# Patient Record
Sex: FEMALE | Race: WHITE | NOT HISPANIC OR LATINO | Employment: PART TIME | URBAN - METROPOLITAN AREA
[De-identification: names, ages, dates, MRNs, and addresses within clinical notes are randomized per-mention and may not be internally consistent; named-entity substitution may affect disease eponyms.]

---

## 2017-09-03 DIAGNOSIS — I10 ESSENTIAL (PRIMARY) HYPERTENSION: ICD-10-CM

## 2017-09-03 DIAGNOSIS — O24.419 GESTATIONAL DIABETES MELLITUS IN PREGNANCY: ICD-10-CM

## 2017-09-03 DIAGNOSIS — D25.9 LEIOMYOMA OF UTERUS: ICD-10-CM

## 2018-01-24 ENCOUNTER — APPOINTMENT (OUTPATIENT)
Dept: LAB | Facility: HOSPITAL | Age: 43
End: 2018-01-24
Payer: COMMERCIAL

## 2018-01-24 ENCOUNTER — OFFICE VISIT (OUTPATIENT)
Dept: OBGYN CLINIC | Facility: CLINIC | Age: 43
End: 2018-01-24
Payer: COMMERCIAL

## 2018-01-24 VITALS
DIASTOLIC BLOOD PRESSURE: 84 MMHG | SYSTOLIC BLOOD PRESSURE: 122 MMHG | BODY MASS INDEX: 27.21 KG/M2 | HEIGHT: 63 IN | WEIGHT: 153.6 LBS

## 2018-01-24 DIAGNOSIS — Z01.419 ENCOUNTER FOR ANNUAL ROUTINE GYNECOLOGICAL EXAMINATION: Primary | ICD-10-CM

## 2018-01-24 DIAGNOSIS — Z12.31 ENCOUNTER FOR SCREENING MAMMOGRAM FOR BREAST CANCER: ICD-10-CM

## 2018-01-24 PROCEDURE — 99396 PREV VISIT EST AGE 40-64: CPT | Performed by: OBSTETRICS & GYNECOLOGY

## 2018-01-24 PROCEDURE — G0145 SCR C/V CYTO,THINLAYER,RESCR: HCPCS | Performed by: OBSTETRICS & GYNECOLOGY

## 2018-01-24 PROCEDURE — 87624 HPV HI-RISK TYP POOLED RSLT: CPT | Performed by: OBSTETRICS & GYNECOLOGY

## 2018-01-24 RX ORDER — IRBESARTAN 300 MG/1
1 TABLET ORAL DAILY
COMMUNITY
Start: 2014-09-08 | End: 2018-03-27 | Stop reason: SDUPTHER

## 2018-01-24 RX ORDER — NIFEDIPINE 60 MG/1
2 TABLET, EXTENDED RELEASE ORAL DAILY
COMMUNITY
Start: 2011-03-29 | End: 2018-03-27 | Stop reason: SDUPTHER

## 2018-01-24 RX ORDER — SERTRALINE HYDROCHLORIDE 100 MG/1
1.5 TABLET, FILM COATED ORAL DAILY
COMMUNITY
Start: 2015-07-13 | End: 2018-03-27 | Stop reason: SDUPTHER

## 2018-01-24 NOTE — PATIENT INSTRUCTIONS
The patient was instructed to keep will exercises, she will make arrangements for mammogram now, she should continue to try to stop smoking she may see to help with her primary physician

## 2018-01-24 NOTE — PROGRESS NOTES
This is a 72-year-old white female, she is a  3 para 3 with 1 vaginal birth followed by 2  sections and a tubal ligation  Her current method of contraception includes tubal ligation  Her menstrual cycles are regular and predictable  She denies any problems with intimacy  Does admit to occasional problem with slight stress urine incontinence  She denies any major  GI complaint  The patient denies any new major family illnesses  She will need to get a mammogram soon  She also has a history of an abnormal Pap smear in the past   She has not been seen in this office for over 2 years  She will need to make arrangements for Pap smear today  The patient states she sees a dentist on a regular basis  She is happy with her weight and exercises on a regular basis  The patient still smoking up to a half pack a day  We had a discussion about stopping she may consider  She is being treated medically for depression and anxiety and for hypertension    Review of systems essentially negative  She does admit to slight stress urine incontinence, this is not interfering with her lifestyle  Family history negative  Social history positive for alcohol and tobacco     Surgical history is positive for  sections x2, history of tubal ligation    Physical exam shows a well-developed well-nourished white female acute distress her lungs clear abdomen soft there are no masses pelvic exam the external genitalia normal limits the vagina is clean, the uterus is retroverted slightly enlarge, there is a slight uterine prolapse  The vagina is otherwise unremarkable  The bladder is well supported  Pap smear was performed

## 2018-01-30 LAB — HPV RRNA GENITAL QL NAA+PROBE: NORMAL

## 2018-01-31 LAB
LAB AP GYN PRIMARY INTERPRETATION: NORMAL
Lab: NORMAL
PATH INTERP SPEC-IMP: NORMAL

## 2018-03-27 DIAGNOSIS — Z12.31 ENCOUNTER FOR SCREENING MAMMOGRAM FOR BREAST CANCER: ICD-10-CM

## 2018-03-27 DIAGNOSIS — Z01.419 ENCOUNTER FOR ANNUAL ROUTINE GYNECOLOGICAL EXAMINATION: ICD-10-CM

## 2018-03-28 RX ORDER — NIFEDIPINE 60 MG/1
TABLET, EXTENDED RELEASE ORAL
Qty: 60 TABLET | Refills: 0 | Status: SHIPPED | OUTPATIENT
Start: 2018-03-28 | End: 2018-08-21 | Stop reason: SDUPTHER

## 2018-03-28 RX ORDER — SERTRALINE HYDROCHLORIDE 100 MG/1
TABLET, FILM COATED ORAL
Qty: 45 TABLET | Refills: 0 | Status: SHIPPED | OUTPATIENT
Start: 2018-03-28 | End: 2018-08-21 | Stop reason: SDUPTHER

## 2018-03-28 RX ORDER — IRBESARTAN 300 MG/1
TABLET ORAL
Qty: 30 TABLET | Refills: 0 | Status: SHIPPED | OUTPATIENT
Start: 2018-03-28 | End: 2018-08-21 | Stop reason: SDUPTHER

## 2018-08-16 ENCOUNTER — APPOINTMENT (OUTPATIENT)
Dept: LAB | Facility: HOSPITAL | Age: 43
End: 2018-08-16
Payer: COMMERCIAL

## 2018-08-16 DIAGNOSIS — D25.9 LEIOMYOMA OF UTERUS: ICD-10-CM

## 2018-08-16 DIAGNOSIS — I10 ESSENTIAL (PRIMARY) HYPERTENSION: ICD-10-CM

## 2018-08-16 DIAGNOSIS — O24.419 GESTATIONAL DIABETES MELLITUS IN PREGNANCY: ICD-10-CM

## 2018-08-16 LAB
ALBUMIN SERPL BCP-MCNC: 3.7 G/DL (ref 3.5–5)
ALP SERPL-CCNC: 73 U/L (ref 46–116)
ALT SERPL W P-5'-P-CCNC: 13 U/L (ref 12–78)
ANION GAP SERPL CALCULATED.3IONS-SCNC: 6 MMOL/L (ref 4–13)
AST SERPL W P-5'-P-CCNC: 11 U/L (ref 5–45)
BILIRUB SERPL-MCNC: 0.4 MG/DL (ref 0.2–1)
BUN SERPL-MCNC: 10 MG/DL (ref 5–25)
CALCIUM SERPL-MCNC: 8.8 MG/DL (ref 8.3–10.1)
CHLORIDE SERPL-SCNC: 106 MMOL/L (ref 100–108)
CHOLEST SERPL-MCNC: 171 MG/DL (ref 50–200)
CO2 SERPL-SCNC: 25 MMOL/L (ref 21–32)
CREAT SERPL-MCNC: 0.51 MG/DL (ref 0.6–1.3)
ERYTHROCYTE [DISTWIDTH] IN BLOOD BY AUTOMATED COUNT: 16.2 % (ref 11.6–15.1)
EST. AVERAGE GLUCOSE BLD GHB EST-MCNC: 117 MG/DL
GFR SERPL CREATININE-BSD FRML MDRD: 119 ML/MIN/1.73SQ M
GLUCOSE P FAST SERPL-MCNC: 87 MG/DL (ref 65–99)
HBA1C MFR BLD: 5.7 % (ref 4.2–6.3)
HCT VFR BLD AUTO: 38.9 % (ref 34.8–46.1)
HDLC SERPL-MCNC: 43 MG/DL (ref 40–60)
HGB BLD-MCNC: 12.2 G/DL (ref 11.5–15.4)
LDLC SERPL CALC-MCNC: 111 MG/DL (ref 0–100)
MCH RBC QN AUTO: 27.5 PG (ref 26.8–34.3)
MCHC RBC AUTO-ENTMCNC: 31.4 G/DL (ref 31.4–37.4)
MCV RBC AUTO: 88 FL (ref 82–98)
PLATELET # BLD AUTO: 267 THOUSANDS/UL (ref 149–390)
PMV BLD AUTO: 10.6 FL (ref 8.9–12.7)
POTASSIUM SERPL-SCNC: 3.7 MMOL/L (ref 3.5–5.3)
PROT SERPL-MCNC: 7.6 G/DL (ref 6.4–8.2)
RBC # BLD AUTO: 4.43 MILLION/UL (ref 3.81–5.12)
SODIUM SERPL-SCNC: 137 MMOL/L (ref 136–145)
TRIGL SERPL-MCNC: 87 MG/DL
TSH SERPL DL<=0.05 MIU/L-ACNC: 1.24 UIU/ML (ref 0.36–3.74)
WBC # BLD AUTO: 6.8 THOUSAND/UL (ref 4.31–10.16)

## 2018-08-16 PROCEDURE — 36415 COLL VENOUS BLD VENIPUNCTURE: CPT

## 2018-08-16 PROCEDURE — 80061 LIPID PANEL: CPT

## 2018-08-16 PROCEDURE — 80053 COMPREHEN METABOLIC PANEL: CPT

## 2018-08-16 PROCEDURE — 84443 ASSAY THYROID STIM HORMONE: CPT

## 2018-08-16 PROCEDURE — 85027 COMPLETE CBC AUTOMATED: CPT

## 2018-08-16 PROCEDURE — 83036 HEMOGLOBIN GLYCOSYLATED A1C: CPT

## 2018-08-21 ENCOUNTER — OFFICE VISIT (OUTPATIENT)
Dept: FAMILY MEDICINE CLINIC | Facility: CLINIC | Age: 43
End: 2018-08-21
Payer: COMMERCIAL

## 2018-08-21 VITALS
BODY MASS INDEX: 28.28 KG/M2 | RESPIRATION RATE: 16 BRPM | TEMPERATURE: 98.5 F | DIASTOLIC BLOOD PRESSURE: 80 MMHG | HEIGHT: 63 IN | WEIGHT: 159.6 LBS | HEART RATE: 72 BPM | SYSTOLIC BLOOD PRESSURE: 120 MMHG

## 2018-08-21 DIAGNOSIS — Z12.31 ENCOUNTER FOR SCREENING MAMMOGRAM FOR BREAST CANCER: ICD-10-CM

## 2018-08-21 DIAGNOSIS — R25.1 TREMOR OF FACE AND HANDS: ICD-10-CM

## 2018-08-21 DIAGNOSIS — R20.0 NUMBNESS AND TINGLING OF LEFT LEG: ICD-10-CM

## 2018-08-21 DIAGNOSIS — Z00.00 WELL WOMAN EXAM (NO GYNECOLOGICAL EXAM): Primary | ICD-10-CM

## 2018-08-21 DIAGNOSIS — R20.2 NUMBNESS AND TINGLING OF LEFT LEG: ICD-10-CM

## 2018-08-21 DIAGNOSIS — J30.9 ALLERGIC RHINITIS, UNSPECIFIED SEASONALITY, UNSPECIFIED TRIGGER: ICD-10-CM

## 2018-08-21 DIAGNOSIS — Z01.419 ENCOUNTER FOR ANNUAL ROUTINE GYNECOLOGICAL EXAMINATION: ICD-10-CM

## 2018-08-21 PROCEDURE — 99396 PREV VISIT EST AGE 40-64: CPT | Performed by: FAMILY MEDICINE

## 2018-08-21 RX ORDER — IRBESARTAN 300 MG/1
300 TABLET ORAL DAILY
Qty: 90 TABLET | Refills: 3 | Status: SHIPPED | OUTPATIENT
Start: 2018-08-21 | End: 2019-11-04 | Stop reason: SDUPTHER

## 2018-08-21 RX ORDER — SERTRALINE HYDROCHLORIDE 100 MG/1
150 TABLET, FILM COATED ORAL DAILY
Qty: 135 TABLET | Refills: 3 | Status: SHIPPED | OUTPATIENT
Start: 2018-08-21 | End: 2019-11-04 | Stop reason: SDUPTHER

## 2018-08-21 RX ORDER — NIFEDIPINE 60 MG/1
120 TABLET, EXTENDED RELEASE ORAL DAILY
Qty: 180 TABLET | Refills: 1 | Status: SHIPPED | OUTPATIENT
Start: 2018-08-21 | End: 2019-07-12 | Stop reason: SDUPTHER

## 2018-08-21 NOTE — PROGRESS NOTES
FAMILY PRACTICE OFFICE VISIT       NAME: Bj Wright  AGE: 43 y o  SEX: female       : 1975        MRN: [id number]        Assessment and Plan     Problem List Items Addressed This Visit     Tremor of face and hands    Relevant Orders    Ambulatory referral to Neurology    Numbness and tingling of left leg    Relevant Orders    EMG 2 limb lower extremity      Other Visit Diagnoses     Well woman exam (no gynecological exam)    -  Primary    Allergic rhinitis, unspecified seasonality, unspecified trigger        Relevant Medications    Beclomethasone Dipropionate 80 MCG/ACT AERS    Encounter for annual routine gynecological examination        Relevant Medications    irbesartan (AVAPRO) 300 mg tablet    sertraline (ZOLOFT) 100 mg tablet    NIFEdipine (PROCARDIA XL) 60 mg 24 hr tablet    Encounter for screening mammogram for breast cancer        Relevant Medications    irbesartan (AVAPRO) 300 mg tablet    sertraline (ZOLOFT) 100 mg tablet    NIFEdipine (PROCARDIA XL) 60 mg 24 hr tablet       Patient presents for annual well exam   I advised her to contact her gyn to discuss results of Pap smear performed in January revealing ASCUS  Her HPV testing was negative  I reminded patient to proceed with mammography as ordered by gyn  Will order EMG/nerve conduction study of bilateral lower extremities to evaluate persistent numbness and paresthesias of left lower shin and foot  Patient should schedule non urgent consult with [facility]'s Neurology regarding chronic hand tremors and had shakiness  She denies any headaches or dizziness  Will try corticosteroid nasal spray for symptoms of chronic allergic rhinitis  Depression  Anxiety  Patient is doing well, continue Zoloft 150 mg daily  Hypertension:  Blood pressure is well controlled on regimen of Avapro and Procardia  Follow-up pending diagnostic results, updates, annually and as needed      Patient Instructions   Please  Call Dr Keith to review PAP results Your HPV screening was normal /negative   please schedule mammography   please proceed with EMG Re : left leg numbness    Neurology consult- non-urgent          Chief Complaint     Chief Complaint   Patient presents with    Follow-up     pt is here for f/u for meds, BP and blood work    Numbness     Left foot numbness       History of Present Illness     Annual well exam  Patient remains on medications for hypertension and depression/anxiety  She uses Avapro 300 mg once a day and Procardia 60 mg daily  She denies chest pain, palpitations, shortness of breath or dizziness  No abdominal discomfort or dysuria  Patient is up-to-date with gyn exam, January 2018  She is due for mammography  Occ  LBP-  No sciatica  Patient is complaining of rather persistent left  Foot and lower distal  Left  Shin paresthesias for the last few months  Patient states that her left dorsal foot feels numb, area affecting mid foot and 2nd, 3rd, 4th and 5th toes, she is also experiencing " jolts' of discomfort with touching lower anterior left shin   Patient also reports symptom of chronic sshakiness of head and B/L hands  Symptoms are present for many years, mild overall -  somewhat more noticeable lately   no headaches, no dizziness  Results of recent blood work discussed  All normal     Chronic nasal congestion, left ear feels clogged  No cold symptoms                 Review of Systems   Review of Systems   Constitutional: Negative  HENT: Positive for congestion and ear pain (Left ear feels blocked)  Eyes: Negative  Respiratory: Negative  Gastrointestinal: Negative  Endocrine: Negative  Genitourinary: Negative  Musculoskeletal: Negative  Allergic/Immunologic: Negative  Neurological: Positive for tremors and numbness  Hematological: Negative  Psychiatric/Behavioral: The patient is nervous/anxious          Active Problem List     Patient Active Problem List   Diagnosis    Benign essential hypertension    Depression with anxiety    Leiomyoma of uterus    Tremor of face and hands    Numbness and tingling of left leg       Past Medical History:  Past Medical History:   Diagnosis Date    Preeclampsia        Past Surgical History:  Past Surgical History:   Procedure Laterality Date     SECTION      EYE SURGERY      TUBAL LIGATION         Family History:  Family History   Problem Relation Age of Onset    Hypertension Father     Kidney disease Paternal Grandfather        Social History:  Social History     Social History    Marital status: Single     Spouse name: N/A    Number of children: N/A    Years of education: N/A     Occupational History    Not on file  Social History Main Topics    Smoking status: Current Every Day Smoker    Smokeless tobacco: Never Used    Alcohol use No    Drug use: No    Sexual activity: Not on file     Other Topics Concern    Not on file     Social History Narrative    No narrative on file         Objective     Vitals:    18 1132   BP: 120/80   Pulse:    Resp:    Temp:      Wt Readings from Last 3 Encounters:   18 72 4 kg (159 lb 9 6 oz)   18 69 7 kg (153 lb 9 6 oz)   16 71 3 kg (157 lb 4 oz)       Physical Exam   Constitutional: She is oriented to person, place, and time  She appears well-developed and well-nourished  HENT:   Head: Normocephalic and atraumatic  Right Ear: Tympanic membrane and external ear normal    Left Ear: Tympanic membrane and external ear normal    Nose: Mucosal edema (Pale congested nasal mucosa) present  Eyes: Conjunctivae are normal  Pupils are equal, round, and reactive to light  Neck: Normal range of motion  Neck supple  No thyromegaly present  Cardiovascular: Normal rate, regular rhythm and normal heart sounds  No murmur heard  Pulmonary/Chest: Effort normal and breath sounds normal  She has no wheezes  She has no rales  Abdominal: Soft   Bowel sounds are normal  She exhibits no abdominal bruit  There is no tenderness  Musculoskeletal: Normal range of motion  She exhibits no edema  No calf discoloration, swelling or edema  No pedal edema discoloration  Normal pedal pulses bilaterally   Neurological: She is alert and oriented to person, place, and time  No cranial nerve deficit  Skin: No rash noted  Psychiatric: She has a normal mood and affect  Judgment and thought content normal    Nursing note and vitals reviewed        Pertinent Laboratory/Diagnostic Studies:  Lab Results   Component Value Date    GLUCOSE 84 07/22/2016    BUN 10 08/16/2018    CREATININE 0 51 (L) 08/16/2018    CALCIUM 8 8 08/16/2018     08/16/2018    K 3 7 08/16/2018    CO2 25 08/16/2018     08/16/2018     Lab Results   Component Value Date    ALT 13 08/16/2018    AST 11 08/16/2018    ALKPHOS 73 08/16/2018    BILITOT 0 40 08/16/2018       Lab Results   Component Value Date    WBC 6 80 08/16/2018    HGB 12 2 08/16/2018    HCT 38 9 08/16/2018    MCV 88 08/16/2018     08/16/2018       No results found for: TSH    Lab Results   Component Value Date    CHOL 194 07/09/2015     Lab Results   Component Value Date    TRIG 87 08/16/2018     Lab Results   Component Value Date    HDL 43 08/16/2018     Lab Results   Component Value Date    LDLCALC 111 (H) 08/16/2018     Lab Results   Component Value Date    HGBA1C 5 7 08/16/2018       Results for orders placed or performed in visit on 08/16/18   CBC   Result Value Ref Range    WBC 6 80 4 31 - 10 16 Thousand/uL    RBC 4 43 3 81 - 5 12 Million/uL    Hemoglobin 12 2 11 5 - 15 4 g/dL    Hematocrit 38 9 34 8 - 46 1 %    MCV 88 82 - 98 fL    MCH 27 5 26 8 - 34 3 pg    MCHC 31 4 31 4 - 37 4 g/dL    RDW 16 2 (H) 11 6 - 15 1 %    Platelets 638 816 - 094 Thousands/uL    MPV 10 6 8 9 - 12 7 fL   Comprehensive metabolic panel   Result Value Ref Range    Sodium 137 136 - 145 mmol/L    Potassium 3 7 3 5 - 5 3 mmol/L    Chloride 106 100 - 108 mmol/L    CO2 25 21 - 32 mmol/L    Anion Gap 6 4 - 13 mmol/L    BUN 10 5 - 25 mg/dL    Creatinine 0 51 (L) 0 60 - 1 30 mg/dL    Glucose, Fasting 87 65 - 99 mg/dL    Calcium 8 8 8 3 - 10 1 mg/dL    AST 11 5 - 45 U/L    ALT 13 12 - 78 U/L    Alkaline Phosphatase 73 46 - 116 U/L    Total Protein 7 6 6 4 - 8 2 g/dL    Albumin 3 7 3 5 - 5 0 g/dL    Total Bilirubin 0 40 0 20 - 1 00 mg/dL    eGFR 119 ml/min/1 73sq m   TSH, 3rd generation   Result Value Ref Range    TSH 3RD GENERATON 1 240 0 358 - 3 740 uIU/mL   Lipid Panel with Direct LDL reflex   Result Value Ref Range    Cholesterol 171 50 - 200 mg/dL    Triglycerides 87 <=150 mg/dL    HDL, Direct 43 40 - 60 mg/dL    LDL Calculated 111 (H) 0 - 100 mg/dL   Hemoglobin A1c   Result Value Ref Range    Hemoglobin A1C 5 7 4 2 - 6 3 %     mg/dl       Orders Placed This Encounter   Procedures    Ambulatory referral to Neurology    EMG 2 limb lower extremity       ALLERGIES:  No Known Allergies    Current Medications     Current Outpatient Prescriptions   Medication Sig Dispense Refill    irbesartan (AVAPRO) 300 mg tablet Take 1 tablet (300 mg total) by mouth daily 90 tablet 3    NIFEdipine (PROCARDIA XL) 60 mg 24 hr tablet Take 2 tablets (120 mg total) by mouth daily 180 tablet 1    sertraline (ZOLOFT) 100 mg tablet Take 1 5 tablets (150 mg total) by mouth daily 135 tablet 3    Beclomethasone Dipropionate 80 MCG/ACT AERS 2 Act (160 mcg total) into each nostril daily 1 Inhaler 3     No current facility-administered medications for this visit            Health Maintenance     Health Maintenance   Topic Date Due    Pneumococcal PPSV23 Medium Risk Adult (1 of 1 - PPSV23) 09/05/1994    Depression Screening PHQ-9  09/17/2018 (Originally 1975)    MAMMOGRAM  09/17/2018 (Originally 1975)    INFLUENZA VACCINE  09/01/2018    PAP SMEAR  01/24/2021    DTaP,Tdap,and Td Vaccines (2 - Td) 07/12/2024     Immunization History   Administered Date(s) Administered    Influenza TIV (IM) 10/16/2015  Tdap 07/12/2014    Tuberculin Skin Test-PPD Intradermal 09/10/2012       Rajat Pérez MD

## 2018-08-21 NOTE — PATIENT INSTRUCTIONS
Please  Call Dr Keith to review PAP results   Your HPV screening was normal /negative   please schedule mammography   please proceed with EMG Re : left leg numbness    Neurology consult- non-urgent

## 2018-10-29 ENCOUNTER — TELEPHONE (OUTPATIENT)
Dept: OBGYN CLINIC | Facility: CLINIC | Age: 43
End: 2018-10-29

## 2018-10-29 NOTE — TELEPHONE ENCOUNTER
Pt was at her pcp and they told her she should fu with her GYN because her last pap looks questionable  Please call pt she is worried

## 2019-03-11 ENCOUNTER — OFFICE VISIT (OUTPATIENT)
Dept: FAMILY MEDICINE CLINIC | Facility: CLINIC | Age: 44
End: 2019-03-11
Payer: COMMERCIAL

## 2019-03-11 VITALS
TEMPERATURE: 98.8 F | SYSTOLIC BLOOD PRESSURE: 132 MMHG | WEIGHT: 172 LBS | HEIGHT: 63 IN | OXYGEN SATURATION: 96 % | RESPIRATION RATE: 16 BRPM | HEART RATE: 76 BPM | DIASTOLIC BLOOD PRESSURE: 90 MMHG | BODY MASS INDEX: 30.48 KG/M2

## 2019-03-11 DIAGNOSIS — J11.1 INFLUENZA: Primary | ICD-10-CM

## 2019-03-11 PROCEDURE — 99213 OFFICE O/P EST LOW 20 MIN: CPT | Performed by: NURSE PRACTITIONER

## 2019-03-11 PROCEDURE — 94640 AIRWAY INHALATION TREATMENT: CPT | Performed by: NURSE PRACTITIONER

## 2019-03-11 PROCEDURE — A7003 NEBULIZER ADMINISTRATION SET: HCPCS | Performed by: NURSE PRACTITIONER

## 2019-03-11 PROCEDURE — 3008F BODY MASS INDEX DOCD: CPT | Performed by: NURSE PRACTITIONER

## 2019-03-11 RX ORDER — ALBUTEROL SULFATE 90 UG/1
2 AEROSOL, METERED RESPIRATORY (INHALATION) EVERY 4 HOURS PRN
Qty: 18 G | Refills: 0 | Status: SHIPPED | OUTPATIENT
Start: 2019-03-11 | End: 2019-07-24

## 2019-03-11 RX ORDER — OSELTAMIVIR PHOSPHATE 75 MG/1
75 CAPSULE ORAL EVERY 12 HOURS SCHEDULED
Qty: 10 CAPSULE | Refills: 0 | Status: SHIPPED | OUTPATIENT
Start: 2019-03-11 | End: 2019-03-16

## 2019-03-11 RX ORDER — ALBUTEROL SULFATE 2.5 MG/3ML
2.5 SOLUTION RESPIRATORY (INHALATION) ONCE
Status: COMPLETED | OUTPATIENT
Start: 2019-03-11 | End: 2019-03-11

## 2019-03-11 RX ORDER — DEXTROMETHORPHAN HYDROBROMIDE AND PROMETHAZINE HYDROCHLORIDE 15; 6.25 MG/5ML; MG/5ML
5 SYRUP ORAL 4 TIMES DAILY PRN
Qty: 180 ML | Refills: 0 | Status: SHIPPED | OUTPATIENT
Start: 2019-03-11 | End: 2019-07-24

## 2019-03-11 RX ADMIN — ALBUTEROL SULFATE 2.5 MG: 2.5 SOLUTION RESPIRATORY (INHALATION) at 12:40

## 2019-03-11 NOTE — LETTER
March 11, 2019     Patient: Summer Carballo   YOB: 1975   Date of Visit: 3/11/2019       To Whom it May Concern:    Janett Flores is under my professional care  She was seen in my office on 3/11/2019  She may return to work on 03/16/2019  If you have any questions or concerns, please don't hesitate to call           Sincerely,          LUKE Olivo        CC: No Recipients

## 2019-03-11 NOTE — PROGRESS NOTES
FAMILY PRACTICE OFFICE VISIT       NAME: Nelli Palma  AGE: 37 y o  SEX: female       : 1975        MRN: [id number]    DATE: 3/11/2019    Assessment and Plan     Problem List Items Addressed This Visit     None      Visit Diagnoses     Influenza    -  Primary    Relevant Medications    promethazine-dextromethorphan (PHENERGAN-DM) 6 25-15 mg/5 mL oral syrup    oseltamivir (TAMIFLU) 75 mg capsule    albuterol inhalation solution 2 5 mg (Completed)    albuterol (VENTOLIN HFA) 90 mcg/act inhaler          1  Influenza  promethazine-dextromethorphan (PHENERGAN-DM) 6 25-15 mg/5 mL oral syrup    oseltamivir (TAMIFLU) 75 mg capsule    albuterol inhalation solution 2 5 mg    albuterol (VENTOLIN HFA) 90 mcg/act inhaler     This 77-year-old presents today with symptoms consistent with influenza  She was exposed to influenza at her work  She did receive influenza vaccination this season  She was feeling chest tightness and shortness of breath, which improved after albuterol nebulizer in office  Lung sounds initially clear, but decreased  Improved aeration after nebulizer  Will treat with Tamiflu 75 mg twice daily for 5 days  Albuterol inhaler prescribed 2 puffs every 4-6 hours as needed for shortness of breath, wheezing, or chest tightness  Promethazine DM cough syrup as needed  Continue supportive care:  Rest, increase fluids, warm tea, honey, humidification  If symptoms worsen, or if symptoms are not improving by the end of the week, instructed to call  We did review typical course of influenza and prevention of spread of illness  Chief Complaint     Chief Complaint   Patient presents with    Nasal Congestion    Generalized Body Aches    Fever    Cough    Wheezing       History of Present Illness     Nelli Palma is a 77-year-old female presenting today with fevers, chills, sweats, body aches for the past 2 days  Temperature max 102    Symptoms also include cough, rhinorrhea, nasal congestion and fatigue  She does feel out of breath and wheezy at times  She has been taking Mucinex, Tylenol, and ibuprofen  She did have of flu shot this season  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  Blood pressure is elevated today, she believes she forgot to take her medication today  Review of Systems   Review of Systems   Constitutional: Positive for chills, diaphoresis, fatigue and fever  HENT: Positive for congestion, postnasal drip, rhinorrhea and sore throat  Negative for ear pain  Respiratory: Positive for cough, chest tightness, shortness of breath and wheezing  Cardiovascular: Negative  Gastrointestinal: Negative  Musculoskeletal: Positive for myalgias  Neurological: Positive for headaches  Negative for dizziness         Active Problem List     Patient Active Problem List   Diagnosis    Benign essential hypertension    Depression with anxiety    Leiomyoma of uterus    Tremor of face and hands    Numbness and tingling of left leg       Past Medical History:  Past Medical History:   Diagnosis Date    Preeclampsia        Past Surgical History:  Past Surgical History:   Procedure Laterality Date     SECTION      EYE SURGERY      TUBAL LIGATION         Family History:  Family History   Problem Relation Age of Onset    Hypertension Father     Kidney disease Paternal Grandfather        Social History:  Social History     Socioeconomic History    Marital status: Single     Spouse name: Not on file    Number of children: Not on file    Years of education: Not on file    Highest education level: Not on file   Occupational History    Not on file   Social Needs    Financial resource strain: Not on file    Food insecurity:     Worry: Not on file     Inability: Not on file    Transportation needs:     Medical: Not on file     Non-medical: Not on file   Tobacco Use    Smoking status: Current Every Day Smoker    Smokeless tobacco: Never Used   Substance and Sexual Activity    Alcohol use: No    Drug use: No    Sexual activity: Not on file   Lifestyle    Physical activity:     Days per week: Not on file     Minutes per session: Not on file    Stress: Not on file   Relationships    Social connections:     Talks on phone: Not on file     Gets together: Not on file     Attends Bahai service: Not on file     Active member of club or organization: Not on file     Attends meetings of clubs or organizations: Not on file     Relationship status: Not on file    Intimate partner violence:     Fear of current or ex partner: Not on file     Emotionally abused: Not on file     Physically abused: Not on file     Forced sexual activity: Not on file   Other Topics Concern    Not on file   Social History Narrative    Not on file       I have reviewed the patient's medical history in detail; there are no changes to the history as noted in the electronic medical record  Objective     Vitals:    03/11/19 1201 03/11/19 1230 03/11/19 1231   BP: (!) 142/102 132/90    BP Location: Right arm     Patient Position: Sitting     Cuff Size: Adult     Pulse: 88  76   Resp: (!) 24  16   Temp: 98 8 °F (37 1 °C)     TempSrc: Tympanic     SpO2:   96%   Weight: 78 kg (172 lb)     Height: 5' 3" (1 6 m)       Wt Readings from Last 3 Encounters:   03/11/19 78 kg (172 lb)   08/21/18 72 4 kg (159 lb 9 6 oz)   01/24/18 69 7 kg (153 lb 9 6 oz)     Body mass index is 30 47 kg/m²  PHQ-9 Depression Screening    PHQ-9:    Frequency of the following problems over the past two weeks:            Physical Exam   Constitutional: She appears well-developed and well-nourished  She appears ill  No distress  HENT:   Head: Normocephalic and atraumatic  Right Ear: Tympanic membrane and ear canal normal    Left Ear: Tympanic membrane and ear canal normal    Nose: Mucosal edema present     Mouth/Throat: Posterior oropharyngeal erythema present  No oropharyngeal exudate or posterior oropharyngeal edema  Eyes: Conjunctivae are normal    Neck: Normal range of motion  Neck supple  Cardiovascular: Normal rate and normal heart sounds  Pulmonary/Chest: Effort normal and breath sounds normal    Musculoskeletal: She exhibits no edema  Lymphadenopathy:     She has cervical adenopathy (Bilateral submandibular adenopathy)  Psychiatric: She has a normal mood and affect  Nursing note and vitals reviewed  Mini neb  Performed by: LUKE Salmon  Authorized by: LUKE Salmon     Treatment 1:   Pre-Procedure     Symptoms:  Shortness of breath and cough    Lung Sounds:  Clear, decreased    HR:  88    RR:  24    SP02:  96    Medication Administered:  Albuterol 2 5 mg  Post-Procedure     Symptoms:  Cough    Lung sounds:  Clear, improved aeration    HR:  76    RR:  16        ALLERGIES:  No Known Allergies    Current Medications     Current Outpatient Medications   Medication Sig Dispense Refill    Beclomethasone Dipropionate 80 MCG/ACT AERS 2 Act (160 mcg total) into each nostril daily 1 Inhaler 3    irbesartan (AVAPRO) 300 mg tablet Take 1 tablet (300 mg total) by mouth daily 90 tablet 3    NIFEdipine (PROCARDIA XL) 60 mg 24 hr tablet Take 2 tablets (120 mg total) by mouth daily 180 tablet 1    sertraline (ZOLOFT) 100 mg tablet Take 1 5 tablets (150 mg total) by mouth daily 135 tablet 3    albuterol (VENTOLIN HFA) 90 mcg/act inhaler Inhale 2 puffs every 4 (four) hours as needed for wheezing 18 g 0    oseltamivir (TAMIFLU) 75 mg capsule Take 1 capsule (75 mg total) by mouth every 12 (twelve) hours for 5 days 10 capsule 0    promethazine-dextromethorphan (PHENERGAN-DM) 6 25-15 mg/5 mL oral syrup Take 5 mL by mouth 4 (four) times a day as needed for cough 180 mL 0     No current facility-administered medications for this visit            Health Maintenance     Health Maintenance   Topic Date Due    BMI: Followup Plan  09/05/1993    Pneumococcal PPSV23 Medium Risk Adult (1 of 1 - PPSV23) 09/05/1994    Depression Screening PHQ  04/11/2019 (Originally 1975)    MAMMOGRAM  04/11/2019 (Originally 1975)    BMI: Adult  03/11/2020    PAP SMEAR  01/24/2021    DTaP,Tdap,and Td Vaccines (2 - Td) 07/12/2024    INFLUENZA VACCINE  Completed    HEPATITIS B VACCINES  Aged Out     Immunization History   Administered Date(s) Administered     Influenza (IM) Preservative Free 10/29/2018    Influenza TIV (IM) 10/16/2015    Tdap 07/12/2014    Tuberculin Skin Test-PPD Intradermal 09/10/2012       LUKE Ball

## 2019-03-18 ENCOUNTER — TELEPHONE (OUTPATIENT)
Dept: FAMILY MEDICINE CLINIC | Facility: CLINIC | Age: 44
End: 2019-03-18

## 2019-03-18 NOTE — TELEPHONE ENCOUNTER
Patient called and stated that she had the flu last week and had taken all of her medication but now she has a rash on Both of her arms that are itchy  She isnt sure if its dry skin or what its from  No pain from the rash  I offered appointment for tomorrow but refused at this time  Please call to advise on what she should do?

## 2019-07-12 DIAGNOSIS — Z01.419 ENCOUNTER FOR ANNUAL ROUTINE GYNECOLOGICAL EXAMINATION: ICD-10-CM

## 2019-07-12 DIAGNOSIS — Z12.31 ENCOUNTER FOR SCREENING MAMMOGRAM FOR BREAST CANCER: ICD-10-CM

## 2019-07-12 RX ORDER — NIFEDIPINE 60 MG/1
TABLET, EXTENDED RELEASE ORAL
Qty: 180 TABLET | Refills: 0 | Status: SHIPPED | OUTPATIENT
Start: 2019-07-12 | End: 2019-11-04 | Stop reason: SDUPTHER

## 2019-07-21 ENCOUNTER — HOSPITAL ENCOUNTER (EMERGENCY)
Facility: HOSPITAL | Age: 44
Discharge: HOME | End: 2019-07-21
Attending: EMERGENCY MEDICINE | Admitting: EMERGENCY MEDICINE
Payer: COMMERCIAL

## 2019-07-21 VITALS
HEART RATE: 99 BPM | HEIGHT: 63 IN | RESPIRATION RATE: 20 BRPM | DIASTOLIC BLOOD PRESSURE: 63 MMHG | SYSTOLIC BLOOD PRESSURE: 141 MMHG | BODY MASS INDEX: 30.47 KG/M2 | OXYGEN SATURATION: 96 %

## 2019-07-21 DIAGNOSIS — R55 SYNCOPE: Primary | ICD-10-CM

## 2019-07-21 DIAGNOSIS — E87.6 HYPOKALEMIA: ICD-10-CM

## 2019-07-21 LAB
ALBUMIN SERPL BCP-MCNC: 3.5 G/DL (ref 3.5–5)
ALP SERPL-CCNC: 84 U/L (ref 46–116)
ALT SERPL W P-5'-P-CCNC: 14 U/L (ref 12–78)
ANION GAP SERPL CALCULATED.3IONS-SCNC: 9 MMOL/L (ref 4–13)
AST SERPL W P-5'-P-CCNC: 13 U/L (ref 5–45)
ATRIAL RATE: 82 BPM
BASOPHILS # BLD AUTO: 0.06 THOUSANDS/ΜL (ref 0–0.1)
BASOPHILS NFR BLD AUTO: 1 % (ref 0–1)
BILIRUB SERPL-MCNC: 0.22 MG/DL (ref 0.2–1)
BUN SERPL-MCNC: 15 MG/DL (ref 5–25)
CALCIUM SERPL-MCNC: 8.8 MG/DL (ref 8.3–10.1)
CHLORIDE SERPL-SCNC: 107 MMOL/L (ref 100–108)
CO2 SERPL-SCNC: 22 MMOL/L (ref 21–32)
CREAT SERPL-MCNC: 0.82 MG/DL (ref 0.6–1.3)
EOSINOPHIL # BLD AUTO: 0.02 THOUSAND/ΜL (ref 0–0.61)
EOSINOPHIL NFR BLD AUTO: 0 % (ref 0–6)
ERYTHROCYTE [DISTWIDTH] IN BLOOD BY AUTOMATED COUNT: 15.7 % (ref 11.6–15.1)
EXT PREG TEST URINE: NEGATIVE
EXT. CONTROL ED NAV: NORMAL
GFR SERPL CREATININE-BSD FRML MDRD: 88 ML/MIN/1.73SQ M
GLUCOSE SERPL-MCNC: 160 MG/DL (ref 65–140)
HCT VFR BLD AUTO: 37.6 % (ref 34.8–46.1)
HGB BLD-MCNC: 12.2 G/DL (ref 11.5–15.4)
IMM GRANULOCYTES # BLD AUTO: 0.02 THOUSAND/UL (ref 0–0.2)
IMM GRANULOCYTES NFR BLD AUTO: 0 % (ref 0–2)
LYMPHOCYTES # BLD AUTO: 2.33 THOUSANDS/ΜL (ref 0.6–4.47)
LYMPHOCYTES NFR BLD AUTO: 24 % (ref 14–44)
MCH RBC QN AUTO: 27.7 PG (ref 26.8–34.3)
MCHC RBC AUTO-ENTMCNC: 32.4 G/DL (ref 31.4–37.4)
MCV RBC AUTO: 85 FL (ref 82–98)
MONOCYTES # BLD AUTO: 0.78 THOUSAND/ΜL (ref 0.17–1.22)
MONOCYTES NFR BLD AUTO: 8 % (ref 4–12)
NEUTROPHILS # BLD AUTO: 6.33 THOUSANDS/ΜL (ref 1.85–7.62)
NEUTS SEG NFR BLD AUTO: 67 % (ref 43–75)
NRBC BLD AUTO-RTO: 0 /100 WBCS
P AXIS: 71 DEGREES
PLATELET # BLD AUTO: 235 THOUSANDS/UL (ref 149–390)
PMV BLD AUTO: 10.7 FL (ref 8.9–12.7)
POTASSIUM SERPL-SCNC: 3.3 MMOL/L (ref 3.5–5.3)
PR INTERVAL: 144 MS
PROT SERPL-MCNC: 7.3 G/DL (ref 6.4–8.2)
QRS AXIS: 11 DEGREES
QRSD INTERVAL: 80 MS
QT INTERVAL: 394 MS
QTC INTERVAL: 460 MS
RBC # BLD AUTO: 4.41 MILLION/UL (ref 3.81–5.12)
SODIUM SERPL-SCNC: 138 MMOL/L (ref 136–145)
T WAVE AXIS: 8 DEGREES
TROPONIN I SERPL-MCNC: <0.02 NG/ML
VENTRICULAR RATE: 82 BPM
WBC # BLD AUTO: 9.54 THOUSAND/UL (ref 4.31–10.16)

## 2019-07-21 PROCEDURE — 99284 EMERGENCY DEPT VISIT MOD MDM: CPT

## 2019-07-21 PROCEDURE — 93010 ELECTROCARDIOGRAM REPORT: CPT | Performed by: INTERNAL MEDICINE

## 2019-07-21 PROCEDURE — 84484 ASSAY OF TROPONIN QUANT: CPT | Performed by: EMERGENCY MEDICINE

## 2019-07-21 PROCEDURE — 81025 URINE PREGNANCY TEST: CPT | Performed by: EMERGENCY MEDICINE

## 2019-07-21 PROCEDURE — 93005 ELECTROCARDIOGRAM TRACING: CPT

## 2019-07-21 PROCEDURE — 99284 EMERGENCY DEPT VISIT MOD MDM: CPT | Performed by: EMERGENCY MEDICINE

## 2019-07-21 PROCEDURE — 36415 COLL VENOUS BLD VENIPUNCTURE: CPT | Performed by: EMERGENCY MEDICINE

## 2019-07-21 PROCEDURE — 80053 COMPREHEN METABOLIC PANEL: CPT | Performed by: EMERGENCY MEDICINE

## 2019-07-21 PROCEDURE — 85025 COMPLETE CBC W/AUTO DIFF WBC: CPT | Performed by: EMERGENCY MEDICINE

## 2019-07-21 RX ORDER — ATROPINE SULFATE 0.1 MG/ML
1 SYRINGE (ML) INJECTION ONCE
Status: DISCONTINUED | OUTPATIENT
Start: 2019-07-21 | End: 2019-07-21 | Stop reason: HOSPADM

## 2019-07-21 RX ORDER — POTASSIUM CHLORIDE 750 MG/1
10 TABLET, EXTENDED RELEASE ORAL DAILY
Qty: 5 TABLET | Refills: 0 | Status: SHIPPED | OUTPATIENT
Start: 2019-07-21 | End: 2019-11-29

## 2019-07-21 NOTE — ED ATTENDING ATTESTATION
Marizol Tomlinson MD, saw and evaluated the patient  I have discussed the patient with the resident/non-physician practitioner and agree with the resident's/non-physician practitioner's findings, Plan of Care, and MDM as documented in the resident's/non-physician practitioner's note, except where noted  All available labs and Radiology studies were reviewed  At this point I agree with the current assessment done in the Emergency Department  I have conducted an independent evaluation of this patient including a focused history of:    Emergency Department Note- Marilynn Díaz 37 y o  female MRN: [id number]    Unit/Bed#: ED 13 Encounter: [id number]    Marilynn Díaz is a 37 y o  female who presents with   Chief Complaint   Patient presents with    Syncope     Per EMS, patient had 2 episodes of syncope, one at [facility] where she works and one with EMS  Unknown headstrike, +LOC  Patient reports feeling faint prior with drop in BP (75/44 per EMS)  Pt reports headache currently  History of Present Illness   HPI:  Marilynn Díaz is a 37 y o  female who presents for evaluation of:  2 episodes of syncope while at work  Patient felt panicked then passed out  Patient notes that she had a headache  Patient denies palpitations and chest pain when she passed out  Patient denies h/o CV disease, seizure, neurovascular disease  Review of Systems   Constitutional: Negative for fatigue and fever  HENT: Negative for congestion and sinus pain  Cardiovascular: Negative for chest pain and palpitations  Neurological: Positive for syncope and headaches  All other systems reviewed and are negative        Historical Information   Past Medical History:   Diagnosis Date    Preeclampsia      Past Surgical History:   Procedure Laterality Date     SECTION      EYE SURGERY      TUBAL LIGATION       Social History   Social History     Substance and Sexual Activity   Alcohol Use No     Social History Substance and Sexual Activity   Drug Use No     Social History     Tobacco Use   Smoking Status Current Every Day Smoker   Smokeless Tobacco Never Used     Family History: non-contributory    Meds/Allergies   all medications and allergies reviewed  No Known Allergies    Objective   First Vitals:   Blood Pressure: 141/63 (19 0325)  Pulse: 99 (19 0325)  Respirations: 20 (19 032)  Height: 5' 3" (160 cm) (19 032)  SpO2: 96 % (19)    Current Vitals:   Blood Pressure: 141/63 (19 0325)  Pulse: 99 (19 0325)  Respirations: 20 (19)  Height: 5' 3" (160 cm) (19)  SpO2: 96 % (19)      Intake/Output Summary (Last 24 hours) at 2019 0338  Last data filed at 2019 7787  Gross per 24 hour   Intake 100 ml   Output    Net 100 ml       Invasive Devices     Peripheral Intravenous Line            Peripheral IV 19 Right Hand less than 1 day                Physical Exam   Constitutional: She is oriented to person, place, and time  She appears well-developed and well-nourished  HENT:   Head: Normocephalic and atraumatic  Cardiovascular: Normal rate and regular rhythm  Pulmonary/Chest: Effort normal and breath sounds normal    Abdominal: Soft  Bowel sounds are normal    Musculoskeletal: Normal range of motion  She exhibits no deformity  Neurological: She is alert and oriented to person, place, and time  Skin: Skin is warm and dry  Capillary refill takes less than 2 seconds  Psychiatric: She has a normal mood and affect  Her behavior is normal  Judgment and thought content normal    Nursing note and vitals reviewed   No LMP recorded  Medical Decision Makin  Syncope: ECG r/o arrhythmia; urine hcg r/o pregnancy; Tn r/o ACS; CBC r/o anemia    No results found for this or any previous visit (from the past 39 hour(s))    No orders to display         Portions of the record may have been created with voice recognition software  Occasional wrong word or "sound a like" substitutions may have occurred due to the inherent limitations of voice recognition software  Read the chart carefully and recognize, using context, where substitutions have occurred

## 2019-07-21 NOTE — ED PROVIDER NOTES
History  Chief Complaint   Patient presents with    Syncope     Per EMS, patient had 2 episodes of syncope, one at [facility] where she works and one with EMS  Unknown headstrike, +LOC  Patient reports feeling faint prior with drop in BP (75/44 per EMS)  Pt reports headache currently  Patient is a 51-year-old female with no significant past medical history who presents to the emergency department via EMS for syncope  She states that she was at work, returning from outside ago to an elevator, she states she felt very warm and flushed  States that she began feeling very lightheaded, and felt as though she was going to pass out so she tried to find a chair to sit down; states that her vision became dark and she subsequently "passed out " Unsure if she hit her head, fall was witnessed by a co-worker, who helped the patient into a seated position  She does have a prior history of syncope although she states has been many years since she has experienced this  She states that additionally while she was in the ambulance EN route to the emergency department, felt very lightheaded again, and presyncopal but she did not lose consciousness  Per EMS at that time, her heart rate and blood pressure decreased and then subsequently returned to normal   Given IV fluids by EMS  Patient states she currently feels generalized weakness, and fatigue  Denies chest pain, shortness of breath, cough, fever, chills, palpitations, headache, neck pain, neck stiffness, lightheadedness, dizziness, hearing changes, vision changes, difficulty swallowing, difficulty with speech, abdominal pain, nausea, vomiting, diarrhea, constipation, melena, hematochezia, dysuria urgency and frequency  She has no personal or family history of DVT or PE, no exogenous hormone therapy, no history of cancer, no recent surgeries or prolonged immobilizations, no lower extremity edema or calf tenderness  She has no cardiac history    LMP 3 days ago       History provided by:  Patient and EMS personnel   used: No    Syncope   Episode history:  Single  Most recent episode: Today  Progression:  Unchanged  Chronicity:  New  Witnessed: yes    Relieved by:  None tried  Worsened by:  Nothing  Ineffective treatments:  None tried  Associated symptoms: weakness    Associated symptoms: no chest pain, no diaphoresis, no difficulty breathing, no dizziness, no fever, no focal sensory loss, no focal weakness, no headaches, no nausea, no recent injury, no recent surgery, no seizures, no shortness of breath, no visual change and no vomiting    Risk factors: no congenital heart disease, no coronary artery disease, no seizures and no vascular disease        Prior to Admission Medications   Prescriptions Last Dose Informant Patient Reported? Taking? NIFEdipine (PROCARDIA XL) 60 mg 24 hr tablet   No No   Sig: TAKE 2 TABLETS DAILY   Patient taking differently: TAKE 1 TABLET DAILY   irbesartan (AVAPRO) 300 mg tablet   No No   Sig: Take 1 tablet (300 mg total) by mouth daily   sertraline (ZOLOFT) 100 mg tablet   No No   Sig: Take 1 5 tablets (150 mg total) by mouth daily      Facility-Administered Medications: None       Past Medical History:   Diagnosis Date    Preeclampsia        Past Surgical History:   Procedure Laterality Date     SECTION      EYE SURGERY      TUBAL LIGATION         Family History   Problem Relation Age of Onset    Hypertension Father     Kidney disease Paternal Grandfather      I have reviewed and agree with the history as documented  Social History     Tobacco Use    Smoking status: Current Every Day Smoker    Smokeless tobacco: Never Used   Substance Use Topics    Alcohol use: No    Drug use: No        Review of Systems   Constitutional: Positive for fatigue  Negative for appetite change, chills, diaphoresis and fever  HENT: Negative  Eyes: Negative  Negative for photophobia and visual disturbance  Respiratory: Negative  Negative for cough, chest tightness and shortness of breath  Cardiovascular: Positive for syncope  Negative for chest pain and leg swelling  Gastrointestinal: Negative  Negative for abdominal pain, blood in stool, constipation, diarrhea, nausea and vomiting  Endocrine: Negative  Genitourinary: Negative  Negative for difficulty urinating, dysuria, flank pain, frequency and urgency  Musculoskeletal: Negative  Negative for back pain, neck pain and neck stiffness  Skin: Negative  Allergic/Immunologic: Negative  Neurological: Positive for syncope and weakness  Negative for dizziness, focal weakness, seizures, light-headedness and headaches  Hematological: Negative  Psychiatric/Behavioral: Negative  Physical Exam  ED Triage Vitals [07/21/19 0325]   Temp Pulse Respirations Blood Pressure SpO2   -- 99 20 141/63 96 %      Temp src Heart Rate Source Patient Position - Orthostatic VS BP Location FiO2 (%)   -- Monitor Lying Right arm --      Pain Score       --             Orthostatic Vital Signs  Vitals:    07/21/19 0325   BP: 141/63   Pulse: 99   Patient Position - Orthostatic VS: Lying       Physical Exam   Constitutional: She is oriented to person, place, and time  She appears well-developed and well-nourished  HENT:   Head: Normocephalic and atraumatic  Right Ear: External ear normal    Left Ear: External ear normal    Nose: Nose normal    Mouth/Throat: Oropharynx is clear and moist    Eyes: Pupils are equal, round, and reactive to light  Conjunctivae and EOM are normal  No scleral icterus  Neck: Normal range of motion  No JVD present  No tracheal deviation present  Cardiovascular: Normal rate, regular rhythm, normal heart sounds and intact distal pulses  No murmur heard  Pulmonary/Chest: Effort normal and breath sounds normal  No respiratory distress  Abdominal: Soft  Bowel sounds are normal  She exhibits no distension  There is no tenderness   There is no guarding  Musculoskeletal: Normal range of motion  She exhibits no edema  Neurological: She is alert and oriented to person, place, and time  She has normal strength  She displays no tremor  No cranial nerve deficit or sensory deficit  She exhibits normal muscle tone  GCS eye subscore is 4  GCS verbal subscore is 5  GCS motor subscore is 6  Patient is alert and oriented to person, place, time, and situation  Speech is normal with no dysarthria, no aphasia  Cranial nerves II-XII intact  Sensation is intact bilaterally upper and lower extremities  Normal muscle tone, no clonus, no atrophy  5/5 muscle strength bilaterally upper extremities, 5/5 muscle strength bilaterally lower extremities  Finger-to-nose, heel-to-shin testing normal bilaterally  No pronator drift  Skin: Skin is warm and dry  Capillary refill takes less than 2 seconds  She is not diaphoretic  Psychiatric: She has a normal mood and affect  Her behavior is normal  Judgment and thought content normal    Vitals reviewed        ED Medications  Medications - No data to display    Diagnostic Studies  Results Reviewed     Procedure Component Value Units Date/Time    POCT pregnancy, urine [[id number]]  (Normal) Resulted:  07/21/19 0512    Lab Status:  Final result Updated:  07/21/19 0512     EXT PREG TEST UR (Ref: Negative) negative     Control valid    Comprehensive metabolic panel [[id number]]  (Abnormal) Collected:  07/21/19 0401    Lab Status:  Final result Specimen:  Blood from Arm, Right Updated:  07/21/19 0429     Sodium 138 mmol/L      Potassium 3 3 mmol/L      Chloride 107 mmol/L      CO2 22 mmol/L      ANION GAP 9 mmol/L      BUN 15 mg/dL      Creatinine 0 82 mg/dL      Glucose 160 mg/dL      Calcium 8 8 mg/dL      AST 13 U/L      ALT 14 U/L      Alkaline Phosphatase 84 U/L      Total Protein 7 3 g/dL      Albumin 3 5 g/dL      Total Bilirubin 0 22 mg/dL      eGFR 88 ml/min/1 73sq m     Narrative:       National Kidney Disease Foundation guidelines for Chronic Kidney Disease (CKD):     Stage 1 with normal or high GFR (GFR > 90 mL/min/1 73 square meters)    Stage 2 Mild CKD (GFR = 60-89 mL/min/1 73 square meters)    Stage 3A Moderate CKD (GFR = 45-59 mL/min/1 73 square meters)    Stage 3B Moderate CKD (GFR = 30-44 mL/min/1 73 square meters)    Stage 4 Severe CKD (GFR = 15-29 mL/min/1 73 square meters)    Stage 5 End Stage CKD (GFR <15 mL/min/1 73 square meters)  Note: GFR calculation is accurate only with a steady state creatinine    Troponin I [[id number]]  (Normal) Collected:  07/21/19 0401    Lab Status:  Final result Specimen:  Blood from Arm, Right Updated:  07/21/19 0429     Troponin I <0 02 ng/mL     CBC and differential [[id number]]  (Abnormal) Collected:  07/21/19 0401    Lab Status:  Final result Specimen:  Blood from Arm, Right Updated:  07/21/19 0410     WBC 9 54 Thousand/uL      RBC 4 41 Million/uL      Hemoglobin 12 2 g/dL      Hematocrit 37 6 %      MCV 85 fL      MCH 27 7 pg      MCHC 32 4 g/dL      RDW 15 7 %      MPV 10 7 fL      Platelets 398 Thousands/uL      nRBC 0 /100 WBCs      Neutrophils Relative 67 %      Immat GRANS % 0 %      Lymphocytes Relative 24 %      Monocytes Relative 8 %      Eosinophils Relative 0 %      Basophils Relative 1 %      Neutrophils Absolute 6 33 Thousands/µL      Immature Grans Absolute 0 02 Thousand/uL      Lymphocytes Absolute 2 33 Thousands/µL      Monocytes Absolute 0 78 Thousand/µL      Eosinophils Absolute 0 02 Thousand/µL      Basophils Absolute 0 06 Thousands/µL                  No orders to display         Procedures  ECG 12 Lead Documentation Only  Date/Time: 7/21/2019 3:48 AM  Performed by: Mk Marin DO  Authorized by: Mk Marni DO     ECG reviewed by me, the ED Provider: yes    Patient location:  ED  Previous ECG:     Previous ECG:  Unavailable  Interpretation:     Interpretation: normal    Rate:     ECG rate:  82    ECG rate assessment: normal    Rhythm: Rhythm: sinus rhythm    Ectopy:     Ectopy: none    QRS:     QRS axis:  Normal    QRS intervals:  Normal  Conduction:     Conduction: normal    ST segments:     ST segments:  Normal  T waves:     T waves: normal          ED Course         HEART Risk Score      Most Recent Value   History  0 Filed at: 07/21/2019 0344   ECG  0 Filed at: 07/21/2019 0344   Age  0 Filed at: 07/21/2019 0344   Risk Factors  1 Filed at: 07/21/2019 0344   Troponin  0 Filed at: 07/21/2019 0344   Heart Score Risk Calculator   History  0 Filed at: 07/21/2019 0344   ECG  0 Filed at: 07/21/2019 0344   Age  0 Filed at: 07/21/2019 0344   Risk Factors  1 Filed at: 07/21/2019 0344   Troponin  0 Filed at: 07/21/2019 0344   HEART Score  1 Filed at: 07/21/2019 0344   HEART Score  1 Filed at: 07/21/2019 0344          MDM  Number of Diagnoses or Management Options  Hypokalemia: new and does not require workup  Syncope: new and requires workup  Diagnosis management comments: 1  Syncope, will obtain EKG, evaluate for electrolyte abnormalities and a CBC to evaluate for anemia  Urine pregnancy negative, patient has abdominal pain  2  EKG sinus rhythm, normal intervals  Chemistry reveals mildly decreased potassium  Labs within normal limits  3  Patient has a normal neurological examination, her head is atraumatic, she has no focal neurological deficits, she is not on any anticoagulation  4  Discharge patient home, prescription for potassium replacement  Instructed to follow up with her PCP regarding her hypokalemia  Return precautions given for any further syncope, chest pain, shortness of breath, or other new symptoms         Amount and/or Complexity of Data Reviewed  Clinical lab tests: ordered and reviewed  Obtain history from someone other than the patient: yes  Review and summarize past medical records: yes  Independent visualization of images, tracings, or specimens: yes        Disposition  Final diagnoses:   Syncope   Hypokalemia     Time reflects when diagnosis was documented in both MDM as applicable and the Disposition within this note     Time User Action Codes Description Comment    7/21/2019  4:50 AM Christiano Felt Add [R55] Syncope     7/21/2019  4:50 AM Christiano Felt Add [E87 6] Hypokalemia       ED Disposition     ED Disposition Condition Date/Time Comment    Discharge Stable Sun Jul 21, 2019  4:50 AM Junior Pierce discharge to home/self care              Follow-up Information     Follow up With Specialties Details Why Contact Info Additional Information    Clotilde Simons MD Family Medicine Call in 3 days  [address]       [address] Emergency Department Emergency Medicine Go to  As needed, If symptoms worsen [address]  [facility] ED, [address], [place], [address] [facility], [address]          Discharge Medication List as of 7/21/2019  4:51 AM      START taking these medications    Details   potassium chloride (K-DUR,KLOR-CON) 10 mEq tablet Take 1 tablet (10 mEq total) by mouth daily for 5 days, Starting Sun 7/21/2019, Until Fri 7/26/2019, Print         CONTINUE these medications which have NOT CHANGED    Details   irbesartan (AVAPRO) 300 mg tablet Take 1 tablet (300 mg total) by mouth daily, Starting Tue 8/21/2018, Normal      NIFEdipine (PROCARDIA XL) 60 mg 24 hr tablet TAKE 2 TABLETS DAILY, Normal      sertraline (ZOLOFT) 100 mg tablet Take 1 5 tablets (150 mg total) by mouth daily, Starting Tue 8/21/2018, Normal      albuterol (VENTOLIN HFA) 90 mcg/act inhaler Inhale 2 puffs every 4 (four) hours as needed for wheezing, Starting Mon 3/11/2019, Normal      Beclomethasone Dipropionate 80 MCG/ACT AERS 2 Act (160 mcg total) into each nostril daily, Starting Tue 8/21/2018, Print      promethazine-dextromethorphan (PHENERGAN-DM) 6 25-15 mg/5 mL oral syrup Take 5 mL by mouth 4 (four) times a day as needed for cough, Starting Mon 3/11/2019, Normal           No discharge procedures on file  ED Provider  Attending physically available and evaluated Julián Jung I managed the patient along with the ED Attending      Electronically Signed by         Kiera King, DO  07/29/19 Lorraine Mulligan, DO  07/29/19 [id number]

## 2019-07-24 ENCOUNTER — OFFICE VISIT (OUTPATIENT)
Dept: FAMILY MEDICINE CLINIC | Facility: CLINIC | Age: 44
End: 2019-07-24
Payer: COMMERCIAL

## 2019-07-24 VITALS
RESPIRATION RATE: 16 BRPM | HEIGHT: 63 IN | WEIGHT: 170.2 LBS | DIASTOLIC BLOOD PRESSURE: 90 MMHG | HEART RATE: 68 BPM | BODY MASS INDEX: 30.16 KG/M2 | OXYGEN SATURATION: 97 % | TEMPERATURE: 98.6 F | SYSTOLIC BLOOD PRESSURE: 140 MMHG

## 2019-07-24 DIAGNOSIS — R73.01 IMPAIRED FASTING GLUCOSE: ICD-10-CM

## 2019-07-24 DIAGNOSIS — Z13.6 ENCOUNTER FOR LIPID SCREENING FOR CARDIOVASCULAR DISEASE: ICD-10-CM

## 2019-07-24 DIAGNOSIS — R55 SYNCOPE, UNSPECIFIED SYNCOPE TYPE: ICD-10-CM

## 2019-07-24 DIAGNOSIS — R14.0 ABDOMINAL DISTENSION, GASEOUS: ICD-10-CM

## 2019-07-24 DIAGNOSIS — Z13.220 ENCOUNTER FOR LIPID SCREENING FOR CARDIOVASCULAR DISEASE: ICD-10-CM

## 2019-07-24 DIAGNOSIS — Z91.81 STATUS POST FALL: ICD-10-CM

## 2019-07-24 DIAGNOSIS — Z09 ENCOUNTER FOR EXAMINATION FOLLOWING TREATMENT AT HOSPITAL: Primary | ICD-10-CM

## 2019-07-24 DIAGNOSIS — E87.6 LOW SERUM POTASSIUM: ICD-10-CM

## 2019-07-24 DIAGNOSIS — R53.83 FATIGUE, UNSPECIFIED TYPE: ICD-10-CM

## 2019-07-24 DIAGNOSIS — S09.90XA INJURY OF HEAD, INITIAL ENCOUNTER: ICD-10-CM

## 2019-07-24 PROCEDURE — 3008F BODY MASS INDEX DOCD: CPT | Performed by: FAMILY MEDICINE

## 2019-07-24 PROCEDURE — 99214 OFFICE O/P EST MOD 30 MIN: CPT | Performed by: FAMILY MEDICINE

## 2019-07-24 NOTE — PROGRESS NOTES
FAMILY PRACTICE OFFICE VISIT       NAME: Marilynn Díaz  AGE: 37 y o  SEX: female       : 1975        MRN: [id number]    DATE: 2019  TIME: 1:59 PM    Assessment and Plan     Problem List Items Addressed This Visit        Endocrine    Impaired fasting glucose    Relevant Orders    Hemoglobin A1C       Cardiovascular and Mediastinum    Syncope    Relevant Orders    CT head wo contrast    Echo complete with contrast if indicated    Holter monitor - 24 hour       Other    Encounter for examination following treatment at hospital - Primary    Status post fall    Relevant Orders    CT head wo contrast    Head injury    Relevant Orders    CT head wo contrast    Abdominal distension, gaseous    Relevant Orders    TSH, 3rd generation with Free T4 reflex    Celiac Disease Antibody Profile    Low serum potassium    Relevant Orders    Basic metabolic panel      Other Visit Diagnoses     Fatigue, unspecified type        Relevant Orders    TSH, 3rd generation with Free T4 reflex    Encounter for lipid screening for cardiovascular disease        Relevant Orders    Lipid Panel with Direct LDL reflex        29-year-old female with a history of hypertension, tobacco use here for follow-up of recent hospital/ER visit for syncope  Patient sustained head injury however denies any headache at present  Denies lightheadedness, dizziness, chest pain, shortness of breath, palpitations  I would like however to order CT head  I also would like to order Holter, echocardiogram   She is agreeable with this  On exam, she is neurologically intact  BP is mildly elevated today however patient did not take her medications today  I would like her to keep an eye on this and return in 2 weeks for BP check with Dr Adrien Macias  Regarding abdominal bloating? Patient does not wish to have any further workup at present  She will start a food diary to see what aggravates her symptoms    I will go ahead and check routine blood work including celiac panel and TSH  I have strongly urged, advised and counseled on the importance of tobacco cessation as well  Return parameters discussed  She will follow up with Dr Susan Mendoza in 2 weeks  There are no Patient Instructions on file for this visit  Chief Complaint     Chief Complaint   Patient presents with    Follow-up     ER f/u fainting        History of Present Illness     HPI   75-year-old female presents today for follow-up of recent ER visit after she had 2 episodes of fainting  Patient states she was at work, returning from outside leaving an elevator, states she felt very warm and flushed  She began to feel very lightheaded, felt as if she was going to pass out so she tried to find a chair to sit down, states her vision became dark and she subsequently passed out  Patient states she had the wall  Patient states she lost consciousness for few seconds  pt hit her head, loc was a few seconds   bp was taken, this was good   Called 911  Fainted again in the ambulance while they were trying to get an iv started  Did not lose consciousness the 2nd time  bp was noted to be low   Has fainted before  Last fainting episode was 1 yr ago  Has had a total of 5 epidodes of fainting in her adut life  Denies chest pain, shortness of breath, palpitations  Patient states, Lately she feels her abdominal fat is interfering with her breathing the past 2 months   Pt states she feels sometimes she forgets to breathe  Usually smokes 1/2-3/4 ppd X 20 yrs  Has quit for 6 yrs in between    Feels gas bubble over left side of abdomen, pressure occasionally  Passing gas helps  This feeling occurs once every 2 weeks, occurring for past yr  Occurs in different places of her abdomen  Patient states she feels better when she lays down    Consume 2-3 cups coffee daily  Rare alcohol use  Has gained weight in past 1 yr  Did not take bp meds today  Normally takes them in the evening    Will address abd issues   Review of Systems   Review of Systems   Constitutional: Negative for unexpected weight change  Eyes: Negative for visual disturbance  Respiratory: Negative for shortness of breath  Cardiovascular: Negative  Gastrointestinal: Positive for abdominal distention  Negative for blood in stool, constipation, diarrhea, nausea and vomiting  Genitourinary: Negative for dysuria  Neurological: Negative for headaches         Active Problem List     Patient Active Problem List   Diagnosis    Benign essential hypertension    Depression with anxiety    Leiomyoma of uterus    Tremor of face and hands    Numbness and tingling of left leg    Encounter for examination following treatment at hospital    Syncope    Status post fall    Head injury    Abdominal distension, gaseous    Impaired fasting glucose    Low serum potassium       Past Medical History:  Past Medical History:   Diagnosis Date    Preeclampsia        Past Surgical History:  Past Surgical History:   Procedure Laterality Date     SECTION      EYE SURGERY      TUBAL LIGATION         Family History:  Family History   Problem Relation Age of Onset    Hypertension Father     Kidney disease Paternal Grandfather        Social History:  Social History     Socioeconomic History    Marital status: Single     Spouse name: Not on file    Number of children: Not on file    Years of education: Not on file    Highest education level: Not on file   Occupational History    Not on file   Social Needs    Financial resource strain: Not on file    Food insecurity:     Worry: Not on file     Inability: Not on file    Transportation needs:     Medical: Not on file     Non-medical: Not on file   Tobacco Use    Smoking status: Current Every Day Smoker    Smokeless tobacco: Never Used   Substance and Sexual Activity    Alcohol use: No    Drug use: No    Sexual activity: Not on file   Lifestyle    Physical activity:     Days per week: Not on file Minutes per session: Not on file    Stress: Not on file   Relationships    Social connections:     Talks on phone: Not on file     Gets together: Not on file     Attends Islam service: Not on file     Active member of club or organization: Not on file     Attends meetings of clubs or organizations: Not on file     Relationship status: Not on file    Intimate partner violence:     Fear of current or ex partner: Not on file     Emotionally abused: Not on file     Physically abused: Not on file     Forced sexual activity: Not on file   Other Topics Concern    Not on file   Social History Narrative    Not on file     I have reviewed the patient's medical history in detail; there are no changes to the history as noted in the electronic medical record  Objective     Vitals:    07/24/19 1338   BP: 140/90   Pulse:    Resp:    Temp:    SpO2:      Wt Readings from Last 3 Encounters:   07/24/19 77 2 kg (170 lb 3 2 oz)   03/11/19 78 kg (172 lb)   08/21/18 72 4 kg (159 lb 9 6 oz)       Physical Exam   Constitutional: She is oriented to person, place, and time  She appears well-developed and well-nourished  HENT:   Head: Normocephalic and atraumatic  Mouth/Throat: Oropharynx is clear and moist    Eyes: Pupils are equal, round, and reactive to light  Conjunctivae and EOM are normal    Neck: Normal range of motion  Neck supple  No thyromegaly present  Cardiovascular: Normal rate, regular rhythm and normal heart sounds  Pulmonary/Chest: Effort normal and breath sounds normal    Abdominal: Soft  Bowel sounds are normal  She exhibits no distension  There is no tenderness  Musculoskeletal: Normal range of motion  She exhibits no edema  Lymphadenopathy:     She has no cervical adenopathy  Neurological: She is alert and oriented to person, place, and time  Psychiatric: She has a normal mood and affect  Nursing note and vitals reviewed        Pertinent Laboratory/Diagnostic Studies:  Lab Results   Component Value Date    GLUCOSE 84 10/27/2015    BUN 15 07/21/2019    CREATININE 0 82 07/21/2019    CALCIUM 8 8 07/21/2019     10/27/2015    K 3 3 (L) 07/21/2019    CO2 22 07/21/2019     07/21/2019     Lab Results   Component Value Date    ALT 14 07/21/2019    AST 13 07/21/2019    ALKPHOS 84 07/21/2019    BILITOT 0 24 07/09/2015       Lab Results   Component Value Date    WBC 9 54 07/21/2019    HGB 12 2 07/21/2019    HCT 37 6 07/21/2019    MCV 85 07/21/2019     07/21/2019       No results found for: TSH    Lab Results   Component Value Date    CHOL 194 07/09/2015     Lab Results   Component Value Date    TRIG 87 08/16/2018     Lab Results   Component Value Date    HDL 43 08/16/2018     Lab Results   Component Value Date    LDLCALC 111 (H) 08/16/2018     Lab Results   Component Value Date    HGBA1C 5 7 08/16/2018       Results for orders placed or performed during the hospital encounter of 07/21/19   Comprehensive metabolic panel   Result Value Ref Range    Sodium 138 136 - 145 mmol/L    Potassium 3 3 (L) 3 5 - 5 3 mmol/L    Chloride 107 100 - 108 mmol/L    CO2 22 21 - 32 mmol/L    ANION GAP 9 4 - 13 mmol/L    BUN 15 5 - 25 mg/dL    Creatinine 0 82 0 60 - 1 30 mg/dL    Glucose 160 (H) 65 - 140 mg/dL    Calcium 8 8 8 3 - 10 1 mg/dL    AST 13 5 - 45 U/L    ALT 14 12 - 78 U/L    Alkaline Phosphatase 84 46 - 116 U/L    Total Protein 7 3 6 4 - 8 2 g/dL    Albumin 3 5 3 5 - 5 0 g/dL    Total Bilirubin 0 22 0 20 - 1 00 mg/dL    eGFR 88 ml/min/1 73sq m   CBC and differential   Result Value Ref Range    WBC 9 54 4 31 - 10 16 Thousand/uL    RBC 4 41 3 81 - 5 12 Million/uL    Hemoglobin 12 2 11 5 - 15 4 g/dL    Hematocrit 37 6 34 8 - 46 1 %    MCV 85 82 - 98 fL    MCH 27 7 26 8 - 34 3 pg    MCHC 32 4 31 4 - 37 4 g/dL    RDW 15 7 (H) 11 6 - 15 1 %    MPV 10 7 8 9 - 12 7 fL    Platelets 760 368 - 338 Thousands/uL    nRBC 0 /100 WBCs    Neutrophils Relative 67 43 - 75 %    Immat GRANS % 0 0 - 2 %    Lymphocytes Relative 24 14 - 44 %    Monocytes Relative 8 4 - 12 %    Eosinophils Relative 0 0 - 6 %    Basophils Relative 1 0 - 1 %    Neutrophils Absolute 6 33 1 85 - 7 62 Thousands/µL    Immature Grans Absolute 0 02 0 00 - 0 20 Thousand/uL    Lymphocytes Absolute 2 33 0 60 - 4 47 Thousands/µL    Monocytes Absolute 0 78 0 17 - 1 22 Thousand/µL    Eosinophils Absolute 0 02 0 00 - 0 61 Thousand/µL    Basophils Absolute 0 06 0 00 - 0 10 Thousands/µL   Troponin I   Result Value Ref Range    Troponin I <0 02 <=0 04 ng/mL   POCT pregnancy, urine   Result Value Ref Range    EXT PREG TEST UR (Ref: Negative) negative     Control valid    ECG 12 lead   Result Value Ref Range    Ventricular Rate 82 BPM    Atrial Rate 82 BPM    PA Interval 144 ms    QRSD Interval 80 ms    QT Interval 394 ms    QTC Interval 460 ms    P Axis 71 degrees    QRS Axis 11 degrees    T Wave Axis 8 degrees       Orders Placed This Encounter   Procedures    CT head wo contrast    Hemoglobin A1C    TSH, 3rd generation with Free T4 reflex    Lipid Panel with Direct LDL reflex    Basic metabolic panel    Celiac Disease Antibody Profile    Holter monitor - 24 hour    Echo complete with contrast if indicated       ALLERGIES:  No Known Allergies    Current Medications     Current Outpatient Medications   Medication Sig Dispense Refill    irbesartan (AVAPRO) 300 mg tablet Take 1 tablet (300 mg total) by mouth daily 90 tablet 3    NIFEdipine (PROCARDIA XL) 60 mg 24 hr tablet TAKE 2 TABLETS DAILY (Patient taking differently: TAKE 1 TABLET DAILY) 180 tablet 0    sertraline (ZOLOFT) 100 mg tablet Take 1 5 tablets (150 mg total) by mouth daily 135 tablet 3    potassium chloride (K-DUR,KLOR-CON) 10 mEq tablet Take 1 tablet (10 mEq total) by mouth daily for 5 days (Patient not taking: Reported on 7/24/2019) 5 tablet 0     No current facility-administered medications for this visit            Health Maintenance     Health Maintenance   Topic Date Due    MAMMOGRAM  1975  Pneumococcal Vaccine: Pediatrics (0 to 5 Years) and At-Risk Patients (6 to 59 Years) (1 of 1 - PPSV23) 09/05/1981    BMI: Followup Plan  09/05/1993    INFLUENZA VACCINE  07/01/2019    Depression Screening PHQ  07/24/2020    BMI: Adult  07/24/2020    PAP SMEAR  01/24/2021    DTaP,Tdap,and Td Vaccines (2 - Td) 07/12/2024    Pneumococcal Vaccine: 65+ Years (1 of 2 - PCV13) 09/05/2040    HEPATITIS B VACCINES  Aged Out     Immunization History   Administered Date(s) Administered     Influenza (IM) Preservative Free 10/29/2018    Influenza TIV (IM) 10/16/2015    Tdap 07/12/2014    Tuberculin Skin Test-PPD Intradermal 09/10/2012       Gail Aldana MD

## 2019-07-28 PROBLEM — S09.90XA HEAD INJURY: Status: ACTIVE | Noted: 2019-07-28

## 2019-07-28 PROBLEM — E87.6 LOW SERUM POTASSIUM: Status: ACTIVE | Noted: 2019-07-28

## 2019-07-28 PROBLEM — Z91.81 STATUS POST FALL: Status: ACTIVE | Noted: 2019-07-28

## 2019-07-28 PROBLEM — R73.01 IMPAIRED FASTING GLUCOSE: Status: ACTIVE | Noted: 2019-07-28

## 2019-07-28 PROBLEM — Z09 ENCOUNTER FOR EXAMINATION FOLLOWING TREATMENT AT HOSPITAL: Status: ACTIVE | Noted: 2019-07-28

## 2019-07-28 PROBLEM — R14.0 ABDOMINAL DISTENSION, GASEOUS: Status: ACTIVE | Noted: 2019-07-28

## 2019-07-28 PROBLEM — R55 SYNCOPE: Status: ACTIVE | Noted: 2019-07-28

## 2019-08-06 ENCOUNTER — HOSPITAL ENCOUNTER (OUTPATIENT)
Dept: RADIOLOGY | Facility: HOSPITAL | Age: 44
Discharge: HOME | End: 2019-08-06
Payer: COMMERCIAL

## 2019-08-06 DIAGNOSIS — R55 SYNCOPE, UNSPECIFIED SYNCOPE TYPE: ICD-10-CM

## 2019-08-06 DIAGNOSIS — S09.90XA INJURY OF HEAD, INITIAL ENCOUNTER: ICD-10-CM

## 2019-08-06 DIAGNOSIS — Z91.81 STATUS POST FALL: ICD-10-CM

## 2019-08-06 PROCEDURE — 70450 CT HEAD/BRAIN W/O DYE: CPT

## 2019-08-08 ENCOUNTER — TELEPHONE (OUTPATIENT)
Dept: FAMILY MEDICINE CLINIC | Facility: CLINIC | Age: 44
End: 2019-08-08

## 2019-08-08 NOTE — RESULT ENCOUNTER NOTE
Can you please let patient know that her CT head did not show any acute abnormalities  She does have possible cyst or polyp in her left maxillary sinus region  If she has any symptoms, I would like her to be further evaluated by ENT    Thank you

## 2019-08-08 NOTE — TELEPHONE ENCOUNTER
----- Message from Aditi Del Rosario MD sent at 8/8/2019  1:37 PM EDT -----  Can you please let patient know that her CT head did not show any acute abnormalities  She does have possible cyst or polyp in her left maxillary sinus region  If she has any symptoms, I would like her to be further evaluated by ENT    Thank you

## 2019-08-12 ENCOUNTER — TELEPHONE (OUTPATIENT)
Dept: FAMILY MEDICINE CLINIC | Facility: CLINIC | Age: 44
End: 2019-08-12

## 2019-08-12 NOTE — TELEPHONE ENCOUNTER
----- Message from Subhash Qiu MD sent at 8/8/2019  1:37 PM EDT -----  Can you please let patient know that her CT head did not show any acute abnormalities  She does have possible cyst or polyp in her left maxillary sinus region  If she has any symptoms, I would like her to be further evaluated by ENT    Thank you

## 2019-08-30 ENCOUNTER — TELEPHONE (OUTPATIENT)
Dept: FAMILY MEDICINE CLINIC | Facility: CLINIC | Age: 44
End: 2019-08-30

## 2019-08-30 ENCOUNTER — HOSPITAL ENCOUNTER (OUTPATIENT)
Dept: NON INVASIVE DIAGNOSTICS | Facility: HOSPITAL | Age: 44
Discharge: HOME | End: 2019-08-30
Payer: COMMERCIAL

## 2019-08-30 DIAGNOSIS — R55 SYNCOPE, UNSPECIFIED SYNCOPE TYPE: ICD-10-CM

## 2019-08-30 PROCEDURE — 93225 XTRNL ECG REC<48 HRS REC: CPT

## 2019-08-30 PROCEDURE — 93306 TTE W/DOPPLER COMPLETE: CPT | Performed by: INTERNAL MEDICINE

## 2019-08-30 PROCEDURE — 93306 TTE W/DOPPLER COMPLETE: CPT

## 2019-08-30 PROCEDURE — 93226 XTRNL ECG REC<48 HR SCAN A/R: CPT

## 2019-08-30 NOTE — TELEPHONE ENCOUNTER
----- Message from Chevy Boyer MD sent at 8/30/2019  2:41 PM EDT -----  Can you please let patient know that her echocardiogram was overall normal   Thank you

## 2019-09-05 PROCEDURE — 93227 XTRNL ECG REC<48 HR R&I: CPT | Performed by: INTERNAL MEDICINE

## 2019-10-14 ENCOUNTER — OFFICE VISIT (OUTPATIENT)
Dept: FAMILY MEDICINE CLINIC | Facility: CLINIC | Age: 44
End: 2019-10-14
Payer: COMMERCIAL

## 2019-10-14 VITALS
WEIGHT: 165.6 LBS | SYSTOLIC BLOOD PRESSURE: 142 MMHG | TEMPERATURE: 98 F | BODY MASS INDEX: 29.33 KG/M2 | HEART RATE: 74 BPM | DIASTOLIC BLOOD PRESSURE: 80 MMHG | OXYGEN SATURATION: 98 %

## 2019-10-14 DIAGNOSIS — J01.90 ACUTE NON-RECURRENT SINUSITIS, UNSPECIFIED LOCATION: Primary | ICD-10-CM

## 2019-10-14 DIAGNOSIS — R05.9 COUGH: ICD-10-CM

## 2019-10-14 PROCEDURE — 99213 OFFICE O/P EST LOW 20 MIN: CPT | Performed by: NURSE PRACTITIONER

## 2019-10-14 RX ORDER — AMOXICILLIN AND CLAVULANATE POTASSIUM 875; 125 MG/1; MG/1
1 TABLET, FILM COATED ORAL EVERY 12 HOURS SCHEDULED
Qty: 20 TABLET | Refills: 0 | Status: SHIPPED | OUTPATIENT
Start: 2019-10-14 | End: 2019-10-24

## 2019-10-14 NOTE — PROGRESS NOTES
FAMILY PRACTICE OFFICE VISIT       NAME: Pushpa Mohr  AGE: 40 y o  SEX: female       : 1975        MRN: [id number]    DATE: 10/14/2019    Assessment and Plan     Problem List Items Addressed This Visit     None      Visit Diagnoses     Acute non-recurrent sinusitis, unspecified location    -  Primary    Relevant Medications    amoxicillin-clavulanate (AUGMENTIN) 875-125 mg per tablet    Other Relevant Orders    XR chest pa & lateral    Cough        Relevant Orders    XR chest pa & lateral          1  Acute non-recurrent sinusitis, unspecified location  amoxicillin-clavulanate (AUGMENTIN) 875-125 mg per tablet    XR chest pa & lateral   2  Cough  XR chest pa & lateral     This 51-year-old female presents today with symptoms concerning for sinusitis  Recommend treatment with Augmentin 875-125 mg twice daily for 10 days  Take this medication with food  Recommend taking an over-the-counter probiotic or eating yogurt daily while taking antibiotics  Continue supportive care:  Rest, fluids, warm fluids, honey, humidification  Nasal saline rinses  Stop using Afrin  Follow up with ENT  May continue to use Tylenol or ibuprofen as needed  Sudafed as needed  Will check chest x-ray due to chest burning, cough, assess for pneumonia  If symptoms should worsen, or if symptoms are not improving over the next few days instructed to call  Chief Complaint     Chief Complaint   Patient presents with    Cough     congestion, body aches, chills       History of Present Illness     Pushpa Mohr is a 40 old female presenting today for nasal congestion, body aches, night sweats, fatigue, nausea, cough, postnasal drip  Symptoms began 1 week ago  Denies fevers  No sore throat  Left ear feels clogged  She has been taking Sudafed with no relief  Motrin helps with achiness  She has also been trying allergy medications including Benadryl which are not effective    She is not able to tolerate Flonase due to the smell  Chest is burning at times  She is using a nasal saline spray and Afrin on a regular basis  Admits she should not be using Afrin on a regular basis  Has chronic sinus issues, with a cyst noted in left maxillary sinus  She is planning to follow up with ENT  Current smoker  Currently working as a CNA on night shift, 1 of her patients has similar symptoms  Has 3 children ages 25, 6, 11, which keep her busy and she has limited time to rest         Review of Systems   Review of Systems   Constitutional: Positive for chills, diaphoresis and fatigue  Negative for fever  HENT: Positive for congestion, postnasal drip, rhinorrhea and sinus pressure  Negative for ear pain, sore throat and voice change  Respiratory: Positive for cough  Negative for chest tightness, shortness of breath and wheezing  Cardiovascular: Positive for chest pain (chest burning at times)  Negative for palpitations and leg swelling  Gastrointestinal: Positive for nausea  Negative for abdominal pain, diarrhea and vomiting  Musculoskeletal: Positive for myalgias  Neurological: Positive for headaches  Negative for dizziness         Active Problem List     Patient Active Problem List   Diagnosis    Benign essential hypertension    Depression with anxiety    Leiomyoma of uterus    Tremor of face and hands    Numbness and tingling of left leg    Encounter for examination following treatment at hospital    Syncope    Status post fall    Head injury    Abdominal distension, gaseous    Impaired fasting glucose    Low serum potassium       Past Medical History:  Past Medical History:   Diagnosis Date    Preeclampsia        Past Surgical History:  Past Surgical History:   Procedure Laterality Date     SECTION      EYE SURGERY      TUBAL LIGATION         Family History:  Family History   Problem Relation Age of Onset    Hypertension Father     Kidney disease Paternal Grandfather        Social History:  Social History     Socioeconomic History    Marital status: Single     Spouse name: Not on file    Number of children: Not on file    Years of education: Not on file    Highest education level: Not on file   Occupational History    Not on file   Social Needs    Financial resource strain: Not on file    Food insecurity:     Worry: Not on file     Inability: Not on file    Transportation needs:     Medical: Not on file     Non-medical: Not on file   Tobacco Use    Smoking status: Current Every Day Smoker    Smokeless tobacco: Never Used   Substance and Sexual Activity    Alcohol use: No    Drug use: No    Sexual activity: Not on file   Lifestyle    Physical activity:     Days per week: Not on file     Minutes per session: Not on file    Stress: Not on file   Relationships    Social connections:     Talks on phone: Not on file     Gets together: Not on file     Attends [employer] service: Not on file     Active member of club or organization: Not on file     Attends meetings of clubs or organizations: Not on file     Relationship status: Not on file    Intimate partner violence:     Fear of current or ex partner: Not on file     Emotionally abused: Not on file     Physically abused: Not on file     Forced sexual activity: Not on file   Other Topics Concern    Not on file   Social History Narrative    Not on file       I have reviewed the patient's medical history in detail; there are no changes to the history as noted in the electronic medical record  Objective     Vitals:    10/14/19 1535   BP: 142/80   BP Location: Right arm   Patient Position: Sitting   Cuff Size: Standard   Pulse: 74   Temp: 98 °F (36 7 °C)   SpO2: 98%   Weight: 75 1 kg (165 lb 9 6 oz)     Wt Readings from Last 3 Encounters:   10/14/19 75 1 kg (165 lb 9 6 oz)   07/24/19 77 2 kg (170 lb 3 2 oz)   03/11/19 78 kg (172 lb)     Body mass index is 29 33 kg/m²    PHQ-9 Depression Screening    PHQ-9:    Frequency of the following problems over the past two weeks:            Physical Exam   Constitutional: She appears well-developed and well-nourished  No distress  HENT:   Head: Normocephalic and atraumatic  Right Ear: Tympanic membrane, external ear and ear canal normal    Left Ear: Tympanic membrane, external ear and ear canal normal    Nose: Mucosal edema and rhinorrhea present  Mouth/Throat: Posterior oropharyngeal erythema present  No oropharyngeal exudate or posterior oropharyngeal edema  Eyes: Conjunctivae are normal    Neck: Normal range of motion  Neck supple  Cardiovascular: Normal rate, regular rhythm and normal heart sounds  Pulmonary/Chest: Effort normal and breath sounds normal    Musculoskeletal: She exhibits no edema  Lymphadenopathy:     She has cervical adenopathy (Bilateral submandibular adenopathy)  Skin: No rash noted  Psychiatric: She has a normal mood and affect  Nursing note and vitals reviewed  ALLERGIES:  No Known Allergies    Current Medications     Current Outpatient Medications   Medication Sig Dispense Refill    irbesartan (AVAPRO) 300 mg tablet Take 1 tablet (300 mg total) by mouth daily 90 tablet 3    NIFEdipine (PROCARDIA XL) 60 mg 24 hr tablet TAKE 2 TABLETS DAILY (Patient taking differently: TAKE 1 TABLET DAILY) 180 tablet 0    sertraline (ZOLOFT) 100 mg tablet Take 1 5 tablets (150 mg total) by mouth daily 135 tablet 3    amoxicillin-clavulanate (AUGMENTIN) 875-125 mg per tablet Take 1 tablet by mouth every 12 (twelve) hours for 10 days 20 tablet 0    potassium chloride (K-DUR,KLOR-CON) 10 mEq tablet Take 1 tablet (10 mEq total) by mouth daily for 5 days (Patient not taking: Reported on 7/24/2019) 5 tablet 0     No current facility-administered medications for this visit            Health Maintenance     Health Maintenance   Topic Date Due    MAMMOGRAM  1975    Pneumococcal Vaccine: Pediatrics (0 to 5 Years) and At-Risk Patients (6 to 59 Years) (1 of 1 - PPSV23) 09/05/1981    BMI: Followup Plan  09/05/1993    INFLUENZA VACCINE  07/01/2019    BMI: Adult  10/14/2020    Cervical Cancer Screening  01/24/2021    DTaP,Tdap,and Td Vaccines (2 - Td) 07/12/2024    Pneumococcal Vaccine: 65+ Years (1 of 2 - PCV13) 09/05/2040    HEPATITIS B VACCINES  Aged Out     Immunization History   Administered Date(s) Administered     Influenza (IM) Preservative Free 10/29/2018    Influenza TIV (IM) 10/16/2015    Tdap 07/12/2014    Tuberculin Skin Test-PPD Intradermal 09/10/2012       LUKE Fernandez

## 2019-10-14 NOTE — LETTER
October 14, 2019     Patient: Tala Martinez   YOB: 1975   Date of Visit: 10/14/2019       To Whom it May Concern:    Sara Irizarry is under my professional care  She was seen in my office on 10/14/2019  She may return to work on 10/16/2019  If you have any questions or concerns, please don't hesitate to call           Sincerely,          LUKE Serrano        CC: No Recipients

## 2019-10-16 ENCOUNTER — TRANSCRIBE ORDERS (OUTPATIENT)
Dept: RADIOLOGY | Facility: HOSPITAL | Age: 44
End: 2019-10-16

## 2019-10-16 ENCOUNTER — HOSPITAL ENCOUNTER (OUTPATIENT)
Dept: RADIOLOGY | Facility: HOSPITAL | Age: 44
Discharge: HOME | End: 2019-10-16
Payer: COMMERCIAL

## 2019-10-16 ENCOUNTER — LAB (OUTPATIENT)
Dept: LAB | Facility: HOSPITAL | Age: 44
End: 2019-10-16
Payer: COMMERCIAL

## 2019-10-16 DIAGNOSIS — R73.01 IMPAIRED FASTING GLUCOSE: ICD-10-CM

## 2019-10-16 DIAGNOSIS — Z13.6 ENCOUNTER FOR LIPID SCREENING FOR CARDIOVASCULAR DISEASE: ICD-10-CM

## 2019-10-16 DIAGNOSIS — R05.9 COUGH: ICD-10-CM

## 2019-10-16 DIAGNOSIS — Z13.220 ENCOUNTER FOR LIPID SCREENING FOR CARDIOVASCULAR DISEASE: ICD-10-CM

## 2019-10-16 DIAGNOSIS — R53.83 FATIGUE, UNSPECIFIED TYPE: ICD-10-CM

## 2019-10-16 DIAGNOSIS — E87.6 LOW SERUM POTASSIUM: ICD-10-CM

## 2019-10-16 DIAGNOSIS — J01.90 ACUTE NON-RECURRENT SINUSITIS, UNSPECIFIED LOCATION: ICD-10-CM

## 2019-10-16 DIAGNOSIS — R14.0 ABDOMINAL DISTENSION, GASEOUS: ICD-10-CM

## 2019-10-16 LAB
ANION GAP SERPL CALCULATED.3IONS-SCNC: 5 MMOL/L (ref 4–13)
BUN SERPL-MCNC: 17 MG/DL (ref 5–25)
CALCIUM SERPL-MCNC: 8.9 MG/DL (ref 8.3–10.1)
CHLORIDE SERPL-SCNC: 106 MMOL/L (ref 100–108)
CHOLEST SERPL-MCNC: 200 MG/DL (ref 50–200)
CO2 SERPL-SCNC: 26 MMOL/L (ref 21–32)
CREAT SERPL-MCNC: 0.57 MG/DL (ref 0.6–1.3)
EST. AVERAGE GLUCOSE BLD GHB EST-MCNC: 108 MG/DL
GFR SERPL CREATININE-BSD FRML MDRD: 113 ML/MIN/1.73SQ M
GLUCOSE P FAST SERPL-MCNC: 86 MG/DL (ref 65–99)
HBA1C MFR BLD: 5.4 % (ref 4.2–6.3)
HDLC SERPL-MCNC: 43 MG/DL (ref 40–60)
LDLC SERPL CALC-MCNC: 134 MG/DL (ref 0–100)
POTASSIUM SERPL-SCNC: 3.5 MMOL/L (ref 3.5–5.3)
SODIUM SERPL-SCNC: 137 MMOL/L (ref 136–145)
TRIGL SERPL-MCNC: 115 MG/DL
TSH SERPL DL<=0.05 MIU/L-ACNC: 1.41 UIU/ML (ref 0.36–3.74)

## 2019-10-16 PROCEDURE — 83516 IMMUNOASSAY NONANTIBODY: CPT

## 2019-10-16 PROCEDURE — 80048 BASIC METABOLIC PNL TOTAL CA: CPT

## 2019-10-16 PROCEDURE — 84443 ASSAY THYROID STIM HORMONE: CPT

## 2019-10-16 PROCEDURE — 82784 ASSAY IGA/IGD/IGG/IGM EACH: CPT

## 2019-10-16 PROCEDURE — 80061 LIPID PANEL: CPT

## 2019-10-16 PROCEDURE — 83036 HEMOGLOBIN GLYCOSYLATED A1C: CPT

## 2019-10-16 PROCEDURE — 86255 FLUORESCENT ANTIBODY SCREEN: CPT

## 2019-10-16 PROCEDURE — 36415 COLL VENOUS BLD VENIPUNCTURE: CPT

## 2019-10-16 PROCEDURE — 71046 X-RAY EXAM CHEST 2 VIEWS: CPT

## 2019-10-16 NOTE — RESULT ENCOUNTER NOTE
Please let patient know her chest x-ray is normal   Please have her call if she is not feeling better

## 2019-10-17 LAB
ENDOMYSIUM IGA SER QL: NEGATIVE
GLIADIN PEPTIDE IGA SER-ACNC: 4 UNITS (ref 0–19)
GLIADIN PEPTIDE IGG SER-ACNC: 10 UNITS (ref 0–19)
IGA SERPL-MCNC: 274 MG/DL (ref 87–352)
TTG IGA SER-ACNC: <2 U/ML (ref 0–3)
TTG IGG SER-ACNC: 8 U/ML (ref 0–5)

## 2019-10-24 DIAGNOSIS — E78.00 ELEVATED CHOLESTEROL: Primary | ICD-10-CM

## 2019-10-24 NOTE — RESULT ENCOUNTER NOTE
Please let patient know that her blood work including thyroid, blood sugar, kidneys was within normal range  Her total cholesterol is 200, increased from 171 one  year ago and her bad cholesterol is now 134, increased from 111  I would like her to work on increasing heart healthy fats in the diet by incorporating Mediterranean diet, starting routine exercise regimen as well as working on quitting smoking  I also would like her to avoid saturated fats  I will go ahead and printed out a prescription for her to recheck her cholesterol again in 3 months to make sure it is improving  I also would like her to follow up with Dr Roya Chaparro  In addition, her celiac panel showed 1 blood test that might be elevated in celiac disease however it is not specific  I would like her to avoid gluten see if her abdominal bloating does improve  I also would like her to see a gastroenterologist in regards to this blood test as well as her bloating  Can you please provide patient with number for [facility]    Thank you

## 2019-10-28 ENCOUNTER — TELEPHONE (OUTPATIENT)
Dept: FAMILY MEDICINE CLINIC | Facility: CLINIC | Age: 44
End: 2019-10-28

## 2019-10-28 NOTE — TELEPHONE ENCOUNTER
----- Message from July Graves MD sent at 10/24/2019  6:27 PM EDT -----  Please let patient know that her blood work including thyroid, blood sugar, kidneys was within normal range  Her total cholesterol is 200, increased from 171 one  year ago and her bad cholesterol is now 134, increased from 111  I would like her to work on increasing heart healthy fats in the diet by incorporating Mediterranean diet, starting routine exercise regimen as well as working on quitting smoking  I also would like her to avoid saturated fats  I will go ahead and printed out a prescription for her to recheck her cholesterol again in 3 months to make sure it is improving  I also would like her to follow up with Dr Roya Chaparro  In addition, her celiac panel showed 1 blood test that might be elevated in celiac disease however it is not specific  I would like her to avoid gluten see if her abdominal bloating does improve  I also would like her to see a gastroenterologist in regards to this blood test as well as her bloating  Can you please provide patient with number for [facility]    Thank you

## 2019-11-04 DIAGNOSIS — Z01.419 ENCOUNTER FOR ANNUAL ROUTINE GYNECOLOGICAL EXAMINATION: ICD-10-CM

## 2019-11-04 DIAGNOSIS — Z12.31 ENCOUNTER FOR SCREENING MAMMOGRAM FOR BREAST CANCER: ICD-10-CM

## 2019-11-06 RX ORDER — NIFEDIPINE 60 MG/1
TABLET, EXTENDED RELEASE ORAL
Qty: 180 TABLET | Refills: 0 | Status: SHIPPED | OUTPATIENT
Start: 2019-11-06 | End: 2019-12-30 | Stop reason: HOSPADM

## 2019-11-06 RX ORDER — SERTRALINE HYDROCHLORIDE 100 MG/1
TABLET, FILM COATED ORAL
Qty: 135 TABLET | Refills: 0 | Status: SHIPPED | OUTPATIENT
Start: 2019-11-06 | End: 2020-01-07 | Stop reason: SDUPTHER

## 2019-11-06 RX ORDER — IRBESARTAN 300 MG/1
TABLET ORAL
Qty: 90 TABLET | Refills: 0 | Status: SHIPPED | OUTPATIENT
Start: 2019-11-06 | End: 2020-01-07 | Stop reason: SDUPTHER

## 2019-11-06 NOTE — TELEPHONE ENCOUNTER
Please contact patient  I just refilled her medications  I have not seen her in the office since August of last year    Please advise her to schedule annual physical   Thank you

## 2019-11-12 ENCOUNTER — TELEPHONE (OUTPATIENT)
Dept: FAMILY MEDICINE CLINIC | Facility: CLINIC | Age: 44
End: 2019-11-12

## 2019-11-13 NOTE — TELEPHONE ENCOUNTER
Spoke with patient and gave her results and directions    Patient made an appt for 12/12/2019 anno other questions at this time

## 2019-11-24 ENCOUNTER — HOSPITAL ENCOUNTER (EMERGENCY)
Facility: HOSPITAL | Age: 44
Discharge: HOME | End: 2019-11-24
Attending: EMERGENCY MEDICINE | Admitting: EMERGENCY MEDICINE
Payer: COMMERCIAL

## 2019-11-24 ENCOUNTER — APPOINTMENT (EMERGENCY)
Dept: RADIOLOGY | Facility: HOSPITAL | Age: 44
End: 2019-11-24
Payer: COMMERCIAL

## 2019-11-24 VITALS
RESPIRATION RATE: 20 BRPM | BODY MASS INDEX: 29.06 KG/M2 | SYSTOLIC BLOOD PRESSURE: 129 MMHG | HEIGHT: 63 IN | WEIGHT: 164 LBS | DIASTOLIC BLOOD PRESSURE: 78 MMHG | OXYGEN SATURATION: 96 % | HEART RATE: 71 BPM

## 2019-11-24 DIAGNOSIS — R42 EPISODIC LIGHTHEADEDNESS: ICD-10-CM

## 2019-11-24 DIAGNOSIS — R94.31 NONSPECIFIC ABNORMAL ELECTROCARDIOGRAM (ECG) (EKG): ICD-10-CM

## 2019-11-24 DIAGNOSIS — R55 PRE-SYNCOPE: Primary | ICD-10-CM

## 2019-11-24 LAB
ALBUMIN SERPL BCP-MCNC: 3.6 G/DL (ref 3.5–5)
ALP SERPL-CCNC: 84 U/L (ref 46–116)
ALT SERPL W P-5'-P-CCNC: 21 U/L (ref 12–78)
ANION GAP SERPL CALCULATED.3IONS-SCNC: 7 MMOL/L (ref 4–13)
AST SERPL W P-5'-P-CCNC: 23 U/L (ref 5–45)
ATRIAL RATE: 67 BPM
BASOPHILS # BLD AUTO: 0.05 THOUSANDS/ΜL (ref 0–0.1)
BASOPHILS NFR BLD AUTO: 1 % (ref 0–1)
BILIRUB SERPL-MCNC: 0.25 MG/DL (ref 0.2–1)
BUN SERPL-MCNC: 16 MG/DL (ref 5–25)
CALCIUM SERPL-MCNC: 9.1 MG/DL (ref 8.3–10.1)
CHLORIDE SERPL-SCNC: 109 MMOL/L (ref 100–108)
CO2 SERPL-SCNC: 24 MMOL/L (ref 21–32)
CREAT SERPL-MCNC: 0.85 MG/DL (ref 0.6–1.3)
EOSINOPHIL # BLD AUTO: 0.1 THOUSAND/ΜL (ref 0–0.61)
EOSINOPHIL NFR BLD AUTO: 1 % (ref 0–6)
ERYTHROCYTE [DISTWIDTH] IN BLOOD BY AUTOMATED COUNT: 14.9 % (ref 11.6–15.1)
GFR SERPL CREATININE-BSD FRML MDRD: 84 ML/MIN/1.73SQ M
GLUCOSE SERPL-MCNC: 129 MG/DL (ref 65–140)
GLUCOSE SERPL-MCNC: 132 MG/DL (ref 65–140)
HCT VFR BLD AUTO: 38 % (ref 34.8–46.1)
HGB BLD-MCNC: 12.1 G/DL (ref 11.5–15.4)
IMM GRANULOCYTES # BLD AUTO: 0.04 THOUSAND/UL (ref 0–0.2)
IMM GRANULOCYTES NFR BLD AUTO: 1 % (ref 0–2)
LYMPHOCYTES # BLD AUTO: 1.34 THOUSANDS/ΜL (ref 0.6–4.47)
LYMPHOCYTES NFR BLD AUTO: 16 % (ref 14–44)
MAGNESIUM SERPL-MCNC: 1.9 MG/DL (ref 1.6–2.6)
MCH RBC QN AUTO: 27.8 PG (ref 26.8–34.3)
MCHC RBC AUTO-ENTMCNC: 31.8 G/DL (ref 31.4–37.4)
MCV RBC AUTO: 87 FL (ref 82–98)
MONOCYTES # BLD AUTO: 0.79 THOUSAND/ΜL (ref 0.17–1.22)
MONOCYTES NFR BLD AUTO: 9 % (ref 4–12)
NEUTROPHILS # BLD AUTO: 6.31 THOUSANDS/ΜL (ref 1.85–7.62)
NEUTS SEG NFR BLD AUTO: 72 % (ref 43–75)
NRBC BLD AUTO-RTO: 0 /100 WBCS
P AXIS: 72 DEGREES
PLATELET # BLD AUTO: 212 THOUSANDS/UL (ref 149–390)
PMV BLD AUTO: 9.9 FL (ref 8.9–12.7)
POTASSIUM SERPL-SCNC: 3.7 MMOL/L (ref 3.5–5.3)
PR INTERVAL: 144 MS
PROT SERPL-MCNC: 7.4 G/DL (ref 6.4–8.2)
QRS AXIS: 5 DEGREES
QRSD INTERVAL: 80 MS
QT INTERVAL: 476 MS
QTC INTERVAL: 502 MS
RBC # BLD AUTO: 4.35 MILLION/UL (ref 3.81–5.12)
SODIUM SERPL-SCNC: 140 MMOL/L (ref 136–145)
T WAVE AXIS: -53 DEGREES
TROPONIN I SERPL-MCNC: <0.02 NG/ML
TROPONIN I SERPL-MCNC: <0.02 NG/ML
VENTRICULAR RATE: 67 BPM
WBC # BLD AUTO: 8.63 THOUSAND/UL (ref 4.31–10.16)

## 2019-11-24 PROCEDURE — 93005 ELECTROCARDIOGRAM TRACING: CPT

## 2019-11-24 PROCEDURE — 82948 REAGENT STRIP/BLOOD GLUCOSE: CPT

## 2019-11-24 PROCEDURE — 85025 COMPLETE CBC W/AUTO DIFF WBC: CPT | Performed by: EMERGENCY MEDICINE

## 2019-11-24 PROCEDURE — 84484 ASSAY OF TROPONIN QUANT: CPT | Performed by: EMERGENCY MEDICINE

## 2019-11-24 PROCEDURE — 96361 HYDRATE IV INFUSION ADD-ON: CPT

## 2019-11-24 PROCEDURE — 83735 ASSAY OF MAGNESIUM: CPT | Performed by: EMERGENCY MEDICINE

## 2019-11-24 PROCEDURE — 93010 ELECTROCARDIOGRAM REPORT: CPT | Performed by: INTERNAL MEDICINE

## 2019-11-24 PROCEDURE — 99284 EMERGENCY DEPT VISIT MOD MDM: CPT

## 2019-11-24 PROCEDURE — 71046 X-RAY EXAM CHEST 2 VIEWS: CPT

## 2019-11-24 PROCEDURE — 99285 EMERGENCY DEPT VISIT HI MDM: CPT | Performed by: EMERGENCY MEDICINE

## 2019-11-24 PROCEDURE — 96360 HYDRATION IV INFUSION INIT: CPT

## 2019-11-24 PROCEDURE — 36415 COLL VENOUS BLD VENIPUNCTURE: CPT

## 2019-11-24 PROCEDURE — 80053 COMPREHEN METABOLIC PANEL: CPT | Performed by: EMERGENCY MEDICINE

## 2019-11-24 RX ADMIN — SODIUM CHLORIDE 1000 ML: 0.9 INJECTION, SOLUTION INTRAVENOUS at 07:10

## 2019-11-24 NOTE — ED ATTENDING ATTESTATION
11/24/2019  ITaras MD, saw and evaluated the patient  I have discussed the patient with the resident/non-physician practitioner and agree with the resident's/non-physician practitioner's findings, Plan of Care, and MDM as documented in the resident's/non-physician practitioner's note, except where noted  All available labs and Radiology studies were reviewed  I was present for key portions of any procedure(s) performed by the resident/non-physician practitioner and I was immediately available to provide assistance  At this point I agree with the current assessment done in the Emergency Department  I have conducted an independent evaluation of this patient a history and physical is as follows: This is a 40 y o  old female who presents to the ED for evaluation of shaking/near syncope  Works as CNA, began to feel pale, nausea, lightheaded  "Felt like needed to poop" BP checked at that time was 70/40  Reports recent viral URI symptoms  Np CP or dyspnea  No complaints at this time  VS and nursing notes reviewed  General: Appears in NAD, awake, alert, speaking normally in full sentences  Well-nourished, well-developed  Appears stated age  Head: Normocephalic, atraumatic  Eyes: EOMI  Vision grossly normal  No subconjunctival hemorrhages or occular discharge noted  Symmetrical lids  ENT: Atraumatic external nose and ears  No stridor  Normal phonation  No drooling  Normal swallowing  Neck: No JVD  FROM  No goiter  CV: No pallor  Normal rate  Lungs: No tachypnea  No respiratory distress  MSK: Moving all extremities equally, no peripheral edema  Skin: Dry, intact  No cyanosis  Neuro: Awake, alert, GCS15  CN II-XII grossly intact  Grossly normal gait  Psychiatric/Behavioral: Appropriate mood and affect  A/P: This is a 40 y o  female who presents to the ED for evaluation of near syncope  Labs, EKG, IV fluids    Previous echo and Holter monitor review, unremarkable, likely vagal, over re-evaluate disposition accordingly        ED Course         Critical Care Time  Procedures

## 2019-11-24 NOTE — ED PROVIDER NOTES
History  Chief Complaint   Patient presents with    Shaking     per ems - patient reports near syncope, c/o feeling "fuzzy", patient also c/o shakiness       Dizziness   Quality:  Lightheadedness  Severity:  Moderate  Onset quality:  Sudden  Duration:  1 hour  Progression:  Resolved  Chronicity:  New  Associated symptoms: no blood in stool, no chest pain, no diarrhea, no palpitations, no shortness of breath, no vomiting and no weakness        Prior to Admission Medications   Prescriptions Last Dose Informant Patient Reported? Taking? NIFEdipine (PROCARDIA XL) 60 mg 24 hr tablet   No Yes   Sig: TAKE 2 TABLETS DAILY   irbesartan (AVAPRO) 300 mg tablet   No Yes   Sig: TAKE 1 TABLET DAILY   potassium chloride (K-DUR,KLOR-CON) 10 mEq tablet   No No   Sig: Take 1 tablet (10 mEq total) by mouth daily for 5 days   Patient not taking: Reported on 2019   sertraline (ZOLOFT) 100 mg tablet   No Yes   Sig: TAKE ONE AND ONE-HALF TABLETS DAILY      Facility-Administered Medications: None       Past Medical History:   Diagnosis Date    Preeclampsia        Past Surgical History:   Procedure Laterality Date     SECTION      EYE SURGERY      TUBAL LIGATION         Family History   Problem Relation Age of Onset    Hypertension Father     Kidney disease Paternal Grandfather      I have reviewed and agree with the history as documented  Social History     Tobacco Use    Smoking status: Current Every Day Smoker    Smokeless tobacco: Never Used   Substance Use Topics    Alcohol use: Yes    Drug use: No        Review of Systems   Constitutional: Negative for chills and fever  HENT: Positive for congestion and rhinorrhea  Eyes: Negative for photophobia and visual disturbance  Respiratory: Positive for cough  Negative for shortness of breath  Cardiovascular: Negative for chest pain, palpitations and leg swelling  Gastrointestinal: Negative for abdominal pain, blood in stool, diarrhea and vomiting  Genitourinary: Negative for dysuria and hematuria  Musculoskeletal: Negative for neck pain and neck stiffness  Skin: Negative for rash and wound  Neurological: Positive for light-headedness  Negative for seizures, facial asymmetry, speech difficulty, weakness and numbness  Psychiatric/Behavioral: Negative for agitation and confusion  All other systems reviewed and are negative  Physical Exam  ED Triage Vitals [11/24/19 0610]   Temp Pulse Respirations Blood Pressure SpO2   -- 68 20 117/69 97 %      Temp src Heart Rate Source Patient Position - Orthostatic VS BP Location FiO2 (%)   -- Monitor -- -- --      Pain Score       No Pain             Orthostatic Vital Signs  Vitals:    11/24/19 0714 11/24/19 0800 11/24/19 0903 11/24/19 0930   BP: 122/74 116/73 130/82 129/78   Pulse: 71 68 80 71       Physical Exam   Constitutional: She is oriented to person, place, and time  She appears well-developed  No distress  HENT:   Head: Normocephalic  Right Ear: External ear normal    Left Ear: External ear normal    Nose: Nose normal    Mouth/Throat: Oropharynx is clear and moist    Eyes: Conjunctivae and EOM are normal    Neck: No tracheal deviation present  Cardiovascular: Normal rate, normal heart sounds and intact distal pulses  Pulmonary/Chest: Effort normal and breath sounds normal  No stridor  No respiratory distress  She has no wheezes  She has no rales  She exhibits no tenderness  Abdominal: Soft  She exhibits no distension  There is no tenderness  There is no rebound and no guarding  Musculoskeletal: She exhibits no edema, tenderness or deformity  Neurological: She is alert and oriented to person, place, and time  No cranial nerve deficit or sensory deficit  She exhibits normal muscle tone  CN II-XII intact, 5/5 motor and intact sensation in upper and lower extremities bilaterally, no pronator drift     Skin: Skin is warm and dry  Capillary refill takes less than 2 seconds   She is not diaphoretic  Psychiatric: She has a normal mood and affect  Her behavior is normal    Nursing note and vitals reviewed        ED Medications  Medications   sodium chloride 0 9 % bolus 1,000 mL (0 mL Intravenous Stopped 11/24/19 0859)       Diagnostic Studies  Results Reviewed     Procedure Component Value Units Date/Time    Troponin I [[id number]]  (Normal) Collected:  11/24/19 0859    Lab Status:  Final result Specimen:  Blood from Arm, Left Updated:  11/24/19 0927     Troponin I <0 02 ng/mL     Magnesium [[id number]]  (Normal) Collected:  11/24/19 0616    Lab Status:  Final result Specimen:  Blood from Arm, Left Updated:  11/24/19 0813     Magnesium 1 9 mg/dL     Comprehensive metabolic panel [[id number]]  (Abnormal) Collected:  11/24/19 0616    Lab Status:  Final result Specimen:  Blood from Arm, Left Updated:  11/24/19 0647     Sodium 140 mmol/L      Potassium 3 7 mmol/L      Chloride 109 mmol/L      CO2 24 mmol/L      ANION GAP 7 mmol/L      BUN 16 mg/dL      Creatinine 0 85 mg/dL      Glucose 132 mg/dL      Calcium 9 1 mg/dL      AST 23 U/L      ALT 21 U/L      Alkaline Phosphatase 84 U/L      Total Protein 7 4 g/dL      Albumin 3 6 g/dL      Total Bilirubin 0 25 mg/dL      eGFR 84 ml/min/1 73sq m     Narrative:       [place] guidelines for Chronic Kidney Disease (CKD):     Stage 1 with normal or high GFR (GFR > 90 mL/min/1 73 square meters)    Stage 2 Mild CKD (GFR = 60-89 mL/min/1 73 square meters)    Stage 3A Moderate CKD (GFR = 45-59 mL/min/1 73 square meters)    Stage 3B Moderate CKD (GFR = 30-44 mL/min/1 73 square meters)    Stage 4 Severe CKD (GFR = 15-29 mL/min/1 73 square meters)    Stage 5 End Stage CKD (GFR <15 mL/min/1 73 square meters)  Note: GFR calculation is accurate only with a steady state creatinine    Troponin I [[id number]]  (Normal) Collected:  11/24/19 0616    Lab Status:  Final result Specimen:  Blood from Arm, Left Updated:  11/24/19 0647     Troponin I <0 02 ng/mL     CBC and differential [[id number]] Collected:  11/24/19 0616    Lab Status:  Final result Specimen:  Blood from Arm, Left Updated:  11/24/19 0625     WBC 8 63 Thousand/uL      RBC 4 35 Million/uL      Hemoglobin 12 1 g/dL      Hematocrit 38 0 %      MCV 87 fL      MCH 27 8 pg      MCHC 31 8 g/dL      RDW 14 9 %      MPV 9 9 fL      Platelets 618 Thousands/uL      nRBC 0 /100 WBCs      Neutrophils Relative 72 %      Immat GRANS % 1 %      Lymphocytes Relative 16 %      Monocytes Relative 9 %      Eosinophils Relative 1 %      Basophils Relative 1 %      Neutrophils Absolute 6 31 Thousands/µL      Immature Grans Absolute 0 04 Thousand/uL      Lymphocytes Absolute 1 34 Thousands/µL      Monocytes Absolute 0 79 Thousand/µL      Eosinophils Absolute 0 10 Thousand/µL      Basophils Absolute 0 05 Thousands/µL     Fingerstick Glucose (POCT) [[id number]]  (Normal) Collected:  11/24/19 0614    Lab Status:  Final result Updated:  11/24/19 0616     POC Glucose 129 mg/dl                  XR chest 2 views   ED Interpretation by Walter Grubbs MD (11/24 7057)   No pneumonia, pneumothorax, narrow mediastinum      Final Result by Jen Berman MD (11/24 4064)      Interval enlargement of the cardiac silhouette, consider new cardiac or pericardial disease  Workstation performed: [id number]               Procedures  Procedures        ED Course  ED Course as of Nov 24 0950   Sun Nov 24, 2019   0649 WBC: 8 63   0649 Red Blood Cell Count: 4 35   0649 Troponin I: <0 02   4422 POC Glucose: 129   0649 Creatinine: 0 85   0649 Potassium: 3 7   0752 Procedure Note: EKG  Date/Time: 11/24/19 7:52 AM   Interpreted by: Clotilde Guthrie   Indications / Diagnosis: Lightheaded  ECG reviewed by me, the ED Provider: yes   The EKG demonstrates:  Rhythm: normal sinus  Intervals:  Qt 502, otherwise normal intervals  Axis: normal axis  QRS/Blocks: normal QRS  ST Changes: Inferior and lateral T wave changes since EKG from from 7-, no STD/SWETHA           7528 Patient is noted to have some new T-wave inversions in the inferolateral leads on EKG today, unclear if these are acute as patient's last EKG was from several months ago  Given the new changes will obtain a delta troponin an EKG  Also has mildly prolonged Qt, will check Mg level      0846 Magnesium: 1 9   0949 Delta trop negative with no changes on repeat EKG  Vitals stable since patient arrived  Will discharge to home with cardiology and pcp f/u  MDM  Number of Diagnoses or Management Options  Diagnosis management comments: This is a 17-year-old female who presents following a presyncopal episode  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, she was at work this morning when she began to feel very lightheaded with some mild associated nausea, sore has also stated that she looked pale  She sat down and they took her blood pressure which was 70/40 when they checked it, this was the same on repeat  On arrival in the ED patient's BP was stable with >987 systolic  She denies losing consciousness  She does report URI symptoms of congestion, mild cough for 1 week  Denies any chest pain, back pain, abdominal pain  Overall this is most consistent with a vagal episode  He will in symptoms of cough and congestion, will obtain a chest x-ray to rule out pneumonia, will obtain a CBC to evaluate white count for affection, hemoglobin for anemia, and BMP for electrolytes kidney function  Obtain EKG and troponin for evidence of cardiac etiologies  Will also give fluids due to report of low blood pressure, possible component dehydration as patient reports she has been sick for the past week  No abdominal tenderness to suggest intra-abdominal pathology  Patient otherwise well-appearing with clear lungs and normal vitals and afebrile time of exam which is all reassuring        Disposition  Final diagnoses:   Pre-syncope   Episodic lightheadedness   Nonspecific abnormal electrocardiogram (ECG) (EKG)     Time reflects when diagnosis was documented in both MDM as applicable and the Disposition within this note     Time User Action Codes Description Comment    11/24/2019  9:35 AM Oneil Vega Add [R55] Pre-syncope     11/24/2019  9:35 AM Oneil Vega Add [R42] Episodic lightheadedness     11/24/2019  9:36 AM Marquita Vega Add [R94 31] Nonspecific abnormal electrocardiogram (ECG) (EKG)       ED Disposition     ED Disposition Condition Date/Time Comment    Discharge Stable Sun Nov 24, 2019  9:35 AM Pushpa Mohr discharge to home/self care  Follow-up Information     Follow up With Specialties Details Why Contact Info Additional Information    [address] [facility] Schedule an appointment as soon as possible for a visit  Recurrent episodes of syncope/presyncope, episode of hypotension, enlarged cardiac silhouette [address] [facility] [id number]  [place] [address], [address] [place] [address]    Dannie Sloan MD Family Medicine Schedule an appointment as soon as possible for a visit  Recurrent episodes of syncope/presyncope, episode of hypotension, enlarged cardiac silhouette [address]  [phone]             Patient's Medications   Discharge Prescriptions    No medications on file     No discharge procedures on file  ED Provider  Attending physically available and evaluated Pushpa Mohr I managed the patient along with the ED Attending      Electronically Signed by         Ryan Reno MD  11/24/19 0768

## 2019-11-25 LAB
ATRIAL RATE: 66 BPM
ATRIAL RATE: 73 BPM
P AXIS: 62 DEGREES
P AXIS: 71 DEGREES
PR INTERVAL: 146 MS
PR INTERVAL: 146 MS
QRS AXIS: 2 DEGREES
QRS AXIS: 8 DEGREES
QRSD INTERVAL: 76 MS
QRSD INTERVAL: 78 MS
QT INTERVAL: 450 MS
QT INTERVAL: 456 MS
QTC INTERVAL: 471 MS
QTC INTERVAL: 502 MS
T WAVE AXIS: -49 DEGREES
T WAVE AXIS: -61 DEGREES
VENTRICULAR RATE: 66 BPM
VENTRICULAR RATE: 73 BPM

## 2019-11-25 PROCEDURE — 93010 ELECTROCARDIOGRAM REPORT: CPT | Performed by: INTERNAL MEDICINE

## 2019-11-26 ENCOUNTER — OFFICE VISIT (OUTPATIENT)
Dept: FAMILY MEDICINE CLINIC | Facility: CLINIC | Age: 44
End: 2019-11-26
Payer: COMMERCIAL

## 2019-11-26 VITALS
BODY MASS INDEX: 28.98 KG/M2 | SYSTOLIC BLOOD PRESSURE: 126 MMHG | HEART RATE: 88 BPM | WEIGHT: 163.6 LBS | TEMPERATURE: 98.5 F | OXYGEN SATURATION: 97 % | DIASTOLIC BLOOD PRESSURE: 86 MMHG

## 2019-11-26 DIAGNOSIS — I51.7 CARDIOMEGALY: ICD-10-CM

## 2019-11-26 DIAGNOSIS — R94.31 ECG ABNORMAL: ICD-10-CM

## 2019-11-26 DIAGNOSIS — R55 SYNCOPE, UNSPECIFIED SYNCOPE TYPE: Primary | ICD-10-CM

## 2019-11-26 DIAGNOSIS — F41.8 DEPRESSION WITH ANXIETY: ICD-10-CM

## 2019-11-26 DIAGNOSIS — I10 BENIGN ESSENTIAL HYPERTENSION: ICD-10-CM

## 2019-11-26 DIAGNOSIS — J20.9 ACUTE BRONCHITIS WITH BRONCHOSPASM: ICD-10-CM

## 2019-11-26 PROCEDURE — 99215 OFFICE O/P EST HI 40 MIN: CPT | Performed by: FAMILY MEDICINE

## 2019-11-26 RX ORDER — MOMETASONE FUROATE 50 UG/1
2 SPRAY, METERED NASAL DAILY
Qty: 17 G | Refills: 3 | Status: SHIPPED | OUTPATIENT
Start: 2019-11-26 | End: 2020-01-07 | Stop reason: ALTCHOICE

## 2019-11-26 RX ORDER — METHYLPREDNISOLONE 4 MG/1
TABLET ORAL
Qty: 21 EACH | Refills: 0 | Status: SHIPPED | OUTPATIENT
Start: 2019-11-26 | End: 2019-12-12

## 2019-11-26 RX ORDER — AZITHROMYCIN 250 MG/1
TABLET, FILM COATED ORAL
Qty: 6 TABLET | Refills: 0 | Status: SHIPPED | OUTPATIENT
Start: 2019-11-26 | End: 2019-11-30

## 2019-11-26 NOTE — PROGRESS NOTES
FAMILY PRACTICE OFFICE VISIT       NAME: Troy Young  AGE: 40 y o  SEX: female       : 1975        MRN: [id number]        Assessment and Plan     Problem List Items Addressed This Visit        Cardiovascular and Mediastinum    Benign essential hypertension     · Continue Avapro and Procardia, blood pressure is well controlled         Relevant Orders    Echo complete with contrast if indicated    Syncope - Primary     · Syncope x2 summer 2019, near-syncope 2019  · Unremarkable echo, Holter and CT brain is summer 2019, chest x-ray performed in 2019 speculates possible cardiomegaly  · EKG in 2019 reveals new T-wave inversions in inferior lateral leads  · Will proceed with nuclear stress test and echo ASAP         Relevant Orders    NM myocardial perfusion spect (rx stress and/or rest)    Echo complete with contrast if indicated       Other    Depression with anxiety     · Continue Zoloft 150 mg daily           Other Visit Diagnoses     ECG abnormal        Relevant Orders    NM myocardial perfusion spect (rx stress and/or rest)    Echo complete with contrast if indicated    Cardiomegaly        Relevant Orders    NM myocardial perfusion spect (rx stress and/or rest)    Echo complete with contrast if indicated    Acute bronchitis with bronchospasm        Relevant Medications    azithromycin (ZITHROMAX) 250 mg tablet    methylPREDNISolone 4 MG tablet therapy pack    Albuterol Sulfate (PROAIR RESPICLICK) 899 (90 Base) MCG/ACT AEPB    mometasone (NASONEX) 50 mcg/act nasal spray      Patient presents for evaluation of recurrent episodes of syncope/near syncope and worsening of nasal congestion, cough, bronchospasm symptoms  She was seen in emergency room of [facility]  due to near syncopal episode  New changes on EKG manifesting as T-wave inversions in inferior lateral leads as well as possible cardiomegaly described on chest x-ray    Patient presents with symptoms of wheezing, cough/acute bronchitis with bronchospasm today  Previous workup of 2 syncopal episodes this summer included echo, Holter and CT brain  Recent blood work including cardiac enzymes x2 was unremarkable  Plan:  · Will start patient on regimen of Medrol Dosepak, Z-Oliver, albuterol inhaler and Nasonex  · Schedule recheck in the office in 1 week  · Patient will proceed with ASAP echocardiogram and nuclear stress test  · She may require further evaluation with Cardiology pending diagnostic workup  · I advised rest, fluids, proper hydration    There are no Patient Instructions on file for this visit  Discussed with the patient and all questioned fully answered  She will call me if any problems arise  M*Jumpido software was used to dictate this note  It may contain errors with dictating incorrect words/spelling  Please contact provider directly with any questions  Chief Complaint     Chief Complaint   Patient presents with    Follow-up     ER follow up/ Syncope     URI     couhg, congestion        History of Present Illness     Patient presents for evaluation of near-syncope and ongoing cold and cough symptoms  She was seen in our office in mid October and was treated for URI with Augmentin for 10 days  Patient did develop significant side effects on this antibiotic manifesting as GI upset and vomiting, nevertheless she managed to completed, sinus symptoms have improved but has not resolved completely  Patient started with new URI symptoms a week ago and now is complaining of severe nasal congestion, coughing attacks and bilateral ear pressure  Patient is afebrile  Patient reports 2 episodes of syncope that took place in June of 2019  She was evaluated emergency room and followed up with our office  Extensive workup included echo, Holter and CT brain along with blood work, all performed in August of 2019 and all unremarkable  Etiology of her syncopal episodes was undetermined    Patient reports new episode of nearly passing out on Saturday, November 23rd  Around 530 in the morning patient developed symptoms of nausea and sensation of nearly passing out  She was able to sit down quickly  EMT was called  Blood pressure on the scene was 70/40  Patient was taking to emergency room of [facility] via ambulance  No syncope this time  Emergency room workup reviewed today with patient  Chest x-ray performed in emergency room revealed  Interval enlargement of the cardiac silhouette, consider new cardiac or pericardial disease   [facility] work performed in emergency room included normal CBC, CMP, troponin I was negative x2, normal magnesium  EKG revealed T-wave inversions in leads 2, 3, AVF and V5 V6, I compare this tracing to prior study in July of 2019 with a nonspecific ST flattening was noticed in inferior leads, T-wave inversions in V5 and V6 are new compared to prior study in 2019       URI    Associated symptoms include congestion and coughing  Pertinent negatives include no chest pain  Review of Systems   Review of Systems   Constitutional: Positive for fatigue  Negative for fever  HENT: Positive for congestion, postnasal drip and sinus pressure  Eyes: Negative  Respiratory: Positive for cough and chest tightness  Cardiovascular: Negative  Negative for chest pain and palpitations  Gastrointestinal: Negative  Endocrine: Negative  Genitourinary: Negative  Musculoskeletal: Negative  Skin: Negative  Allergic/Immunologic: Negative  Neurological: Positive for dizziness and syncope  Hematological: Negative  Psychiatric/Behavioral: Negative          Active Problem List     Patient Active Problem List   Diagnosis    Benign essential hypertension    Depression with anxiety    Leiomyoma of uterus    Tremor of face and hands    Numbness and tingling of left leg    Syncope    Impaired fasting glucose       Past Medical History:  Past Medical History: Diagnosis Date    Preeclampsia        Past Surgical History:  Past Surgical History:   Procedure Laterality Date     SECTION      EYE SURGERY      TUBAL LIGATION         Family History:  Family History   Problem Relation Age of Onset    Hypertension Father     Kidney disease Paternal Grandfather        Social History:  Social History     Socioeconomic History    Marital status: /Civil Union     Spouse name: Not on file    Number of children: Not on file    Years of education: Not on file    Highest education level: Not on file   Occupational History    Not on file   Social Needs    Financial resource strain: Not on file    Food insecurity:     Worry: Not on file     Inability: Not on file    Transportation needs:     Medical: Not on file     Non-medical: Not on file   Tobacco Use    Smoking status: Current Every Day Smoker    Smokeless tobacco: Never Used   Substance and Sexual Activity    Alcohol use:  Yes    Drug use: No    Sexual activity: Not on file   Lifestyle    Physical activity:     Days per week: Not on file     Minutes per session: Not on file    Stress: Not on file   Relationships    Social connections:     Talks on phone: Not on file     Gets together: Not on file     Attends Congregation service: Not on file     Active member of club or organization: Not on file     Attends meetings of clubs or organizations: Not on file     Relationship status: Not on file    Intimate partner violence:     Fear of current or ex partner: Not on file     Emotionally abused: Not on file     Physically abused: Not on file     Forced sexual activity: Not on file   Other Topics Concern    Not on file   Social History Narrative    Not on file       Objective     Vitals:    19 0908   BP: 126/86   BP Location: Left arm   Patient Position: Sitting   Cuff Size: Standard   Pulse: 88   Temp: 98 5 °F (36 9 °C)   TempSrc: Tympanic   SpO2: 97%   Weight: 74 2 kg (163 lb 9 6 oz)     Wt Readings from Last 3 Encounters:   11/26/19 74 2 kg (163 lb 9 6 oz)   11/24/19 74 4 kg (164 lb)   10/14/19 75 1 kg (165 lb 9 6 oz)       Physical Exam   Constitutional: She is oriented to person, place, and time  She appears well-developed and well-nourished  Fatigued   HENT:   Head: Normocephalic and atraumatic  Right Ear: Tympanic membrane normal    Left Ear: Tympanic membrane normal    Nose: Mucosal edema present  Mouth/Throat: Posterior oropharyngeal erythema present  No oropharyngeal exudate  Eyes: Conjunctivae are normal    Neck: Neck supple  No JVD present  Cardiovascular: Normal rate, regular rhythm and normal heart sounds  No murmur heard  Pulmonary/Chest: Effort normal  No respiratory distress  She has wheezes  She has no rales  Coughing attacks during exam  Increased expiratory phase, generalized wheezing and bilateral rhonchi at bases  Nebulized albuterol administered in the office today   Abdominal: Bowel sounds are normal    Musculoskeletal: Normal range of motion  Lymphadenopathy:     She has no cervical adenopathy  Neurological: She is alert and oriented to person, place, and time  She has normal reflexes  No cranial nerve deficit  Skin: Skin is warm  No rash noted  Psychiatric: She has a normal mood and affect  Her behavior is normal  Judgment and thought content normal    Nursing note and vitals reviewed        Pertinent Laboratory/Diagnostic Studies:  Lab Results   Component Value Date    GLUCOSE 84 10/27/2015    BUN 16 11/24/2019    CREATININE 0 85 11/24/2019    CALCIUM 9 1 11/24/2019     10/27/2015    K 3 7 11/24/2019    CO2 24 11/24/2019     (H) 11/24/2019     Lab Results   Component Value Date    ALT 21 11/24/2019    AST 23 11/24/2019    ALKPHOS 84 11/24/2019    BILITOT 0 24 07/09/2015       Lab Results   Component Value Date    WBC 8 63 11/24/2019    HGB 12 1 11/24/2019    HCT 38 0 11/24/2019    MCV 87 11/24/2019     11/24/2019       No results found for: TSH    Lab Results   Component Value Date    CHOL 194 07/09/2015     Lab Results   Component Value Date    TRIG 115 10/16/2019     Lab Results   Component Value Date    HDL 43 10/16/2019     Lab Results   Component Value Date    LDLCALC 134 (H) 10/16/2019     Lab Results   Component Value Date    HGBA1C 5 4 10/16/2019       Results for orders placed or performed during the hospital encounter of 11/24/19   CBC and differential   Result Value Ref Range    WBC 8 63 4 31 - 10 16 Thousand/uL    RBC 4 35 3 81 - 5 12 Million/uL    Hemoglobin 12 1 11 5 - 15 4 g/dL    Hematocrit 38 0 34 8 - 46 1 %    MCV 87 82 - 98 fL    MCH 27 8 26 8 - 34 3 pg    MCHC 31 8 31 4 - 37 4 g/dL    RDW 14 9 11 6 - 15 1 %    MPV 9 9 8 9 - 12 7 fL    Platelets 817 872 - 491 Thousands/uL    nRBC 0 /100 WBCs    Neutrophils Relative 72 43 - 75 %    Immat GRANS % 1 0 - 2 %    Lymphocytes Relative 16 14 - 44 %    Monocytes Relative 9 4 - 12 %    Eosinophils Relative 1 0 - 6 %    Basophils Relative 1 0 - 1 %    Neutrophils Absolute 6 31 1 85 - 7 62 Thousands/µL    Immature Grans Absolute 0 04 0 00 - 0 20 Thousand/uL    Lymphocytes Absolute 1 34 0 60 - 4 47 Thousands/µL    Monocytes Absolute 0 79 0 17 - 1 22 Thousand/µL    Eosinophils Absolute 0 10 0 00 - 0 61 Thousand/µL    Basophils Absolute 0 05 0 00 - 0 10 Thousands/µL   Comprehensive metabolic panel   Result Value Ref Range    Sodium 140 136 - 145 mmol/L    Potassium 3 7 3 5 - 5 3 mmol/L    Chloride 109 (H) 100 - 108 mmol/L    CO2 24 21 - 32 mmol/L    ANION GAP 7 4 - 13 mmol/L    BUN 16 5 - 25 mg/dL    Creatinine 0 85 0 60 - 1 30 mg/dL    Glucose 132 65 - 140 mg/dL    Calcium 9 1 8 3 - 10 1 mg/dL    AST 23 5 - 45 U/L    ALT 21 12 - 78 U/L    Alkaline Phosphatase 84 46 - 116 U/L    Total Protein 7 4 6 4 - 8 2 g/dL    Albumin 3 6 3 5 - 5 0 g/dL    Total Bilirubin 0 25 0 20 - 1 00 mg/dL    eGFR 84 ml/min/1 73sq m   Troponin I   Result Value Ref Range    Troponin I <0 02 <=0 04 ng/mL   Magnesium Result Value Ref Range    Magnesium 1 9 1 6 - 2 6 mg/dL   Troponin I   Result Value Ref Range    Troponin I <0 02 <=0 04 ng/mL   ECG 12 lead   Result Value Ref Range    Ventricular Rate 67 BPM    Atrial Rate 67 BPM    NE Interval 144 ms    QRSD Interval 80 ms    QT Interval 476 ms    QTC Interval 502 ms    P [place] 72 degrees    QRS Axis 5 degrees    T Wave Axis -53 degrees   ECG 12 lead   Result Value Ref Range    Ventricular Rate 66 BPM    Atrial Rate 66 BPM    NE Interval 146 ms    QRSD Interval 78 ms    QT Interval 450 ms    QTC Interval 471 ms    P Axis 71 degrees    QRS Axis 8 degrees    T Wave Axis -61 degrees   ECG 12 lead   Result Value Ref Range    Ventricular Rate 73 BPM    Atrial Rate 73 BPM    NE Interval 146 ms    QRSD Interval 76 ms    QT Interval 456 ms    QTC Interval 502 ms    P Axis 62 degrees    QRS Axis 2 degrees    T Wave Axis -49 degrees   Fingerstick Glucose (POCT)   Result Value Ref Range    POC Glucose 129 65 - 140 mg/dl       Orders Placed This Encounter   Procedures    NM myocardial perfusion spect (rx stress and/or rest)    Echo complete with contrast if indicated       ALLERGIES:  Allergies   Allergen Reactions    Augmentin [Amoxicillin-Pot Clavulanate] Vomiting     Vomiting and  myalgias       Current Medications     Current Outpatient Medications   Medication Sig Dispense Refill    irbesartan (AVAPRO) 300 mg tablet TAKE 1 TABLET DAILY 90 tablet 0    NIFEdipine (PROCARDIA XL) 60 mg 24 hr tablet TAKE 2 TABLETS DAILY 180 tablet 0    sertraline (ZOLOFT) 100 mg tablet TAKE ONE AND ONE-HALF TABLETS DAILY 135 tablet 0    Albuterol Sulfate (PROAIR RESPICLICK) 902 (90 Base) MCG/ACT AEPB Two puffs every 4-6 hours as needed for cough and wheeze 1 each 1    azithromycin (ZITHROMAX) 250 mg tablet Take 2 tablets today then 1 tablet daily x 4 days 6 tablet 0    methylPREDNISolone 4 MG tablet therapy pack Use as directed on package 21 each 0    mometasone (NASONEX) 50 mcg/act nasal spray 2 sprays into each nostril daily 17 g 3     No current facility-administered medications for this visit          Medications Discontinued During This Encounter   Medication Reason    potassium chloride (K-DUR,KLOR-CON) 10 mEq tablet        Health Maintenance     Health Maintenance   Topic Date Due    MAMMOGRAM  1975    Pneumococcal Vaccine: Pediatrics (0 to 5 Years) and At-Risk Patients (6 to 59 Years) (1 of 1 - PPSV23) 09/05/1981    BMI: Followup Plan  09/05/1993    Influenza Vaccine  07/01/2019    BMI: Adult  11/26/2020    Cervical Cancer Screening  01/24/2021    DTaP,Tdap,and Td Vaccines (2 - Td) 07/12/2024    Pneumococcal Vaccine: 65+ Years (1 of 2 - PCV13) 09/05/2040    HIV Screening  Completed    HIB Vaccine  Aged Out    Hepatitis B Vaccine  Aged Out    IPV Vaccine  Aged Out    Hepatitis A Vaccine  Aged Out    Meningococcal ACWY Vaccine  Aged Out    HPV Vaccine  Aged Out       Immunization History   Administered Date(s) Administered     Influenza (IM) Preservative Free 10/29/2018    Influenza TIV (IM) 10/16/2015    Tdap 07/12/2014    Tuberculin Skin Test-PPD Intradermal 09/10/2012       Avril Amor MD

## 2019-11-29 PROBLEM — Z09 ENCOUNTER FOR EXAMINATION FOLLOWING TREATMENT AT HOSPITAL: Status: RESOLVED | Noted: 2019-07-28 | Resolved: 2019-11-29

## 2019-11-29 PROBLEM — E87.6 LOW SERUM POTASSIUM: Status: RESOLVED | Noted: 2019-07-28 | Resolved: 2019-11-29

## 2019-11-29 PROBLEM — R14.0 ABDOMINAL DISTENSION, GASEOUS: Status: RESOLVED | Noted: 2019-07-28 | Resolved: 2019-11-29

## 2019-11-29 PROBLEM — S09.90XA HEAD INJURY: Status: RESOLVED | Noted: 2019-07-28 | Resolved: 2019-11-29

## 2019-11-29 PROBLEM — Z91.81 STATUS POST FALL: Status: RESOLVED | Noted: 2019-07-28 | Resolved: 2019-11-29

## 2019-11-29 NOTE — ASSESSMENT & PLAN NOTE
· Syncope x2 summer 2019, near-syncope November 2019  · Unremarkable echo, Holter and CT brain is summer 2019, chest x-ray performed in November 2019 speculates possible cardiomegaly  · EKG in November 2019 reveals new T-wave inversions in inferior lateral leads  · Will proceed with nuclear stress test and echo ASAP

## 2019-12-06 ENCOUNTER — HOSPITAL ENCOUNTER (OUTPATIENT)
Dept: NON INVASIVE DIAGNOSTICS | Facility: HOSPITAL | Age: 44
Discharge: HOME | End: 2019-12-06
Payer: COMMERCIAL

## 2019-12-06 DIAGNOSIS — I10 BENIGN ESSENTIAL HYPERTENSION: ICD-10-CM

## 2019-12-06 DIAGNOSIS — R94.31 ECG ABNORMAL: ICD-10-CM

## 2019-12-06 DIAGNOSIS — I51.7 CARDIOMEGALY: ICD-10-CM

## 2019-12-06 DIAGNOSIS — R55 SYNCOPE, UNSPECIFIED SYNCOPE TYPE: ICD-10-CM

## 2019-12-06 PROCEDURE — 93306 TTE W/DOPPLER COMPLETE: CPT

## 2019-12-06 PROCEDURE — 93306 TTE W/DOPPLER COMPLETE: CPT | Performed by: INTERNAL MEDICINE

## 2019-12-09 ENCOUNTER — HOSPITAL ENCOUNTER (OUTPATIENT)
Dept: NON INVASIVE DIAGNOSTICS | Facility: CLINIC | Age: 44
Discharge: HOME | End: 2019-12-09
Payer: COMMERCIAL

## 2019-12-09 DIAGNOSIS — I51.7 CARDIOMEGALY: ICD-10-CM

## 2019-12-09 DIAGNOSIS — R94.31 ECG ABNORMAL: ICD-10-CM

## 2019-12-09 DIAGNOSIS — R55 SYNCOPE, UNSPECIFIED SYNCOPE TYPE: ICD-10-CM

## 2019-12-09 LAB
CHEST PAIN STATEMENT: NORMAL
MAX DIASTOLIC BP: 82 MMHG
MAX HEART RATE: 142 BPM
MAX PREDICTED HEART RATE: 176 BPM
MAX. SYSTOLIC BP: 180 MMHG
PROTOCOL NAME: NORMAL
REASON FOR TERMINATION: NORMAL
TARGET HR FORMULA: NORMAL
TIME IN EXERCISE PHASE: NORMAL

## 2019-12-09 PROCEDURE — A9502 TC99M TETROFOSMIN: HCPCS

## 2019-12-09 PROCEDURE — 93018 CV STRESS TEST I&R ONLY: CPT | Performed by: INTERNAL MEDICINE

## 2019-12-09 PROCEDURE — 78452 HT MUSCLE IMAGE SPECT MULT: CPT | Performed by: INTERNAL MEDICINE

## 2019-12-09 PROCEDURE — 78452 HT MUSCLE IMAGE SPECT MULT: CPT

## 2019-12-09 PROCEDURE — 93016 CV STRESS TEST SUPVJ ONLY: CPT | Performed by: INTERNAL MEDICINE

## 2019-12-09 PROCEDURE — 93017 CV STRESS TEST TRACING ONLY: CPT

## 2019-12-10 ENCOUNTER — TELEPHONE (OUTPATIENT)
Dept: FAMILY MEDICINE CLINIC | Facility: CLINIC | Age: 44
End: 2019-12-10

## 2019-12-10 DIAGNOSIS — R94.39 ABNORMAL NUCLEAR STRESS TEST: Primary | ICD-10-CM

## 2019-12-10 DIAGNOSIS — I34.0 MITRAL VALVE INSUFFICIENCY, UNSPECIFIED ETIOLOGY: ICD-10-CM

## 2019-12-10 NOTE — TELEPHONE ENCOUNTER
Spoke with PT and she is aware of the appointment and is off tomorrow so she will be going   Pt knows to call us back if she needs anything futher

## 2019-12-10 NOTE — TELEPHONE ENCOUNTER
Call placed to Cardio and got Pt appointment for 12/11 @ [address] in [place]   Called Pt and left her a detailed message about her appointment but asked her to call me back to richa she got the message and can make the appointment

## 2019-12-10 NOTE — TELEPHONE ENCOUNTER
Please contact patient  I received results of echocardiogram and nuclear stress test   Nuclear stress test raises concern about possible blockage  Echocardiogram reveals normal pumping ability of the heart but shows mild leakage in mitral valve  We need to arrange patient's consultation with Cardiology ASAP  Please contact [facility]'s Cardiology in person to set up an appointment for patient  I will place stat orders      Thank you

## 2019-12-11 ENCOUNTER — OFFICE VISIT (OUTPATIENT)
Dept: CARDIOLOGY CLINIC | Facility: CLINIC | Age: 44
End: 2019-12-11
Payer: COMMERCIAL

## 2019-12-11 VITALS
WEIGHT: 169 LBS | HEART RATE: 72 BPM | BODY MASS INDEX: 29.95 KG/M2 | DIASTOLIC BLOOD PRESSURE: 70 MMHG | HEIGHT: 63 IN | OXYGEN SATURATION: 98 % | SYSTOLIC BLOOD PRESSURE: 124 MMHG

## 2019-12-11 DIAGNOSIS — I10 BENIGN ESSENTIAL HYPERTENSION: ICD-10-CM

## 2019-12-11 DIAGNOSIS — R94.39 ABNORMAL STRESS TEST: Primary | ICD-10-CM

## 2019-12-11 DIAGNOSIS — I34.0 NONRHEUMATIC MITRAL VALVE REGURGITATION: ICD-10-CM

## 2019-12-11 DIAGNOSIS — R94.39 ABNORMAL NUCLEAR STRESS TEST: ICD-10-CM

## 2019-12-11 PROCEDURE — 3074F SYST BP LT 130 MM HG: CPT | Performed by: INTERNAL MEDICINE

## 2019-12-11 PROCEDURE — 99204 OFFICE O/P NEW MOD 45 MIN: CPT | Performed by: INTERNAL MEDICINE

## 2019-12-11 PROCEDURE — 93000 ELECTROCARDIOGRAM COMPLETE: CPT | Performed by: INTERNAL MEDICINE

## 2019-12-11 PROCEDURE — 3078F DIAST BP <80 MM HG: CPT | Performed by: INTERNAL MEDICINE

## 2019-12-11 NOTE — PROGRESS NOTES
[facility]  [id number] [facility], [place] [address]  Phone#  [phone]  Fax#  [phone]  *-*-*-*-*-*-*-*-*-*-*-*-*-*-*-*-*-*-*-*-*-*-*-*-*-*-*-*-*-*-*-*-*-*-*-*-*-*-*-*-*-*-*-*-*-*-*-*-*-*-*-*-*-*  Mee Childress DATE: 12/11/19 4:16 PM  PATIENT NAME: Benny Doyle   1975    [id number]  Age: 40 y o  Sex: female  AUTHOR: Ross Herzog MD  PRIMARYCARE PHYSICIAN: Celena Whiteside MD  REFERRING PHYSICIAN: Celena Whiteside MD  [address] [place] [address] Marlon Griffith MD   *-*-*-*-*-*-*-*-*-*-*-*-*-*-*-*-*-*-*-*-*-*-*-*-*-*-*-*-*-*-*-*-*-*-*-*-*-*-*-*-*-*-*-*-*-*-*-*-*-*-*-*-*-*-  REASON FOR REFERRAL:  Evaluation of abnormal stress test, syncope or near-syncope    *-*-*-*-*-*-*-*-*-*-*-*-*-*-*-*-*-*-*-*-*-*-*-*-*-*-*-*-*-*-*-*-*-*-*-*-*-*-*-*-*-*-*-*-*-*-*-*-*-*-*-*-*-*-  CARDIOLOGY ASSESSMENT & PLAN:  Abnormal nuclear stress test  40 year young female with risk factors of longstanding history of hypertension and history of smoking with symptoms of decline in exercise tolerance, recurring syncope and near-syncope and baseline ECG abnormality recently underwent exercise nuclear stress test   Though she had no diagnostic changes of ischemia there were changes consistent with exercise induced inferolateral ischemia  Echocardiogram significant for normal left ventricular systolic function with mild-to-moderate mitral valve regurgitation  Today on physical examination I am not able to murmur  She denies typical angina-like chest pain  - today we discussed the options for further evaluation including doing of coronary CT angiogram versus cardiac catheterization  Given that she has had multiple symptoms and abnormalities on various test in our opinion cardiac catheterization with coronary angiogram would be recommended for more definitive evaluation of her symptoms and abnormalities  I have explained this procedure to her briefly including its benefits and risks    She is concerned and little anxious about the test but is agreeable to undergo it  She says that she would need adequate sedation as she is very anxious  - cardiac catheterization at [facility]  - today we reinforced with her the importance of quitting smoking specially given her risk factors  At this time she is not interested in smoking cessation counseling     - regarding cardiac catheterization she clarifies that she had her tubal ligation and cannot be pregnant  A script for pregnancy test is being given to her and may be performed upon the request of cardiologist performing the cardiac catheterization  She recently had routine metabolic panel which indicates normal renal function  Mitral valve regurgitation  Mild to moderate mitral valve regurgitation noted on recent transthoracic echocardiogram   On auscultation I am not able to appreciate significant murmur today  She has had no signs and symptoms of heart failure  At this time no further workup is recommended regarding this condition  She will continue her antihypertensive therapy  A limited transthoracic echocardiogram may be performed next year to reassess this condition  Benign essential hypertension  Blood pressure is well controlled on current therapy with a irbesartan and nifedipine XL     - Patient is advised to continue current medical therapy  - Dietary and medical compliance are reinforced  - Advised  to report any concerning symptoms such as chest pain, shortness of breath, decline in exercise tolerance or presyncope/syncope  *-*-*-*-*-*-*-*-*-*-*-*-*-*-*-*-*-*-*-*-*-*-*-*-*-*-*-*-*-*-*-*-*-*-*-*-*-*-*-*-*-*-*-*-*-*-*-*-*-*-*-*-*-*-  CURRENT ECG:  Results for orders placed or performed in visit on 12/11/19   POCT ECG    Narrative    Sinus rhythm, HR 72 bpm, normal axis and intervals, evidence of inferior and lateral downsloping ST changes and T-wave inversions size    Slightly delayed R-wave transition  *-*-*-*-*-*-*-*-*-*-*-*-*-*-*-*-*-*-*-*-*-*-*-*-*-*-*-*-*-*-*-*-*-*-*-*-*-*-*-*-*-*-*-*-*-*-*-*-*-*-*-*-*-*-  HISTORY OF PRESENT ILLNESS:  Patient is a pleasant 40 year female with past medical history significant for benign essential hypertension since she was around 21 age, history of anxiety and depression, GERD and impaired fasting glucose  She was her usual state of health until past June when she had a syncopal event while at work  Apparently it was a heart day and she was working the night shift  As she was walking out of the elevator she felt a profound weakness and blurriness of vision  As she walked to the dining room near by she lost consciousness and fell onto the floor  She was taken to [facility] where she was diagnosed with vasovagal syncope  Subsequently she had an echocardiogram and a Holter test performed which were overall normal   More recently a couple of days before Thanksgiving she had a similar episode while she was at work  This time she was doing normal activities and not stressed  While she was doing normal activities she again felt the profound weakness and vision change and she was cold and clammy and diaphoretic  Her Colace check her blood pressure and apparently her systolic blood pressure was 70 mmHg  She was taken to emergency room  As per documentation her blood pressure were there was normal   Again she was diagnosed with presyncope  Subsequently she saw her primary care physician who sent her for another echocardiogram and a nuclear stress test   The echocardiogram was significant for presence of mild to moderate mitral valve regurgitation  Her nuclear stress test was significant for small to moderate size inferolateral perfusion abnormality consistent with ischemia  She is now referred for further evaluation      She has no prior history of coronary artery disease, congestive heart failure or arrhythmias in the past   She denies typical angina-like chest pain but she does notice that her exercise tolerance has decreased in the recent months and she is not able to be as active as before  She mentions specifically that during gardening she has to take several breaks  *-*-*-*-*-*-*-*-*-*-*-*-*-*-*-*-*-*-*-*-*-*-*-*-*-*-*-*-*-*-*-*-*-*-*-*-*-*-*-*-*-*-*-*-*-*-*-*-*-*-*-*-*-*  PAST MEDICAL HISTORY:   Past Medical History:   Diagnosis Date    Preeclampsia    Benign essential Hypertension  GERD  Anxiety and depression  Impaired fasting glucose      PAST SURGICAL HISTORY:   Past Surgical History:   Procedure Laterality Date     SECTION      EYE SURGERY      TUBAL LIGATION         FAMILY HISTORY:  Family History   Problem Relation Age of Onset    Hypertension Father     Kidney disease Paternal Grandfather      Her dad passed away at the age of around 61 years of on clear etiology  SOCIAL HISTORY:  [unfilled]  Social History     Tobacco Use   Smoking Status Current Every Day Smoker   Smokeless Tobacco Never Used   Smokes half a pack of cigarettes a day  Social History     Substance and Sexual Activity   Alcohol Use Yes     Social History     Substance and Sexual Activity   Drug Use No     She is a certified nursing assistant at [employer]  Occasional alcohol use      *-*-*-*-*-*-*-*-*-*-*-*-*-*-*-*-*-*-*-*-*-*-*-*-*-*-*-*-*-*-*-*-*-*-*-*-*-*-*-*-*-*-*-*-*-*-*-*-*-*-*-*-*-*  ALLERGIES:  Allergies   Allergen Reactions    Augmentin [Amoxicillin-Pot Clavulanate] Vomiting     Vomiting and  myalgias     *-*-*-*-*-*-*-*-*-*-*-*-*-*-*-*-*-*-*-*-*-*-*-*-*-*-*-*-*-*-*-*-*-*-*-*-*-*-*-*-*-*-*-*-*-*-*-*-*-*-*-*-*-*  CURRENT OUTPATIENT MEDICATIONS:     Current Outpatient Medications:     Albuterol Sulfate (PROAIR RESPICLICK) 487 (90 Base) MCG/ACT AEPB, Two puffs every 4-6 hours as needed for cough and wheeze, Disp: 1 each, Rfl: 1    irbesartan (AVAPRO) 300 mg tablet, TAKE 1 TABLET DAILY, Disp: 90 tablet, Rfl: 0   methylPREDNISolone 4 MG tablet therapy pack, Use as directed on package, Disp: 21 each, Rfl: 0    mometasone (NASONEX) 50 mcg/act nasal spray, 2 sprays into each nostril daily, Disp: 17 g, Rfl: 3    NIFEdipine (PROCARDIA XL) 60 mg 24 hr tablet, TAKE 2 TABLETS DAILY, Disp: 180 tablet, Rfl: 0    sertraline (ZOLOFT) 100 mg tablet, TAKE ONE AND ONE-HALF TABLETS DAILY, Disp: 135 tablet, Rfl: 0    *-*-*-*-*-*-*-*-*-*-*-*-*-*-*-*-*-*-*-*-*-*-*-*-*-*-*-*-*-*-*-*-*-*-*-*-*-*-*-*-*-*-*-*-*-*-*-*-*-*-*-*-*-*  REVIEW OF SYMPTOMS:    Positive for:  Recurring near-syncope and syncope and his decrease in exercise tolerance  Feeling of anxiety  Negative for: All remaining as reviewed below and in HPI  SYSTEM SYMPTOMS REVIEWED:  General--weight change, fever, night sweats  Respirato  Cardiovascular--chest pain, syncope, dyspnea on exertion, edema, decline in exercise tolerance, claudication   Gastrointestinal--persistent vomiting, diarrhea, abdominal distention, blood in stool   Muscular or skeletal--joint pain or swelling   Neurologic--headaches, syncope, abnormal movement  Hematologic--history of easy bruising and bleeding   Endocrine--thyroid enlargement, heat or cold intolerance, polyuria   Psychiatric--anxiety, depression     *-*-*-*-*-*-*-*-*-*-*-*-*-*-*-*-*-*-*-*-*-*-*-*-*-*-*-*-*-*-*-*-*-*-*-*-*-*-*-*-*-*-*-*-*-*-*-*-*-*-*-*-*-*-  VITAL SIGNS:  Vitals:    12/11/19 1522   BP: 124/70   Pulse: 72   SpO2: 98%   Weight: 76 7 kg (169 lb)   Height: 5' 3" (1 6 m)     Weight (last 2 days)     Date/Time   Weight    12/11/19 1522   76 7 (169)           ,   Wt Readings from Last 3 Encounters:   12/11/19 76 7 kg (169 lb)   11/26/19 74 2 kg (163 lb 9 6 oz)   11/24/19 74 4 kg (164 lb)    , Body mass index is 29 94 kg/m²    *-*-*-*-*-*-*-*-*-*-*-*-*-*-*-*-*-*-*-*-*-*-*-*-*-*-*-*-*-*-*-*-*-*-*-*-*-*-*-*-*-*-*-*-*-*-*-*-*-*-*-*-*-*-  PHYSICAL EXAM:  General Appearance:    Alert, cooperative, no distress, appears stated age   Head, Eyes, ENT:    No obvious abnormality, moist mucous mebranes  Neck:   Supple, no carotid bruit or JVD   Back:     Symmetric, no curvature  Lungs:     Respirations unlabored  Clear to auscultation bilaterally,    Chest wall:    No tenderness or deformity   Heart:    Regular rate and rhythm, S1 and S2 normal, no murmur, rub  or gallop  Abdomen:     Soft, non-tender, No obvious masses, or organomegaly   Extremities:   Extremities normal, no cyanosis or edema    Skin:   Skin color, texture, turgor normal, no rashes or lesions     *-*-*-*-*-*-*-*-*-*-*-*-*-*-*-*-*-*-*-*-*-*-*-*-*-*-*-*-*-*-*-*-*-*-*-*-*-*-*-*-*-*-*-*-*-*-*-*-*-*-*-*-*-*-  LABORATORY DATA:  I have personally reviewed pertinent labs      Sodium   Date Value Ref Range Status   10/27/2015 140 136 - 145 mmol/L Final   07/09/2015 135 (L) 136 - 145 mmol/L Final   07/10/2014 138 136 - 145 mmol/L Final     Potassium   Date Value Ref Range Status   11/24/2019 3 7 3 5 - 5 3 mmol/L Final   10/16/2019 3 5 3 5 - 5 3 mmol/L Final   07/21/2019 3 3 (L) 3 5 - 5 3 mmol/L Final   10/27/2015 3 6 3 5 - 5 3 mmol/L Final   07/09/2015 3 5 3 5 - 5 3 mmol/L Final   07/10/2014 3 7 3 5 - 5 3 mmol/L Final     Chloride   Date Value Ref Range Status   11/24/2019 109 (H) 100 - 108 mmol/L Final   10/16/2019 106 100 - 108 mmol/L Final   07/21/2019 107 100 - 108 mmol/L Final   10/27/2015 107 100 - 108 mmol/L Final   07/09/2015 102 100 - 108 mmol/L Final   07/10/2014 101 100 - 108 mmol/L Final     CO2   Date Value Ref Range Status   11/24/2019 24 21 - 32 mmol/L Final   10/16/2019 26 21 - 32 mmol/L Final   07/21/2019 22 21 - 32 mmol/L Final   10/27/2015 25 21 0 - 32 0 mmol/L Final   07/09/2015 25 23 - 33 mmol/L Final   07/10/2014 22 (L) 23 - 33 mmol/L Final     Anion Gap   Date Value Ref Range Status   10/27/2015 8 4 - 13 mmol/L Final   07/09/2015 8 4 - 13 mmol/L Final   07/10/2014 15 (H) 4 - 13 mmol/L Final     BUN   Date Value Ref Range Status   11/24/2019 16 5 - 25 mg/dL Final 10/16/2019 17 5 - 25 mg/dL Final   07/21/2019 15 5 - 25 mg/dL Final   10/27/2015 9 5 - 25 mg/dL Final   07/09/2015 16 5 - 25 mg/dL Final   07/10/2014 11 5 - 25 mg/dL Final     Creatinine   Date Value Ref Range Status   11/24/2019 0 85 0 60 - 1 30 mg/dL Final     Comment:     Standardized to IDMS reference method   10/16/2019 0 57 (L) 0 60 - 1 30 mg/dL Final     Comment:     Standardized to IDMS reference method   07/21/2019 0 82 0 60 - 1 30 mg/dL Final     Comment:     Standardized to IDMS reference method   10/27/2015 0 50 (L) 0 60 - 1 30 mg/dL Final     Comment:     Standardized to IDMS reference method   07/09/2015 0 52 (L) 0 60 - 1 30 mg/dL Final     Comment:     Standardized to IDMS reference method   07/10/2014 0 60 0 60 - 1 30 mg/dL Final     Comment:     Standardized to IDMS reference method     eGFR   Date Value Ref Range Status   11/24/2019 84 ml/min/1 73sq m Final   10/16/2019 113 ml/min/1 73sq m Final   07/21/2019 88 ml/min/1 73sq m Final     Glucose   Date Value Ref Range Status   10/27/2015 84 65 - 140 mg/dL Final     Comment:     If patient is fasting, the ADA then defines impaired fasting glucose as  >100 mg/dl and diabetes as  >or equal to 126 mg/dl  07/09/2015 102 65 - 140 mg/dL Final     Comment:     If patient is fasting, the ADA then defines impaired fasting glucose as  >100 mg/dl and diabetes as  >or equal to 126 mg/dl  07/10/2014 139 65 - 140 mg/dL Final     Comment:     If patient is fasting, the ADA then defines impaired fasting glucose as  >100 mg/dl and diabetes as  >or equal to 126 mg/dl         Calcium   Date Value Ref Range Status   11/24/2019 9 1 8 3 - 10 1 mg/dL Final   10/16/2019 8 9 8 3 - 10 1 mg/dL Final   07/21/2019 8 8 8 3 - 10 1 mg/dL Final   10/27/2015 8 8 8 3 - 10 1 mg/dL Final   07/09/2015 8 2 (L) 8 3 - 10 1 mg/dL Final   07/10/2014 9 0 8 3 - 10 1 mg/dL Final     AST   Date Value Ref Range Status   11/24/2019 23 5 - 45 U/L Final     Comment:       Specimen collection should occur prior to Sulfasalazine administration due to the potential for falsely depressed results  07/21/2019 13 5 - 45 U/L Final     Comment:       Specimen collection should occur prior to Sulfasalazine administration due to the potential for falsely depressed results  08/16/2018 11 5 - 45 U/L Final     Comment:       Specimen collection should occur prior to Sulfasalazine administration due to the potential for falsely depressed results  07/09/2015 16 0 - 45 U/L Final   07/10/2014 21 0 - 45 U/L Final   07/03/2014 23 0 - 45 U/L Final     ALT   Date Value Ref Range Status   11/24/2019 21 12 - 78 U/L Final     Comment:       Specimen collection should occur prior to Sulfasalazine and/or Sulfapyridine administration due to the potential for falsely depressed results  07/21/2019 14 12 - 78 U/L Final     Comment:       Specimen collection should occur prior to Sulfasalazine and/or Sulfapyridine administration due to the potential for falsely depressed results  08/16/2018 13 12 - 78 U/L Final     Comment:       Specimen collection should occur prior to Sulfasalazine and/or Sulfapyridine administration due to the potential for falsely depressed results      07/09/2015 20 14 - 59 U/L Final   07/10/2014 17 6 - 78 U/L Final   07/03/2014 16 6 - 78 U/L Final     Alkaline Phosphatase   Date Value Ref Range Status   11/24/2019 84 46 - 116 U/L Final   07/21/2019 84 46 - 116 U/L Final   08/16/2018 73 46 - 116 U/L Final   07/09/2015 94 46 - 116 U/L Final   07/10/2014 199 (H) 50 - 136 U/L Final   07/03/2014 173 (H) 50 - 136 U/L Final     Total Protein   Date Value Ref Range Status   07/09/2015 7 7 6 4 - 8 2 g/dL Final   07/10/2014 7 1 6 4 - 8 2 g/dL Final   07/03/2014 6 9 6 4 - 8 2 g/dL Final     Total Bilirubin   Date Value Ref Range Status   07/09/2015 0 24 0 20 - 1 00 mg/dL Final   07/10/2014 0 16 (L) 0 20 - 1 00 mg/dL Final   07/03/2014 0 11 (L) 0 20 - 1 00 mg/dL Final     Magnesium   Date Value Ref Range Status   11/24/2019 1 9 1 6 - 2 6 mg/dL Final   06/11/2014 4 5 (H) 1 6 - 2 6 mg/dL Final     Comment:     The above 1 analytes were performed by [facility]  [address] [place], [place] [address] [address]       WBC   Date Value Ref Range Status   11/24/2019 8 63 4 31 - 10 16 Thousand/uL Final   07/21/2019 9 54 4 31 - 10 16 Thousand/uL Final   08/16/2018 6 80 4 31 - 10 16 Thousand/uL Final   07/09/2015 7 01 4 31 - 10 16 Thousand/uL Final   07/11/2014 12 89 (H) 4 31 - 10 16 Thousand/uL Final   07/10/2014 8 91 4 31 - 10 16 Thousand/uL Final     Hemoglobin   Date Value Ref Range Status   11/24/2019 12 1 11 5 - 15 4 g/dL Final   07/21/2019 12 2 11 5 - 15 4 g/dL Final   08/16/2018 12 2 11 5 - 15 4 g/dL Final   07/09/2015 13 4 11 5 - 15 4 g/dL Final   07/11/2014 11 4 (L) 11 5 - 15 4 g/dL Final   07/10/2014 11 7 11 5 - 15 4 g/dL Final     Platelets   Date Value Ref Range Status   11/24/2019 212 149 - 390 Thousands/uL Final   07/21/2019 235 149 - 390 Thousands/uL Final   08/16/2018 267 149 - 390 Thousands/uL Final   07/09/2015 245 149 - 390 Thousand/uL Final   07/11/2014 193 149 - 390 Thousand/uL Final   07/10/2014 207 149 - 390 Thousand/uL Final     No results found for: PT, PTT, INR  No results found for: CKMB, BNP, DIGOXIN  No results found for: TSH  Cholesterol   Date Value Ref Range Status   07/09/2015 194 mg/dL Final     Comment:     CHOLESTEROL:       Desirable           <200 mg/dl       Borderline High     200-239 mg/dl       High                   >239 mg/dl  ____________________________________       HDL   Date Value Ref Range Status   07/09/2015 38 mg/dL Final     Comment:     HDL:       High       >59 mg/dl       Low        <41 mg/dl  ______________________________       HDL, Direct   Date Value Ref Range Status   10/16/2019 43 40 - 60 mg/dL Final     Comment:       HDL Cholesterol:       High    >60 mg/dL       Low     <41 mg/dL  Specimen collection should occur prior to Metamizole administration due to the potential for rasheed depressed results  Triglycerides   Date Value Ref Range Status   10/16/2019 115 <=150 mg/dL Final     Comment:       Triglyceride:     Normal          <150 mg/dl     Borderline High 150-199 mg/dl     High            200-499 mg/dl        Very High       >499 mg/dl    Specimen collection should occur prior to N-Acetylcysteine or Metamizole administration due to the potential for falsely depressed results     07/09/2015 150 mg/dL Final     Comment:     TRIGLYCERIDE:       Normal                 <150 mg/dl       Borderline High       150-199 mg/dl       High                  200-499 mg/dl       Very High             >499 mg/dl  _______________________________________        Hemoglobin A1C   Date Value Ref Range Status   10/16/2019 5 4 4 2 - 6 3 % Final   06/02/2014 5 4 4 0 - 5 6 % Final     Comment:     5 7-6 4% impaired fasting glucose  >=6 5% diagnosis of diabetes  Falsely low levels are seen in conditions linked to short RBC life span  ï¿½ hemolytic anemia, and splenomegaly  Falsely elevated levels are seen in situations where there is an  increased production of RBC ï¿½ receipt of erythropoietin or blood  transfusions  Adopted from ADA-Clinical Practice Recommendations       INFLU A PCR   Date Value Ref Range Status   01/04/2015 Detected (A) Not Detected Final     Comment:     Test performed at [address] [place] [address] B PCR   Date Value Ref Range Status   01/04/2015 Not Detected Not Detected Final     Comment:     Test performed at [facility] 2     RSV PCR   Date Value Ref Range Status   01/04/2015 Not Detected Not Detected Final     Comment:     Test performed at [facility] 2  The above 3 analytes were performed by [facility] [address]         *-*-*-*-*-*-*-*-*-*-*-*-*-*-*-*-*-*-*-*-*-*-*-*-*-*-*-*-*-*-*-*-*-*-*-*-*-*-*-*-*-*-*-*-*-*-*-*-*-*-*-*-*-*-  RADIOLOGY RESULTS:  No results found     *-*-*-*-*-*-*-*-*-*-*-*-*-*-*-*-*-*-*-*-*-*-*-*-*-*-*-*-*-*-*-*-*-*-*-*-*-*-*-*-*-*-*-*-*-*-*-*-*-*-*-*-*-*-  ECHOCARDIOGRAM AND OTHER CARDIOLOGY RESULTS:  Results for orders placed during the hospital encounter of 19   Echo complete with contrast if indicated    Narrative [address]  [facility], [address] [facility]  [phone]    Transthoracic Echocardiogram  2D, M-mode, Doppler, and Color Doppler    Study date:  06-Dec-2019    Patient: Dean Katz  MR number: [id number]  Account number: [de-identified]  : 1975  Age: 40 years  Gender: Female  Status: Outpatient  Location: Echo lab  Height: 63 in  Weight: 162 6 lb  BP: 140/ 90 mmHg    Indications: Cardiomegaly;Myocarditis  Diagnoses: I51 4 - Myocarditis, unspecified, I51 7 - Cardiomegaly    Sonographer:  Adina Angel  Referring Physician:  Isaias Ch MD  Group:  [id number]  Interpreting Physician:  Marci Aldridge MD    SUMMARY    LEFT VENTRICLE:  Systolic function was normal  Ejection fraction was estimated to be 60 %  There were no regional wall motion abnormalities  RIGHT VENTRICLE:  The size was normal   Systolic function was normal     MITRAL VALVE:  There was mild to moderate regurgitation  HISTORY: PRIOR HISTORY: HTN;Syncope  PROCEDURE: The procedure was performed in the echo lab  This was a stat study  The transthoracic approach was used  The study included complete 2D imaging, M-mode, complete spectral Doppler, and color Doppler  The heart rate was 63 bpm, at  the start of the study  Images were obtained from the parasternal, apical, subcostal, and suprasternal notch acoustic windows  Image quality was adequate  LEFT VENTRICLE: Size was normal  Systolic function was normal  Ejection fraction was estimated to be 60 %  There were no regional wall motion abnormalities  Wall thickness was normal  No evidence of apical thrombus   DOPPLER: Left  ventricular diastolic function parameters were normal     RIGHT VENTRICLE: The size was normal  Systolic function was normal  Wall thickness was normal     LEFT ATRIUM: Size was normal     RIGHT ATRIUM: Size was normal     MITRAL VALVE: Valve structure was normal  There was normal leaflet separation  DOPPLER: The transmitral velocity was within the normal range  There was no evidence for stenosis  There was mild to moderate regurgitation  AORTIC VALVE: The valve was trileaflet  Leaflets exhibited normal thickness and normal cuspal separation  DOPPLER: Transaortic velocity was within the normal range  There was no evidence for stenosis  There was no significant  regurgitation  TRICUSPID VALVE: The valve structure was normal  There was normal leaflet separation  DOPPLER: The transtricuspid velocity was within the normal range  There was no evidence for stenosis  There was no significant regurgitation  PULMONIC VALVE: Leaflets exhibited normal thickness, no calcification, and normal cuspal separation  DOPPLER: The transpulmonic velocity was within the normal range  There was no significant regurgitation  PERICARDIUM: There was no pericardial effusion  AORTA: The root exhibited normal size  SYSTEMIC VEINS: IVC: The inferior vena cava was normal in size  Respirophasic changes were normal     SYSTEM MEASUREMENT TABLES    2D  %FS: 37 07 %  Ao Diam: 2 9 cm  EDV(Teich): 97 37 ml  EF(Cube): 75 07 %  EF(Teich): 67 05 %  ESV(Cube): 24 27 ml  ESV(Teich): 32 08 ml  IVSd: 0 88 cm  LA Area: 13 25 cm2  LA Diam: 2 44 cm  LVEDV MOD A4C: 75 94 ml  LVEF MOD A4C: 59 91 %  LVESV MOD A4C: 30 45 ml  LVIDd: 4 6 cm  LVIDs: 2 9 cm  LVLd A4C: 7 81 cm  LVLs A4C: 6 3 cm  LVOT Diam: 1 91 cm  LVPWd: 0 93 cm  RA Area: 12 44 cm2  SI(Cube): 41 3 ml/m2  SI(Teich): 36 88 ml/m2  SV MOD A4C: 45 49 ml  SV(Cube): 73 1 ml  SV(Teich): 65 28 ml  rv diam: 3 27 cm    CW  AV Env  Ti: 288 35 ms  AV VTI: 29 12 cm  AV Vmax: 1 51 m/s  AV Vmean: 1 01 m/s  AV maxP 08 mmHg  AV meanP 72 mmHg  MR VTI: 226 55 cm  MR Vmax: 6 05 m/s  MR Vmean: 5 11 m/s  MR maxP 2 mmHg  MR meanP 35 mmHg  PRend P 94 mmHg  PRend Vmax: 1 22 m/s    MM  TAPSE: 1 87 cm    PW  JAD (VTI): 2 97 cm2  JAD Vmax: 2 74 cm2  E': 0 05 m/s  E/E': 13 43  LVOT Env  Ti: 354 9 ms  LVOT VTI: 30 29 cm  LVOT Vmax: 1 45 m/s  LVOT Vmean: 0 85 m/s  LVOT maxP 36 mmHg  LVOT meanPG: 3 64 mmHg  LVSI Dopp: 48 93 ml/m2  LVSV Dopp: 86 61 ml  MV A Nikolay: 0 74 m/s  MV Dec [place]: 3 17 m/s2  MV DecT: 231 91 ms  MV E Nikolay: 0 73 m/s  MV E/A Ratio: 0 99    [facility] Accredited [facility]ocardi[facility] Laboratory    Prepared and electronically signed by    Bharat Heard MD  Signed 06-Dec-2019 16:37:02       Exercise nuclear stress test performed on 2019 was significant for good exercise tolerance, she exercised for 10 minutes and achieved only 81% of her age predicted maximal heart rate  She had baseline ECG abnormalities which persisted with exercise  There were no new changes of ischemia  Her SPECT imaging was significant for small to moderate size, moderately severe partially reversible perfusion abnormality involving the inferolateral wall  Ejection fraction was normal at 58%  There was wall motion abnormality in the inferior region       *-*-*-*-*-*-*-*-*-*-*-*-*-*-*-*-*-*-*-*-*-*-*-*-*-*-*-*-*-*-*-*-*-*-*-*-*-*-*-*-*-*-*-*-*-*-*-*-*-*-*-*-*-*-  SIGNATURES:   @V@   Jcarlos Arciniega MD     CC:   MD Lissa Kelly MD

## 2019-12-11 NOTE — ASSESSMENT & PLAN NOTE
40 year young female with risk factors of longstanding history of hypertension and history of smoking with symptoms of decline in exercise tolerance, recurring syncope and near-syncope and baseline ECG abnormality recently underwent exercise nuclear stress test   Though she had no diagnostic changes of ischemia there were changes consistent with exercise induced inferolateral ischemia  Echocardiogram significant for normal left ventricular systolic function with mild-to-moderate mitral valve regurgitation  Today on physical examination I am not able to murmur  She denies typical angina-like chest pain  - today we discussed the options for further evaluation including doing of coronary CT angiogram versus cardiac catheterization  Given that she has had multiple symptoms and abnormalities on various test in our opinion cardiac catheterization with coronary angiogram would be recommended for more definitive evaluation of her symptoms and abnormalities  I have explained this procedure to her briefly including its benefits and risks  She is concerned and little anxious about the test but is agreeable to undergo it  She says that she would need adequate sedation as she is very anxious  - cardiac catheterization at [facility] catheterization lab  - today we reinforced with her the importance of quitting smoking specially given her risk factors  At this time she is not interested in smoking cessation counseling     - regarding cardiac catheterization she clarifies that she had her tubal ligation and cannot be pregnant  A script for pregnancy test is being given to her and may be performed upon the request of cardiologist performing the cardiac catheterization  She recently had routine metabolic panel which indicates normal renal function

## 2019-12-11 NOTE — ASSESSMENT & PLAN NOTE
Mild to moderate mitral valve regurgitation noted on recent transthoracic echocardiogram   On auscultation I am not able to appreciate significant murmur today  She has had no signs and symptoms of heart failure  At this time no further workup is recommended regarding this condition  She will continue her antihypertensive therapy  A limited transthoracic echocardiogram may be performed next year to reassess this condition

## 2019-12-11 NOTE — ASSESSMENT & PLAN NOTE
Blood pressure is well controlled on current therapy with a irbesartan and nifedipine XL     - Patient is advised to continue current medical therapy  - Dietary and medical compliance are reinforced  - Advised  to report any concerning symptoms such as chest pain, shortness of breath, decline in exercise tolerance or presyncope/syncope

## 2019-12-11 NOTE — PATIENT INSTRUCTIONS
CARDIOLOGY ASSESSMENT & PLAN:  Abnormal nuclear stress test  40 year young female with risk factors of longstanding history of hypertension and history of smoking with symptoms of decline in exercise tolerance, recurring syncope and near-syncope and baseline ECG abnormality recently underwent exercise nuclear stress test   Though she had no diagnostic changes of ischemia there were changes consistent with exercise induced inferolateral ischemia  Echocardiogram significant for normal left ventricular systolic function with mild-to-moderate mitral valve regurgitation  Today on physical examination I am not able to murmur  She denies typical angina-like chest pain  - today we discussed the options for further evaluation including doing of coronary CT angiogram versus cardiac catheterization  Given that she has had multiple symptoms and abnormalities on various test in our opinion cardiac catheterization with coronary angiogram would be recommended for more definitive evaluation of her symptoms and abnormalities  I have explained this procedure to her briefly including its benefits and risks  She is concerned and little anxious about the test but is agreeable to undergo it  She says that she would need adequate sedation as she is very anxious  - cardiac catheterization at [facility] catheterization lab  - today we reinforced with her the importance of quitting smoking specially given her risk factors  At this time she is not interested in smoking cessation counseling     - regarding cardiac catheterization she clarifies that she had her tubal ligation and cannot be pregnant  A script for pregnancy test is being given to her and may be performed upon the request of cardiologist performing the cardiac catheterization  She recently had routine metabolic panel which indicates normal renal function        Mitral valve regurgitation  Mild to moderate mitral valve regurgitation noted on recent transthoracic echocardiogram   On auscultation I am not able to appreciate significant murmur today  She has had no signs and symptoms of heart failure  At this time no further workup is recommended regarding this condition  She will continue her antihypertensive therapy  A limited transthoracic echocardiogram may be performed next year to reassess this condition  Benign essential hypertension  Blood pressure is well controlled on current therapy with a irbesartan and nifedipine XL     - Patient is advised to continue current medical therapy  - Dietary and medical compliance are reinforced  - Advised  to report any concerning symptoms such as chest pain, shortness of breath, decline in exercise tolerance or presyncope/syncope

## 2019-12-12 ENCOUNTER — APPOINTMENT (OUTPATIENT)
Dept: LAB | Facility: CLINIC | Age: 44
End: 2019-12-12
Payer: COMMERCIAL

## 2019-12-12 ENCOUNTER — OFFICE VISIT (OUTPATIENT)
Dept: FAMILY MEDICINE CLINIC | Facility: CLINIC | Age: 44
End: 2019-12-12
Payer: COMMERCIAL

## 2019-12-12 ENCOUNTER — TELEPHONE (OUTPATIENT)
Dept: CARDIOLOGY CLINIC | Facility: CLINIC | Age: 44
End: 2019-12-12

## 2019-12-12 VITALS
RESPIRATION RATE: 18 BRPM | BODY MASS INDEX: 29.48 KG/M2 | HEART RATE: 67 BPM | SYSTOLIC BLOOD PRESSURE: 122 MMHG | TEMPERATURE: 97.8 F | DIASTOLIC BLOOD PRESSURE: 80 MMHG | HEIGHT: 63 IN | WEIGHT: 166.4 LBS | OXYGEN SATURATION: 98 %

## 2019-12-12 DIAGNOSIS — I34.0 NONRHEUMATIC MITRAL VALVE REGURGITATION: ICD-10-CM

## 2019-12-12 DIAGNOSIS — Z12.39 SCREENING FOR BREAST CANCER: ICD-10-CM

## 2019-12-12 DIAGNOSIS — Z00.00 WELL ADULT EXAM: Primary | ICD-10-CM

## 2019-12-12 DIAGNOSIS — R94.39 ABNORMAL NUCLEAR STRESS TEST: ICD-10-CM

## 2019-12-12 DIAGNOSIS — Z01.818 PRE-OPERATIVE CLEARANCE: Primary | ICD-10-CM

## 2019-12-12 DIAGNOSIS — I10 BENIGN ESSENTIAL HYPERTENSION: ICD-10-CM

## 2019-12-12 DIAGNOSIS — F41.8 DEPRESSION WITH ANXIETY: ICD-10-CM

## 2019-12-12 LAB
ANION GAP SERPL CALCULATED.3IONS-SCNC: 7 MMOL/L (ref 4–13)
BASOPHILS # BLD AUTO: 0.06 THOUSANDS/ΜL (ref 0–0.1)
BASOPHILS NFR BLD AUTO: 1 % (ref 0–1)
BUN SERPL-MCNC: 12 MG/DL (ref 5–25)
CALCIUM SERPL-MCNC: 9.4 MG/DL (ref 8.3–10.1)
CHLORIDE SERPL-SCNC: 109 MMOL/L (ref 100–108)
CO2 SERPL-SCNC: 24 MMOL/L (ref 21–32)
CREAT SERPL-MCNC: 0.6 MG/DL (ref 0.6–1.3)
EOSINOPHIL # BLD AUTO: 0.04 THOUSAND/ΜL (ref 0–0.61)
EOSINOPHIL NFR BLD AUTO: 1 % (ref 0–6)
ERYTHROCYTE [DISTWIDTH] IN BLOOD BY AUTOMATED COUNT: 15.4 % (ref 11.6–15.1)
GFR SERPL CREATININE-BSD FRML MDRD: 111 ML/MIN/1.73SQ M
GLUCOSE SERPL-MCNC: 89 MG/DL (ref 65–140)
HCT VFR BLD AUTO: 41.2 % (ref 34.8–46.1)
HGB BLD-MCNC: 12.9 G/DL (ref 11.5–15.4)
IMM GRANULOCYTES # BLD AUTO: 0.03 THOUSAND/UL (ref 0–0.2)
IMM GRANULOCYTES NFR BLD AUTO: 1 % (ref 0–2)
INR PPP: 0.99 (ref 0.84–1.19)
LYMPHOCYTES # BLD AUTO: 1.94 THOUSANDS/ΜL (ref 0.6–4.47)
LYMPHOCYTES NFR BLD AUTO: 31 % (ref 14–44)
MCH RBC QN AUTO: 27.2 PG (ref 26.8–34.3)
MCHC RBC AUTO-ENTMCNC: 31.3 G/DL (ref 31.4–37.4)
MCV RBC AUTO: 87 FL (ref 82–98)
MONOCYTES # BLD AUTO: 0.53 THOUSAND/ΜL (ref 0.17–1.22)
MONOCYTES NFR BLD AUTO: 9 % (ref 4–12)
NEUTROPHILS # BLD AUTO: 3.6 THOUSANDS/ΜL (ref 1.85–7.62)
NEUTS SEG NFR BLD AUTO: 57 % (ref 43–75)
NRBC BLD AUTO-RTO: 0 /100 WBCS
PLATELET # BLD AUTO: 253 THOUSANDS/UL (ref 149–390)
PMV BLD AUTO: 10.1 FL (ref 8.9–12.7)
POTASSIUM SERPL-SCNC: 3.6 MMOL/L (ref 3.5–5.3)
PROTHROMBIN TIME: 12.7 SECONDS (ref 11.6–14.5)
RBC # BLD AUTO: 4.74 MILLION/UL (ref 3.81–5.12)
SODIUM SERPL-SCNC: 140 MMOL/L (ref 136–145)
WBC # BLD AUTO: 6.2 THOUSAND/UL (ref 4.31–10.16)

## 2019-12-12 PROCEDURE — 85025 COMPLETE CBC W/AUTO DIFF WBC: CPT

## 2019-12-12 PROCEDURE — 85610 PROTHROMBIN TIME: CPT

## 2019-12-12 PROCEDURE — 99396 PREV VISIT EST AGE 40-64: CPT | Performed by: FAMILY MEDICINE

## 2019-12-12 PROCEDURE — 80048 BASIC METABOLIC PNL TOTAL CA: CPT

## 2019-12-12 PROCEDURE — 36415 COLL VENOUS BLD VENIPUNCTURE: CPT

## 2019-12-12 RX ORDER — LORAZEPAM 0.5 MG/1
0.5 TABLET ORAL 2 TIMES DAILY PRN
Qty: 30 TABLET | Refills: 0 | Status: SHIPPED | OUTPATIENT
Start: 2019-12-12 | End: 2020-01-07 | Stop reason: SDUPTHER

## 2019-12-12 NOTE — PROGRESS NOTES
FAMILY PRACTICE OFFICE VISIT       NAME: Pushpa Mohr  AGE: 40 y o  SEX: female       : 1975        MRN: [id number]        Assessment and Plan     Problem List Items Addressed This Visit        Cardiovascular and Mediastinum    Benign essential hypertension     · Blood pressure is well controlled on Avapro and Procardia XL         Mitral valve regurgitation       Other    Depression with anxiety     · Continue Zoloft 150 mg once a day  · Will prescribe lorazepam 0 5 mg on a as needed basis for symptoms of anxiety  · Patient is well aware of sedating side effects of medication, advised her not to drive while using it         Relevant Medications    LORazepam (ATIVAN) 0 5 mg tablet    Abnormal nuclear stress test     · Nuclear stress 10 reveals small to moderate, moderately severe, partially reversible myocardial perfusion defect of the inferolateral wall  · Patient presented with few episodes of near-syncope and abnormal EKG with T-wave inversions in inferior lateral leads  · Patient is being scheduled for cardiac catheterization later this month  ·            Other Visit Diagnoses     Well adult exam    -  Primary    Screening for breast cancer        Relevant Orders    Mammo screening bilateral w 3d & cad       Patient presents for annual well exam   She is past due gyn exam and mammogram   Forthcoming cardiac catheterization due to abnormal nuclear stress test and few episodes of near-syncope as well as abnormal appearance of EKG  Echocardiogram reveals mild to moderate mitral regurgitation with normal ejection fraction  Patient will continue same medication regimen for hypertension  She will remain on Zoloft 150 mg daily for treatment of longstanding depression anxiety and will use lorazepam p r n     I strongly advised her to quit tobacco   Follow-up pending results of cardiac catheterization and routine checkup in 3 months  BMI Counseling: Body mass index is 29 48 kg/m²   The BMI is above normal  Nutrition recommendations include encouraging healthy choices of fruits and vegetables, consuming healthier snacks, moderation in carbohydrate intake and reducing intake of cholesterol  There are no Patient Instructions on file for this visit  Discussed with the patient and all questioned fully answered  She will call me if any problems arise  M*Modal software was used to dictate this note  It may contain errors with dictating incorrect words/spelling  Please contact provider directly with any questions  Chief Complaint     Chief Complaint   Patient presents with    Follow-up       History of Present Illness     Patient presents for follow-up/annual physical   She is under care of gyn, Dr Celina Villanueva as, last Pap smear January 2018 consistent with ASCUS, HPV testing was negative  Patient is past due follow-up and mammogram I advised her to set up  Menses are regular  Status post tubal ligation  Results of recent nuclear stress test, echocardiogram and Cardiology consult reviewed with patient and rest   Nuclear stress test is concerning for small to moderate, moderately severe, partially reversible myocardial perfusion defect of the inferolateral wall  Echocardiogram revealed normal ejection fraction of 60% and mild-to-moderate mitral regurgitation  Patient is still smoking, less than half a pack daily, she is well aware of risks and is planning to quit  She was seen by [facility] in consultation and is currently scheduled for cardiac catheterization later this month  No recurrences of syncope  No chest pain or palpitations  Patient remains on medications for hypertension as directed  She uses Zoloft 150 mg daily for longstanding symptoms of depression anxiety that overall well controlled  Patient admits to increased anxiety within past few days due to results of cardiac testing and forthcoming cardiac catheterization      Cold and cough symptoms have resolved  Review of Systems   Review of Systems   Constitutional: Negative  HENT: Negative  Eyes: Negative  Respiratory: Negative  Cardiovascular: Negative  Gastrointestinal: Negative  Endocrine: Negative  Genitourinary: Negative  Musculoskeletal: Negative  Skin: Negative  Allergic/Immunologic: Negative  Neurological: Negative  Hematological: Negative  Psychiatric/Behavioral: The patient is nervous/anxious  Active Problem List     Patient Active Problem List   Diagnosis    Benign essential hypertension    Depression with anxiety    Leiomyoma of uterus    Tremor of face and hands    Numbness and tingling of left leg    Syncope    Impaired fasting glucose    Abnormal nuclear stress test    Mitral valve regurgitation       Past Medical History:  Past Medical History:   Diagnosis Date    Preeclampsia        Past Surgical History:  Past Surgical History:   Procedure Laterality Date     SECTION      EYE SURGERY      TUBAL LIGATION         Family History:  Family History   Problem Relation Age of Onset    Hypertension Father     Kidney disease Paternal Grandfather        Social History:  Social History     Socioeconomic History    Marital status: /Civil Union     Spouse name: Not on file    Number of children: Not on file    Years of education: Not on file    Highest education level: Not on file   Occupational History    Not on file   Social Needs    Financial resource strain: Not on file    Food insecurity:     Worry: Not on file     Inability: Not on file    Transportation needs:     Medical: Not on file     Non-medical: Not on file   Tobacco Use    Smoking status: Current Every Day Smoker    Smokeless tobacco: Never Used   Substance and Sexual Activity    Alcohol use:  Yes    Drug use: No    Sexual activity: Not on file   Lifestyle    Physical activity:     Days per week: Not on file     Minutes per session: Not on file    Stress: Not on file   Relationships    Social connections:     Talks on phone: Not on file     Gets together: Not on file     Attends [employer] service: Not on file     Active member of club or organization: Not on file     Attends meetings of clubs or organizations: Not on file     Relationship status: Not on file    Intimate partner violence:     Fear of current or ex partner: Not on file     Emotionally abused: Not on file     Physically abused: Not on file     Forced sexual activity: Not on file   Other Topics Concern    Not on file   Social History Narrative    Not on file           Objective     Vitals:    12/12/19 1009   BP: 122/80   BP Location: Left arm   Patient Position: Sitting   Cuff Size: Adult   Pulse: 67   Resp: 18   Temp: 97 8 °F (36 6 °C)   TempSrc: Tympanic   SpO2: 98%   Weight: 75 5 kg (166 lb 6 4 oz)   Height: 5' 3" (1 6 m)     Wt Readings from Last 3 Encounters:   12/12/19 75 5 kg (166 lb 6 4 oz)   12/11/19 76 7 kg (169 lb)   11/26/19 74 2 kg (163 lb 9 6 oz)       Physical Exam   Constitutional: She is oriented to person, place, and time  She appears well-developed and well-nourished  HENT:   Head: Normocephalic and atraumatic  Eyes: Conjunctivae are normal    Neck: Neck supple  No JVD present  Carotid bruit is not present  No thyromegaly present  Cardiovascular: Normal rate, regular rhythm and normal heart sounds  No murmur heard  Pulmonary/Chest: Effort normal and breath sounds normal  No respiratory distress  She has no wheezes  Abdominal: She exhibits no abdominal bruit  Musculoskeletal: Normal range of motion  She exhibits no edema  Neurological: She is alert and oriented to person, place, and time  No cranial nerve deficit  Coordination normal    Psychiatric: Her behavior is normal  Her mood appears anxious  Nursing note and vitals reviewed        Pertinent Laboratory/Diagnostic Studies:  Lab Results   Component Value Date    GLUCOSE 84 10/27/2015    BUN 12 12/12/2019 CREATININE 0 60 12/12/2019    CALCIUM 9 4 12/12/2019     10/27/2015    K 3 6 12/12/2019    CO2 24 12/12/2019     (H) 12/12/2019     Lab Results   Component Value Date    ALT 21 11/24/2019    AST 23 11/24/2019    ALKPHOS 84 11/24/2019    BILITOT 0 24 07/09/2015       Lab Results   Component Value Date    WBC 6 20 12/12/2019    HGB 12 9 12/12/2019    HCT 41 2 12/12/2019    MCV 87 12/12/2019     12/12/2019       No results found for: TSH    Lab Results   Component Value Date    CHOL 194 07/09/2015     Lab Results   Component Value Date    TRIG 115 10/16/2019     Lab Results   Component Value Date    HDL 43 10/16/2019     Lab Results   Component Value Date    LDLCALC 134 (H) 10/16/2019     Lab Results   Component Value Date    HGBA1C 5 4 10/16/2019       Results for orders placed or performed in visit on 02/80/99   Basic metabolic panel   Result Value Ref Range    Sodium 140 136 - 145 mmol/L    Potassium 3 6 3 5 - 5 3 mmol/L    Chloride 109 (H) 100 - 108 mmol/L    CO2 24 21 - 32 mmol/L    ANION GAP 7 4 - 13 mmol/L    BUN 12 5 - 25 mg/dL    Creatinine 0 60 0 60 - 1 30 mg/dL    Glucose 89 65 - 140 mg/dL    Calcium 9 4 8 3 - 10 1 mg/dL    eGFR 111 ml/min/1 73sq m   CBC and differential   Result Value Ref Range    WBC 6 20 4  31 - 10 16 Thousand/uL    RBC 4 74 3 81 - 5 12 Million/uL    Hemoglobin 12 9 11 5 - 15 4 g/dL    Hematocrit 41 2 34 8 - 46 1 %    MCV 87 82 - 98 fL    MCH 27 2 26 8 - 34 3 pg    MCHC 31 3 (L) 31 4 - 37 4 g/dL    RDW 15 4 (H) 11 6 - 15 1 %    MPV 10 1 8 9 - 12 7 fL    Platelets 973 090 - 816 Thousands/uL    nRBC 0 /100 WBCs    Neutrophils Relative 57 43 - 75 %    Immat GRANS % 1 0 - 2 %    Lymphocytes Relative 31 14 - 44 %    Monocytes Relative 9 4 - 12 %    Eosinophils Relative 1 0 - 6 %    Basophils Relative 1 0 - 1 %    Neutrophils Absolute 3 60 1 85 - 7 62 Thousands/µL    Immature Grans Absolute 0 03 0 00 - 0 20 Thousand/uL    Lymphocytes Absolute 1 94 0 60 - 4 47 Thousands/µL Monocytes Absolute 0 53 0 17 - 1 22 Thousand/µL    Eosinophils Absolute 0 04 0 00 - 0 61 Thousand/µL    Basophils Absolute 0 06 0 00 - 0 10 Thousands/µL   Protime-INR   Result Value Ref Range    Protime 12 7 11 6 - 14 5 seconds    INR 0 99 0 84 - 1 19       Orders Placed This Encounter   Procedures    Mammo screening bilateral w 3d & cad       ALLERGIES:  Allergies   Allergen Reactions    Augmentin [Amoxicillin-Pot Clavulanate] Vomiting     Vomiting and  myalgias       Current Medications     Current Outpatient Medications   Medication Sig Dispense Refill    Albuterol Sulfate (PROAIR RESPICLICK) 749 (90 Base) MCG/ACT AEPB Two puffs every 4-6 hours as needed for cough and wheeze 1 each 1    irbesartan (AVAPRO) 300 mg tablet TAKE 1 TABLET DAILY 90 tablet 0    mometasone (NASONEX) 50 mcg/act nasal spray 2 sprays into each nostril daily 17 g 3    NIFEdipine (PROCARDIA XL) 60 mg 24 hr tablet TAKE 2 TABLETS DAILY 180 tablet 0    sertraline (ZOLOFT) 100 mg tablet TAKE ONE AND ONE-HALF TABLETS DAILY 135 tablet 0    LORazepam (ATIVAN) 0 5 mg tablet Take 1 tablet (0 5 mg total) by mouth 2 (two) times a day as needed for anxiety 30 tablet 0     No current facility-administered medications for this visit          Medications Discontinued During This Encounter   Medication Reason    methylPREDNISolone 4 MG tablet therapy pack        Health Maintenance     Health Maintenance   Topic Date Due    MAMMOGRAM  1975    Pneumococcal Vaccine: Pediatrics (0 to 5 Years) and At-Risk Patients (6 to 59 Years) (1 of 1 - PPSV23) 09/05/1981    BMI: Followup Plan  09/05/1993    Influenza Vaccine  07/01/2019    BMI: Adult  12/12/2020    Cervical Cancer Screening  01/24/2021    DTaP,Tdap,and Td Vaccines (2 - Td) 07/12/2024    Pneumococcal Vaccine: 65+ Years (1 of 2 - PCV13) 09/05/2040    HIV Screening  Completed    HIB Vaccine  Aged Out    Hepatitis B Vaccine  Aged Out    IPV Vaccine  Aged Out    Hepatitis A Vaccine Aged Out    Meningococcal ACWY Vaccine  Aged Out    HPV Vaccine  Aged Out       Immunization History   Administered Date(s) Administered     Influenza (IM) Preservative Free 10/29/2018    Influenza TIV (IM) 10/16/2015    Tdap 07/12/2014    Tuberculin Skin Test-PPD Intradermal 09/10/2012       Dannie Sloan MD

## 2019-12-16 NOTE — ASSESSMENT & PLAN NOTE
· Continue Zoloft 150 mg once a day  · Will prescribe lorazepam 0 5 mg on a as needed basis for symptoms of anxiety    · Patient is well aware of sedating side effects of medication, advised her not to drive while using it

## 2019-12-16 NOTE — ASSESSMENT & PLAN NOTE
· Nuclear stress 10 reveals small to moderate, moderately severe, partially reversible myocardial perfusion defect of the inferolateral wall  · Patient presented with few episodes of near-syncope and abnormal EKG with T-wave inversions in inferior lateral leads  · Patient is being scheduled for cardiac catheterization later this month    ·

## 2019-12-27 ENCOUNTER — TELEPHONE (OUTPATIENT)
Dept: FAMILY MEDICINE CLINIC | Facility: CLINIC | Age: 44
End: 2019-12-27

## 2019-12-27 DIAGNOSIS — R20.2 PARESTHESIA OF ARM: ICD-10-CM

## 2019-12-27 DIAGNOSIS — R25.1 TREMOR OF FACE AND HANDS: Primary | ICD-10-CM

## 2019-12-27 DIAGNOSIS — R68.84 JAW PAIN: ICD-10-CM

## 2019-12-27 DIAGNOSIS — R94.39 ABNORMAL NUCLEAR STRESS TEST: Primary | ICD-10-CM

## 2019-12-27 RX ORDER — ASPIRIN 81 MG/1
324 TABLET, CHEWABLE ORAL ONCE
Status: CANCELLED | OUTPATIENT
Start: 2019-12-27 | End: 2019-12-27

## 2019-12-27 RX ORDER — SODIUM CHLORIDE 9 MG/ML
125 INJECTION, SOLUTION INTRAVENOUS CONTINUOUS
Status: CANCELLED | OUTPATIENT
Start: 2019-12-27

## 2019-12-27 RX ORDER — CLOPIDOGREL BISULFATE 75 MG/1
600 TABLET ORAL ONCE
Status: CANCELLED | OUTPATIENT
Start: 2019-12-27 | End: 2019-12-27

## 2019-12-28 ENCOUNTER — APPOINTMENT (EMERGENCY)
Dept: RADIOLOGY | Facility: HOSPITAL | Age: 44
DRG: 247 | End: 2019-12-28
Payer: COMMERCIAL

## 2019-12-28 ENCOUNTER — APPOINTMENT (INPATIENT)
Dept: RADIOLOGY | Facility: HOSPITAL | Age: 44
DRG: 247 | End: 2019-12-28
Payer: COMMERCIAL

## 2019-12-28 ENCOUNTER — APPOINTMENT (OUTPATIENT)
Dept: NON INVASIVE DIAGNOSTICS | Facility: HOSPITAL | Age: 44
DRG: 247 | End: 2019-12-28
Payer: COMMERCIAL

## 2019-12-28 ENCOUNTER — HOSPITAL ENCOUNTER (INPATIENT)
Facility: HOSPITAL | Age: 44
LOS: 2 days | Discharge: HOME | DRG: 247 | End: 2019-12-30
Attending: EMERGENCY MEDICINE | Admitting: INTERNAL MEDICINE
Payer: COMMERCIAL

## 2019-12-28 DIAGNOSIS — I21.21 STEMI INVOLVING LEFT CIRCUMFLEX CORONARY ARTERY (HCC): Primary | ICD-10-CM

## 2019-12-28 DIAGNOSIS — R07.9 CHEST PAIN: ICD-10-CM

## 2019-12-28 DIAGNOSIS — I21.3 ST ELEVATION MI (STEMI) (HCC): ICD-10-CM

## 2019-12-28 DIAGNOSIS — R77.8 ELEVATED TROPONIN: ICD-10-CM

## 2019-12-28 DIAGNOSIS — F17.200 SMOKER: ICD-10-CM

## 2019-12-28 LAB
ALBUMIN SERPL BCP-MCNC: 3.4 G/DL (ref 3.5–5)
ALP SERPL-CCNC: 80 U/L (ref 46–116)
ALT SERPL W P-5'-P-CCNC: 20 U/L (ref 12–78)
ANION GAP SERPL CALCULATED.3IONS-SCNC: 7 MMOL/L (ref 4–13)
APTT PPP: 27 SECONDS (ref 23–37)
AST SERPL W P-5'-P-CCNC: 17 U/L (ref 5–45)
BASOPHILS # BLD AUTO: 0.03 THOUSANDS/ΜL (ref 0–0.1)
BASOPHILS NFR BLD AUTO: 1 % (ref 0–1)
BILIRUB SERPL-MCNC: 0.2 MG/DL (ref 0.2–1)
BUN SERPL-MCNC: 13 MG/DL (ref 5–25)
CALCIUM SERPL-MCNC: 8.6 MG/DL (ref 8.3–10.1)
CHLORIDE SERPL-SCNC: 110 MMOL/L (ref 100–108)
CHOLEST SERPL-MCNC: 224 MG/DL (ref 50–200)
CO2 SERPL-SCNC: 24 MMOL/L (ref 21–32)
CREAT SERPL-MCNC: 0.62 MG/DL (ref 0.6–1.3)
EOSINOPHIL # BLD AUTO: 0.03 THOUSAND/ΜL (ref 0–0.61)
EOSINOPHIL NFR BLD AUTO: 1 % (ref 0–6)
ERYTHROCYTE [DISTWIDTH] IN BLOOD BY AUTOMATED COUNT: 15.5 % (ref 11.6–15.1)
GFR SERPL CREATININE-BSD FRML MDRD: 110 ML/MIN/1.73SQ M
GLUCOSE SERPL-MCNC: 90 MG/DL (ref 65–140)
HCG SERPL QL: NEGATIVE
HCT VFR BLD AUTO: 39 % (ref 34.8–46.1)
HDLC SERPL-MCNC: 48 MG/DL
HGB BLD-MCNC: 12.7 G/DL (ref 11.5–15.4)
IMM GRANULOCYTES # BLD AUTO: 0.02 THOUSAND/UL (ref 0–0.2)
IMM GRANULOCYTES NFR BLD AUTO: 0 % (ref 0–2)
INR PPP: 0.94 (ref 0.84–1.19)
KCT BLD-ACNC: 248 SEC (ref 89–137)
LDLC SERPL CALC-MCNC: 150 MG/DL (ref 0–100)
LYMPHOCYTES # BLD AUTO: 2.07 THOUSANDS/ΜL (ref 0.6–4.47)
LYMPHOCYTES NFR BLD AUTO: 35 % (ref 14–44)
MAGNESIUM SERPL-MCNC: 2 MG/DL (ref 1.6–2.6)
MCH RBC QN AUTO: 28 PG (ref 26.8–34.3)
MCHC RBC AUTO-ENTMCNC: 32.6 G/DL (ref 31.4–37.4)
MCV RBC AUTO: 86 FL (ref 82–98)
MONOCYTES # BLD AUTO: 0.58 THOUSAND/ΜL (ref 0.17–1.22)
MONOCYTES NFR BLD AUTO: 10 % (ref 4–12)
NEUTROPHILS # BLD AUTO: 3.27 THOUSANDS/ΜL (ref 1.85–7.62)
NEUTS SEG NFR BLD AUTO: 53 % (ref 43–75)
NONHDLC SERPL-MCNC: 176 MG/DL
NRBC BLD AUTO-RTO: 0 /100 WBCS
PLATELET # BLD AUTO: 273 THOUSANDS/UL (ref 149–390)
PMV BLD AUTO: 10.2 FL (ref 8.9–12.7)
POTASSIUM SERPL-SCNC: 3.4 MMOL/L (ref 3.5–5.3)
PROT SERPL-MCNC: 7.5 G/DL (ref 6.4–8.2)
PROTHROMBIN TIME: 12.2 SECONDS (ref 11.6–14.5)
RBC # BLD AUTO: 4.54 MILLION/UL (ref 3.81–5.12)
SODIUM SERPL-SCNC: 141 MMOL/L (ref 136–145)
SPECIMEN SOURCE: ABNORMAL
TRIGL SERPL-MCNC: 130 MG/DL
TROPONIN I SERPL-MCNC: 0.6 NG/ML
TROPONIN I SERPL-MCNC: 3.49 NG/ML
TROPONIN I SERPL-MCNC: 4.69 NG/ML
TROPONIN I SERPL-MCNC: 4.91 NG/ML
WBC # BLD AUTO: 6 THOUSAND/UL (ref 4.31–10.16)

## 2019-12-28 PROCEDURE — 96375 TX/PRO/DX INJ NEW DRUG ADDON: CPT

## 2019-12-28 PROCEDURE — 027136Z DILATION OF CORONARY ARTERY, TWO ARTERIES WITH THREE DRUG-ELUTING INTRALUMINAL DEVICES, PERCUTANEOUS APPROACH: ICD-10-PCS | Performed by: INTERNAL MEDICINE

## 2019-12-28 PROCEDURE — 71045 X-RAY EXAM CHEST 1 VIEW: CPT

## 2019-12-28 PROCEDURE — 93458 L HRT ARTERY/VENTRICLE ANGIO: CPT | Performed by: INTERNAL MEDICINE

## 2019-12-28 PROCEDURE — B2111ZZ FLUOROSCOPY OF MULTIPLE CORONARY ARTERIES USING LOW OSMOLAR CONTRAST: ICD-10-PCS | Performed by: INTERNAL MEDICINE

## 2019-12-28 PROCEDURE — 96374 THER/PROPH/DIAG INJ IV PUSH: CPT

## 2019-12-28 PROCEDURE — 4A023N7 MEASUREMENT OF CARDIAC SAMPLING AND PRESSURE, LEFT HEART, PERCUTANEOUS APPROACH: ICD-10-PCS | Performed by: INTERNAL MEDICINE

## 2019-12-28 PROCEDURE — C1874 STENT, COATED/COV W/DEL SYS: HCPCS

## 2019-12-28 PROCEDURE — 99152 MOD SED SAME PHYS/QHP 5/>YRS: CPT | Performed by: EMERGENCY MEDICINE

## 2019-12-28 PROCEDURE — 80053 COMPREHEN METABOLIC PANEL: CPT | Performed by: EMERGENCY MEDICINE

## 2019-12-28 PROCEDURE — C1725 CATH, TRANSLUMIN NON-LASER: HCPCS | Performed by: EMERGENCY MEDICINE

## 2019-12-28 PROCEDURE — B3101ZZ FLUOROSCOPY OF THORACIC AORTA USING LOW OSMOLAR CONTRAST: ICD-10-PCS | Performed by: INTERNAL MEDICINE

## 2019-12-28 PROCEDURE — 93458 L HRT ARTERY/VENTRICLE ANGIO: CPT | Performed by: EMERGENCY MEDICINE

## 2019-12-28 PROCEDURE — C9600 PERC DRUG-EL COR STENT SING: HCPCS | Performed by: EMERGENCY MEDICINE

## 2019-12-28 PROCEDURE — 85730 THROMBOPLASTIN TIME PARTIAL: CPT | Performed by: EMERGENCY MEDICINE

## 2019-12-28 PROCEDURE — 92921 HB PRQ CARDIAC ANGIO ADDL ART: CPT | Performed by: EMERGENCY MEDICINE

## 2019-12-28 PROCEDURE — C1769 GUIDE WIRE: HCPCS | Performed by: EMERGENCY MEDICINE

## 2019-12-28 PROCEDURE — 99152 MOD SED SAME PHYS/QHP 5/>YRS: CPT | Performed by: INTERNAL MEDICINE

## 2019-12-28 PROCEDURE — 99285 EMERGENCY DEPT VISIT HI MDM: CPT

## 2019-12-28 PROCEDURE — 84484 ASSAY OF TROPONIN QUANT: CPT | Performed by: EMERGENCY MEDICINE

## 2019-12-28 PROCEDURE — 93005 ELECTROCARDIOGRAM TRACING: CPT

## 2019-12-28 PROCEDURE — 99285 EMERGENCY DEPT VISIT HI MDM: CPT | Performed by: EMERGENCY MEDICINE

## 2019-12-28 PROCEDURE — C1887 CATHETER, GUIDING: HCPCS | Performed by: EMERGENCY MEDICINE

## 2019-12-28 PROCEDURE — 83735 ASSAY OF MAGNESIUM: CPT | Performed by: EMERGENCY MEDICINE

## 2019-12-28 PROCEDURE — 92928 PRQ TCAT PLMT NTRAC ST 1 LES: CPT | Performed by: INTERNAL MEDICINE

## 2019-12-28 PROCEDURE — 80061 LIPID PANEL: CPT | Performed by: EMERGENCY MEDICINE

## 2019-12-28 PROCEDURE — 36415 COLL VENOUS BLD VENIPUNCTURE: CPT

## 2019-12-28 PROCEDURE — 84484 ASSAY OF TROPONIN QUANT: CPT | Performed by: STUDENT IN AN ORGANIZED HEALTH CARE EDUCATION/TRAINING PROGRAM

## 2019-12-28 PROCEDURE — 84703 CHORIONIC GONADOTROPIN ASSAY: CPT | Performed by: STUDENT IN AN ORGANIZED HEALTH CARE EDUCATION/TRAINING PROGRAM

## 2019-12-28 PROCEDURE — 85025 COMPLETE CBC W/AUTO DIFF WBC: CPT | Performed by: EMERGENCY MEDICINE

## 2019-12-28 PROCEDURE — NC001 PR NO CHARGE: Performed by: STUDENT IN AN ORGANIZED HEALTH CARE EDUCATION/TRAINING PROGRAM

## 2019-12-28 PROCEDURE — C1894 INTRO/SHEATH, NON-LASER: HCPCS | Performed by: EMERGENCY MEDICINE

## 2019-12-28 PROCEDURE — 85610 PROTHROMBIN TIME: CPT | Performed by: EMERGENCY MEDICINE

## 2019-12-28 PROCEDURE — 02703DZ DILATION OF CORONARY ARTERY, ONE ARTERY WITH INTRALUMINAL DEVICE, PERCUTANEOUS APPROACH: ICD-10-PCS | Performed by: INTERNAL MEDICINE

## 2019-12-28 PROCEDURE — 85347 COAGULATION TIME ACTIVATED: CPT

## 2019-12-28 RX ORDER — ACETAMINOPHEN 325 MG/1
650 TABLET ORAL EVERY 4 HOURS PRN
Status: DISCONTINUED | OUTPATIENT
Start: 2019-12-28 | End: 2019-12-30 | Stop reason: HOSPADM

## 2019-12-28 RX ORDER — SODIUM CHLORIDE 9 MG/ML
100 INJECTION, SOLUTION INTRAVENOUS CONTINUOUS
Status: DISCONTINUED | OUTPATIENT
Start: 2019-12-28 | End: 2019-12-28

## 2019-12-28 RX ORDER — HEPARIN SODIUM 1000 [USP'U]/ML
4000 INJECTION, SOLUTION INTRAVENOUS; SUBCUTANEOUS ONCE
Status: COMPLETED | OUTPATIENT
Start: 2019-12-28 | End: 2019-12-28

## 2019-12-28 RX ORDER — SODIUM CHLORIDE 9 MG/ML
INJECTION, SOLUTION INTRAVENOUS
Status: COMPLETED | OUTPATIENT
Start: 2019-12-28 | End: 2019-12-28

## 2019-12-28 RX ORDER — LIDOCAINE HYDROCHLORIDE 10 MG/ML
INJECTION, SOLUTION EPIDURAL; INFILTRATION; INTRACAUDAL; PERINEURAL CODE/TRAUMA/SEDATION MEDICATION
Status: COMPLETED | OUTPATIENT
Start: 2019-12-28 | End: 2019-12-28

## 2019-12-28 RX ORDER — HEPARIN SODIUM 10000 [USP'U]/100ML
3-20 INJECTION, SOLUTION INTRAVENOUS
Status: DISCONTINUED | OUTPATIENT
Start: 2019-12-28 | End: 2019-12-28

## 2019-12-28 RX ORDER — NITROGLYCERIN 20 MG/100ML
INJECTION INTRAVENOUS CODE/TRAUMA/SEDATION MEDICATION
Status: COMPLETED | OUTPATIENT
Start: 2019-12-28 | End: 2019-12-28

## 2019-12-28 RX ORDER — FLUTICASONE PROPIONATE 50 MCG
1 SPRAY, SUSPENSION (ML) NASAL 2 TIMES DAILY
Status: DISCONTINUED | OUTPATIENT
Start: 2019-12-28 | End: 2019-12-30 | Stop reason: HOSPADM

## 2019-12-28 RX ORDER — FENTANYL CITRATE 50 UG/ML
INJECTION, SOLUTION INTRAMUSCULAR; INTRAVENOUS CODE/TRAUMA/SEDATION MEDICATION
Status: COMPLETED | OUTPATIENT
Start: 2019-12-28 | End: 2019-12-28

## 2019-12-28 RX ORDER — ACETAMINOPHEN 325 MG/1
650 TABLET ORAL EVERY 4 HOURS PRN
Status: DISCONTINUED | OUTPATIENT
Start: 2019-12-28 | End: 2019-12-28

## 2019-12-28 RX ORDER — ATORVASTATIN CALCIUM 80 MG/1
80 TABLET, FILM COATED ORAL
Status: DISCONTINUED | OUTPATIENT
Start: 2019-12-28 | End: 2019-12-30 | Stop reason: HOSPADM

## 2019-12-28 RX ORDER — SODIUM CHLORIDE 9 MG/ML
100 INJECTION, SOLUTION INTRAVENOUS CONTINUOUS
Status: DISCONTINUED | OUTPATIENT
Start: 2019-12-28 | End: 2019-12-29

## 2019-12-28 RX ORDER — HEPARIN SODIUM 1000 [USP'U]/ML
INJECTION, SOLUTION INTRAVENOUS; SUBCUTANEOUS CODE/TRAUMA/SEDATION MEDICATION
Status: COMPLETED | OUTPATIENT
Start: 2019-12-28 | End: 2019-12-28

## 2019-12-28 RX ORDER — ONDANSETRON 2 MG/ML
4 INJECTION INTRAMUSCULAR; INTRAVENOUS EVERY 6 HOURS PRN
Status: DISCONTINUED | OUTPATIENT
Start: 2019-12-28 | End: 2019-12-30 | Stop reason: HOSPADM

## 2019-12-28 RX ORDER — NITROGLYCERIN 0.4 MG/1
0.4 TABLET SUBLINGUAL ONCE
Status: COMPLETED | OUTPATIENT
Start: 2019-12-28 | End: 2019-12-28

## 2019-12-28 RX ORDER — SODIUM CHLORIDE 9 MG/ML
3 INJECTION INTRAVENOUS AS NEEDED
Status: DISCONTINUED | OUTPATIENT
Start: 2019-12-28 | End: 2019-12-28

## 2019-12-28 RX ORDER — ASPIRIN 81 MG/1
81 TABLET, CHEWABLE ORAL DAILY
Status: DISCONTINUED | OUTPATIENT
Start: 2019-12-29 | End: 2019-12-30 | Stop reason: HOSPADM

## 2019-12-28 RX ORDER — LOSARTAN POTASSIUM 50 MG/1
100 TABLET ORAL DAILY
Status: DISCONTINUED | OUTPATIENT
Start: 2019-12-29 | End: 2019-12-30 | Stop reason: HOSPADM

## 2019-12-28 RX ORDER — VERAPAMIL HYDROCHLORIDE 2.5 MG/ML
INJECTION, SOLUTION INTRAVENOUS CODE/TRAUMA/SEDATION MEDICATION
Status: COMPLETED | OUTPATIENT
Start: 2019-12-28 | End: 2019-12-28

## 2019-12-28 RX ORDER — LORAZEPAM 0.5 MG/1
0.5 TABLET ORAL 2 TIMES DAILY PRN
Status: DISCONTINUED | OUTPATIENT
Start: 2019-12-28 | End: 2019-12-30 | Stop reason: HOSPADM

## 2019-12-28 RX ORDER — MIDAZOLAM HYDROCHLORIDE 2 MG/2ML
INJECTION, SOLUTION INTRAMUSCULAR; INTRAVENOUS CODE/TRAUMA/SEDATION MEDICATION
Status: COMPLETED | OUTPATIENT
Start: 2019-12-28 | End: 2019-12-28

## 2019-12-28 RX ORDER — ASPIRIN 325 MG
325 TABLET ORAL ONCE
Status: COMPLETED | OUTPATIENT
Start: 2019-12-28 | End: 2019-12-28

## 2019-12-28 RX ORDER — HEPARIN SODIUM 1000 [USP'U]/ML
4000 INJECTION, SOLUTION INTRAVENOUS; SUBCUTANEOUS AS NEEDED
Status: DISCONTINUED | OUTPATIENT
Start: 2019-12-28 | End: 2019-12-28

## 2019-12-28 RX ORDER — NICOTINE 21 MG/24HR
1 PATCH, TRANSDERMAL 24 HOURS TRANSDERMAL DAILY
Status: DISCONTINUED | OUTPATIENT
Start: 2019-12-29 | End: 2019-12-30 | Stop reason: HOSPADM

## 2019-12-28 RX ORDER — LOSARTAN POTASSIUM 50 MG/1
100 TABLET ORAL DAILY
Status: DISCONTINUED | OUTPATIENT
Start: 2019-12-29 | End: 2019-12-28

## 2019-12-28 RX ORDER — HEPARIN SODIUM 1000 [USP'U]/ML
2000 INJECTION, SOLUTION INTRAVENOUS; SUBCUTANEOUS AS NEEDED
Status: DISCONTINUED | OUTPATIENT
Start: 2019-12-28 | End: 2019-12-28

## 2019-12-28 RX ADMIN — HEPARIN SODIUM 4000 UNITS: 1000 INJECTION, SOLUTION INTRAVENOUS; SUBCUTANEOUS at 16:18

## 2019-12-28 RX ADMIN — IOHEXOL 150 ML: 350 INJECTION, SOLUTION INTRAVENOUS at 17:25

## 2019-12-28 RX ADMIN — MIDAZOLAM 1 MG: 1 INJECTION INTRAMUSCULAR; INTRAVENOUS at 16:44

## 2019-12-28 RX ADMIN — METOPROLOL TARTRATE 25 MG: 25 TABLET ORAL at 21:15

## 2019-12-28 RX ADMIN — VERAPAMIL HYDROCHLORIDE 2.5 MG: 2.5 INJECTION INTRAVENOUS at 16:33

## 2019-12-28 RX ADMIN — NITROGLYCERIN 200 MCG: 20 INJECTION INTRAVENOUS at 16:49

## 2019-12-28 RX ADMIN — HEPARIN SODIUM 1000 UNITS: 1000 INJECTION INTRAVENOUS; SUBCUTANEOUS at 16:41

## 2019-12-28 RX ADMIN — SODIUM CHLORIDE 100 ML/HR: 0.9 INJECTION, SOLUTION INTRAVENOUS at 18:44

## 2019-12-28 RX ADMIN — ACETAMINOPHEN 650 MG: 325 TABLET ORAL at 20:33

## 2019-12-28 RX ADMIN — SERTRALINE HYDROCHLORIDE 150 MG: 100 TABLET ORAL at 20:34

## 2019-12-28 RX ADMIN — NITROGLYCERIN 200 MCG: 20 INJECTION INTRAVENOUS at 17:27

## 2019-12-28 RX ADMIN — MIDAZOLAM 1 MG: 1 INJECTION INTRAMUSCULAR; INTRAVENOUS at 17:09

## 2019-12-28 RX ADMIN — NITROGLYCERIN 200 MCG: 20 INJECTION INTRAVENOUS at 16:33

## 2019-12-28 RX ADMIN — ASPIRIN 325 MG ORAL TABLET 325 MG: 325 PILL ORAL at 16:03

## 2019-12-28 RX ADMIN — ATORVASTATIN CALCIUM 80 MG: 80 TABLET, FILM COATED ORAL at 20:34

## 2019-12-28 RX ADMIN — SODIUM CHLORIDE 100 ML/HR: 0.9 INJECTION, SOLUTION INTRAVENOUS at 16:28

## 2019-12-28 RX ADMIN — FENTANYL CITRATE 50 MCG: 50 INJECTION, SOLUTION INTRAMUSCULAR; INTRAVENOUS at 17:09

## 2019-12-28 RX ADMIN — LIDOCAINE HYDROCHLORIDE 1 ML: 10 INJECTION, SOLUTION EPIDURAL; INFILTRATION; INTRACAUDAL; PERINEURAL at 16:33

## 2019-12-28 RX ADMIN — NITROGLYCERIN 1 INCH: 20 OINTMENT TOPICAL at 16:44

## 2019-12-28 RX ADMIN — TICAGRELOR 180 MG: 90 TABLET ORAL at 16:41

## 2019-12-28 RX ADMIN — IOHEXOL 70 ML: 350 INJECTION, SOLUTION INTRAVENOUS at 17:47

## 2019-12-28 RX ADMIN — MIDAZOLAM 1 MG: 1 INJECTION INTRAMUSCULAR; INTRAVENOUS at 17:38

## 2019-12-28 RX ADMIN — FENTANYL CITRATE 50 MCG: 50 INJECTION, SOLUTION INTRAMUSCULAR; INTRAVENOUS at 17:38

## 2019-12-28 RX ADMIN — FENTANYL CITRATE 50 MCG: 50 INJECTION, SOLUTION INTRAMUSCULAR; INTRAVENOUS at 16:30

## 2019-12-28 RX ADMIN — FENTANYL CITRATE 50 MCG: 50 INJECTION, SOLUTION INTRAMUSCULAR; INTRAVENOUS at 16:44

## 2019-12-28 RX ADMIN — FLUTICASONE PROPIONATE 1 SPRAY: 50 SPRAY, METERED NASAL at 21:17

## 2019-12-28 RX ADMIN — HEPARIN SODIUM 4000 UNITS: 1000 INJECTION INTRAVENOUS; SUBCUTANEOUS at 17:27

## 2019-12-28 RX ADMIN — MIDAZOLAM 2 MG: 1 INJECTION INTRAMUSCULAR; INTRAVENOUS at 16:30

## 2019-12-28 RX ADMIN — NITROGLYCERIN 200 MCG: 20 INJECTION INTRAVENOUS at 17:09

## 2019-12-28 RX ADMIN — HEPARIN SODIUM 4000 UNITS: 1000 INJECTION INTRAVENOUS; SUBCUTANEOUS at 16:33

## 2019-12-28 RX ADMIN — TICAGRELOR 90 MG: 90 TABLET ORAL at 21:14

## 2019-12-28 RX ADMIN — NITROGLYCERIN 200 MCG: 20 INJECTION INTRAVENOUS at 17:12

## 2019-12-28 RX ADMIN — NITROGLYCERIN 0.4 MG: 0.4 TABLET SUBLINGUAL at 16:11

## 2019-12-28 NOTE — ED PROVIDER NOTES
History  Chief Complaint   Patient presents with    Chest Pain     pt is due to have a blockage removed on monday, she has pain from her jaw down to her shoulder into her chest with n/v  Patient is a 54-year-old female with no significant past history presents to the emergency department for evaluation of chest pain  The patient states that since October she has been experiencing intermittent chest pain at rest  She has been seeing her primary care physician for this, and a stress test was scheduled given she had new EKG changes of T-wave inversion in the inferior and lateral leads  Stress test the beginning of this month revealed partially reversible myocardial perfusion defect of the inferolateral wall, patient was then scheduled for cardiac catheterization on 12/30  The patient states that yesterday her pain became more severe, and she vomited once yesterday and twice today  Her pain at worsened at 11:00 a m  This morning and has been constant since it started  She said prior to this had been intermittent and would resolve after several minutes to an hour  She is also endorsing shortness of breath that has been ongoing as well  Upon evaluation of her EKG today she has elevation in lead AVR, ST depression in 2, 3 and AVF as well as ST depression in V4 through V6  A STEMI alert was called, Cardiology evaluated the patient she was taken to the cath lab for cardiac catheterization        History provided by:  Patient  History limited by:  Acuity of condition   used: No    Chest Pain   Pain location:  Substernal area  Pain quality: crushing    Pain radiates to:  R jaw, epigastrium and precordial region  Onset quality:  Sudden  Date/time of last known well:  12/28/2019 11:00 AM  Timing:  Constant  Progression:  Worsening  Chronicity:  Recurrent  Context: at rest    Relieved by:  None tried  Worsened by:  Nothing tried  Ineffective treatments:  None tried  Associated symptoms: shortness of breath and vomiting    Associated symptoms: no abdominal pain, no cough, no fever and no nausea        Prior to Admission Medications   Prescriptions Last Dose Informant Patient Reported? Taking? Albuterol Sulfate (PROAIR RESPICLICK) 149 (90 Base) MCG/ACT AEPB   No No   Sig: Two puffs every 4-6 hours as needed for cough and wheeze   LORazepam (ATIVAN) 0 5 mg tablet   No No   Sig: Take 1 tablet (0 5 mg total) by mouth 2 (two) times a day as needed for anxiety   NIFEdipine (PROCARDIA XL) 60 mg 24 hr tablet   No No   Sig: TAKE 2 TABLETS DAILY   irbesartan (AVAPRO) 300 mg tablet   No No   Sig: TAKE 1 TABLET DAILY   mometasone (NASONEX) 50 mcg/act nasal spray   No No   Si sprays into each nostril daily   sertraline (ZOLOFT) 100 mg tablet   No No   Sig: TAKE ONE AND ONE-HALF TABLETS DAILY      Facility-Administered Medications: None       Past Medical History:   Diagnosis Date    Preeclampsia        Past Surgical History:   Procedure Laterality Date     SECTION      EYE SURGERY      TUBAL LIGATION         Family History   Problem Relation Age of Onset    Hypertension Father     Kidney disease Paternal Grandfather      I have reviewed and agree with the history as documented  Social History     Tobacco Use    Smoking status: Current Every Day Smoker    Smokeless tobacco: Never Used   Substance Use Topics    Alcohol use: Yes    Drug use: No        Review of Systems   Unable to perform ROS: Acuity of condition   Constitutional: Negative  Negative for fever  HENT: Negative  Respiratory: Positive for shortness of breath  Negative for cough and wheezing  Cardiovascular: Positive for chest pain  Negative for leg swelling  Gastrointestinal: Positive for vomiting  Negative for abdominal pain, blood in stool and nausea         Physical Exam  ED Triage Vitals   Temperature Pulse Respirations Blood Pressure SpO2   19 1531 19 1531 19 1531 19 1531 19 1531   98 9 °F (37 2 °C) 91 22 (!) 167/125 100 %      Temp Source Heart Rate Source Patient Position - Orthostatic VS BP Location FiO2 (%)   12/28/19 1531 12/28/19 1554 12/28/19 1610 12/28/19 1610 --   Oral Monitor Sitting Left arm       Pain Score       12/28/19 1531       6             Orthostatic Vital Signs  Vitals:    12/28/19 1531 12/28/19 1554 12/28/19 1610   BP: (!) 167/125 168/100 (!) 176/96   Pulse: 91 80 67   Patient Position - Orthostatic VS:   Sitting       Physical Exam   Constitutional: She is oriented to person, place, and time  She appears well-developed and well-nourished  She appears distressed  HENT:   Head: Normocephalic and atraumatic  Mouth/Throat: Oropharynx is clear and moist    Eyes: Conjunctivae and EOM are normal  No scleral icterus  Neck: Normal range of motion  Neck supple  No JVD present  Cardiovascular: Normal rate, regular rhythm, normal heart sounds and intact distal pulses  No murmur heard  Pulmonary/Chest: Breath sounds normal  No stridor  No respiratory distress  She has no rales  Conversational dyspnea  Tachypnea    Abdominal: Soft  She exhibits no distension  There is no tenderness  There is no guarding  Musculoskeletal: Normal range of motion  She exhibits no edema  Neurological: She is alert and oriented to person, place, and time  Skin: Skin is warm and dry  Capillary refill takes less than 2 seconds  She is not diaphoretic  Psychiatric: She has a normal mood and affect  Her behavior is normal    Vitals reviewed        ED Medications  Medications   sodium chloride (PF) 0 9 % injection 3 mL (has no administration in time range)   heparin (porcine) 25,000 units in 250 mL infusion (premix) (0 Units/kg/hr × 75 kg (Order-Specific) Intravenous Hold 12/28/19 1621)   heparin (porcine) injection 4,000 Units (has no administration in time range)   heparin (porcine) injection 2,000 Units (has no administration in time range)   fentanyl citrate (PF) 100 MCG/2ML (50 mcg Intravenous Given 12/28/19 1644)   midazolam (VERSED) injection (1 mg Intravenous Given 12/28/19 1644)   sodium chloride 0 9 % infusion (100 mL/hr Intravenous New Bag 12/28/19 1628)   lidocaine (PF) (XYLOCAINE-MPF) 1 % injection (1 mL Infiltration Given 12/28/19 1633)   heparin (porcine) injection (1,000 Units Intravenous Given 12/28/19 1641)   verapamil (ISOPTIN) injection (2 5 mg Intravenous Given 12/28/19 1633)   nitroGLYcerin (TRIDIL) 50 mg in 250 mL infusion (premix) (200 mcg Intra-arterial Given 12/28/19 1633)   ticagrelor (BRILINTA) tablet (180 mg Oral Given 12/28/19 1641)   nitroglycerin (NITRO-BID) 2 % TD ointment (1 inch Topical Given 12/28/19 1644)   aspirin tablet 325 mg (325 mg Oral Given 12/28/19 1603)   nitroglycerin (NITROSTAT) SL tablet 0 4 mg (0 4 mg Sublingual Given 12/28/19 1611)   heparin (porcine) injection 4,000 Units (4,000 Units Intravenous Given 12/28/19 1618)       Diagnostic Studies  Results Reviewed     Procedure Component Value Units Date/Time    Protime-INR [[id number]]  (Normal) Collected:  12/28/19 1600    Lab Status:  Final result Specimen:  Blood from Arm, Right Updated:  12/28/19 1634     Protime 12 2 seconds      INR 0 94    APTT [[id number]]  (Normal) Collected:  12/28/19 1600    Lab Status:  Final result Specimen:  Blood from Arm, Right Updated:  12/28/19 1634     PTT 27 seconds     Lipid panel [[id number]]  (Abnormal) Collected:  12/28/19 1550    Lab Status:  Final result Specimen:  Blood from Arm, Right Updated:  12/28/19 1627     Cholesterol 224 mg/dL      Triglycerides 130 mg/dL      HDL, Direct 48 mg/dL      LDL Calculated 150 mg/dL      Non-HDL-Chol (CHOL-HDL) 176 mg/dl     Magnesium [[id number]]  (Normal) Collected:  12/28/19 1550    Lab Status:  Final result Specimen:  Blood from Arm, Right Updated:  12/28/19 1627     Magnesium 2 0 mg/dL     Troponin I [[id number]]  (Abnormal) Collected:  12/28/19 0250    Lab Status:  Final result Specimen:  Blood from Arm, Right Updated:  12/28/19 0040 Troponin I 0 60 ng/mL     Comprehensive metabolic panel [[id number]]  (Abnormal) Collected:  12/28/19 1550    Lab Status:  Final result Specimen:  Blood from Arm, Right Updated:  12/28/19 1620     Sodium 141 mmol/L      Potassium 3 4 mmol/L      Chloride 110 mmol/L      CO2 24 mmol/L      ANION GAP 7 mmol/L      BUN 13 mg/dL      Creatinine 0 62 mg/dL      Glucose 90 mg/dL      Calcium 8 6 mg/dL      AST 17 U/L      ALT 20 U/L      Alkaline Phosphatase 80 U/L      Total Protein 7 5 g/dL      Albumin 3 4 g/dL      Total Bilirubin 0 20 mg/dL      eGFR 110 ml/min/1 73sq m     Narrative:       [facility] guidelines for Chronic Kidney Disease (CKD):     Stage 1 with normal or high GFR (GFR > 90 mL/min/1 73 square meters)    Stage 2 Mild CKD (GFR = 60-89 mL/min/1 73 square meters)    Stage 3A Moderate CKD (GFR = 45-59 mL/min/1 73 square meters)    Stage 3B Moderate CKD (GFR = 30-44 mL/min/1 73 square meters)    Stage 4 Severe CKD (GFR = 15-29 mL/min/1 73 square meters)    Stage 5 End Stage CKD (GFR <15 mL/min/1 73 square meters)  Note: GFR calculation is accurate only with a steady state creatinine    CBC and differential [[id number]]  (Abnormal) Collected:  12/28/19 1550    Lab Status:  Final result Specimen:  Blood from Arm, Right Updated:  12/28/19 1617     WBC 6 00 Thousand/uL      RBC 4 54 Million/uL      Hemoglobin 12 7 g/dL      Hematocrit 39 0 %      MCV 86 fL      MCH 28 0 pg      MCHC 32 6 g/dL      RDW 15 5 %      MPV 10 2 fL      Platelets 441 Thousands/uL      nRBC 0 /100 WBCs      Neutrophils Relative 53 %      Immat GRANS % 0 %      Lymphocytes Relative 35 %      Monocytes Relative 10 %      Eosinophils Relative 1 %      Basophils Relative 1 %      Neutrophils Absolute 3 27 Thousands/µL      Immature Grans Absolute 0 02 Thousand/uL      Lymphocytes Absolute 2 07 Thousands/µL      Monocytes Absolute 0 58 Thousand/µL      Eosinophils Absolute 0 03 Thousand/µL      Basophils Absolute 0 03 Thousands/µL     APTT six (6) hours after Heparin bolus/drip initiation or dosing change [[id number]]     Lab Status:  No result Specimen:  Blood                  XR chest portable    (Results Pending)         Procedures  Procedures      ED Course  ED Course as of Dec 28 1648   Sat Dec 28, 2019   1605 MI alert called  Spoke with Dr Molina Phoenix, will come evaluate patient in ED  ASA and heparin ordered  MDM  Number of Diagnoses or Management Options  Chest pain: new and requires workup  Elevated troponin: new and requires workup  ST elevation MI (STEMI) [facility]): new and requires workup  Diagnosis management comments: Patient with a history of chest pain presenting now with persistent chest pain and vomiting  EKG reveals ST elevation and ST depressions  A STEMI alert was, cardiology evaluated the patient and patient was taken to the cath lab for acute ST-elevation MI  Given aspirin, SL nitro and heparin in the emergency department           Amount and/or Complexity of Data Reviewed  Clinical lab tests: reviewed  Tests in the radiology section of CPT®: ordered and reviewed  Review and summarize past medical records: yes  Discuss the patient with other providers: yes  Independent visualization of images, tracings, or specimens: yes          Disposition  Final diagnoses:   Chest pain   Elevated troponin   ST elevation MI (STEMI) ([facility] 75 )     Time reflects when diagnosis was documented in both MDM as applicable and the Disposition within this note     Time User Action Codes Description Comment    12/28/2019  4:25 PM Lg Cider Add [R07 9] Chest pain     12/28/2019  4:25 PM Venice Tsai [R79 89] Elevated troponin     12/28/2019  4:25 PM Lg Cider Add [I21 3] ST [facility] )     12/28/2019  4:37 PM Donovanbenito Silvaer Modify [R07 9] Chest pain     12/28/2019  4:37 PM Donovan Jointer Modify [I21 3] ST elevation MI (STEMI) [facility])       ED Disposition ED Disposition Condition Date/Time Comment    Send to Cath Lab  Sat Dec 28, 2019  4:25 PM       Follow-up Information    None         Patient's Medications   Discharge Prescriptions    No medications on file     No discharge procedures on file  ED Provider  Attending physically available and evaluated Jose Nesbitt  I managed the patient along with the ED Attending      Electronically Signed by         Viji Milton DO  12/28/19 8756

## 2019-12-28 NOTE — LETTER
[address] [facility] [address]  [address] [facility] [address]  Dept: [phone]    December 30, 2019     Patient: Avel Alicia   YOB: 1975   Date of Visit: 12/28/2019       To Whom it May Concern:    Ian Bautista is under my professional care  She was seen in the hospital from 12/28/2019   to 12/30/19  She may return to work on 1/13/2019 with the following limitations:  No heavy lifting greater than 10 lb or strenuous activity until clearance by outpatient Cardiology  If you have any questions or concerns, please don't hesitate to call           Sincerely,          Indira Daniels MD

## 2019-12-28 NOTE — PROGRESS NOTES
12/28/19 1600   Plan of Care   Comments Attended code  Family present     Assessment Completed by: Unit visit

## 2019-12-28 NOTE — ED ATTENDING ATTESTATION
Charlie Recinos MD, saw and evaluated the patient  All available labs and X-rays were ordered by me or the resident and have been reviewed by myself  I discussed the patient with the resident / non-physician and agree with the resident's / non-physician practitioner's findings and plan as documented in the resident's / non-physician practicitioner's note, except where noted  At this point, I agree with the current assessment done in the ED  I was present during key portions of all procedures performed unless otherwise stated  Chief Complaint   Patient presents with    Chest Pain     pt is due to have a blockage removed on monday, she has pain from her jaw down to her shoulder into her chest with n/v  This is a 41 y/o F presenting for evaluation of CP  The patient states that she has been having chest pain every now and then, but today it has become significantly more severe  She says it is going to her right jaw, right shoulder with associated nausea without diaphoresis  She has had a recent stress test that is abnormal (per patient)  She also states she is supposed to have a catherization done soon to evaluate her  No f/ch  No falls trauma  I saw her EKG    Immediately evaluated the patient due to it  A: intact  B: bilateral sound present  C: 2+ pulses, radials bialterally equal  2+ PTs  No pulsatile mass in abdomen  D: awake, alert, tearful  E: afebrile    She states that she is having active chest pain (3/10 down from 8/10 earlier)  She doesn't feel well with the pain going from chest to the jaw + shoulder on the right     Given EKG and story, I have high suspicion for ACS/STEMI  Called STEMI alert given story  Ordered heparin bolus/drip  Ordered posterior eKG (that doesn't look like a posterior MI)     Ordered sublingual ntiroglycerin for active pain  Ordered [id number]  Awaiting consult for Brlinta 180 vs Plavix 600    Vitals:    12/28/19 1531 12/28/19 1554 12/28/19 1610 12/28/19 1613   BP: (!) 167/125 168/100 (!) 176/96    BP Location:   Left arm    Pulse: 91 80 67    Resp: 22 18 20    Temp: 98 9 °F (37 2 °C)      TempSrc: Oral      SpO2: 100% 98% 98%    Weight:    74 4 kg (164 lb)   - 13 point ROS was performed and all are normal unless stated in the history above  - Nursing note reviewed  Vitals reviewed  - Orders placed by myself and/or advanced practitioner / resident     - Previous chart was reviewed  - No language barrier    - History obtained from patient  - There are no limitations to the history obtained  - Critical care time: Not applicable for this patient  Addendum:  - EKG done at 3:48pm, signed immediately  - Evaluated patient immediately  - Called STEMI alert at 3:56 PM; Interventional cardiology paged; EKG Roselle Texted to Dr Kami Pal at 4:00 PM      Code Status: No Order  Advance Directive and Living Will:      Power of :    POLST:      Final Diagnosis:  1  Chest pain    2  Elevated troponin    3   ST elevation MI (STEMI) [facility])        ED Course as of Dec 28 1625   Sat Dec 28, 2019   1622 Troponin I(!): 0 60     Medications   sodium chloride (PF) 0 9 % injection 3 mL (has no administration in time range)   heparin (porcine) 25,000 units in 250 mL infusion (premix) (0 Units/kg/hr × 75 kg (Order-Specific) Intravenous Hold 12/28/19 1621)   heparin (porcine) injection 4,000 Units (has no administration in time range)   heparin (porcine) injection 2,000 Units (has no administration in time range)   heparin (ACS LOW) (has no administration in time range)   aspirin tablet 325 mg (325 mg Oral Given 12/28/19 1603)   nitroglycerin (NITROSTAT) SL tablet 0 4 mg (0 4 mg Sublingual Given 12/28/19 1611)   heparin (porcine) injection 4,000 Units (4,000 Units Intravenous Given 12/28/19 1618)     XR chest portable    (Results Pending)     Orders Placed This Encounter   Procedures    XR chest portable    CBC and differential    Comprehensive metabolic panel    Troponin I    Lipid panel    Magnesium    Protime-INR    APTT    APTT six (6) hours after Heparin bolus/drip initiation or dosing change    Insert peripheral IV    Continuous cardiac monitoring    Continuous pulse oximetry    Nasal cannula oxygen    Heparin: ACS (low) or Straight Infusion Protocol Administration Instructions    Heparin: ASC (Low) or Straight  Infusion Protocol Notify Physician If:    Heparin Infusion and Platelet Monitoring Per Protocol    Consult to Cardiology    ECG 12 lead     Labs Reviewed   CBC AND DIFFERENTIAL - Abnormal       Result Value Ref Range Status    WBC 6 00  4 31 - 10 16 Thousand/uL Final    RBC 4 54  3 81 - 5 12 Million/uL Final    Hemoglobin 12 7  11 5 - 15 4 g/dL Final    Hematocrit 39 0  34 8 - 46 1 % Final    MCV 86  82 - 98 fL Final    MCH 28 0  26 8 - 34 3 pg Final    MCHC 32 6  31 4 - 37 4 g/dL Final    RDW 15 5 (*) 11 6 - 15 1 % Final    MPV 10 2  8 9 - 12 7 fL Final    Platelets 452  860 - 390 Thousands/uL Final    nRBC 0  /100 WBCs Final    Neutrophils Relative 53  43 - 75 % Final    Immat GRANS % 0  0 - 2 % Final    Lymphocytes Relative 35  14 - 44 % Final    Monocytes Relative 10  4 - 12 % Final    Eosinophils Relative 1  0 - 6 % Final    Basophils Relative 1  0 - 1 % Final    Neutrophils Absolute 3 27  1 85 - 7 62 Thousands/µL Final    Immature Grans Absolute 0 02  0 00 - 0 20 Thousand/uL Final    Lymphocytes Absolute 2 07  0 60 - 4 47 Thousands/µL Final    Monocytes Absolute 0 58  0 17 - 1 22 Thousand/µL Final    Eosinophils Absolute 0 03  0 00 - 0 61 Thousand/µL Final    Basophils Absolute 0 03  0 00 - 0 10 Thousands/µL Final   COMPREHENSIVE METABOLIC PANEL - Abnormal    Sodium 141  136 - 145 mmol/L Final    Potassium 3 4 (*) 3 5 - 5 3 mmol/L Final    Chloride 110 (*) 100 - 108 mmol/L Final    CO2 24  21 - 32 mmol/L Final    ANION GAP 7  4 - 13 mmol/L Final    BUN 13  5 - 25 mg/dL Final    Creatinine 0 62  0 60 - 1 30 mg/dL Final    Comment: Standardized to IDMS reference method    Glucose 90  65 - 140 mg/dL Final    Comment:   If the patient is fasting, the ADA then defines impaired fasting glucose as > 100 mg/dL and diabetes as > or equal to 123 mg/dL  Specimen collection should occur prior to Sulfasalazine administration due to the potential for falsely depressed results  Specimen collection should occur prior to Sulfapyridine administration due to the potential for falsely elevated results  Calcium 8 6  8 3 - 10 1 mg/dL Final    AST 17  5 - 45 U/L Final    Comment:   Specimen collection should occur prior to Sulfasalazine administration due to the potential for falsely depressed results  ALT 20  12 - 78 U/L Final    Comment:   Specimen collection should occur prior to Sulfasalazine and/or Sulfapyridine administration due to the potential for falsely depressed results       Alkaline Phosphatase 80  46 - 116 U/L Final    Total Protein 7 5  6 4 - 8 2 g/dL Final    Albumin 3 4 (*) 3 5 - 5 0 g/dL Final    Total Bilirubin 0 20  0 20 - 1 00 mg/dL Final    eGFR 110  ml/min/1 73sq m Final    Narrative:     [facility] guidelines for Chronic Kidney Disease (CKD):     Stage 1 with normal or high GFR (GFR > 90 mL/min/1 73 square meters)    Stage 2 Mild CKD (GFR = 60-89 mL/min/1 73 square meters)    Stage 3A Moderate CKD (GFR = 45-59 mL/min/1 73 square meters)    Stage 3B Moderate CKD (GFR = 30-44 mL/min/1 73 square meters)    Stage 4 Severe CKD (GFR = 15-29 mL/min/1 73 square meters)    Stage 5 End Stage CKD (GFR <15 mL/min/1 73 square meters)  Note: GFR calculation is accurate only with a steady state creatinine   TROPONIN I - Abnormal    Troponin I 0 60 (*) <=0 04 ng/mL Final    Comment:   [employer] Chemistry analyzer 99% cutoff is > 0 04 ng/mL in network labs     o cTnI 99% cutoff is useful only when applied to patients in the clinical setting of myocardial ischemia   o cTnI 99% cutoff should be interpreted in the context of clinical history, ECG findings and possibly cardiac imaging to establish correct diagnosis  o cTnI 99% cutoff may be suggestive but clearly not indicative of a coronary event without the clinical setting of myocardial ischemia  LIPID PANEL   MAGNESIUM   PROTIME-INR   APTT   APTT     Time reflects when diagnosis was documented in both MDM as applicable and the Disposition within this note     Time User Action Codes Description Comment    2019  4:25 PM My Deshpande [R07 9] Chest pain     2019  4:25 PM Mireya Carmine [R79 89] Elevated troponin     2019  4:25 PM My Deshpande [I21 3] ST elevation MI (STEMI) [facility])       ED Disposition     ED Disposition Condition Date/Time Comment    Send to Cath Lab  Sat Dec 28, 2019  4:25 PM       Follow-up Information    None       Patient's Medications   Discharge Prescriptions    No medications on file     No discharge procedures on file  Prior to Admission Medications   Prescriptions Last Dose Informant Patient Reported? Taking? Albuterol Sulfate (PROAIR RESPICLICK) 251 (90 Base) MCG/ACT AEPB   No No   Sig: Two puffs every 4-6 hours as needed for cough and wheeze   LORazepam (ATIVAN) 0 5 mg tablet   No No   Sig: Take 1 tablet (0 5 mg total) by mouth 2 (two) times a day as needed for anxiety   NIFEdipine (PROCARDIA XL) 60 mg 24 hr tablet   No No   Sig: TAKE 2 TABLETS DAILY   irbesartan (AVAPRO) 300 mg tablet   No No   Sig: TAKE 1 TABLET DAILY   mometasone (NASONEX) 50 mcg/act nasal spray   No No   Si sprays into each nostril daily   sertraline (ZOLOFT) 100 mg tablet   No No   Sig: TAKE ONE AND ONE-HALF TABLETS DAILY      Facility-Administered Medications: None       Portions of the record may have been created with voice recognition software  Occasional wrong word or "sound a like" substitutions may have occurred due to the inherent limitations of voice recognition software   Read the chart carefully and recognize, using context, where substitutions have occurred      Electronically signed by:  Carlos Bill

## 2019-12-28 NOTE — H&P
H&P Exam - Cardiology   Seun Ch 40 y o  female MRN: [id number]  Unit/Bed#: DEE Encounter: [id number]    Assessment/Plan   STEMI  -had abnormal stress test as an outpatient and was planned for elective PCI now presenting with persistent chest pain with acute EKG changes  -procedure left heart catheterization  -start aspirin, Brilinta, Lipitor 80  -check TTE  -case management consult Brilinta pricing  -continue with ARB and start lopressor 25mg BID   Hypertension  -currently irbesartan 300 mg daily and nifedipine 60 mg b i d   - will hold nifedipine and start Lopressor   Mild to moderate MR  -seen on TTE on 12/06  -EF was preserved at this time  Active smoker  -encourage smoking cessation    History of Present Illness   HPI:  Seun Ch is a 40 y o  female who presents with chest pain  She has past medical history hypertension, active smoker and mild-to-moderate MR  She has been having intermittent chest pain since October and had a positive stress test as an outpatient  She has been plans for heart catheterization by Dr Gonzalez Grief  She reports that the pain became so severe that she can take anymore and came to the hospital for evaluation  She reports more frequent and persistent episodes of this chest pain that have open are from sleep  She describes chest pain as radiating from her jaw to epigastric region and down her left arm  Previously lasted for 30 seconds to a minute, but now lasted to 15 minutes at a time  EKG now shows a new AVR elevation with diffuse ST segment depressions  She has also been having episodes of intermittent nausea vomiting and shortness of breath with chest pain  Review of Systems   Constitutional: Negative for fatigue and fever  HENT: Negative for congestion  Eyes: Negative for photophobia and visual disturbance  Respiratory: Positive for chest tightness and shortness of breath  Negative for cough  Cardiovascular: Positive for chest pain   Negative for palpitations and leg swelling  Gastrointestinal: Negative for abdominal distention, abdominal pain, constipation, diarrhea, nausea and vomiting  Genitourinary: Negative for difficulty urinating and dysuria  Musculoskeletal: Negative for arthralgias and back pain  Neurological: Negative for dizziness, syncope and headaches  Hematological: Negative for adenopathy  Psychiatric/Behavioral: Negative for agitation  Historical Information   Past Medical History:   Diagnosis Date    Preeclampsia      Past Surgical History:   Procedure Laterality Date     SECTION      EYE SURGERY      TUBAL LIGATION       Social History   Social History     Substance and Sexual Activity   Alcohol Use Yes     Social History     Substance and Sexual Activity   Drug Use No     Social History     Tobacco Use   Smoking Status Current Every Day Smoker   Smokeless Tobacco Never Used     Family History:   Family History   Problem Relation Age of Onset    Hypertension Father     Kidney disease Paternal Grandfather        Meds/Allergies   all medications and allergies reviewed  Allergies   Allergen Reactions    Augmentin [Amoxicillin-Pot Clavulanate] Vomiting     Vomiting and  myalgias       Objective   Vitals: Blood pressure (!) 176/96, pulse 67, temperature 98 9 °F (37 2 °C), temperature source Oral, resp  rate 20, weight 74 4 kg (164 lb), SpO2 98 %  Orthostatic Blood Pressures      Most Recent Value   Blood Pressure  (!) 176/96 filed at 2019 1610   Patient Position - Orthostatic VS  Sitting filed at 2019 1610          No intake or output data in the 24 hours ending 19 1632    Invasive Devices     Peripheral Intravenous Line            Peripheral IV 19 Left Arm less than 1 day    Peripheral IV 19 Right Antecubital less than 1 day                Physical Exam   Constitutional: She is oriented to person, place, and time  She appears well-developed  No distress     Eyes: Pupils are equal, round, and reactive to light  Conjunctivae and EOM are normal    Neck: Normal range of motion  Neck supple  No JVD present  Cardiovascular: Normal rate, regular rhythm and intact distal pulses  Exam reveals no gallop and no friction rub  Murmur heard  Pulmonary/Chest: No stridor  No respiratory distress  She has no wheezes  Abdominal: Soft  Bowel sounds are normal  She exhibits no distension  There is no tenderness  Musculoskeletal: Normal range of motion  She exhibits no edema  Neurological: She is alert and oriented to person, place, and time  Skin: Skin is warm and dry  She is not diaphoretic  Psychiatric: She has a normal mood and affect  Her behavior is normal           Lab Results:   I have personally reviewed pertinent lab results  CBC with diff:   Results from last 7 days   Lab Units 12/28/19  1550   WBC Thousand/uL 6 00   RBC Million/uL 4 54   HEMOGLOBIN g/dL 12 7   HEMATOCRIT % 39 0   MCV fL 86   MCH pg 28 0   MCHC g/dL 32 6   RDW % 15 5*   MPV fL 10 2   PLATELETS Thousands/uL 273     CMP:   Results from last 7 days   Lab Units 12/28/19  1550   SODIUM mmol/L 141   POTASSIUM mmol/L 3 4*   CHLORIDE mmol/L 110*   CO2 mmol/L 24   BUN mg/dL 13   CREATININE mg/dL 0 62   CALCIUM mg/dL 8 6   AST U/L 17   ALT U/L 20   ALK PHOS U/L 80   EGFR ml/min/1 73sq m 110     Troponin:   0   Lab Value Date/Time    TROPONINI 0 60 (H) 12/28/2019 1550    TROPONINI <0 02 11/24/2019 0859    TROPONINI <0 02 11/24/2019 0616    TROPONINI <0 02 07/21/2019 0401     Coags:   Results from last 7 days   Lab Units 12/28/19  1600   PTT seconds 27   INR  0 94     Magnesium:   Results from last 7 days   Lab Units 12/28/19  1550   MAGNESIUM mg/dL 2 0     Lipid Profile:   Results from last 7 days   Lab Units 12/28/19  1550   HDL mg/dL 48   LDL CALC mg/dL 150*   TRIGLYCERIDES mg/dL 130     Imaging: I have personally reviewed pertinent reports      EKG:  Isolated AVR elevation with diffuse ST depressions  Pathology and Other Studies: I have personally reviewed pertinent reports      VTE Prophylaxis: Enoxaparin (Lovenox)    Code Status: No Order  Advance Directive and Living Will:      Power of :    POLST:

## 2019-12-29 ENCOUNTER — APPOINTMENT (INPATIENT)
Dept: NON INVASIVE DIAGNOSTICS | Facility: HOSPITAL | Age: 44
DRG: 247 | End: 2019-12-29
Payer: COMMERCIAL

## 2019-12-29 ENCOUNTER — TELEPHONE (OUTPATIENT)
Dept: INPATIENT UNIT | Facility: HOSPITAL | Age: 44
End: 2019-12-29

## 2019-12-29 LAB
ALBUMIN SERPL BCP-MCNC: 2.9 G/DL (ref 3.5–5)
ALP SERPL-CCNC: 65 U/L (ref 46–116)
ALT SERPL W P-5'-P-CCNC: 16 U/L (ref 12–78)
ANION GAP SERPL CALCULATED.3IONS-SCNC: 7 MMOL/L (ref 4–13)
AST SERPL W P-5'-P-CCNC: 25 U/L (ref 5–45)
ATRIAL RATE: 79 BPM
ATRIAL RATE: 80 BPM
BILIRUB SERPL-MCNC: 0.26 MG/DL (ref 0.2–1)
BUN SERPL-MCNC: 10 MG/DL (ref 5–25)
CALCIUM SERPL-MCNC: 8.2 MG/DL (ref 8.3–10.1)
CHLORIDE SERPL-SCNC: 111 MMOL/L (ref 100–108)
CHOLEST SERPL-MCNC: 177 MG/DL (ref 50–200)
CO2 SERPL-SCNC: 24 MMOL/L (ref 21–32)
CREAT SERPL-MCNC: 0.56 MG/DL (ref 0.6–1.3)
ERYTHROCYTE [DISTWIDTH] IN BLOOD BY AUTOMATED COUNT: 15.6 % (ref 11.6–15.1)
GFR SERPL CREATININE-BSD FRML MDRD: 114 ML/MIN/1.73SQ M
GLUCOSE SERPL-MCNC: 106 MG/DL (ref 65–140)
HCT VFR BLD AUTO: 33.2 % (ref 34.8–46.1)
HDLC SERPL-MCNC: 40 MG/DL
HGB BLD-MCNC: 10.6 G/DL (ref 11.5–15.4)
LDLC SERPL CALC-MCNC: 117 MG/DL (ref 0–100)
MAGNESIUM SERPL-MCNC: 1.9 MG/DL (ref 1.6–2.6)
MCH RBC QN AUTO: 28 PG (ref 26.8–34.3)
MCHC RBC AUTO-ENTMCNC: 31.9 G/DL (ref 31.4–37.4)
MCV RBC AUTO: 88 FL (ref 82–98)
NONHDLC SERPL-MCNC: 137 MG/DL
P AXIS: 69 DEGREES
P AXIS: 73 DEGREES
PHOSPHATE SERPL-MCNC: 3 MG/DL (ref 2.7–4.5)
PLATELET # BLD AUTO: 203 THOUSANDS/UL (ref 149–390)
PMV BLD AUTO: 9.9 FL (ref 8.9–12.7)
POTASSIUM SERPL-SCNC: 3.4 MMOL/L (ref 3.5–5.3)
PR INTERVAL: 140 MS
PR INTERVAL: 144 MS
PROT SERPL-MCNC: 6.1 G/DL (ref 6.4–8.2)
QRS AXIS: -14 DEGREES
QRS AXIS: -9 DEGREES
QRSD INTERVAL: 78 MS
QRSD INTERVAL: 82 MS
QT INTERVAL: 352 MS
QT INTERVAL: 390 MS
QTC INTERVAL: 403 MS
QTC INTERVAL: 449 MS
RBC # BLD AUTO: 3.78 MILLION/UL (ref 3.81–5.12)
SODIUM SERPL-SCNC: 142 MMOL/L (ref 136–145)
T WAVE AXIS: 158 DEGREES
T WAVE AXIS: 195 DEGREES
TRIGL SERPL-MCNC: 101 MG/DL
TSH SERPL DL<=0.05 MIU/L-ACNC: 1.96 UIU/ML (ref 0.36–3.74)
VENTRICULAR RATE: 79 BPM
VENTRICULAR RATE: 80 BPM
WBC # BLD AUTO: 7.31 THOUSAND/UL (ref 4.31–10.16)

## 2019-12-29 PROCEDURE — 93325 DOPPLER ECHO COLOR FLOW MAPG: CPT | Performed by: INTERNAL MEDICINE

## 2019-12-29 PROCEDURE — 83735 ASSAY OF MAGNESIUM: CPT | Performed by: STUDENT IN AN ORGANIZED HEALTH CARE EDUCATION/TRAINING PROGRAM

## 2019-12-29 PROCEDURE — 80053 COMPREHEN METABOLIC PANEL: CPT | Performed by: STUDENT IN AN ORGANIZED HEALTH CARE EDUCATION/TRAINING PROGRAM

## 2019-12-29 PROCEDURE — 84100 ASSAY OF PHOSPHORUS: CPT | Performed by: STUDENT IN AN ORGANIZED HEALTH CARE EDUCATION/TRAINING PROGRAM

## 2019-12-29 PROCEDURE — 93321 DOPPLER ECHO F-UP/LMTD STD: CPT | Performed by: INTERNAL MEDICINE

## 2019-12-29 PROCEDURE — 85027 COMPLETE CBC AUTOMATED: CPT | Performed by: STUDENT IN AN ORGANIZED HEALTH CARE EDUCATION/TRAINING PROGRAM

## 2019-12-29 PROCEDURE — NC001 PR NO CHARGE: Performed by: INTERNAL MEDICINE

## 2019-12-29 PROCEDURE — 93010 ELECTROCARDIOGRAM REPORT: CPT | Performed by: INTERNAL MEDICINE

## 2019-12-29 PROCEDURE — 80061 LIPID PANEL: CPT | Performed by: STUDENT IN AN ORGANIZED HEALTH CARE EDUCATION/TRAINING PROGRAM

## 2019-12-29 PROCEDURE — 93308 TTE F-UP OR LMTD: CPT

## 2019-12-29 PROCEDURE — 84443 ASSAY THYROID STIM HORMONE: CPT | Performed by: STUDENT IN AN ORGANIZED HEALTH CARE EDUCATION/TRAINING PROGRAM

## 2019-12-29 PROCEDURE — 93308 TTE F-UP OR LMTD: CPT | Performed by: INTERNAL MEDICINE

## 2019-12-29 PROCEDURE — 99232 SBSQ HOSP IP/OBS MODERATE 35: CPT | Performed by: INTERNAL MEDICINE

## 2019-12-29 RX ORDER — POTASSIUM CHLORIDE 20 MEQ/1
40 TABLET, EXTENDED RELEASE ORAL EVERY 4 HOURS
Status: COMPLETED | OUTPATIENT
Start: 2019-12-29 | End: 2019-12-29

## 2019-12-29 RX ORDER — METOPROLOL SUCCINATE 50 MG/1
50 TABLET, EXTENDED RELEASE ORAL DAILY
Status: DISCONTINUED | OUTPATIENT
Start: 2019-12-29 | End: 2019-12-30 | Stop reason: HOSPADM

## 2019-12-29 RX ADMIN — POTASSIUM CHLORIDE 40 MEQ: 1500 TABLET, EXTENDED RELEASE ORAL at 12:08

## 2019-12-29 RX ADMIN — ATORVASTATIN CALCIUM 80 MG: 80 TABLET, FILM COATED ORAL at 16:26

## 2019-12-29 RX ADMIN — POTASSIUM CHLORIDE 40 MEQ: 1500 TABLET, EXTENDED RELEASE ORAL at 14:17

## 2019-12-29 RX ADMIN — SERTRALINE HYDROCHLORIDE 150 MG: 100 TABLET ORAL at 08:45

## 2019-12-29 RX ADMIN — METOPROLOL SUCCINATE 50 MG: 50 TABLET, EXTENDED RELEASE ORAL at 20:43

## 2019-12-29 RX ADMIN — NICOTINE 1 PATCH: 21 PATCH, EXTENDED RELEASE TRANSDERMAL at 08:45

## 2019-12-29 RX ADMIN — ENOXAPARIN SODIUM 40 MG: 40 INJECTION SUBCUTANEOUS at 08:45

## 2019-12-29 RX ADMIN — TICAGRELOR 90 MG: 90 TABLET ORAL at 20:43

## 2019-12-29 RX ADMIN — METOPROLOL TARTRATE 25 MG: 25 TABLET ORAL at 08:45

## 2019-12-29 RX ADMIN — ACETAMINOPHEN 650 MG: 325 TABLET ORAL at 08:45

## 2019-12-29 RX ADMIN — ACETAMINOPHEN 650 MG: 325 TABLET ORAL at 04:49

## 2019-12-29 RX ADMIN — LOSARTAN POTASSIUM 100 MG: 50 TABLET, FILM COATED ORAL at 08:45

## 2019-12-29 RX ADMIN — LORAZEPAM 0.5 MG: 0.5 TABLET ORAL at 08:45

## 2019-12-29 RX ADMIN — ACETAMINOPHEN 650 MG: 325 TABLET ORAL at 00:21

## 2019-12-29 RX ADMIN — ASPIRIN 81 MG 81 MG: 81 TABLET ORAL at 08:46

## 2019-12-29 RX ADMIN — TICAGRELOR 90 MG: 90 TABLET ORAL at 08:45

## 2019-12-29 NOTE — SOCIAL WORK
CM met with patient and explained CM role  Patient lives w/ her  Laly Noe [phone], in a 2 fl 2 sang home  No DME, drives, and independent w/ ADLs  Denies HHC, STR, MH and drugs/etoh  PCP is Dr Latonya Oakley  No POA  Patient uses [facility] in [place]  CM reviewed d/c planning process including the following: identifying help at home, patient preference for d/c planning needs, Discharge Lounge, [facility] Meds to Bed program, availability of treatment team to discuss questions or concerns patient and/or family may have regarding understanding medications and recognizing signs and symptoms once discharged  CM also encouraged patient to follow up with all recommended appointments after discharge  Patient advised of importance for patient and family to participate in managing patients medical well being

## 2019-12-29 NOTE — CONSULTS
Cardiology Progress Note   Danilo Galvan 40 y o  female MRN: [id number]  Unit/Bed#: [facility] 522-01 Encounter: [id number]    Hospital Course/Assessment  39yo woman with history of HTN, active smoker, who presented with chest pain s/p MYA x 3 to mLCX, OM1, mLAD  Today feels well and denies CP    Plan  1  STEMI s/p MYA x 3  - aspirin, brilinta, atorvastatin 80mg  - f/u TTE  - transitioning metop tartrate 25mg q12H to metop XL 50mg at next dose at 9pm  - complete smoking cessation  - HTN control as below  - trops trended down  Stop trending    2  HTN  - home meds irbesartan 300mg and nifedipine 60mg BID held on admission  - losartan 100mg started    3  Hypokalemia: K 3 4  Giving KCL 40mEq x 2 doses  4  Active smoker: as above    Nick Singh MD  - PGY-4 Cardiology Fellow  - Tiger text enabled    ---  Subjective: Today in good spirits  Getting TTE  Denies CP    Objective  GEN: NAD, alert and oriented x 3, pleasant and cooperative   Cardiac: S1, S2, appreciated  No S3 or S4  Regular rhythm  No JVD  +2 radial pulses, +2 DP, PT  murmurs  Pulm: CTABL  Abd: +Bs, NT, ND    ======================================================  TREADMILL STRESS  No results found for this or any previous visit    ----------------------------------------------------------------------------------------------  NUCLEAR STRESS TEST: No results found for this or any previous visit    Results for orders placed during the hospital encounter of 19   NM myocardial perfusion spect (rx stress and/or rest)    Narrative Anel 175  [facility], [address] [place] [address]  [phone]    Stress Gated SPECT Myocardial Perfusion Imaging after Exercise    Patient: Jemma Briceño  MR number: [id number]  Account number: [de-identified]  : 1975  Age: 40 years  Gender: Female  Status: Outpatient  Location: [address] [place] [address] [facility]  Height: 63 in  Weight: 164 lb  BP: 132/ 82 mmHg    Allergies: AMOXICILLIN-POT CLAVULANATE    Referring Physician:  Jeri Foreman MD  Primary Physician:  Jeri Foreman MD  RN:  Enmanuel Purvis RN  Group:  [facility]  Report Prepared by[de-identified]  Enmanuel Purvis RN  Interpreting Physician:  Felix Leroy MD  Technician:  BRUNILDA Palacios    INDICATIONS: Detection of coronary artery disease  HISTORY: The patient is a 40year old  female  Chest pain status: no chest pain  Other symptoms: syncope with collapse  Coronary artery disease risk factors: hypertension and smoking  Cardiovascular history: none significant  Medications: a calcium channel blocker  Previous test results: abnormal ECG  PHYSICAL EXAM: Baseline physical exam screening: normal     REST ECG: Inferolateral T-wave changes Normal sinus rhythm  PROCEDURE: The study was performed in the [address]  Treadmill exercise testing was performed, using the Walter protocol  Gated SPECT myocardial perfusion imaging was performed during stress  Systolic blood  pressure was 132 mmHg, at the start of the study  Diastolic blood pressure was 82 mmHg, at the start of the study  The heart rate was 74 bpm, at the start of the study  IV double checked  WALTER PROTOCOL:  HR bpm SBP mmHg DBP mmHg Symptoms  Baseline 74 132 82 none  Stage 1 110 142 76 --  Stage 2 115 168 80 --  Stage 3 127 180 82 fatigue  Stage 4 142 142 80 fatigue  Recovery 1 75 124 76 --  Recovery 2 80 132 78 --  No medications or fluids given  STRESS SUMMARY: Duration of exercise was 10 min  The patient exercised to protocol stage 4  Maximal work rate was 13 4 METs  Maximal heart rate during stress was 142 bpm ( 81 % of maximal predicted heart rate)  The rate-pressure product  for the peak heart rate and blood pressure was 24112  There was no chest pain during stress  The stress test was terminated due to fatigue  Pre oxygen saturation: 98 %  Peak oxygen saturation: 98 %   The stress ECG was negative for ischemia  and normal  There were no stress arrhythmias or conduction abnormalities  The stress ECG was non-diagnostic due to resting ST/T wave abnormality  ISOTOPE ADMINISTRATION:  The radiopharmaceutical was injected one minute before the end of exercise  PERFUSION DEFECTS:  -  There was a small to moderate, moderately severe, partially reversible myocardial perfusion defect of the inferolateral wall  GATED SPECT:  The calculated left ventricular ejection fraction was 58 %  There was mildly reduced myocardial thickening and motion of the inferior wall of the left ventricle  SUMMARY:  -  Stress results: Duration of exercise was 10 min  Target heart rate was not achieved  There was no chest pain during stress  -  ECG conclusions: The stress ECG was negative for ischemia and normal  The stress ECG was non-diagnostic due to resting ST/T wave abnormality   -  Perfusion imaging: There was a small to moderate, moderately severe, partially reversible myocardial perfusion defect of the inferolateral wall  -  Gated SPECT: The calculated left ventricular ejection fraction was 58 %  There was mildly reduced myocardial thickening and motion of the inferior wall of the left ventricle  IMPRESSIONS: Abnormal study after submaximal exercise without reproduction of symptoms  A partially reversible inferolateral defect is noted likely consistent with coronary ischemia  Clinical correlation is advised  Diagnostic sensitivity  was limited by submaximal stress      Prepared and signed by    Felix Leroy MD  Signed 12/09/2019 15:36:20         --------------------------------------------------------------------------------  CATH:    Results for orders placed during the hospital encounter of 12/28/19   Cardiac catheterization    [facility]  [address]  [phone]    Amna Roberson    Invasive Cardiovascular Lab Complete Report    Patient: Jackeline Lai NAHUM  MR number: [id number]  Account number: [de-identified]  Study date: 2019  Gender: Female  : 1975  Height: 63 in  Weight: 163 7 lb  BSA: 1 78 mï¾²    Allergies: AMOXICILLIN-POT CLAVULANATE    Diagnostic Cardiologist:  Patricia Viera MD  Interventional Cardiologist:  Patricia Viera MD  Primary Physician:  Nilay Aguilar MD    SUMMARY    CORONARY CIRCULATION:  Mid LAD: There was a tubular 75 % stenosis  This is a likely culprit for the patient's clinical presentation  An intervention was performed  Mid circumflex: There was a 100 % stenosis at the origin of OM1  There was LOUISE grade 0 flow through the vessel (no flow)  This is a likely culprit for the patient's clinical presentation  An intervention was performed  1ST LESION INTERVENTIONS:  A successful balloon angioplasty with stent procedure was performed on the 99 % lesion in the mid circumflex  Following intervention there was an excellent angiographic appearance with a 0 % residual stenosis  2ND LESION INTERVENTIONS:  A successful drug-eluting stent with balloon angioplasty procedure was performed on the 99 % lesion in the 1st obtuse marginal  Following intervention there was an excellent angiographic appearance with a 0 % residual stenosis  A [address] Rx 2 5 x 15mm drug-eluting stent was placed across the lesion and deployed at a maximum inflation pressure of 10 yolanda  A [address] Rx 2 5 x 08mm drug-eluting stent was placed across the lesion and deployed at a maximum inflation pressure of 9 yolanda  3RD LESION INTERVENTIONS:  A successful drug-eluting stent with balloon angioplasty procedure was performed on the 75 % lesion in the mid LAD  Following intervention there was an excellent angiographic appearance with a 0 % residual stenosis  A Xience Lory Rx 3 0 x 28mm drug-eluting stent was placed across the lesion and deployed at a maximum inflation pressure of 16 yolanda      INDICATIONS:  --  Possible CAD: myocardial infarction without ST elevation (NSTEMI)  PROCEDURES PERFORMED    --  Left heart catheterization without ventriculogram   --  Left coronary angiography  --  Right coronary angiography  --  Outpatient  --  Coronary Catheterization (w/ LHC)  --  Mod Sedation Same Physician Initial 15min  --  Coronary Drug Eluting Stent w/PTCA  --  PTCA, Additional Branch of Coronary Artery  --  Coronary Drug Eluting Stent w/PTCA  --  Intervention on mid circumflex: balloon angioplasty, stent  --  Intervention on OM1: drug-eluting stent, balloon angioplasty  --  Intervention on mid LAD: drug-eluting stent, balloon angioplasty  PROCEDURE: The risks and alternatives of the procedures and conscious sedation were explained to the patient and informed consent was obtained  The patient was brought to the cath lab and placed on the table  The planned puncture sites  were prepped and draped in the usual sterile fashion  --  Right radial artery access  After performing an Hemant's test to verify adequate ulnar artery supply to the hand, the radial site was prepped  The puncture site was infiltrated with local anesthetic  The vessel was accessed using the  modified Seldinger technique, a wire was advanced into the vessel, and a sheath was advanced over the wire into the vessel  --  Left heart catheterization without ventriculogram  A catheter was advanced over a guidewire into the ascending aorta  After recording ascending aortic pressure, the catheter was advanced across the aortic valve and left ventricular  pressure was recorded  The catheter was pulled back across the aortic valve and into the ascending aorta and pullback pressures were obtained  --  Left coronary artery angiography  A catheter was advanced over a guidewire into the aorta and positioned in the left coronary artery ostium under fluoroscopic guidance  Angiography was performed  --  Right coronary artery angiography   A catheter was advanced over a guidewire into the aorta and positioned in the right coronary artery ostium under fluoroscopic guidance  Angiography was performed  --  Outpatient  --  Coronary Catheterization (w/ LHC)  --  Mod Sedation Same Physician Initial 15min  LESION #1 INTERVENTION: A successful balloon angioplasty with stent procedure was performed on the 99 % lesion in the mid circumflex  Following intervention there was an excellent angiographic appearance with a 0 % residual stenosis  There  was LOUISE 3 flow before the procedure and LOUISE 3 flow after the procedure  There was no dissection  --  Vessel setup was performed  A 6Fr  Launcher Ebu 3 5 guiding catheter was used to cannulate the vessel  --  Vessel setup was performed  A Ht  50 St 190cm wire was used to cross the lesion  --  Balloon angioplasty was performed, using a Mini Trek Rx 2 0 x 15mm balloon, with 4 inflations and a maximum inflation pressure of 13 yolanda  LESION #2 INTERVENTION: A successful drug-eluting stent with balloon angioplasty procedure was performed on the 99 % lesion in the 1st obtuse marginal  Following intervention there was an excellent angiographic appearance with a 0 %  residual stenosis  There was LOUISE 3 flow before the procedure and LOUISE 3 flow after the procedure  There was no dissection  --  Vessel setup was performed  A 6Fr  Launcher Ebu 3 5 guiding catheter was used to cannulate the vessel  --  Vessel setup was performed  A Runthrough NS 180cm wire was used to cross the lesion  --  A Xience Lory Rx 2 5 x 15mm drug-eluting stent was placed across the lesion and deployed at a maximum inflation pressure of 10 yolanda  --  A Xience Lory Rx 2 5 x 08mm drug-eluting stent was placed across the lesion and deployed at a maximum inflation pressure of 9 yolanda  --  Balloon angioplasty was performed, with 2 inflations and a maximum inflation pressure of 14 yolanda      --  Balloon angioplasty was performed, using a NC Emerge 3 0 x 8mm balloon, with 2 inflations and a maximum inflation pressure of 20 yolanda  --  Balloon angioplasty was performed, using a NC Emerge 2 5 x 12mm balloon, with 1 inflations and a maximum inflation pressure of 12 yolanda  LESION #3 INTERVENTION: A successful drug-eluting stent with balloon angioplasty procedure was performed on the 75 % lesion in the mid LAD  Following intervention there was an excellent angiographic appearance with a 0 % residual stenosis  There was LOUISE 3 flow before the procedure and LOUISE 3 flow after the procedure  There was no dissection  --  Vessel setup was performed  A 6Fr  Launcher Ebu 3 5 guiding catheter was used to cannulate the vessel  --  Vessel setup was performed  A Ht  50 St 190cm wire was used to cross the lesion  --  Balloon angioplasty was performed, using a NC Emerge 2 5 x 12mm balloon, with 3 inflations and a maximum inflation pressure of 10 yolanda  --  A Xience Lory Rx 3 0 x 28mm drug-eluting stent was placed across the lesion and deployed at a maximum inflation pressure of 16 yolanda  --  Balloon angioplasty was performed, using a NC Emerge 3 5 x 15mm balloon, with 2 inflations and a maximum inflation pressure of 20 yolanda  --  Balloon angioplasty was performed, using a NC Trek Rx 3 75 x 15mm balloon, with 2 inflations and a maximum inflation pressure of 20 yolanda  INTERVENTIONS:  --  Coronary Drug Eluting Stent w/PTCA  --  PTCA, Additional Branch of Coronary Artery  --  Coronary Drug Eluting Stent w/PTCA  PROCEDURE COMPLETION: The patient tolerated the procedure well and was discharged from the cath lab  TIMING: Test started at 16:32  Test concluded at 17:46  HEMOSTASIS: The sheath was removed  The site was compressed with a Hemoband  device  Hemostasis was obtained  MEDICATIONS GIVEN: Fentanyl (1OOmcg/2 ml), 50 mcg, IV, at 16:30  Versed (2mg/2ml), 2 mg, IV, at 16:30  1% Lidocaine, 1 ml, subcutaneously, at 16:32  Nitroglycerin (200mcg/ml), 200 mcg, at 16:34  Heparin  1000 units/ml, 4,000 units, IV, at 16:34  Verapamil (5mg/2ml), 2 5 mg, IV, at 16:34  Ticagrelor, 180 mg, PO, at 16:41  Heparin 1000 units/ml, 1,000 units, IV, at 16:41  Nitro Paste, 1, topically, at 16:43, in  Versed (2mg/2ml), 1 mg, IV,  at 16:44  Fentanyl (1OOmcg/2 ml), 50 mcg, IV, at 16:44  Nitroglycerine (200mcg/ml), 200 mcg, at 16:49  Nitroglycerine (200mcg/ml), 200 mcg, at 17:08  Versed (2mg/2ml), 1 mg, IV, at 17:08  Fentanyl (1OOmcg/2 ml), 50 mcg, IV, at 17:08  Nitroglycerine (200mcg/ml), 200 mcg, at 17:12  Nitroglycerine (200mcg/ml), 200 mcg, at 17:27  Heparin 1000 units/ml, 4,000 units, IV, at 17:27  Versed (2mg/2ml), 1 mg, IV, at 17:38  Fentanyl (1OOmcg/2 ml), 50 mcg, IV, at 17:38  CONTRAST  GIVEN: 150 ml Omnipaque (350 mg I /ml)  70 ml Omnipaque (350 mg I /ml)  RADIATION EXPOSURE: Fluoroscopy time: 19 5 min  HEMODYNAMICS: Hemodynamic assessment demonstrated normal LVEDP  CORONARY VESSELS:   --  The coronary circulation is right dominant  --  Left main: The vessel was medium to large sized  Angiography showed minor luminal irregularities  --  LAD: The vessel was medium to large sized  Angiography showed the vessel to wrap around the cardiac apex  There was one major diagonal branch  --  Proximal LAD: There was a tubular 40 % stenosis  --  Mid LAD: There was a tubular 75 % stenosis  This is a likely culprit for the patient's clinical presentation  An intervention was performed  --  Mid LAD:  --  Circumflex: The vessel was medium to large sized  There were two major obtuse marginals  --  Mid circumflex: There was a 100 % stenosis at the origin of OM1  There was LOUISE grade 0 flow through the vessel (no flow)  This is a likely culprit for the patient's clinical presentation  An intervention was performed  --  1st obtuse marginal:  --  RCA: The vessel was small to medium sized  Angiography showed moderate atherosclerosis      RECOMMENDATIONS  Patient management should include adding lipid lowering therapy  Patient management should include smoking cessation and aggressive risk factor modification  DISPOSITION:  The patient left the catheterization laboratory in stable condition  Prepared and signed by    Steven Ching MD  Signed 2019 17:54:47    Study diagram    Angiographic findings  Native coronary lesions:  ï¾·Proximal LAD: Lesion 1: tubular, 40 % stenosis  ï¾·Mid LAD: Lesion 1: tubular, 75 % stenosis  ï¾·Mid circumflex: Lesion 1: 100 % stenosis  Intervention results  Native coronary lesions:  ï¾·Successful balloon angioplasty and stent of the 99 % stenosis in mid circumflex  Appearance excellent with 0 % residual stenosis  ï¾·Successful drug-eluting stent and balloon angioplasty of the 99 % stenosis in OM1  Appearance excellent with 0 % residual stenosis  Stent: Mabelene Gia Rx 2 5 x 15mm drug-eluting  Stent: Mabelene Gia Rx 2 5 x 08mm drug-eluting  ï¾·Successful drug-eluting stent and balloon angioplasty of the 75 % stenosis in mid LAD  Appearance excellent with 0 % residual stenosis  Stent: Mabelene Gia Rx 3 0 x 28mm drug-eluting      Hemodynamic tables    Pressures:  Baseline  Pressures:  - HR: 71  Pressures:  - Rhythm:  Pressures:  -- Aortic Pressure (S/D/M): 136/93/112  Pressures:  -- Left Ventricle (s/edp): 120/15/--    Outputs:  Baseline  Outputs:  -- CALCULATIONS: [age]  Outputs:  -- CALCULATIONS: Body Surface Area: 1 78  Outputs:  -- CALCULATIONS: Height in cm: 160 00  Outputs:  -- CALCULATIONS: Sex: Female  Outputs:  -- CALCULATIONS: Weight in k 40       --------------------------------------------------------------------------------  ECHO:   Results for orders placed during the hospital encounter of 19   Echo complete with contrast if indicated    Maury Sauceda  [address] [facility], [address] [facility]  [phone]    Transthoracic Echocardiogram  2D, M-mode, Doppler, and Color Doppler    Study date: 06-Dec-2019    Patient: Gina Castro  MR number: [id number]  Account number: [de-identified]  : 1975  Age: 40 years  Gender: Female  Status: Outpatient  Location: Echo lab  Height: 63 in  Weight: 162 6 lb  BP: 140/ 90 mmHg    Indications: Cardiomegaly;Myocarditis  Diagnoses: I51 4 - Myocarditis, unspecified, I51 7 - Cardiomegaly    Sonographer:  Adina Angel  Referring Physician:  Fuentes Sanchez MD  Group:  [facility]  Interpreting Physician:  Doe Delgado MD    SUMMARY    LEFT VENTRICLE:  Systolic function was normal  Ejection fraction was estimated to be 60 %  There were no regional wall motion abnormalities  RIGHT VENTRICLE:  The size was normal   Systolic function was normal     MITRAL VALVE:  There was mild to moderate regurgitation  HISTORY: PRIOR HISTORY: HTN;Syncope  PROCEDURE: The procedure was performed in the echo lab  This was a stat study  The transthoracic approach was used  The study included complete 2D imaging, M-mode, complete spectral Doppler, and color Doppler  The heart rate was 63 bpm, at  the start of the study  Images were obtained from the parasternal, apical, subcostal, and suprasternal notch acoustic windows  Image quality was adequate  LEFT VENTRICLE: Size was normal  Systolic function was normal  Ejection fraction was estimated to be 60 %  There were no regional wall motion abnormalities  Wall thickness was normal  No evidence of apical thrombus  DOPPLER: Left  ventricular diastolic function parameters were normal     RIGHT VENTRICLE: The size was normal  Systolic function was normal  Wall thickness was normal     LEFT ATRIUM: Size was normal     RIGHT ATRIUM: Size was normal     MITRAL VALVE: Valve structure was normal  There was normal leaflet separation  DOPPLER: The transmitral velocity was within the normal range  There was no evidence for stenosis  There was mild to moderate regurgitation  AORTIC VALVE: The valve was trileaflet  Leaflets exhibited normal thickness and normal cuspal separation  DOPPLER: Transaortic velocity was within the normal range  There was no evidence for stenosis  There was no significant  regurgitation  TRICUSPID VALVE: The valve structure was normal  There was normal leaflet separation  DOPPLER: The transtricuspid velocity was within the normal range  There was no evidence for stenosis  There was no significant regurgitation  PULMONIC VALVE: Leaflets exhibited normal thickness, no calcification, and normal cuspal separation  DOPPLER: The transpulmonic velocity was within the normal range  There was no significant regurgitation  PERICARDIUM: There was no pericardial effusion  AORTA: The root exhibited normal size  SYSTEMIC VEINS: IVC: The inferior vena cava was normal in size  Respirophasic changes were normal     SYSTEM MEASUREMENT TABLES    2D  %FS: 37 07 %  Ao Diam: 2 9 cm  EDV(Teich): 97 37 ml  EF(Cube): 75 07 %  EF(Teich): 67 05 %  ESV(Cube): 24 27 ml  ESV(Teich): 32 08 ml  IVSd: 0 88 cm  LA Area: 13 25 cm2  LA Diam: 2 44 cm  LVEDV MOD A4C: 75 94 ml  LVEF MOD A4C: 59 91 %  LVESV MOD A4C: 30 45 ml  LVIDd: 4 6 cm  LVIDs: 2 9 cm  LVLd A4C: 7 81 cm  LVLs A4C: 6 3 cm  LVOT Diam: 1 91 cm  LVPWd: 0 93 cm  RA Area: 12 44 cm2  SI(Cube): 41 3 ml/m2  SI(Teich): 36 88 ml/m2  SV MOD A4C: 45 49 ml  SV(Cube): 73 1 ml  SV(Teich): 65 28 ml  rv diam: 3 27 cm    CW  AV Env  Ti: 288 35 ms  AV VTI: 29 12 cm  AV Vmax: 1 51 m/s  AV Vmean: 1 01 m/s  AV maxP 08 mmHg  AV meanP 72 mmHg  MR VTI: 226 55 cm  MR Vmax: 6 05 m/s  MR Vmean: 5 11 m/s  MR maxP 2 mmHg  MR meanP 35 mmHg  PRend P 94 mmHg  PRend Vmax: 1 22 m/s    MM  TAPSE: 1 87 cm    PW  JAD (VTI): 2 97 cm2  JAD Vmax: 2 74 cm2  E': 0 05 m/s  E/E': 13 43  LVOT Env  Ti: 354 9 ms  LVOT VTI: 30 29 cm  LVOT Vmax: 1 45 m/s  LVOT Vmean: 0 85 m/s  LVOT maxP 36 mmHg  LVOT meanPG: 3 64 mmHg  LVSI Dopp: 48 93 ml/m2  LVSV Dopp: 86 61 ml  MV A Nikolay: 0 74 m/s  MV Dec Billings: 3 17 m/s2  MV DecT: 231 91 ms  MV E Nikolay: 0 73 m/s  MV E/A Ratio: 0 99    [employer] Accredited Echocardiography Laboratory    Prepared and electronically signed by    Radhames Artis MD  Signed 06-Dec-2019 16:37:02       No results found for this or any previous visit   --------------------------------------------------------------------------------  HOLTER  Results for orders placed during the hospital encounter of 19   Holter monitor - 24 hour    Narrative PT NAME: Elton Taylor  : 1975  AGE: 40 y o  GENDER: female  MRN: [id number]   PROCEDURE: Holter monitor - 24 hour        INDICATIONS: Syncope      FINDINGS:  1  A 24 hour holter monitor demonstrated normal sinus rhythm with an   average rate of 78 BPM; a minimum rate of 58 BPM; and a maximum rate of   124 BPM   2  There was one premature ventricular contraction  3  There were 8 supraventricular ectopic beats, all of which were   premature atrial contractions  There were no runs of supraventricular   tachycardia  4  The longest R-R interval was 1 2 seconds  5  The patient kept a diary during holter monitor  It demonstrated no   symptoms  Impression 1  Normal 24 hour holter monitor  2  Patient in normal sinus rhythm throughout holter monitoring  3  Rare premature ventricular contractions with no non-sustained   ventricular tachycardia  4  Rare premature atrial contractions with no supraventricular   tachycardia  5  No significant pauses  6  No symptoms noted in diary          --------------------------------------------------------------------------------  CAROTIDS  No results found for this or any previous visit  [unfilled]   ======================================================    VS: Blood pressure 135/81, pulse 58, temperature 98 8 °F (37 1 °C), temperature source Oral, resp  rate 18, height 5' 3" (1 6 m), weight 76 8 kg (169 lb 5 oz), SpO2 98 %  , Body mass index is 29 99 kg/m²  , Orthostatic Blood Pressures      Most Recent Value   Blood Pressure  135/81 filed at 12/29/2019 0500   Patient Position - Orthostatic VS  Lying filed at 12/29/2019 0500          Active Meds    Current Facility-Administered Medications:     acetaminophen (TYLENOL) tablet 650 mg, 650 mg, Oral, Q4H PRN, Francia Yu MD, 650 mg at 12/29/19 0845    aspirin chewable tablet 81 mg, 81 mg, Oral, Daily, Francia Yu MD, 81 mg at 12/29/19 0846    atorvastatin (LIPITOR) tablet 80 mg, 80 mg, Oral, Daily With Chetan Fontanez MD, 80 mg at 12/28/19 2034    enoxaparin (LOVENOX) subcutaneous injection 40 mg, 40 mg, Subcutaneous, Daily, Francia Yu MD, 40 mg at 12/29/19 0845    fluticasone (FLONASE) 50 mcg/act nasal spray 1 spray, 1 spray, Each Nare, BID, Francia Yu MD, 1 spray at 12/28/19 2117    LORazepam (ATIVAN) tablet 0 5 mg, 0 5 mg, Oral, BID PRN, Francia Yu MD, 0 5 mg at 12/29/19 0845    losartan (COZAAR) tablet 100 mg, 100 mg, Oral, Daily, Francia Yu MD, 100 mg at 12/29/19 0845    metoprolol tartrate (LOPRESSOR) tablet 25 mg, 25 mg, Oral, Q12H [address], Francia Yu MD, 25 mg at 12/29/19 0845    nicotine (NICODERM CQ) 21 mg/24 hr TD 24 hr patch 1 patch, 1 patch, Transdermal, Daily, Francia Yu MD, 1 patch at 12/29/19 0845    ondansetron (ZOFRAN) injection 4 mg, 4 mg, Intravenous, Q6H PRN, Francia Yu MD    sertraline (ZOLOFT) tablet 150 mg, 150 mg, Oral, Daily, Francia Yu MD, 150 mg at 12/29/19 0845    ticagrelor (BRILINTA) tablet 90 mg, 90 mg, Oral, Q12H [address], Peggy Lee DO, 90 mg at 12/29/19 0845    Labs & Results  Lab Results   Component Value Date    TROPONINI 4 91 (H) 12/28/2019    TROPONINI 4 69 (H) 12/28/2019    TROPONINI 3 49 (H) 12/28/2019     Lab Results   Component Value Date    GLUCOSE 84 10/27/2015    CALCIUM 8 2 (L) 12/29/2019     10/27/2015    K 3 4 (L) 12/29/2019    CO2 24 12/29/2019     (H) 12/29/2019    BUN 10 12/29/2019    CREATININE 0 56 (L) 12/29/2019     Lab Results   Component Value Date    WBC 7 31 12/29/2019    HGB 10 6 (L) 12/29/2019    HCT 33 2 (L) 12/29/2019    MCV 88 12/29/2019     12/29/2019     Results from last 7 days   Lab Units 12/28/19  1600   INR  0 94     Lab Results   Component Value Date    CHOL 194 07/09/2015     Lab Results   Component Value Date    HDL 40 12/29/2019    HDL 48 12/28/2019     Lab Results   Component Value Date    LDLCALC 117 (H) 12/29/2019    LDLCALC 150 (H) 12/28/2019     Lab Results   Component Value Date    TRIG 101 12/29/2019    TRIG 130 12/28/2019     Lab Results   Component Value Date    ALT 16 12/29/2019    AST 25 12/29/2019

## 2019-12-29 NOTE — CONSULTS
Please see note from earlier today      Trevor Cazares MD  - PGY-4 Cardiology Fellow  - Tiger text enabled

## 2019-12-30 VITALS
RESPIRATION RATE: 12 BRPM | HEART RATE: 63 BPM | HEIGHT: 63 IN | TEMPERATURE: 98.2 F | OXYGEN SATURATION: 97 % | BODY MASS INDEX: 29.69 KG/M2 | SYSTOLIC BLOOD PRESSURE: 129 MMHG | DIASTOLIC BLOOD PRESSURE: 74 MMHG | WEIGHT: 167.55 LBS

## 2019-12-30 PROBLEM — F17.200 SMOKER: Status: ACTIVE | Noted: 2019-12-30

## 2019-12-30 PROCEDURE — NC001 PR NO CHARGE: Performed by: INTERNAL MEDICINE

## 2019-12-30 PROCEDURE — 99238 HOSP IP/OBS DSCHRG MGMT 30/<: CPT | Performed by: INTERNAL MEDICINE

## 2019-12-30 RX ORDER — METOPROLOL SUCCINATE 50 MG/1
50 TABLET, EXTENDED RELEASE ORAL DAILY
Qty: 30 TABLET | Refills: 4 | Status: SHIPPED | OUTPATIENT
Start: 2019-12-31 | End: 2020-01-07 | Stop reason: SDUPTHER

## 2019-12-30 RX ORDER — ASPIRIN 81 MG/1
81 TABLET, CHEWABLE ORAL DAILY
Qty: 30 TABLET | Refills: 11 | Status: SHIPPED | OUTPATIENT
Start: 2019-12-31 | End: 2020-11-19

## 2019-12-30 RX ORDER — ATORVASTATIN CALCIUM 80 MG/1
80 TABLET, FILM COATED ORAL
Qty: 30 TABLET | Refills: 4 | Status: SHIPPED | OUTPATIENT
Start: 2019-12-30 | End: 2020-01-07 | Stop reason: SDUPTHER

## 2019-12-30 RX ORDER — NICOTINE 21 MG/24HR
1 PATCH, TRANSDERMAL 24 HOURS TRANSDERMAL DAILY
Qty: 28 PATCH | Refills: 0 | Status: SHIPPED | OUTPATIENT
Start: 2019-12-31 | End: 2020-01-07 | Stop reason: ALTCHOICE

## 2019-12-30 RX ADMIN — TICAGRELOR 90 MG: 90 TABLET ORAL at 09:30

## 2019-12-30 RX ADMIN — SERTRALINE HYDROCHLORIDE 150 MG: 100 TABLET ORAL at 09:30

## 2019-12-30 RX ADMIN — METOPROLOL SUCCINATE 50 MG: 50 TABLET, EXTENDED RELEASE ORAL at 09:30

## 2019-12-30 RX ADMIN — ENOXAPARIN SODIUM 40 MG: 40 INJECTION SUBCUTANEOUS at 09:30

## 2019-12-30 RX ADMIN — ACETAMINOPHEN 650 MG: 325 TABLET ORAL at 09:31

## 2019-12-30 RX ADMIN — ASPIRIN 81 MG 81 MG: 81 TABLET ORAL at 09:30

## 2019-12-30 RX ADMIN — LORAZEPAM 0.5 MG: 0.5 TABLET ORAL at 03:11

## 2019-12-30 RX ADMIN — NICOTINE 1 PATCH: 21 PATCH, EXTENDED RELEASE TRANSDERMAL at 09:29

## 2019-12-30 RX ADMIN — LOSARTAN POTASSIUM 100 MG: 50 TABLET, FILM COATED ORAL at 09:30

## 2019-12-30 NOTE — PLAN OF CARE
Problem: CARDIOVASCULAR - ADULT  Goal: Maintains optimal cardiac output and hemodynamic stability  Description  INTERVENTIONS:  - Monitor I/O, vital signs and rhythm  - Monitor for S/S and trends of decreased cardiac output  - Administer and titrate ordered vasoactive medications to optimize hemodynamic stability  - Assess quality of pulses, skin color and temperature  - Assess for signs of decreased coronary artery perfusion  - Instruct patient to report change in severity of symptoms  Outcome: Progressing  Goal: Absence of cardiac dysrhythmias or at baseline rhythm  Description  INTERVENTIONS:  - Continuous cardiac monitoring, vital signs, obtain 12 lead EKG if ordered  - Administer antiarrhythmic and heart rate control medications as ordered  - Monitor electrolytes and administer replacement therapy as ordered  Outcome: Progressing     Problem: PAIN - ADULT  Goal: Verbalizes/displays adequate comfort level or baseline comfort level  Description  Interventions:  - Encourage patient to monitor pain and request assistance  - Assess pain using appropriate pain scale  - Administer analgesics based on type and severity of pain and evaluate response  - Implement non-pharmacological measures as appropriate and evaluate response  - Consider cultural and social influences on pain and pain management  - Notify physician/advanced practitioner if interventions unsuccessful or patient reports new pain  Outcome: Progressing     Problem: INFECTION - ADULT  Goal: Absence or prevention of progression during hospitalization  Description  INTERVENTIONS:  - Assess and monitor for signs and symptoms of infection  - Monitor lab/diagnostic results  - Monitor all insertion sites, i e  indwelling lines, tubes, and drains  - Monitor endotracheal if appropriate and nasal secretions for changes in amount and color  - [place] appropriate cooling/warming therapies per order  - Administer medications as ordered  - Instruct and encourage patient and family to use good hand hygiene technique  - Identify and instruct in appropriate isolation precautions for identified infection/condition  Outcome: Progressing  Goal: Absence of fever/infection during neutropenic period  Description  INTERVENTIONS:  - Monitor WBC    Outcome: Progressing     Problem: SAFETY ADULT  Goal: Patient will remain free of falls  Description  INTERVENTIONS:  - Assess patient frequently for physical needs  -  Identify cognitive and physical deficits and behaviors that affect risk of falls    -  [place] fall precautions as indicated by assessment   - Educate patient/family on patient safety including physical limitations  - Instruct patient to call for assistance with activity based on assessment  - Modify environment to reduce risk of injury  - Consider OT/PT consult to assist with strengthening/mobility  Outcome: Progressing  Goal: Maintain or return to baseline ADL function  Description  INTERVENTIONS:  -  Assess patient's ability to carry out ADLs; assess patient's baseline for ADL function and identify physical deficits which impact ability to perform ADLs (bathing, care of mouth/teeth, toileting, grooming, dressing, etc )  - Assess/evaluate cause of self-care deficits   - Assess range of motion  - Assess patient's mobility; develop plan if impaired  - Assess patient's need for assistive devices and provide as appropriate  - Encourage maximum independence but intervene and supervise when necessary  - Involve family in performance of ADLs  - Assess for home care needs following discharge   - Consider OT consult to assist with ADL evaluation and planning for discharge  - Provide patient education as appropriate  Outcome: Progressing  Goal: Maintain or return mobility status to optimal level  Description  INTERVENTIONS:  - Assess patient's baseline mobility status (ambulation, transfers, stairs, etc )    - Identify cognitive and physical deficits and behaviors that affect mobility  - Identify mobility aids required to assist with transfers and/or ambulation (gait belt, sit-to-stand, lift, walker, cane, etc )  - [place] fall precautions as indicated by assessment  - Record patient progress and toleration of activity level on Mobility SBAR; progress patient to next Phase/Stage  - Instruct patient to call for assistance with activity based on assessment  - Consider rehabilitation consult to assist with strengthening/weightbearing, etc   Outcome: Progressing     Problem: DISCHARGE PLANNING  Goal: Discharge to home or other facility with appropriate resources  Description  INTERVENTIONS:  - Identify barriers to discharge w/patient and caregiver  - Arrange for needed discharge resources and transportation as appropriate  - Identify discharge learning needs (meds, wound care, etc )  - Arrange for interpretive services to assist at discharge as needed  - Refer to Case Management Department for coordinating discharge planning if the patient needs post-hospital services based on physician/advanced practitioner order or complex needs related to functional status, cognitive ability, or social support system  Outcome: Progressing     Problem: Knowledge Deficit  Goal: Patient/family/caregiver demonstrates understanding of disease process, treatment plan, medications, and discharge instructions  Description  Complete learning assessment and assess knowledge base    Interventions:  - Provide teaching at level of understanding  - Provide teaching via preferred learning methods  Outcome: Progressing

## 2019-12-30 NOTE — DISCHARGE SUMMARY
Discharge Summary - Deepali Martin 40 y o  female MRN: [id number]    Unit/Bed#: [facility] 522-01 Encounter: [id number]    Admission Date:   Admission Orders (From admission, onward)     Ordered        12/28/19 1648  Inpatient Admission  Once                     Admitting Diagnosis: Chest pain [R07 9]  Elevated troponin [R79 89]  ST elevation MI (STEMI) ([address] ) [I21 3]    HPI:  The patient is a 44-year-old female with past medical history of hypertension, mild-to-moderate MR, anxiety/depression and is also an active smoker  She had been having intermittent chest pain since October and had a positive stress test as an outpatient  She was plan for a heart catheterization on 12/30, but over the weekend, the chest pain became significant more intense which prompted to come to the hospital       Procedures Performed:   Orders Placed This Encounter   Procedures    Cardiac catheterization       Summary of Hospital Course:   She had emergent heart catheterization for ischemic EKG changes with he rise in troponins concerning for ACS  She had 3 stents placed in total, DESx2 to OM1 and DESx1 to the mid LAD  Her ARB was continued during hospitalization, and she was started on metoprolol  TTE showed an EF of 50% with some lateral wall abnormalities consistent where her ischemia was on heart catheterization  She was also started on aspirin and Brilinta as well as Lipitor 80 mg daily  She feels asymptomatic today and denies any recurrent chest pain  She is hemodynamically stable and ready for discharge today      Significant Findings, Care, Treatment and Services Provided:   Left heart catheterization now status post 2x MYA to OM1 and 1x MYA to midLAD    Complications:  None    Discharge Diagnosis:  STEMI    Resolved Problems  Date Reviewed: 12/16/2019    None          Condition at Discharge: good         Discharge instructions/Information to patient and family:   See after visit summary for information provided to patient and family  Provisions for Follow-Up Care:  See after visit summary for information related to follow-up care and any pertinent home health orders  PCP: Cynthia Martinez MD    Disposition: Home    Planned Readmission: No      Discharge Statement   I spent 25 minutes discharging the patient  This time was spent on the day of discharge  I had direct contact with the patient on the day of discharge  Additional documentation is required if more than 30 minutes were spent on discharge  Discharge Medications:  See after visit summary for reconciled discharge medications provided to patient and family

## 2019-12-30 NOTE — DISCHARGE INSTRUCTIONS
1  Please see the post cardiac catheterization dishcarge instructions  No heavy lifting, greater than 10 lbs  or strenuous  activity for 48 hrs  2 Remove band aid tomorrow  Shower and wash area- wrist gently with soap and water- beginning tomorrow  Rinse and pat dry  Apply new water seal band aid  Repeat this process for 5 days  No powders, creams lotions or antibiotic ointments  for 5 days  No tub baths, hot tubs or swimming for 5 days  3  Please call our office ([phone]) if you have any fever, redness, swelling, discharge from your wrist access site  4 No driving for 2 days     If you developed a sunburn like rash on your back or chest please call the cardiology office  Coronary Intravascular Stent Placement, Ambulatory Care   GENERAL INFORMATION:   What do I need to know about coronary intravascular stent placement? Coronary intravascular stent placement is a procedure to place a stent in an artery of your heart that has plaque buildup  Plaque is a mixture of fat and cholesterol  A stent is a small mesh tube made of metal that helps keep your artery open  Your healthcare provider may place a bare metal stent or a drug-eluting stent (MYA) in your artery  A MYA is coated with medicine that is slowly released and helps prevent more plaque buildup in the area where the stent is placed  The stent remains in your artery for life  You may need more than one stent  How do I prepare for coronary intravascular stent placement? · Your healthcare provider will talk to you about how to prepare for surgery  He may tell you not to eat or drink anything after midnight on the day of your surgery  He will tell you what medicines to take or not take on the day of your surgery  · You may need blood tests and a stress test before your procedure  Talk to your healthcare provider about these or other tests you may need   Contrast dye will be used during your procedure to help healthcare providers see your heart better  Tell the healthcare provider if you have ever had an allergic reaction to contrast dye  What will happen during coronary intravascular stent placement? · You will be given medicine in your IV to help you relax or make you drowsy  You will also get local anesthesia that will numb the area where the catheter will be placed  You will be awake during the procedure so that your healthcare providers can give you instructions  You may be asked to cough, hold your breath, or to tell them how you feel during the procedure  · A catheter (long, thin tube) will be put into an artery, in your wrist or groin  The catheter will be guided through this artery to your heart and into the narrowed or blocked artery  Healthcare providers will use x-rays and dye to find the area where the stent needs to be placed  You may feel warm as the dye is put into the catheter  A guidewire is then placed into the catheter  The balloon catheter is guided into the narrowed or blocked artery using the guidewire  Healthcare providers inflate the balloon several times for short periods  The inflated balloon pushes the plaque against the artery walls  This opens them and allows more blood flow to your heart  Another balloon catheter with a stent is then inserted into the artery  The balloon is inflated  This expands the stent and pushes it into place against the artery wall  The stent will be left in your artery to help keep it open  What are the risks of coronary intravascular stent placement? · You may develop a hematoma (swelling caused by collection of blood) or bleed more than expected from your catheter site  The dye used during this procedure may cause an allergic reaction or kidney problems  You may develop an infection  · Your artery may be injured when the catheter is inserted  During or after surgery, blood clots may form, or plaque may break off   The blood clot or plaque may block your artery and cause a heart attack or stroke  The stent could collapse, or a clot could form on the stent  This could cause the artery to become blocked again  You may need another procedure to open your artery  CARE AGREEMENT:   You have the right to help plan your care  Learn about your health condition and how it may be treated  Discuss treatment options with your caregivers to decide what care you want to receive  You always have the right to refuse treatment  The above information is an  only  It is not intended as medical advice for individual conditions or treatments  Talk to your doctor, nurse or pharmacist before following any medical regimen to see if it is safe and effective for you  © 2014 [address] is for End User's use only and may not be sold, redistributed or otherwise used for commercial purposes  All illustrations and images included in CareNotes® are the copyrighted property of A D A M , Inc  or Dejon Muñoz

## 2019-12-30 NOTE — PROGRESS NOTES
Cardiology Progress Note - Elton Taylor 40 y o  female MRN: [id number]    Unit/Bed#: [facility] 522-01 Encounter: [id number]      Assessment & Plan:  STEMI  -had abnormal stress test as an outpatient and was planned for elective PCI, but presented with persistent chest pain with acute EKG changes concerning for an acute STEMI  - s/p MYA x3 (2x to OM1 and 1x to mid LAD)  -continue with aspirin, Brilinta 90 mg b i d , Lipitor 80  -continue with ARB and Toprol XL 50 mg daily  -pending TTE read  Hypertension  -currently on  irbesartan 300 mg daily and nifedipine 60 mg b i d  At home  - will hold nifedipine  -changed irbesartan to losartan for formulary reasons, continue back on DC  -continue with Toprol XL 50 mg daily  Mild to moderate MR  -seen on TTE on 12/06  -EF was preserved at this time, pending repeat echo  Active smoker  -encourage smoking cessation    Subjective:   Patient seen and examined  No significant events overnight  Denies chest pain, pnd, orthopnea, abdominal pain, nausea, vomiting, fever, chills, shortness of breath, headache or dizziness  Objective:     Vitals: Blood pressure 129/74, pulse 63, temperature 98 2 °F (36 8 °C), temperature source Oral, resp  rate 12, height 5' 3" (1 6 m), weight 76 kg (167 lb 8 8 oz), SpO2 97 %  , Body mass index is 29 68 kg/m² ,   Orthostatic Blood Pressures      Most Recent Value   Blood Pressure  129/74 filed at 12/30/2019 0700   Patient Position - Orthostatic VS  Lying filed at 12/30/2019 0700            Intake/Output Summary (Last 24 hours) at 12/30/2019 1008  Last data filed at 12/30/2019 7621  Gross per 24 hour   Intake 480 ml   Output 400 ml   Net 80 ml           Physical Exam:    GEN: Elton Taylor appears well, alert and oriented x 3, pleasant and cooperative   HEENT: anicteric, mucous membranes moist  NECK: no jvd, carotid bruits   HEART: regular rhythm, normal S1 and S2, no murmurs, clicks, gallops or rubs   LUNGS: clear to auscultation bilaterally; no wheezes, rales, or rhonchi   ABDOMEN: normal bowel sounds, soft, no tenderness, no distention  EXTREMITIES: peripheral pulses normal; no clubbing, cyanosis, or edema  NEURO: no focal findings   SKIN: normal without suspicious lesions on exposed skin      Current Facility-Administered Medications:     acetaminophen (TYLENOL) tablet 650 mg, 650 mg, Oral, Q4H PRN, Blanche Mims MD, 650 mg at 12/30/19 8761    aspirin chewable tablet 81 mg, 81 mg, Oral, Daily, Blanche Mims MD, 81 mg at 12/30/19 0930    atorvastatin (LIPITOR) tablet 80 mg, 80 mg, Oral, Daily With Samson Andrea MD, 80 mg at 12/29/19 1626    enoxaparin (LOVENOX) subcutaneous injection 40 mg, 40 mg, Subcutaneous, Daily, Blanche Mims MD, 40 mg at 12/30/19 0930    fluticasone (FLONASE) 50 mcg/act nasal spray 1 spray, 1 spray, Each Nare, BID, Blanche Mims MD, Stopped at 12/29/19 1724    LORazepam (ATIVAN) tablet 0 5 mg, 0 5 mg, Oral, BID PRN, Blanche Mims MD, 0 5 mg at 12/30/19 7078    losartan (COZAAR) tablet 100 mg, 100 mg, Oral, Daily, Blanche Mims MD, 100 mg at 12/30/19 0930    metoprolol succinate (TOPROL-XL) 24 hr tablet 50 mg, 50 mg, Oral, Daily, Jose Carver MD, 50 mg at 12/30/19 0930    nicotine (NICODERM CQ) 21 mg/24 hr TD 24 hr patch 1 patch, 1 patch, Transdermal, Daily, Blanche Mims MD, 1 patch at 12/30/19 0929    ondansetron (ZOFRAN) injection 4 mg, 4 mg, Intravenous, Q6H PRN, Blanche Mims MD    sertraline (ZOLOFT) tablet 150 mg, 150 mg, Oral, Daily, Blanche Mims MD, 150 mg at 12/30/19 0930    ticagrelor (BRILINTA) tablet 90 mg, 90 mg, Oral, Q12H [address], Roz Lee DO, 90 mg at 12/30/19 0930    Labs & Results:    Lab Results   Component Value Date    TROPONINI 4 91 (H) 12/28/2019    TROPONINI 4 69 (H) 12/28/2019    TROPONINI 3 49 (H) 12/28/2019       Lab Results   Component Value Date    GLUCOSE 84 10/27/2015    CALCIUM 8 2 (L) 12/29/2019     10/27/2015    K 3 4 (L) 12/29/2019    CO2 24 12/29/2019     (H) 12/29/2019    BUN 10 12/29/2019    CREATININE 0 56 (L) 12/29/2019       Lab Results   Component Value Date    WBC 7 31 12/29/2019    HGB 10 6 (L) 12/29/2019    HCT 33 2 (L) 12/29/2019    MCV 88 12/29/2019     12/29/2019     Results from last 7 days   Lab Units 12/28/19  1600   INR  0 94       Lab Results   Component Value Date    CHOL 194 07/09/2015     Lab Results   Component Value Date    HDL 40 12/29/2019    HDL 48 12/28/2019     Lab Results   Component Value Date    LDLCALC 117 (H) 12/29/2019    LDLCALC 150 (H) 12/28/2019     Lab Results   Component Value Date    TRIG 101 12/29/2019    TRIG 130 12/28/2019       Lab Results   Component Value Date    ALT 16 12/29/2019    AST 25 12/29/2019         EKG personally reviewed by )Indira Daniels MD  No acute changes   TELE: no events overnight

## 2019-12-30 NOTE — UTILIZATION REVIEW
Initial Clinical Review    Admission: Date/Time/Statement: Inpatient Admission Orders (From admission, onward)     Ordered        12/28/19 1648  Inpatient Admission  Once             Orders Placed This Encounter   Procedures    Inpatient Admission     Standing Status:   Standing     Number of Occurrences:   1     Order Specific Question:   Admitting Physician     Answer:   Tami Mcpherson [[id number]]     Order Specific Question:   Level of Care     Answer:   Level 1 Stepdown [13]     Order Specific Question:   Estimated length of stay     Answer:   More than 2 Midnights     Order Specific Question:   Certification     Answer:   I certify that inpatient services are medically necessary for this patient for a duration of greater than two midnights  See H&P and MD Progress Notes for additional information about the patient's course of treatment  ED Arrival Information     Expected Arrival Acuity Means of Arrival Escorted By Service Admission Type    - 12/28/2019 15:12 Immediate Walk-In Self Cardiology Emergency    Arrival Complaint    pain     Chief Complaint   Patient presents with    Chest Pain     pt is due to have a blockage removed on monday, she has pain from her jaw down to her shoulder into her chest with n/v  Assessment/Plan: 40year old female with PMHx HTN, mild to moderate MR + active smoker  *Has been having intermittent chest pain since October, positive Outpatient Stress Test earlier this month showed partially reversible myocardial perfusion defect of the inferolateral wall with Cardiology plan for Cardiac Cath  on 12/30/19 Monday  Emergent presentation to [facility] ED 12/28/19 2nd1 day history of worsening chest pain with radiation to jaw, down left arm and epigastric area  Also episodes of chest pain now more constant lasting 15 minutes , severe and awoke her from sleep  Also episodes of intermittent nausea vomiting and SOB with chest pain    IN ED:  +Distress    /125    EKG  - Marked ST abnormality, possible inferolateral subendocardial injury  ED TX:  O2, ASA, Brilinta, NTG SL + IV, IV Heparin    EMERGENT CARD CATH/ PCI + MYA X3:  CORONARY CIRCULATION:  Mid LAD: There was a tubular 75 % stenosis  This is a likely culprit for the patient's clinical presentation  An intervention was performed  Mid circumflex: There was a 100 % stenosis at the origin of OM1  There was LOUISE grade 0 flow through the vessel (no flow)  This is likely culprit for the patient's clinical presentation  An intervention was performed      1ST LESION INTERVENTIONS:  A successful balloon angioplasty with stent procedure was performed on the 99 % lesion in the mid circumflex  Following intervention there was an excellent angiographic appearance with a 0 % residual stenosis    2ND LESION INTERVENTIONS:  A successful drug-eluting stent with balloon angioplasty procedure was performed on the 99 % lesion in the 1st obtuse marginal  Following intervention there was an excellent angiographic appearance with a 0 % residual stenosis  A [address] Rx 2 5 x 15mm drug-eluting stent was placed across the lesion and deployed at a maximum inflation pressure of 10 yolanda  A [address] Rx 2 5 x 08mm drug-eluting stent was placed across the lesion and deployed at a maximum inflation pressure of 9 yolanda    3RD LESION INTERVENTIONS:  A successful drug-eluting stent with balloon angioplasty procedure was performed on the 75 % lesion in the mid LAD  Following intervention there was an excellent angiographic appearance with a 0 % residual stenosis  A Xience Lory Rx 3 0 x 28mm drug-eluting stent was placed across the lesion and deployed at a maximum inflation pressure of 16 yolanda      POST PCI - TO LEVEL 1 STEPDOWN    ED Triage Vitals   Temperature Pulse Respirations Blood Pressure SpO2   12/28/19 1531 12/28/19 1531 12/28/19 1531 12/28/19 1531 12/28/19 1531   98 9 °F (37 2 °C) 91 22 (!) 167/125 100 %      Temp Source Heart Rate Source Patient Position - Orthostatic VS BP Location FiO2 (%)   12/28/19 1531 12/28/19 1554 12/28/19 1610 12/28/19 1610 --   Oral Monitor Sitting Left arm       Pain Score       12/28/19 1531       6        Wt Readings from Last 1 Encounters:   12/30/19 76 kg (167 lb 8 8 oz)     Additional Vital Signs:   12/30/19 0700  98 2 °F (36 8 °C)  63  12  129/74  97 %    12/30/19 0233  98 1 °F (36 7 °C)  56  18  126/71  96 %    12/29/19 2300  98 1 °F (36 7 °C)  62  16  137/94  95 %    12/29/19 2043  --  79  --  122/67  --    12/29/19 1900  98 7 °F (37 1 °C)  81  16  124/72  96 %    12/29/19 1500  98 2 °F (36 8 °C)  59  16  145/77  96 %    12/29/19 1018  98 4 °F (36 9 °C)  63  18  126/78  99 %    12/29/19 0500  98 8 °F (37 1 °C)  58  18  135/81  98 %    12/29/19 0330  98 7 °F (37 1 °C)  62  18  140/88  98 %    12/29/19 0100  --  --  --  --  --      Pertinent Labs/Diagnostic Test Results:   Results from last 7 days   Lab Units 12/29/19  0446 12/28/19  1550   WBC Thousand/uL 7 31 6 00   HEMOGLOBIN g/dL 10 6* 12 7   HEMATOCRIT % 33 2* 39 0   PLATELETS Thousands/uL 203 273   NEUTROS ABS Thousands/µL  --  3 27     Results from last 7 days   Lab Units 12/29/19  0446 12/28/19  1550   SODIUM mmol/L 142 141   POTASSIUM mmol/L 3 4* 3 4*   CHLORIDE mmol/L 111* 110*   CO2 mmol/L 24 24   ANION GAP mmol/L 7 7   BUN mg/dL 10 13   CREATININE mg/dL 0 56* 0 62   EGFR ml/min/1 73sq m 114 110   CALCIUM mg/dL 8 2* 8 6   MAGNESIUM mg/dL 1 9 2 0   PHOSPHORUS mg/dL 3 0  --      Results from last 7 days   Lab Units 12/29/19  0446 12/28/19  1550   AST U/L 25 17   ALT U/L 16 20   ALK PHOS U/L 65 80   TOTAL PROTEIN g/dL 6 1* 7 5   ALBUMIN g/dL 2 9* 3 4*   TOTAL BILIRUBIN mg/dL 0 26 0 20     Results from last 7 days   Lab Units 12/29/19  0446 12/28/19  1550   GLUCOSE RANDOM mg/dL 106 90     Results from last 7 days   Lab Units 12/28/19  2304 12/28/19  2101 12/28/19  1837 12/28/19  1550   TROPONIN I ng/mL 4 91* 4 69* 3 49* 0 60*       Results from last 7 days Lab Units 12/28/19  1600   PROTIME seconds 12 2   INR  0 94   PTT seconds 27     Results from last 7 days   Lab Units 12/29/19  0446   TSH 3RD GENERATON uIU/mL 1 960        EKG -  Normal sinus rhythm  Marked ST abnormality, possible inferolateral subendocardial injury    ED Treatment:   Medication Administration from 12/28/2019 1512 to 12/28/2019 1808       Date/Time Order Dose Route Action     12/28/2019 1603 aspirin tablet 325 mg 325 mg Oral Given     12/28/2019 1611 nitroglycerin (NITROSTAT) SL tablet 0 4 mg 0 4 mg Sublingual Given     12/28/2019 1618 heparin (porcine) injection 4,000 Units 4,000 Units Intravenous Given     12/28/2019 1621 heparin (porcine) 25,000 units in 250 mL infusion (premix) 0 Units/kg/hr Intravenous Hold     12/28/2019 1738 fentanyl citrate (PF) 100 MCG/2ML 50 mcg Intravenous Given     12/28/2019 1709 fentanyl citrate (PF) 100 MCG/2ML 50 mcg Intravenous Given     12/28/2019 1644 fentanyl citrate (PF) 100 MCG/2ML 50 mcg Intravenous Given     12/28/2019 1630 fentanyl citrate (PF) 100 MCG/2ML 50 mcg Intravenous Given     12/28/2019 1738 midazolam (VERSED) injection 1 mg Intravenous Given     12/28/2019 1709 midazolam (VERSED) injection 1 mg Intravenous Given     12/28/2019 1644 midazolam (VERSED) injection 1 mg Intravenous Given     12/28/2019 1630 midazolam (VERSED) injection 2 mg Intravenous Given     12/28/2019 1628 sodium chloride 0 9 % infusion 100 mL/hr Intravenous New Bag     12/28/2019 1633 lidocaine (PF) (XYLOCAINE-MPF) 1 % injection 1 mL Infiltration Given     12/28/2019 1727 heparin (porcine) injection 4,000 Units Intravenous Given     12/28/2019 1641 heparin (porcine) injection 1,000 Units Intravenous Given     12/28/2019 1633 heparin (porcine) injection 4,000 Units Intravenous Given     12/28/2019 1633 verapamil (ISOPTIN) injection 2 5 mg Intravenous Given     12/28/2019 1633 nitroGLYcerin (TRIDIL) 50 mg in 250 mL infusion (premix) 200 mcg Intra-arterial Given     12/28/2019 1641 ticagrelor (BRILINTA) tablet 180 mg Oral Given     12/28/2019 1644 nitroglycerin (NITRO-BID) 2 % TD ointment 1 inch Topical Given     12/28/2019 1727 nitroGLYcerin (TRIDIL) 50 mg in 250 mL infusion (premix) 200 mcg Other Given     12/28/2019 1712 nitroGLYcerin (TRIDIL) 50 mg in 250 mL infusion (premix) 200 mcg Other Given     12/28/2019 1709 nitroGLYcerin (TRIDIL) 50 mg in 250 mL infusion (premix) 200 mcg Other Given     12/28/2019 1649 nitroGLYcerin (TRIDIL) 50 mg in 250 mL infusion (premix) 200 mcg Other Given     12/28/2019 1725 iohexol (OMNIPAQUE) 350 MG/ML injection (SINGLE-DOSE) 150 mL Intravenous Given     Past Medical History:   Diagnosis Date    Preeclampsia      Present on Admission:  **None**    Admitting Diagnosis: Chest pain [R07 9]  Elevated troponin [R79 89]  [facility] [I21 3]    Age/Sex: 40 y o  female    Admission Orders:  TELEMETRY  ST SEGMENT MONITORING  VS Q2HRS  Nasal O2 at 2L/min - TITRATE SpO2 > 92 %  Continuous Pulse Oximetry  POST CARD CATH ACTIVITY RESTRICTIONS    Scheduled Medications:  aspirin 81 mg Oral Daily   atorvastatin 80 mg Oral Daily With Dinner   enoxaparin 40 mg Subcutaneous Daily   fluticasone 1 spray Each Nare BID   losartan 100 mg Oral Daily   metoprolol succinate 50 mg Oral Daily   nicotine 1 patch Transdermal Daily   sertraline 150 mg Oral Daily   ticagrelor 90 mg Oral Q12H [address]     Continuous IV Infusions:  IVF  sodium chloride 0 9 % infusion    Ordered Dose: 100 mL/hr Route: Intravenous Frequency: Continuous @ 100 mL/hr   Scheduled Start Date/Time: 12/28/19 1800 End Date/Time: 12/29/19 0442       PRN Meds:  acetaminophen 650 mg Oral Q4H PRN  GIVEN X 3/ 24 HRS   LORazepam 0 5 mg Oral BID PRN  GIVEN X 1/ 24 HRS   ondansetron 4 mg Intravenous Q6H PRN     IP CONSULT TO CARDIOLOGY  IP CONSULT TO CASE MANAGEMENT    Network Utilization Review Department  [email or web address]  ATTENTION: Please call with any questions or concerns to [phone] and carefully listen to the prompts so that you are directed to the right person  All voicemails are confidential   Corry Alvarado all requests for admission clinical reviews, approved or denied determinations and any other requests to dedicated fax number below belonging to the campus where the patient is receiving treatment   List of dedicated fax numbers for the Facilities:  [address] DENIALS (Administrative/Medical Necessity) [phone]   [address] (Maternity/NICU/Pediatrics) [phone]   Nette Pineda [phone]   Maryjo Sosa [phone]   Sarah Pretty [phone]   Kahlil Hutchins [phone]   [address] [place] [address] [phone]   [facility]  [phone]   2205 [facility] [address]  2401 [facility] [address] [facility] [phone]

## 2019-12-31 ENCOUNTER — TRANSITIONAL CARE MANAGEMENT (OUTPATIENT)
Dept: FAMILY MEDICINE CLINIC | Facility: CLINIC | Age: 44
End: 2019-12-31

## 2020-01-02 NOTE — UTILIZATION REVIEW
Initial Clinical Review    Admission: Date/Time/Statement: Inpatient Admission Orders (From admission, onward)     Ordered        12/28/19 1648  Inpatient Admission  Once             Orders Placed This Encounter   Procedures    Inpatient Admission     Standing Status:   Standing     Number of Occurrences:   1     Order Specific Question:   Admitting Physician     Answer:   Chantel Coffey [[id number]]     Order Specific Question:   Level of Care     Answer:   Level 1 Stepdown [13]     Order Specific Question:   Estimated length of stay     Answer:   More than 2 Midnights     Order Specific Question:   Certification     Answer:   I certify that inpatient services are medically necessary for this patient for a duration of greater than two midnights  See H&P and MD Progress Notes for additional information about the patient's course of treatment  ED Arrival Information     Expected Arrival Acuity Means of Arrival Escorted By Service Admission Type    - 12/28/2019 15:12 Immediate Walk-In Self Cardiology Emergency    Arrival Complaint    pain     Chief Complaint   Patient presents with    Chest Pain     pt is due to have a blockage removed on monday, she has pain from her jaw down to her shoulder into her chest with n/v  Assessment/Plan: 40year old female with PMHx HTN, mild to moderate MR + active smoker  *Has been having intermittent chest pain since October, positive Outpatient Stress Test earlier this month showed partially reversible myocardial perfusion defect of the inferolateral wall with Cardiology plan for Cardiac Cath  on 12/30/19 Monday  Emergent presentation to [facility] ED 12/28/19 2nd1 day history of worsening chest pain with radiation to jaw, down left arm and epigastric area  Also episodes of chest pain now more constant lasting 15 minutes , severe and awoke her from sleep  Also episodes of intermittent nausea vomiting and SOB with chest pain    IN ED:  +Distress    /125    EKG  - Marked ST abnormality, possible inferolateral subendocardial injury  ED TX:  O2, ASA, Brilinta, NTG SL + IV, IV Heparin    EMERGENT CARD CATH/ PCI + MYA X3:  CORONARY CIRCULATION:  Mid LAD: There was a tubular 75 % stenosis  This is a likely culprit for the patient's clinical presentation  An intervention was performed  Mid circumflex: There was a 100 % stenosis at the origin of OM1  There was LOUISE grade 0 flow through the vessel (no flow)  This is likely culprit for the patient's clinical presentation  An intervention was performed      1ST LESION INTERVENTIONS:  A successful balloon angioplasty with stent procedure was performed on the 99 % lesion in the mid circumflex  Following intervention there was an excellent angiographic appearance with a 0 % residual stenosis    2ND LESION INTERVENTIONS:  A successful drug-eluting stent with balloon angioplasty procedure was performed on the 99 % lesion in the 1st obtuse marginal  Following intervention there was an excellent angiographic appearance with a 0 % residual stenosis  A [address] Rx 2 5 x 15mm drug-eluting stent was placed across the lesion and deployed at a maximum inflation pressure of 10 yolanda  A [address] Rx 2 5 x 08mm drug-eluting stent was placed across the lesion and deployed at a maximum inflation pressure of 9 yolanda    3RD LESION INTERVENTIONS:  A successful drug-eluting stent with balloon angioplasty procedure was performed on the 75 % lesion in the mid LAD  Following intervention there was an excellent angiographic appearance with a 0 % residual stenosis  A Xience Lory Rx 3 0 x 28mm drug-eluting stent was placed across the lesion and deployed at a maximum inflation pressure of 16 yolanda      POST PCI - TO LEVEL 1 STEPDOWN    ED Triage Vitals   Temperature Pulse Respirations Blood Pressure SpO2   12/28/19 1531 12/28/19 1531 12/28/19 1531 12/28/19 1531 12/28/19 1531   98 9 °F (37 2 °C) 91 22 (!) 167/125 100 %      Temp Source Heart Rate Source Patient Position - Orthostatic VS BP Location FiO2 (%)   12/28/19 1531 12/28/19 1554 12/28/19 1610 12/28/19 1610 --   Oral Monitor Sitting Left arm       Pain Score       12/28/19 1531       6        Wt Readings from Last 1 Encounters:   12/30/19 76 kg (167 lb 8 8 oz)     Additional Vital Signs:   12/30/19 0700  98 2 °F (36 8 °C)  63  12  129/74  97 %    12/30/19 0233  98 1 °F (36 7 °C)  56  18  126/71  96 %    12/29/19 2300  98 1 °F (36 7 °C)  62  16  137/94  95 %    12/29/19 2043  --  79  --  122/67  --    12/29/19 1900  98 7 °F (37 1 °C)  81  16  124/72  96 %    12/29/19 1500  98 2 °F (36 8 °C)  59  16  145/77  96 %    12/29/19 1018  98 4 °F (36 9 °C)  63  18  126/78  99 %    12/29/19 0500  98 8 °F (37 1 °C)  58  18  135/81  98 %    12/29/19 0330  98 7 °F (37 1 °C)  62  18  140/88  98 %    12/29/19 0100  --  --  --  --  --      Pertinent Labs/Diagnostic Test Results:   Results from last 7 days   Lab Units 12/29/19  0446 12/28/19  1550   WBC Thousand/uL 7 31 6 00   HEMOGLOBIN g/dL 10 6* 12 7   HEMATOCRIT % 33 2* 39 0   PLATELETS Thousands/uL 203 273   NEUTROS ABS Thousands/µL  --  3 27     Results from last 7 days   Lab Units 12/29/19  0446 12/28/19  1550   SODIUM mmol/L 142 141   POTASSIUM mmol/L 3 4* 3 4*   CHLORIDE mmol/L 111* 110*   CO2 mmol/L 24 24   ANION GAP mmol/L 7 7   BUN mg/dL 10 13   CREATININE mg/dL 0 56* 0 62   EGFR ml/min/1 73sq m 114 110   CALCIUM mg/dL 8 2* 8 6   MAGNESIUM mg/dL 1 9 2 0   PHOSPHORUS mg/dL 3 0  --      Results from last 7 days   Lab Units 12/29/19  0446 12/28/19  1550   AST U/L 25 17   ALT U/L 16 20   ALK PHOS U/L 65 80   TOTAL PROTEIN g/dL 6 1* 7 5   ALBUMIN g/dL 2 9* 3 4*   TOTAL BILIRUBIN mg/dL 0 26 0 20     Results from last 7 days   Lab Units 12/29/19  0446 12/28/19  1550   GLUCOSE RANDOM mg/dL 106 90     Results from last 7 days   Lab Units 12/28/19  2304 12/28/19  2101 12/28/19  1837 12/28/19  1550   TROPONIN I ng/mL 4 91* 4 69* 3 49* 0 60*       Results from last 7 days Lab Units 12/28/19  1600   PROTIME seconds 12 2   INR  0 94   PTT seconds 27     Results from last 7 days   Lab Units 12/29/19  0446   TSH 3RD GENERATON uIU/mL 1 960        EKG -  Normal sinus rhythm  Marked ST abnormality, possible inferolateral subendocardial injury    ED Treatment:   Medication Administration from 12/28/2019 1512 to 12/28/2019 1808       Date/Time Order Dose Route Action     12/28/2019 1603 aspirin tablet 325 mg 325 mg Oral Given     12/28/2019 1611 nitroglycerin (NITROSTAT) SL tablet 0 4 mg 0 4 mg Sublingual Given     12/28/2019 1618 heparin (porcine) injection 4,000 Units 4,000 Units Intravenous Given     12/28/2019 1621 heparin (porcine) 25,000 units in 250 mL infusion (premix) 0 Units/kg/hr Intravenous Hold     12/28/2019 1738 fentanyl citrate (PF) 100 MCG/2ML 50 mcg Intravenous Given     12/28/2019 1709 fentanyl citrate (PF) 100 MCG/2ML 50 mcg Intravenous Given     12/28/2019 1644 fentanyl citrate (PF) 100 MCG/2ML 50 mcg Intravenous Given     12/28/2019 1630 fentanyl citrate (PF) 100 MCG/2ML 50 mcg Intravenous Given     12/28/2019 1738 midazolam (VERSED) injection 1 mg Intravenous Given     12/28/2019 1709 midazolam (VERSED) injection 1 mg Intravenous Given     12/28/2019 1644 midazolam (VERSED) injection 1 mg Intravenous Given     12/28/2019 1630 midazolam (VERSED) injection 2 mg Intravenous Given     12/28/2019 1628 sodium chloride 0 9 % infusion 100 mL/hr Intravenous New Bag     12/28/2019 1633 lidocaine (PF) (XYLOCAINE-MPF) 1 % injection 1 mL Infiltration Given     12/28/2019 1727 heparin (porcine) injection 4,000 Units Intravenous Given     12/28/2019 1641 heparin (porcine) injection 1,000 Units Intravenous Given     12/28/2019 1633 heparin (porcine) injection 4,000 Units Intravenous Given     12/28/2019 1633 verapamil (ISOPTIN) injection 2 5 mg Intravenous Given     12/28/2019 1633 nitroGLYcerin (TRIDIL) 50 mg in 250 mL infusion (premix) 200 mcg Intra-arterial Given     12/28/2019 1641 ticagrelor (BRILINTA) tablet 180 mg Oral Given     12/28/2019 1644 nitroglycerin (NITRO-BID) 2 % TD ointment 1 inch Topical Given     12/28/2019 1727 nitroGLYcerin (TRIDIL) 50 mg in 250 mL infusion (premix) 200 mcg Other Given     12/28/2019 1712 nitroGLYcerin (TRIDIL) 50 mg in 250 mL infusion (premix) 200 mcg Other Given     12/28/2019 1709 nitroGLYcerin (TRIDIL) 50 mg in 250 mL infusion (premix) 200 mcg Other Given     12/28/2019 1649 nitroGLYcerin (TRIDIL) 50 mg in 250 mL infusion (premix) 200 mcg Other Given     12/28/2019 1725 iohexol (OMNIPAQUE) 350 MG/ML injection (SINGLE-DOSE) 150 mL Intravenous Given     Past Medical History:   Diagnosis Date    Preeclampsia      Present on Admission:  **None**    Admitting Diagnosis: Chest pain [R07 9]  Elevated troponin [R79 89]  [facility] [I21 3]    Age/Sex: 40 y o  female    Admission Orders:  TELEMETRY  ST SEGMENT MONITORING  VS Q2HRS  Nasal O2 at 2L/min - TITRATE SpO2 > 92 %  Continuous Pulse Oximetry  POST CARD CATH ACTIVITY RESTRICTIONS    Scheduled Medications:  aspirin 81 mg Oral Daily   atorvastatin 80 mg Oral Daily With Dinner   enoxaparin 40 mg Subcutaneous Daily   fluticasone 1 spray Each Nare BID   losartan 100 mg Oral Daily   metoprolol succinate 50 mg Oral Daily   nicotine 1 patch Transdermal Daily   sertraline 150 mg Oral Daily   ticagrelor 90 mg Oral Q12H [address]     Continuous IV Infusions:  IVF  sodium chloride 0 9 % infusion    Ordered Dose: 100 mL/hr Route: Intravenous Frequency: Continuous @ 100 mL/hr   Scheduled Start Date/Time: 12/28/19 1800 End Date/Time: 12/29/19 0442       PRN Meds:  acetaminophen 650 mg Oral Q4H PRN  GIVEN X 3/ 24 HRS   LORazepam 0 5 mg Oral BID PRN  GIVEN X 1/ 24 HRS   ondansetron 4 mg Intravenous Q6H PRN     IP CONSULT TO CARDIOLOGY  IP CONSULT TO CASE MANAGEMENT    Network Utilization Review Department  [email or web address]  ATTENTION: Please call with any questions or concerns to [phone] and carefully listen to the prompts so that you are directed to the right person  All voicemails are confidential   Isabell Angulo all requests for admission clinical reviews, approved or denied determinations and any other requests to dedicated fax number below belonging to the campus where the patient is receiving treatment   List of dedicated fax numbers for the Facilities:  [address] DENIALS (Administrative/Medical Necessity) [phone]   [address] (Maternity/NICU/Pediatrics) [phone]   Ciaran Quintana [phone]   Alexsandra Portillo [phone]   Carole Bernardo [phone]   Eric García [phone]   [address] [place] [address] [address] [place] [address] [phone]   [facility]  [phone]   [address] [facility], [address]  2401 [facility] 1000 [address] [facility] [phone]

## 2020-01-03 NOTE — UTILIZATION REVIEW
Notification of Discharge  This is a Notification of Discharge from our facility [address]  Please be advised that this patient has been discharge from our facility  Below you will find the admission and discharge date and time including the patients disposition  PRESENTATION DATE: 12/28/2019  3:34 PM  OBS ADMISSION DATE:   IP ADMISSION DATE: 12/28/19 1645   DISCHARGE DATE: 12/30/2019  2:15 PM  DISPOSITION: Home/Self Care Home/Self Care   Admission Orders listed below:  Admission Orders (From admission, onward)     Ordered        12/28/19 1648  Inpatient Admission  Once                   Please contact the UR Department if additional information is required to close this patient's authorization/case  [address]par[address] Utilization Review Department  Main: [phone] x carefully listen to the prompts  All voicemails are confidential   [email or web address]  Send all requests for admission clinical reviews, approved or denied determinations and any other requests to dedicated fax number below belonging to the campus where the patient is receiving treatment   List of dedicated fax numbers:  [address] DENIALS (Administrative/Medical Necessity) [phone]   [address] (Maternity/NICU/Pediatrics) [phone]   Ciaran Quintana [phone]   Alexsandra Portillo [phone]   Carole Bernardo [phone]   [address] [phone]   [facility]  [phone]   2205 [facility], [address]  [address] [place] [address] [address] [facility] [phone]

## 2020-01-07 ENCOUNTER — CLINICAL SUPPORT (OUTPATIENT)
Dept: CARDIAC REHAB | Age: 45
End: 2020-01-07
Payer: COMMERCIAL

## 2020-01-07 ENCOUNTER — OFFICE VISIT (OUTPATIENT)
Dept: FAMILY MEDICINE CLINIC | Facility: CLINIC | Age: 45
End: 2020-01-07
Payer: COMMERCIAL

## 2020-01-07 VITALS
HEART RATE: 60 BPM | DIASTOLIC BLOOD PRESSURE: 82 MMHG | TEMPERATURE: 98.6 F | SYSTOLIC BLOOD PRESSURE: 124 MMHG | WEIGHT: 167.6 LBS | BODY MASS INDEX: 29.7 KG/M2 | RESPIRATION RATE: 18 BRPM | OXYGEN SATURATION: 98 % | HEIGHT: 63 IN

## 2020-01-07 DIAGNOSIS — I25.10 CORONARY ARTERY DISEASE INVOLVING NATIVE CORONARY ARTERY OF NATIVE HEART WITHOUT ANGINA PECTORIS: ICD-10-CM

## 2020-01-07 DIAGNOSIS — F41.8 DEPRESSION WITH ANXIETY: ICD-10-CM

## 2020-01-07 DIAGNOSIS — I21.21 STEMI INVOLVING LEFT CIRCUMFLEX CORONARY ARTERY (HCC): ICD-10-CM

## 2020-01-07 DIAGNOSIS — I10 BENIGN ESSENTIAL HYPERTENSION: ICD-10-CM

## 2020-01-07 DIAGNOSIS — Z01.419 ENCOUNTER FOR ANNUAL ROUTINE GYNECOLOGICAL EXAMINATION: ICD-10-CM

## 2020-01-07 DIAGNOSIS — D64.9 ANEMIA, UNSPECIFIED TYPE: ICD-10-CM

## 2020-01-07 DIAGNOSIS — I21.21 STEMI INVOLVING LEFT CIRCUMFLEX CORONARY ARTERY (HCC): Primary | ICD-10-CM

## 2020-01-07 PROCEDURE — 93797 PHYS/QHP OP CAR RHAB WO ECG: CPT

## 2020-01-07 PROCEDURE — 99495 TRANSJ CARE MGMT MOD F2F 14D: CPT | Performed by: FAMILY MEDICINE

## 2020-01-07 RX ORDER — LORAZEPAM 0.5 MG/1
0.5 TABLET ORAL 2 TIMES DAILY PRN
Qty: 180 TABLET | Refills: 0 | Status: SHIPPED | OUTPATIENT
Start: 2020-01-07

## 2020-01-07 RX ORDER — ATORVASTATIN CALCIUM 80 MG/1
80 TABLET, FILM COATED ORAL
Qty: 90 TABLET | Refills: 3 | Status: SHIPPED | OUTPATIENT
Start: 2020-01-07 | End: 2021-01-12

## 2020-01-07 RX ORDER — METOPROLOL SUCCINATE 50 MG/1
50 TABLET, EXTENDED RELEASE ORAL DAILY
Qty: 90 TABLET | Refills: 3 | Status: SHIPPED | OUTPATIENT
Start: 2020-01-07 | End: 2021-01-12

## 2020-01-07 RX ORDER — IRBESARTAN 300 MG/1
300 TABLET ORAL DAILY
Qty: 90 TABLET | Refills: 3 | Status: SHIPPED | OUTPATIENT
Start: 2020-01-07 | End: 2020-01-12

## 2020-01-07 RX ORDER — SERTRALINE HYDROCHLORIDE 100 MG/1
100 TABLET, FILM COATED ORAL 2 TIMES DAILY
Qty: 180 TABLET | Refills: 3 | Status: SHIPPED | OUTPATIENT
Start: 2020-01-07 | End: 2021-01-12

## 2020-01-07 NOTE — PROGRESS NOTES
Cardiac Rehabilitation Plan of Care   Care Plan       Today's date: 2020   Visits: Initial Evaluation  Patient name: Angelica Marin      : 1975  Age: 40 y o  MRN: [id number]  Referring Physician: Roz Conklin MD  Cardiologist: Alona Hernandez MD  Provider: Domenic Brennan  Clinician: Leanna Miner, MS, CEP, CCRP    Dx:   Encounter Diagnosis   Name Primary?  STEMI involving left circumflex coronary artery [facility])      Date of onset: 19      SUMMARY OF PROGRESS:  Mehrdad Salas has remained free of angina since discharge  She has not increased physical activity or added home exercise d/t fear of doing too much at this point in her recovery  She admits to depression - PHQ-9 = 16 = Moderately Severe Depression and has  and has a history of depression  She has resumed her medical therapy  She reports excellent social support  I suggested behavioral health and provided her contact      Medication compliance: Yes   Comments:   Fall Risk: Low   Comments:     EKG changes: NSR, inverted T-waves      EXERCISE ASSESSMENT and PLAN    Current Exercise Program in Rehab:       Frequency: 3 days/week        Minutes: 30-40         METS: 2 6 - 3 8            HR:    RPE: 4-5         Modalities: Treadmill, Airdyne bike, UBE, Lifecycle and Recumbent bike      Exercise Progression 30 Day Goals :    Frequency: 3 days/week  Add home exercise   Minutes: 40+   METS: 3 0-5 2   HR:     RPE: 4-6   Modalities: Treadmill, Airdyne bike, UBE, Lifecycle, Elliptical and Rower    Strength trainin-3 days / week  12-15 repetitions  1-2 sets per modality    Modalities: Leg Press, Chest Press, Pull Downs, Lateral Raise, Arm Extension and Arm Curl    Progressing:   In Progress    Home Exercise: None    Goals: 10% improvement in functional capacity, improved DASI score by 10%, Increase in peak CR METs by 40%, Resume ADLs with increased strength, Return to work unrestricted, Exercise 5 days/wk, >150mins/wk and mountain biking  Education: Benefit of exercise for CAD risk factors, signs and sxs and RPE scale   Plan:education on home exercise guidelines, home exercise 30+ mins 2 days opposite CR and Education class: Risk Factors for Heart Disease  Readiness to change: Preparation:  (Getting ready to change)       NUTRITION ASSESSMENT AND PLAN    Weight control:    Starting weight: 167 8   Current weight:     Waist circumference:    Startin 5   Current:    Diabetes: N/A  Lipid management: Discussed diet and lipid management and Last lipid profile 19  Chol 177    HDL 40    Goals:reduced BMI to < 25, LDL <100, HDL >40, decreased body fat % <33%, reduced waist circumference <35 inches, Improved Rate Your Plate score  >08 and Wt  loss 1-2 ppw goal of 135 lbs  Education: heart healthy eating  diet and lipid management  wt  loss  Progressing: In Progress  Plan: Education class: Reading Food Labels, Education Class: Heart Healthy Eating, Increase PUFA and MUFA, Decrease SFA, Increase whole grains, increase fruits/vegs, eliminate processed meats, reduce red meat 1x/wk, swtich to low fat dairy and Reduce added sugars <25g/day  Readiness to change: Contemplation:  (Acknowledging that there is a problem but not yet ready or sure of wanting to make a change)      PSYCHOSOCIAL ASSESSMENT AND PLAN    Emotional:  Depression assessment:  PHQ-9 = 16 = Moderately Severe Depression            Anxiety measure:  JAYESH-7 = 1 = Moderate anxiety  Self-reported stress level: 8   Social support: Excellent  Goals:  Reduce perceived stress to 1-3/10, PHQ-9 - reduced severity by one level, continue medical therapy, Feelings in [facility] Score < 3, Physical Fitness in [facility] Score < 3, Social Activities in [facility] Score < 3, Overall Health in [place] Score < 3, Increased interest in doing things, improved sleep, Improved appetite, improved concentration, improved positive thoughts of well being and increased energy  Education: signs/sxs of depression, benefits of positive support system, coping mechanisms and depression and CAD  Progressing: In Progress  Plan: Class: Stress and Your Health, Class: Relaxation, Refer to Behavioral Health, PHQ-9 >5 will refer to MD and Practice relaxation techniques  Readiness to change: Preparation:  (Getting ready to change)       OTHER CORE COMPONENTS     Tobacco:   Social History     Tobacco Use   Smoking Status Current Every Day Smoker   Smokeless Tobacco Never Used       Tobacco Use Intervention: Referral to tobacco expert:   Brief counseling by cardiac rehab professional  Date: 20          Pt quit smoking day of event  0 5 ppd x 25 years - abstaining    Blood pressure:    Restin/72   Exercise: 158/88    Goals: consistent BP < 130/80, reduced dietary sodium <2300mg, consistent exercise >150 mins/wk, medication compliance and Abstain from smoking  Education:  understanding HTN and CAD, smoking and heart disease and provide information on tobacco cessation treatment  Progressing: In Progress  Plan: Class: Understanding Heart Disease, Class: Common Heart Medications and Complete abstention from smoking  Readiness to change: Action:  (Changing behavior)

## 2020-01-07 NOTE — PROGRESS NOTES
CARDIAC REHAB ASSESSMENT    Today's date: 2020  Patient name: Deepali Martin     : 1975       MRN: [id number]  PCP: Ori Lanza MD  Referring Physician: Dwaine Osullivan MD  Cardiologist: Lady Rod MD  Surgeon:   Dx:   Encounter Diagnosis   Name Primary?     STEMI involving left circumflex coronary artery [facility])        Date of onset: 19  Cultural needs:     Height:    Wt Readings from Last 1 Encounters:   19 76 kg (167 lb 8 8 oz)      Weight:   Ht Readings from Last 1 Encounters:   19 5' 3" (1 6 m)     Medical History:   Past Medical History:   Diagnosis Date    HTN (hypertension)     Mitral valve regurgitation     Myocardial infarction (HCC)     Preeclampsia          Physical Limitations: none    Risk Factors   Cholesterol: No  Smoking: Recent user, quit in last 6 months - was using the patch - went without yesterday - smoked 35 years 0 5ppd - struggling -  smokes  HTN: Yes - since age 21  DM: No  Obesity: Yes   Inactivity: Yes  Stress:  perceived  stress: 5-810   Stressors:work, abstaining from smoking   Goals for Stress Management: smoking    Family History: possible that her mother had CAD,   Family History   Problem Relation Age of Onset    Hypertension Father     Kidney disease Paternal Grandfather        Allergies: Augmentin [amoxicillin-pot clavulanate]  ETOH:   Social History     Substance and Sexual Activity   Alcohol Use Yes         Current Medications:   Current Outpatient Medications   Medication Sig Dispense Refill    Albuterol Sulfate (PROAIR RESPICLICK) 582 (90 Base) MCG/ACT AEPB Two puffs every 4-6 hours as needed for cough and wheeze 1 each 1    aspirin 81 mg chewable tablet Chew 1 tablet (81 mg total) daily 30 tablet 11    atorvastatin (LIPITOR) 80 mg tablet Take 1 tablet (80 mg total) by mouth daily with dinner 30 tablet 4    irbesartan (AVAPRO) 300 mg tablet TAKE 1 TABLET DAILY 90 tablet 0    LORazepam (ATIVAN) 0 5 mg tablet Take 1 tablet (0 5 mg total) by mouth 2 (two) times a day as needed for anxiety 30 tablet 0    metoprolol succinate (TOPROL-XL) 50 mg 24 hr tablet Take 1 tablet (50 mg total) by mouth daily 30 tablet 4    mometasone (NASONEX) 50 mcg/act nasal spray 2 sprays into each nostril daily 17 g 3    nicotine (NICODERM CQ) 21 mg/24 hr TD 24 hr patch Place 1 patch on the skin daily 28 patch 0    sertraline (ZOLOFT) 100 mg tablet TAKE ONE AND ONE-HALF TABLETS DAILY 135 tablet 0    ticagrelor (BRILINTA) 90 MG Take 1 tablet (90 mg total) by mouth every 12 (twelve) hours 60 tablet 11     No current facility-administered medications for this visit  Functional Status Prior to Diagnosis for Treatment   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   Recreation: sedentary  ADLs: No limitations  [place]: No limitations  Exercise: walking the dog  Other:     Current Functional Status  Occupation: needs clearance to return to work  Recreation: sedentary  ADLs:Capable of performing light to moderate ADLs - unsure of what she can do at she can do at this point  [place]: No limitations  Exercise: short slow walks - no quick walks - unsure of what she can do  Other: fear    Patient Specific Goals:  [place] bike, make dietary changes,     Short Term Program Goals: dietary modifications exercise 120-150 mins/wk wt loss 1-2 ppw return to work    Long Term Goals: Improved [facility] Activity Status score  Improved functional capacity  Improved Quality of Life - [facility] score reduced  Improved lipid profile  Reduced body fat%  Abstain from smoking  Reduced stress  weight loss goal of 135    Ability to reach goals/rehabilitation potential:  Very Good     Projected return to function: 8-12 weeks  Objective tests: sub-max TM ETT      Nutritional   Reviewed details of Rate your Plate  Discussed key elements of heart healthy eating  Reviewed patient goals for dietary modifications and their clinical implications    Reviewed most recent lipid profile  Goals for dietary modification: choose lean cuts of meat  poultry without the skin  low fat ground meat and poultry  eliminate processed meats  reduce portions of meat to 3 oz  increase fish intake  more meatless meals  low fat dairy   reduced fat cheese  increase whole grains  increase fruits and vegetables  eliminate butter  low sodium  improved snack choices  more nuts/seeds  reduce sweets/frozen desserts  heathier choices while dining out      Emotional/Social  has resumed medical therapy for depression/anxiety    SOCIAL SUPPORT NETWORK    Marital status:     Rate 1-5:    Marriage: 5   Family: 3 grown kids   Financial: 3   Relationships: 5   Spirituality: 5   Intellectual: 5      Domestic Violence Screening: No    Comments: current event:  Was having severe chest pain which radiated into her abdomen and jaw with nausea

## 2020-01-07 NOTE — PROGRESS NOTES
Shruthi Ingram        Dear Dr Mat Alford,    Emotional well-being and depression is addressed in the Cardiac  rehab evaluation  To assess the severity of depression, patients are given the PHQ-9 Depression Questionnaire  This is to inform you that your patient Gen Manjarrez scored a 12 which is interpreted as  Moderately Severe Depression  Your patient was provided contact information for [employer]  A repeat PHQ -9 will be administered in 30 days to assess improvement and/or need to explore other mental health resources  Please advise if you recommend additional mental health services or initiation of medical therapy  Thank you for your continued support of cardiac rehabilitation       Sincerely,      Rylee Brandt, MS, CEP, CCRP  Exercise Physiologist

## 2020-01-07 NOTE — PROGRESS NOTES
Cardiac Rehabilitation Plan of Care   Care Plan       Today's date: 2020   Visits: Initial Evaluation  Patient name: Shona Doyle      : 1975  Age: 40 y o  MRN: [id number]  Referring Physician: Darrian Cabrera MD  Cardiologist: Yony Prince MD  Provider: Swapnil 66  Clinician: Regis Gosselin, MS, CEP, CCRP    Dx:   Encounter Diagnosis   Name Primary?  STEMI involving left circumflex coronary artery [facility])      Date of onset: 19      SUMMARY OF PROGRESS:  Gabrielle Manjarrez has remained free of angina since discharge  She has not increased physical activity or added home exercise d/t fear of doing too much at this point in her recovery  She admits to depression - PHQ-9 = 16 = Moderately Severe Depression  She has  a history of depression and takes ativan as needed  She reports excellent social support  I suggested behavioral health and provided her contact information for individual and group therapy  She has abstained from smoking but admits that it is a struggle  She stopped using the nicotine patch  She is determined to abstain but her  is a smoker which makes it difficult  She hopes to receive clearance to return to work at her appt with cardiology tomorrow  We discussed her current dietary habits and heart healthy eating for wt loss, lipid management and BP control  She completed stage III (4 3 METs) in the submaximal TM ETT  No cardiac complaints  Test terminated @ RHR + 30  NSR, inverted T waves on tele with no ectopy  Resting /72 with appropriate increase reaching 158/88  She is scheduled to begin CR on Friday,[place]  10  She seemed unsure of the ability to pay for 36 monitored sessions considering her co-pay and will start the program and determine when she is comfortable to withdraw and exercise on her own        Medication compliance: Yes   Comments:   Fall Risk: Low   Comments:     EKG changes: NSR, inverted T-waves      EXERCISE ASSESSMENT and PLAN    Current Exercise Program in Rehab:       Frequency: 3 days/week        Minutes: 30-40         METS: 2 6 - 3 8            HR:    RPE: 4-5         Modalities: Treadmill, Airdyne bike, UBE, Lifecycle and Recumbent bike      Exercise Progression 30 Day Goals :    Frequency: 3 days/week  Add home exercise   Minutes: 40+   METS: 3 0-5 2   HR:     RPE: 4-6   Modalities: Treadmill, Airdyne bike, UBE, Lifecycle, Elliptical and Rower    Strength trainin-3 days / week  12-15 repetitions  1-2 sets per modality    Modalities: Leg Press, Chest Press, Pull Downs, Lateral Raise, Arm Extension and Arm Curl    Progressing: In Progress    Home Exercise: None    Goals: 10% improvement in functional capacity, improved DASI score by 10%, Increase in peak CR METs by 40%, Resume ADLs with increased strength, Return to work unrestricted, Exercise 5 days/wk, >150mins/wk and mountain biking  Education: Benefit of exercise for CAD risk factors, signs and sxs and RPE scale   Plan:education on home exercise guidelines, home exercise 30+ mins 2 days opposite CR and Education class: Risk Factors for Heart Disease  Readiness to change: Preparation:  (Getting ready to change)       NUTRITION ASSESSMENT AND PLAN    Weight control:    Starting weight: 167 8   Current weight:     Waist circumference:    Startin 5   Current:    Diabetes: N/A  Lipid management: Discussed diet and lipid management and Last lipid profile 19  Chol 177    HDL 40    Goals:reduced BMI to < 25, LDL <100, HDL >40, decreased body fat % <33%, reduced waist circumference <35 inches, Improved Rate Your Plate score  >52 and Wt  loss 1-2 ppw goal of 135 lbs  Education: heart healthy eating  diet and lipid management  wt  loss  Progressing: In Progress  Plan: Education class: Reading Food Labels, Education Class: Heart Healthy Eating, Increase PUFA and MUFA, Decrease SFA, Increase whole grains, increase fruits/vegs, eliminate processed meats, reduce red meat 1x/wk, swtich to low fat dairy and Reduce added sugars <25g/day  Readiness to change: Contemplation:  (Acknowledging that there is a problem but not yet ready or sure of wanting to make a change)      PSYCHOSOCIAL ASSESSMENT AND PLAN    Emotional:  Depression assessment:  PHQ-9 = 16 = Moderately Severe Depression            Anxiety measure:  JAYESH-7 = 1 = Moderate anxiety  Self-reported stress level: 8   Social support: Excellent  Goals:  Reduce perceived stress to 1-3/10, PHQ-9 - reduced severity by one level, continue medical therapy, Feelings in [facility] Score < 3, Physical Fitness in [facility] Score < 3, Social Activities in [facility] Score < 3, Overall Health in [place] Score < 3, Increased interest in doing things, improved sleep, Improved appetite, improved concentration, improved positive thoughts of well being and increased energy  Education: signs/sxs of depression, benefits of positive support system, coping mechanisms and depression and CAD  Progressing: In Progress  Plan: Class: Stress and Your Health, Class: Relaxation, Refer to Behavioral Health, PHQ-9 >5 will refer to MD and Practice relaxation techniques  Readiness to change: Preparation:  (Getting ready to change)       OTHER CORE COMPONENTS     Tobacco:   Social History     Tobacco Use   Smoking Status Current Every Day Smoker   Smokeless Tobacco Never Used       Tobacco Use Intervention: Referral to tobacco expert:   Brief counseling by cardiac rehab professional  Date: 20          Pt quit smoking day of event  0 5 ppd x 25 years - abstaining    Blood pressure:    Restin/72   Exercise: 158/88    Goals: consistent BP < 130/80, reduced dietary sodium <2300mg, consistent exercise >150 mins/wk, medication compliance and Abstain from smoking  Education:  understanding HTN and CAD, smoking and heart disease and provide information on tobacco cessation treatment  Progressing: In Progress  Plan: Class: Understanding Heart Disease, Class: Common Heart Medications and Complete abstention from smoking  Readiness to change: Action:  (Changing behavior)

## 2020-01-07 NOTE — PROGRESS NOTES
FAMILY PRACTICE OFFICE VISIT       NAME: Celeste Kim  AGE: 40 y o  SEX: female       : 1975        MRN: [id number]        Assessment and Plan     Problem List Items Addressed This Visit        Cardiovascular and Mediastinum    Benign essential hypertension    Relevant Medications    metoprolol succinate (TOPROL-XL) 50 mg 24 hr tablet    irbesartan (AVAPRO) 300 mg tablet    STEMI involving left circumflex coronary artery (HCC) - Primary    Relevant Medications    atorvastatin (LIPITOR) 80 mg tablet    metoprolol succinate (TOPROL-XL) 50 mg 24 hr tablet    ticagrelor (BRILINTA) 90 MG    Other Relevant Orders    CBC    Basic metabolic panel    Coronary artery disease involving native coronary artery of native heart without angina pectoris     · Emergent heart catheterization for ischemic EKG changes with he rise in troponins concerning for ACS 2019  · She had 3 stents placed in total, DESx2 to OM1 and DESx1 to the mid LAD  · Patient is on regimen of aspirin, Brilinta, metoprolol and Lipitor 80 mg daily  ·   TTE showed an EF of 50% with some lateral wall abnormalities consistent where her ischemia was on heart catheterization  · Patient is starting cardiac rehabilitation  · Patient is following up with [facility]         Relevant Medications    metoprolol succinate (TOPROL-XL) 50 mg 24 hr tablet    ticagrelor (BRILINTA) 90 MG       Other    Depression with anxiety     · Worsening of symptoms after recent cardiac event  · Will increase dose of sertraline from 150 mg daily to 100 mg b i d  Abigail Sands · Patient will use lorazepam sparingly as needed only  · She declines referral for counseling             Relevant Medications    LORazepam (ATIVAN) 0 5 mg tablet    sertraline (ZOLOFT) 100 mg tablet      Other Visit Diagnoses     Encounter for annual routine gynecological examination        Relevant Medications    sertraline (ZOLOFT) 100 mg tablet    Anemia, unspecified type        Relevant Orders    Iron Vitamin B12      Patient presents for follow-up after recent hospitalization with diagnosis of of ST elevation myocardial infarction  Status post emergent catheterization with 3 drug-eluting stent placement  She is feeling significantly better and denies chest pain, dizziness, palpitations or dyspnea  Patient reports worsening symptoms of depression and anxiety, she is worried about recent health events as well as significant financial stresses related to future health care  Patient has been compliant with regimen of beta-blocker, high intensity statin, Brilinta and aspirin  She remains on ARB  Will increase dose of sertraline for symptoms of depression and anxiety as outlined above  Patient may use lorazepam as needed  She is starting cardiac rehabilitation  Pending follow[facility] with [facility] later this week  I advised patient to repeat blood work including CBC, BMP, iron and B12 in a few weeks  Will schedule follow-up in the office in 6 weeks  Patient is well aware of importance of staying smoke-free and following healthy diet  There are no Patient Instructions on file for this visit  Discussed with the patient and all questioned fully answered  She will call me if any problems arise  M*Modal software was used to dictate this note  It may contain errors with dictating incorrect words/spelling  Please contact provider directly with any questions         Chief Complaint     Chief Complaint   Patient presents with    Transition of Care Management     Heart cath       History of Present Illness     TCM Call (since 12/7/2019)     Date and time call was made  12/31/2019 10:01 AM    Hospital care reviewed  Records reviewed    Patient was hospitialized at  [facility]    Date of Admission  12/28/19    Date of discharge  12/30/19    Diagnosis  Stemi involving left circumflex coronary artery    Disposition  Home    Were the patients medications reviewed and updated  No    Current Symptoms  None      TCM Call (since 12/7/2019)     Should patient be enrolled in anticoag monitoring? No    Scheduled for follow up? Yes    Patients specialists  Cardiologist    Did you obtain your prescribed medications  Yes    Do you need help managing your prescriptions or medications  No    Is transportation to your appointment needed  No    I have advised the patient to call PCP with any new or worsening symptoms    Bindu Hood MA    Living Arrangements  Spouse or Significiant other    Support System  Friends; Spouse    The type of support provided  Physical; Emotional    Do you have social support  Yes, as much as I need    Are you recieving any outpatient services  No    Are you recieving home care services  No    Are you using any community resources  No    Current waiver services  No    Have you fallen in the last 12 months  No    Interperter language line needed  No    Counseling  Patient    Counseling topics  Importance of RX compliance; Activities of daily   living    Comments  F/U with [facility] 1/7/20 @ 2:15       Patient presents for follow-up after recent hospitalization  She presented to [facility] Emergency room with complaints of chest pain radiating to the jaw on arm  Reportedly her EKG revealed ischemic changes and troponin I was elevated  Patient underwent stat emergent cardiac catheterization status post 3 drug-eluting stent placement  Her calcium channel blocker was discontinued and switched to metoprolol succinate 50 mg daily  Med changes included addition of Lipitor 80 mg daily, aspirin 81 mg daily and Brilinta 90 mg b i d   Patient remains on Avapro 300 mg daily  Pending  Card f/up with [facility] 1/8/2020  Discharge blood work indicates slightly decreased hemoglobin at 10 6 and potassium of 3 4  Patient admits to worsening symptoms of anxiety, depression, she admits to increased irritability and anger    Patient remains on Zoloft 150 mg daily for chronic symptoms of depression and anxiety  She has been using this medication at current dose for many years  Patient has tried lorazepam on a few occasions and it has helped  She has hard time quitting tobacco, she understands importance of tobacco cessation but has been experiencing ongoing cravings  Patient is currently smoke-free  She is starting cardiac rehab and is worried about high insurance Co pays  Patient denies recurrences of chest pain, palpitations, shortness of breath or dizziness  Review of Systems   Review of Systems   Constitutional: Positive for fatigue  HENT: Negative  Respiratory: Negative  Cardiovascular: Negative  Negative for chest pain, palpitations and leg swelling  Gastrointestinal: Negative  Endocrine: Negative  Genitourinary: Negative  Musculoskeletal: Negative  Skin: Negative  Allergic/Immunologic: Negative  Neurological: Negative  Hematological: Negative  Psychiatric/Behavioral: Positive for dysphoric mood  The patient is nervous/anxious          Active Problem List     Patient Active Problem List   Diagnosis    Benign essential hypertension    Depression with anxiety    Leiomyoma of uterus    Tremor of face and hands    Numbness and tingling of left leg    Syncope    Impaired fasting glucose    Mitral valve regurgitation    STEMI involving left circumflex coronary artery ([facility] [address] )    Smoker    Coronary artery disease involving native coronary artery of native heart without angina pectoris       Past Medical History:  Past Medical History:   Diagnosis Date    HTN (hypertension)     Mitral valve regurgitation     Myocardial infarction ([address] )     Preeclampsia        Past Surgical History:  Past Surgical History:   Procedure Laterality Date    CARDIAC CATHETERIZATION       SECTION      CORONARY STENT PLACEMENT      EYE SURGERY      TUBAL LIGATION         Family History:  Family History   Problem Relation Age of Onset    Hypertension Father     Kidney disease Paternal Grandfather        Social History:  Social History     Socioeconomic History    Marital status: /Civil Union     Spouse name: Not on file    Number of children: Not on file    Years of education: Not on file    Highest education level: Not on file   Occupational History    Not on file   Social Needs    Financial resource strain: Not on file    Food insecurity:     Worry: Not on file     Inability: Not on file    Transportation needs:     Medical: Not on file     Non-medical: Not on file   Tobacco Use    Smoking status: Former Smoker     Types: Cigarettes    Smokeless tobacco: Never Used   Substance and Sexual Activity    Alcohol use: Yes    Drug use: No    Sexual activity: Not on file   Lifestyle    Physical activity:     Days per week: Not on file     Minutes per session: Not on file    Stress: Not on file   Relationships    Social connections:     Talks on phone: Not on file     Gets together: Not on file     Attends Bahai service: Not on file     Active member of club or organization: Not on file     Attends meetings of clubs or organizations: Not on file     Relationship status: Not on file    Intimate partner violence:     Fear of current or ex partner: Not on file     Emotionally abused: Not on file     Physically abused: Not on file     Forced sexual activity: Not on file   Other Topics Concern    Not on file   Social History Narrative    Not on file           Objective     Vitals:    01/07/20 1409   BP: 124/82   BP Location: Left arm   Patient Position: Sitting   Cuff Size: Adult   Pulse: 60   Resp: 18   Temp: 98 6 °F (37 °C)   TempSrc: Tympanic   SpO2: 98%   Weight: 76 kg (167 lb 9 6 oz)   Height: 5' 3" (1 6 m)     Wt Readings from Last 3 Encounters:   01/09/20 76 2 kg (168 lb)   01/07/20 76 kg (167 lb 9 6 oz)   12/30/19 76 kg (167 lb 8 8 oz)       Physical Exam   Constitutional: She is oriented to person, place, and time   She appears well-developed and well-nourished  HENT:   Head: Normocephalic and atraumatic  Eyes: Conjunctivae are normal    Neck: Neck supple  Carotid bruit is not present  No thyromegaly present  Cardiovascular: Normal rate, regular rhythm and normal heart sounds  No murmur heard  Pulmonary/Chest: Effort normal and breath sounds normal  No respiratory distress  She has no wheezes  Abdominal: Bowel sounds are normal  She exhibits no abdominal bruit  Musculoskeletal: Normal range of motion  She exhibits no edema  Neurological: She is alert and oriented to person, place, and time  No cranial nerve deficit  Coordination normal    Psychiatric: Her behavior is normal  Her mood appears anxious  She exhibits a depressed mood  Worried, concerned   Nursing note and vitals reviewed        Pertinent Laboratory/Diagnostic Studies:  Lab Results   Component Value Date    GLUCOSE 84 10/27/2015    BUN 10 12/29/2019    CREATININE 0 56 (L) 12/29/2019    CALCIUM 8 2 (L) 12/29/2019     10/27/2015    K 3 4 (L) 12/29/2019    CO2 24 12/29/2019     (H) 12/29/2019     Lab Results   Component Value Date    ALT 16 12/29/2019    AST 25 12/29/2019    ALKPHOS 65 12/29/2019    BILITOT 0 24 07/09/2015       Lab Results   Component Value Date    WBC 7 31 12/29/2019    HGB 10 6 (L) 12/29/2019    HCT 33 2 (L) 12/29/2019    MCV 88 12/29/2019     12/29/2019       No results found for: TSH    Lab Results   Component Value Date    CHOL 194 07/09/2015     Lab Results   Component Value Date    TRIG 101 12/29/2019     Lab Results   Component Value Date    HDL 40 12/29/2019     Lab Results   Component Value Date    LDLCALC 117 (H) 12/29/2019     Lab Results   Component Value Date    HGBA1C 5 4 10/16/2019       Results for orders placed or performed during the hospital encounter of 12/28/19   CBC and differential   Result Value Ref Range    WBC 6 00 4 31 - 10 16 Thousand/uL    RBC 4 54 3 81 - 5 12 Million/uL    Hemoglobin 12 7 11 5 - 15 4 g/dL    Hematocrit 39 0 34 8 - 46 1 %    MCV 86 82 - 98 fL    MCH 28 0 26 8 - 34 3 pg    MCHC 32 6 31 4 - 37 4 g/dL    RDW 15 5 (H) 11 6 - 15 1 %    MPV 10 2 8 9 - 12 7 fL    Platelets 564 954 - 610 Thousands/uL    nRBC 0 /100 WBCs    Neutrophils Relative 53 43 - 75 %    Immat GRANS % 0 0 - 2 %    Lymphocytes Relative 35 14 - 44 %    Monocytes Relative 10 4 - 12 %    Eosinophils Relative 1 0 - 6 %    Basophils Relative 1 0 - 1 %    Neutrophils Absolute 3 27 1 85 - 7 62 Thousands/µL    Immature Grans Absolute 0 02 0 00 - 0 20 Thousand/uL    Lymphocytes Absolute 2 07 0 60 - 4 47 Thousands/µL    Monocytes Absolute 0 58 0 17 - 1 22 Thousand/µL    Eosinophils Absolute 0 03 0 00 - 0 61 Thousand/µL    Basophils Absolute 0 03 0 00 - 0 10 Thousands/µL   Comprehensive metabolic panel   Result Value Ref Range    Sodium 141 136 - 145 mmol/L    Potassium 3 4 (L) 3 5 - 5 3 mmol/L    Chloride 110 (H) 100 - 108 mmol/L    CO2 24 21 - 32 mmol/L    ANION GAP 7 4 - 13 mmol/L    BUN 13 5 - 25 mg/dL    Creatinine 0 62 0 60 - 1 30 mg/dL    Glucose 90 65 - 140 mg/dL    Calcium 8 6 8 3 - 10 1 mg/dL    AST 17 5 - 45 U/L    ALT 20 12 - 78 U/L    Alkaline Phosphatase 80 46 - 116 U/L    Total Protein 7 5 6 4 - 8 2 g/dL    Albumin 3 4 (L) 3 5 - 5 0 g/dL    Total Bilirubin 0 20 0 20 - 1 00 mg/dL    eGFR 110 ml/min/1 73sq m   Troponin I   Result Value Ref Range    Troponin I 0 60 (H) <=0 04 ng/mL   Lipid panel   Result Value Ref Range    Cholesterol 224 (H) 50 - 200 mg/dL    Triglycerides 130 <=150 mg/dL    HDL, Direct 48 >=40 mg/dL    LDL Calculated 150 (H) 0 - 100 mg/dL    Non-HDL-Chol (CHOL-HDL) 176 mg/dl   Magnesium   Result Value Ref Range    Magnesium 2 0 1 6 - 2 6 mg/dL   Protime-INR   Result Value Ref Range    Protime 12 2 11 6 - 14 5 seconds    INR 0 94 0 84 - 1 19   APTT   Result Value Ref Range    PTT 27 23 - 37 seconds   Troponin I   Result Value Ref Range    Troponin I 3 49 (H) <=0 04 ng/mL   Troponin I   Result Value Ref Range    Troponin I 4 69 (H) <=0 04 ng/mL   Pregnancy Test (HCG Qualitative)   Result Value Ref Range    Preg, Serum Negative Negative   Troponin I   Result Value Ref Range    Troponin I 4 91 (H) <=0 04 ng/mL   Comprehensive metabolic panel   Result Value Ref Range    Sodium 142 136 - 145 mmol/L    Potassium 3 4 (L) 3 5 - 5 3 mmol/L    Chloride 111 (H) 100 - 108 mmol/L    CO2 24 21 - 32 mmol/L    ANION GAP 7 4 - 13 mmol/L    BUN 10 5 - 25 mg/dL    Creatinine 0 56 (L) 0 60 - 1 30 mg/dL    Glucose 106 65 - 140 mg/dL    Calcium 8 2 (L) 8 3 - 10 1 mg/dL    AST 25 5 - 45 U/L    ALT 16 12 - 78 U/L    Alkaline Phosphatase 65 46 - 116 U/L    Total Protein 6 1 (L) 6 4 - 8 2 g/dL    Albumin 2 9 (L) 3 5 - 5 0 g/dL    Total Bilirubin 0 26 0 20 - 1 00 mg/dL    eGFR 114 ml/min/1 73sq m   Magnesium   Result Value Ref Range    Magnesium 1 9 1 6 - 2 6 mg/dL   Phosphorus   Result Value Ref Range    Phosphorus 3 0 2 7 - 4 5 mg/dL   Lipid panel   Result Value Ref Range    Cholesterol 177 50 - 200 mg/dL    Triglycerides 101 <=150 mg/dL    HDL, Direct 40 >=40 mg/dL    LDL Calculated 117 (H) 0 - 100 mg/dL    Non-HDL-Chol (CHOL-HDL) 137 mg/dl   CBC (With Platelets)   Result Value Ref Range    WBC 7 31 4 31 - 10 16 Thousand/uL    RBC 3 78 (L) 3 81 - 5 12 Million/uL    Hemoglobin 10 6 (L) 11 5 - 15 4 g/dL    Hematocrit 33 2 (L) 34 8 - 46 1 %    MCV 88 82 - 98 fL    MCH 28 0 26 8 - 34 3 pg    MCHC 31 9 31 4 - 37 4 g/dL    RDW 15 6 (H) 11 6 - 15 1 %    Platelets 016 117 - 990 Thousands/uL    MPV 9 9 8 9 - 12 7 fL   TSH, 3rd generation   Result Value Ref Range    TSH 3RD GENERATON 1 960 0 358 - 3 740 uIU/mL   ECG 12 lead   Result Value Ref Range    Ventricular Rate 79 BPM    Atrial Rate 79 BPM    OH Interval 140 ms    QRSD Interval 78 ms    QT Interval 352 ms    QTC Interval 403 ms    P Clyo 69 degrees    QRS Axis -9 degrees    T Wave Axis 158 degrees   ECG 12 lead   Result Value Ref Range    Ventricular Rate 80 BPM    Atrial Rate 80 BPM    OH Interval 144 ms    QRSD Interval 82 ms    QT Interval 390 ms    QTC Interval 449 ms    P [place] 73 degrees    QRS Axis -14 degrees    T Wave Axis 195 degrees   POCT activated clotting time   Result Value Ref Range    Activated Clotting Time, i-STAT 248 (H) 89 - 137 sec    Specimen Type VENOUS        Orders Placed This Encounter   Procedures    CBC    Basic metabolic panel    Iron    Vitamin B12       ALLERGIES:  Allergies   Allergen Reactions    Augmentin [Amoxicillin-Pot Clavulanate] Vomiting     Vomiting and  myalgias       Current Medications     Current Outpatient Medications   Medication Sig Dispense Refill    aspirin 81 mg chewable tablet Chew 1 tablet (81 mg total) daily 30 tablet 11    atorvastatin (LIPITOR) 80 mg tablet Take 1 tablet (80 mg total) by mouth daily with dinner 90 tablet 3    irbesartan (AVAPRO) 300 mg tablet Take 1 tablet (300 mg total) by mouth daily 90 tablet 3    LORazepam (ATIVAN) 0 5 mg tablet Take 1 tablet (0 5 mg total) by mouth 2 (two) times a day as needed for anxiety 180 tablet 0    metoprolol succinate (TOPROL-XL) 50 mg 24 hr tablet Take 1 tablet (50 mg total) by mouth daily 90 tablet 3    sertraline (ZOLOFT) 100 mg tablet Take 1 tablet (100 mg total) by mouth 2 (two) times a day 180 tablet 3    ticagrelor (BRILINTA) 90 MG Take 1 tablet (90 mg total) by mouth 2 (two) times a day 180 tablet 3    nitroglycerin (NITROSTAT) 0 4 mg SL tablet Place 1 tablet (0 4 mg total) under the tongue every 5 (five) minutes as needed for chest pain 50 tablet 3     No current facility-administered medications for this visit          Medications Discontinued During This Encounter   Medication Reason    Albuterol Sulfate (PROAIR RESPICLICK) 388 (90 Base) MCG/ACT AEPB Therapy completed    mometasone (NASONEX) 50 mcg/act nasal spray Therapy completed    nicotine (NICODERM CQ) 21 mg/24 hr TD 24 hr patch Therapy completed    atorvastatin (LIPITOR) 80 mg tablet Reorder    metoprolol succinate (TOPROL-XL) 50 mg 24 hr tablet Reorder    ticagrelor (BRILINTA) 90 MG Reorder    LORazepam (ATIVAN) 0 5 mg tablet Reorder    sertraline (ZOLOFT) 100 mg tablet Reorder    irbesartan (AVAPRO) 300 mg tablet Reorder    irbesartan (AVAPRO) 300 mg tablet        Health Maintenance     Health Maintenance   Topic Date Due    MAMMOGRAM  1975    Pneumococcal Vaccine: Pediatrics (0 to 5 Years) and At-Risk Patients (6 to 59 Years) (1 of 1 - PPSV23) 09/05/1981    Influenza Vaccine  07/01/2019    BMI: Followup Plan  12/16/2020    BMI: Adult  01/09/2021    Cervical Cancer Screening  01/24/2021    DTaP,Tdap,and Td Vaccines (2 - Td) 07/12/2024    Pneumococcal Vaccine: 65+ Years (1 of 2 - PCV13) 09/05/2040    HIV Screening  Completed    HIB Vaccine  Aged Out    Hepatitis B Vaccine  Aged Out    IPV Vaccine  Aged Out    Hepatitis A Vaccine  Aged Out    Meningococcal ACWY Vaccine  Aged Out    HPV Vaccine  Aged Out       Immunization History   Administered Date(s) Administered     Influenza (IM) Preservative Free 10/29/2018    Influenza TIV (IM) 10/16/2015    Tdap 07/12/2014    Tuberculin Skin Test-PPD Intradermal 09/10/2012       Ely Alvarez MD

## 2020-01-09 ENCOUNTER — OFFICE VISIT (OUTPATIENT)
Dept: CARDIOLOGY CLINIC | Facility: CLINIC | Age: 45
End: 2020-01-09
Payer: COMMERCIAL

## 2020-01-09 VITALS
HEIGHT: 63 IN | DIASTOLIC BLOOD PRESSURE: 60 MMHG | WEIGHT: 168 LBS | OXYGEN SATURATION: 96 % | HEART RATE: 62 BPM | SYSTOLIC BLOOD PRESSURE: 116 MMHG | BODY MASS INDEX: 29.77 KG/M2

## 2020-01-09 DIAGNOSIS — I10 BENIGN ESSENTIAL HYPERTENSION: ICD-10-CM

## 2020-01-09 DIAGNOSIS — I25.5 ISCHEMIC CARDIOMYOPATHY: ICD-10-CM

## 2020-01-09 DIAGNOSIS — F17.200 SMOKER: ICD-10-CM

## 2020-01-09 DIAGNOSIS — E78.5 HYPERLIPIDEMIA, UNSPECIFIED HYPERLIPIDEMIA TYPE: ICD-10-CM

## 2020-01-09 DIAGNOSIS — Z95.5 S/P DRUG ELUTING CORONARY STENT PLACEMENT: Primary | ICD-10-CM

## 2020-01-09 PROCEDURE — 99214 OFFICE O/P EST MOD 30 MIN: CPT | Performed by: NURSE PRACTITIONER

## 2020-01-09 PROCEDURE — 3078F DIAST BP <80 MM HG: CPT | Performed by: NURSE PRACTITIONER

## 2020-01-09 PROCEDURE — 3074F SYST BP LT 130 MM HG: CPT | Performed by: NURSE PRACTITIONER

## 2020-01-09 RX ORDER — NITROGLYCERIN 0.4 MG/1
0.4 TABLET SUBLINGUAL
Qty: 50 TABLET | Refills: 3 | Status: SHIPPED | OUTPATIENT
Start: 2020-01-09 | End: 2021-07-16

## 2020-01-09 NOTE — PROGRESS NOTES
Cardiology Follow Up    Evaristo Jeronimo  1975  [id number]  [facility] [place] [facility] [address]  [phone]  [phone]    Hospital Follow up visit     Interval History:   Ms Debra Jain Presented to [facility] on 12/28 - 12/30/19 with a STEMI  On 12/09/19 she underwent a Nuclear stress test  Which showed partially reversible inferior lateral defects noted consistent with coronary ischemia  She was scheduled for cardiac catheterization  Prior to cardiac catheterization she began to experience chest pain  She presented emergency room due to chest pain  On admission she was noted to ischemic changes on her EKGs  Troponins elevated to 4 69 and 4 91  She underwent urgent cardiac catheterization which showed:  Mid LAD 75% stenosis, mid circumflex 100% stenosis at the origin of the OM1  Next successful balloon angioplasty stent procedure performed on 99% lesion in mid circumflex  Successful drug-eluting stent balloon angioplasty procedure performed on the 99% lesion in the 1st obtuse marginal; [place] MYA x 2 placed,  Successful drug eluting stent balloon angioplasty procedure performed on the 75% lesion in the mid LAD  TTE   Left ventricular systolic function lower limits of normal, LVEF 36%, grade 1 diastolic dysfunction, mild MR   12/06/19 LVEF 60%/  12/29/19  Cholesterol 177, triglycerides 101, HDL 40,   She was placed on high intensity statin, aspirin 81 mg daily and Brilinta 90 mg b i d     Ms Carolyn Robison our office for recent hospitalization follow-up visit  She denies chest pain palpitations lightheadedness or dizziness  She admits to  Fatigue  She is currently menstruating with a heavy flow  No recent lab studies  Jermaine Marcial was seen by her PCP yesterday  Lab studies ordered at this office visit       HPI  Tobacco abuse Hypertension  Mod MR  Anxiety   Depression   Patient Active Problem List   Diagnosis    Benign essential hypertension    Depression with anxiety    Leiomyoma of uterus    Tremor of face and hands    Numbness and tingling of left leg    Syncope    Impaired fasting glucose    Abnormal nuclear stress test    Mitral valve regurgitation    STEMI involving left circumflex coronary artery (HCC)    Smoker     Past Medical History:   Diagnosis Date    HTN (hypertension)     Mitral valve regurgitation     Myocardial infarction (HCC)     Preeclampsia      Social History     Socioeconomic History    Marital status: /Civil Union     Spouse name: Not on file    Number of children: Not on file    Years of education: Not on file    Highest education level: Not on file   Occupational History    Not on file   Social Needs    Financial resource strain: Not on file    Food insecurity:     Worry: Not on file     Inability: Not on file    Transportation needs:     Medical: Not on file     Non-medical: Not on file   Tobacco Use    Smoking status: Former Smoker     Types: Cigarettes    Smokeless tobacco: Never Used   Substance and Sexual Activity    Alcohol use:  Yes    Drug use: No    Sexual activity: Not on file   Lifestyle    Physical activity:     Days per week: Not on file     Minutes per session: Not on file    Stress: Not on file   Relationships    Social connections:     Talks on phone: Not on file     Gets together: Not on file     Attends Anabaptist service: Not on file     Active member of club or organization: Not on file     Attends meetings of clubs or organizations: Not on file     Relationship status: Not on file    Intimate partner violence:     Fear of current or ex partner: Not on file     Emotionally abused: Not on file     Physically abused: Not on file     Forced sexual activity: Not on file   Other Topics Concern    Not on file   Social History Narrative    Not on file Family History   Problem Relation Age of Onset    Hypertension Father     Kidney disease Paternal Grandfather      Past Surgical History:   Procedure Laterality Date    CARDIAC CATHETERIZATION       SECTION      CORONARY STENT PLACEMENT      EYE SURGERY      TUBAL LIGATION         Current Outpatient Medications:     aspirin 81 mg chewable tablet, Chew 1 tablet (81 mg total) daily, Disp: 30 tablet, Rfl: 11    atorvastatin (LIPITOR) 80 mg tablet, Take 1 tablet (80 mg total) by mouth daily with dinner, Disp: 90 tablet, Rfl: 3    irbesartan (AVAPRO) 300 mg tablet, Take 1 tablet (300 mg total) by mouth daily, Disp: 90 tablet, Rfl: 3    LORazepam (ATIVAN) 0 5 mg tablet, Take 1 tablet (0 5 mg total) by mouth 2 (two) times a day as needed for anxiety, Disp: 180 tablet, Rfl: 0    metoprolol succinate (TOPROL-XL) 50 mg 24 hr tablet, Take 1 tablet (50 mg total) by mouth daily, Disp: 90 tablet, Rfl: 3    sertraline (ZOLOFT) 100 mg tablet, Take 1 tablet (100 mg total) by mouth 2 (two) times a day, Disp: 180 tablet, Rfl: 3    ticagrelor (BRILINTA) 90 MG, Take 1 tablet (90 mg total) by mouth 2 (two) times a day, Disp: 180 tablet, Rfl: 3  Allergies   Allergen Reactions    Augmentin [Amoxicillin-Pot Clavulanate] Vomiting     Vomiting and  myalgias       Labs:  Admission on 2019, Discharged on 2019   Component Date Value    WBC 2019 6 00     RBC 2019 4 54     Hemoglobin 2019 12 7     Hematocrit 2019 39 0     MCV 2019 86     MCH 2019 28 0     MCHC 2019 32 6     RDW 2019 15 5*    MPV 2019 10 2     Platelets  273     nRBC 2019 0     Neutrophils Relative 2019 53     Immat GRANS % 2019 0     Lymphocytes Relative 2019 35     Monocytes Relative 2019 10     Eosinophils Relative 2019 1     Basophils Relative 2019 1     Neutrophils Absolute 2019 3 27     Immature Grans Absolute 12/28/2019 0 02     Lymphocytes Absolute 12/28/2019 2 07     Monocytes Absolute 12/28/2019 0 58     Eosinophils Absolute 12/28/2019 0 03     Basophils Absolute 12/28/2019 0 03     Sodium 12/28/2019 141     Potassium 12/28/2019 3 4*    Chloride 12/28/2019 110*    CO2 12/28/2019 24     ANION GAP 12/28/2019 7     BUN 12/28/2019 13     Creatinine 12/28/2019 0 62     Glucose 12/28/2019 90     Calcium 12/28/2019 8 6     AST 12/28/2019 17     ALT 12/28/2019 20     Alkaline Phosphatase 12/28/2019 80     Total Protein 12/28/2019 7 5     Albumin 12/28/2019 3 4*    Total Bilirubin 12/28/2019 0 20     eGFR 12/28/2019 110     Troponin I 12/28/2019 0 60*    Cholesterol 12/28/2019 224*    Triglycerides 12/28/2019 130     HDL, Direct 12/28/2019 48     LDL Calculated 12/28/2019 150*    Non-HDL-Chol (CHOL-HDL) 12/28/2019 176     Magnesium 12/28/2019 2 0     Protime 12/28/2019 12 2     INR 12/28/2019 0 94     PTT 12/28/2019 27     Troponin I 12/28/2019 3 49*    Troponin I 12/28/2019 4 69*    Preg, Serum 12/28/2019 Negative     Activated Clotting Time,* 12/28/2019 248*    Specimen Type 12/28/2019 VENOUS     Troponin I 12/28/2019 4 91*    Sodium 12/29/2019 142     Potassium 12/29/2019 3 4*    Chloride 12/29/2019 111*    CO2 12/29/2019 24     ANION GAP 12/29/2019 7     BUN 12/29/2019 10     Creatinine 12/29/2019 0 56*    Glucose 12/29/2019 106     Calcium 12/29/2019 8 2*    AST 12/29/2019 25     ALT 12/29/2019 16     Alkaline Phosphatase 12/29/2019 65     Total Protein 12/29/2019 6 1*    Albumin 12/29/2019 2 9*    Total Bilirubin 12/29/2019 0 26     eGFR 12/29/2019 114     Magnesium 12/29/2019 1 9     Phosphorus 12/29/2019 3 0     Cholesterol 12/29/2019 177     Triglycerides 12/29/2019 101     HDL, Direct 12/29/2019 40     LDL Calculated 12/29/2019 117*    Non-HDL-Chol (CHOL-HDL) 12/29/2019 137     WBC 12/29/2019 7 31     RBC 12/29/2019 3 78*    Hemoglobin 12/29/2019 10 6*    Hematocrit 12/29/2019 33 2*    MCV 12/29/2019 88     MCH 12/29/2019 28 0     MCHC 12/29/2019 31 9     RDW 12/29/2019 15 6*    Platelets 70/84/9247 203     MPV 12/29/2019 9 9     TSH 3RD GENERATON 12/29/2019 1 960     Ventricular Rate 12/28/2019 79     Atrial Rate 12/28/2019 79     TX Interval 12/28/2019 140     QRSD Interval 12/28/2019 78     QT Interval 12/28/2019 352     QTC Interval 12/28/2019 403     P Axis 12/28/2019 69     QRS Axis 12/28/2019 -9     T Wave [place] 12/28/2019 158     Ventricular Rate 12/28/2019 80     Atrial Rate 12/28/2019 80     TX Interval 12/28/2019 144     QRSD Interval 12/28/2019 82     QT Interval 12/28/2019 390     QTC Interval 12/28/2019 449     P Axis 12/28/2019 73     QRS [place] 12/28/2019 -14     T Wave Axis 12/28/2019 195    Telephone on 12/12/2019   Component Date Value    Sodium 12/12/2019 140     Potassium 12/12/2019 3 6     Chloride 12/12/2019 109*    CO2 12/12/2019 24     ANION GAP 12/12/2019 7     BUN 12/12/2019 12     Creatinine 12/12/2019 0 60     Glucose 12/12/2019 89     Calcium 12/12/2019 9 4     eGFR 12/12/2019 111     WBC 12/12/2019 6 20     RBC 12/12/2019 4 74     Hemoglobin 12/12/2019 12 9     Hematocrit 12/12/2019 41 2     MCV 12/12/2019 87     MCH 12/12/2019 27 2     MCHC 12/12/2019 31 3*    RDW 12/12/2019 15 4*    MPV 12/12/2019 10 1     Platelets 22/32/5108 253     nRBC 12/12/2019 0     Neutrophils Relative 12/12/2019 57     Immat GRANS % 12/12/2019 1     Lymphocytes Relative 12/12/2019 31     Monocytes Relative 12/12/2019 9     Eosinophils Relative 12/12/2019 1     Basophils Relative 12/12/2019 1     Neutrophils Absolute 12/12/2019 3 60     Immature Grans Absolute 12/12/2019 0 03     Lymphocytes Absolute 12/12/2019 1 94     Monocytes Absolute 12/12/2019 0 53     Eosinophils Absolute 12/12/2019 0 04     Basophils Absolute 12/12/2019 0 06     Protime 12/12/2019 12 7     INR 12/12/2019 0 99    Hospital Outpatient Visit on 12/09/2019   Component Date Value    Protocol Name 12/09/2019 Sherri Rowland Time In Exercise Phase 12/09/2019 00:10:00     MAX   SYSTOLIC BP 46/53/1891 821     Max Diastolic Bp 12/44/7659 82     Max Heart Rate 12/09/2019 142     Max Predicted Heart Rate 12/09/2019 176     Reason for Termination 12/09/2019 Fatigue     Test Indication 12/09/2019                      Value:Abnormal ECG  Syncope      Target Hr Formular 12/09/2019 (220 - Age)*100%     Chest Pain Statement 12/09/2019 none    Admission on 11/24/2019, Discharged on 11/24/2019   Component Date Value    WBC 11/24/2019 8 63     RBC 11/24/2019 4 35     Hemoglobin 11/24/2019 12 1     Hematocrit 11/24/2019 38 0     MCV 11/24/2019 87     MCH 11/24/2019 27 8     MCHC 11/24/2019 31 8     RDW 11/24/2019 14 9     MPV 11/24/2019 9 9     Platelets 28/86/2301 212     nRBC 11/24/2019 0     Neutrophils Relative 11/24/2019 72     Immat GRANS % 11/24/2019 1     Lymphocytes Relative 11/24/2019 16     Monocytes Relative 11/24/2019 9     Eosinophils Relative 11/24/2019 1     Basophils Relative 11/24/2019 1     Neutrophils Absolute 11/24/2019 6 31     Immature Grans Absolute 11/24/2019 0 04     Lymphocytes Absolute 11/24/2019 1 34     Monocytes Absolute 11/24/2019 0 79     Eosinophils Absolute 11/24/2019 0 10     Basophils Absolute 11/24/2019 0 05     Sodium 11/24/2019 140     Potassium 11/24/2019 3 7     Chloride 11/24/2019 109*    CO2 11/24/2019 24     ANION GAP 11/24/2019 7     BUN 11/24/2019 16     Creatinine 11/24/2019 0 85     Glucose 11/24/2019 132     Calcium 11/24/2019 9 1     AST 11/24/2019 23     ALT 11/24/2019 21     Alkaline Phosphatase 11/24/2019 84     Total Protein 11/24/2019 7 4     Albumin 11/24/2019 3 6     Total Bilirubin 11/24/2019 0 25     eGFR 11/24/2019 84     Troponin I 11/24/2019 <0 02     POC Glucose 11/24/2019 129     Magnesium 11/24/2019 1 9     Troponin I 11/24/2019 <0 02     Ventricular Rate 11/24/2019 67     Atrial Rate 11/24/2019 67     [place] Interval 11/24/2019 144     QRSD Interval 11/24/2019 80     QT Interval 11/24/2019 476     QTC Interval 11/24/2019 502     P Axis 11/24/2019 72     QRS Axis 11/24/2019 5     T Wave Axis 11/24/2019 -53     Ventricular Rate 11/24/2019 66     Atrial Rate 11/24/2019 66     [place] Interval 11/24/2019 146     QRSD Interval 11/24/2019 78     QT Interval 11/24/2019 450     QTC Interval 11/24/2019 471     P Axis 11/24/2019 71     QRS Axis 11/24/2019 8     T Wave Axis 11/24/2019 -61     Ventricular Rate 11/24/2019 73     Atrial Rate 11/24/2019 73     [place] Interval 11/24/2019 146     QRSD Interval 11/24/2019 76     QT Interval 11/24/2019 456     QTC Interval 11/24/2019 502     P Axis 11/24/2019 62     QRS Axis 11/24/2019 2     T Wave Axis 11/24/2019 -52      Imaging: Xr Chest Portable    Result Date: 12/29/2019  Narrative: CHEST INDICATION:   STEMI  COMPARISON:  11/24/2019 EXAM PERFORMED/VIEWS:  XR CHEST PORTABLE FINDINGS: Aorta mildly uncoiled  Heart size normal  The lungs are clear  No pneumothorax or pleural effusion  Osseous structures appear within normal limits for patient age  Impression: No acute cardiopulmonary disease  Workstation performed: [id number]       Review of Systems:  Review of Systems   Constitutional: Positive for fatigue  Psychiatric/Behavioral: The patient is nervous/anxious  Physical Exam:  Physical Exam   Constitutional: She is oriented to person, place, and time  She appears well-developed  HENT:   Head: Normocephalic  Eyes: Pupils are equal, round, and reactive to light  Neck: Normal range of motion  Neck supple  Cardiovascular: Normal rate, regular rhythm and normal heart sounds  Pulmonary/Chest: Effort normal and breath sounds normal    Abdominal: Soft  Bowel sounds are normal    Musculoskeletal: Normal range of motion  She exhibits no edema     Neurological: She is alert and oriented to person, place, and time  Skin: Skin is warm and dry  Capillary refill takes less than 2 seconds  Right radial cath site clean dry intact proximally appears healed   Psychiatric: She has a normal mood and affect  Vitals reviewed  Discussion/Summary:  1  Sp MYA to 99% lesion in mid Cx, MYA x 2 to 99% stenosis of 1st OM, MYA to 75% stenosis of mid LAD  Continue on aspirin 81 mg daily, Brilinta 90 mg BID,  I have instructed [place]  Not stop aspirin and Brilinta for any reason  Continue liver pro 300 mg daily, metoprolol succinate 50 mg daily, Lipitor 80 mg daily  I have ordered PRN sublingual nitroglycerin for CP with instructions on the proper use  Cardiac Rehabilitation  Follow up lab studies ordered by PCP  She was  Instructed to call  Gyn if menses continues with heavy flow over the next several months  2  Ischemic mild reduced LVEF from 60% to 50%, follow up TTE in 3 months  Not ordered at this office visit  Continue on  Metoprolol succinate 50 mg daily, Avapro 300 mg daily    3  Hypertension- RUE sitting 100/60 continue on  Metoprolol succinate 50 mg daily  4  Hyperlipidemia 12/29/19  Cholesterol 177, triglycerides 101, HDL 40,  - Continue on Lipitor 80mg daily  5   Tobacco abuse- continues with smoking cessation

## 2020-01-09 NOTE — PATIENT INSTRUCTIONS
2gm sodium low fat low cholesterol diet, eating fresh is best, fresh fruit fresh vegetables, lean protein    Cardiac Rehabilitation   DO NOT STOP ASA OR BRILINTA  For any reason

## 2020-01-10 ENCOUNTER — CLINICAL SUPPORT (OUTPATIENT)
Dept: CARDIAC REHAB | Age: 45
End: 2020-01-10
Payer: COMMERCIAL

## 2020-01-10 DIAGNOSIS — I21.21 STEMI INVOLVING LEFT CIRCUMFLEX CORONARY ARTERY (HCC): ICD-10-CM

## 2020-01-10 PROCEDURE — 93798 PHYS/QHP OP CAR RHAB W/ECG: CPT

## 2020-01-12 PROBLEM — R94.39 ABNORMAL NUCLEAR STRESS TEST: Status: RESOLVED | Noted: 2019-12-10 | Resolved: 2020-01-12

## 2020-01-12 PROBLEM — I25.10 CORONARY ARTERY DISEASE INVOLVING NATIVE CORONARY ARTERY OF NATIVE HEART WITHOUT ANGINA PECTORIS: Status: ACTIVE | Noted: 2020-01-12

## 2020-01-12 RX ORDER — IRBESARTAN 300 MG/1
300 TABLET ORAL DAILY
Qty: 90 TABLET | Refills: 3
Start: 2020-01-12 | End: 2020-06-24

## 2020-01-12 NOTE — ASSESSMENT & PLAN NOTE
· Worsening of symptoms after recent cardiac event  · Will increase dose of sertraline from 150 mg daily to 100 mg b i d  Renee Posada · Patient will use lorazepam sparingly as needed only  · She declines referral for counseling

## 2020-01-12 NOTE — ASSESSMENT & PLAN NOTE
· Emergent heart catheterization for ischemic EKG changes with he rise in troponins concerning for ACS 12/2019  · She had 3 stents placed in total, DESx2 to OM1 and DESx1 to the mid LAD  · Patient is on regimen of aspirin, Brilinta, metoprolol and Lipitor 80 mg daily  ·   TTE showed an EF of 50% with some lateral wall abnormalities consistent where her ischemia was on heart catheterization    · Patient is starting cardiac rehabilitation  · Patient is following up with [facility]

## 2020-01-13 ENCOUNTER — CLINICAL SUPPORT (OUTPATIENT)
Dept: CARDIAC REHAB | Age: 45
End: 2020-01-13
Payer: COMMERCIAL

## 2020-01-13 DIAGNOSIS — I21.21 STEMI INVOLVING LEFT CIRCUMFLEX CORONARY ARTERY (HCC): ICD-10-CM

## 2020-01-13 PROCEDURE — 93798 PHYS/QHP OP CAR RHAB W/ECG: CPT

## 2020-01-15 ENCOUNTER — CLINICAL SUPPORT (OUTPATIENT)
Dept: CARDIAC REHAB | Age: 45
End: 2020-01-15
Payer: COMMERCIAL

## 2020-01-15 DIAGNOSIS — I21.21 STEMI INVOLVING LEFT CIRCUMFLEX CORONARY ARTERY (HCC): ICD-10-CM

## 2020-01-15 PROCEDURE — 93798 PHYS/QHP OP CAR RHAB W/ECG: CPT

## 2020-01-17 ENCOUNTER — TELEPHONE (OUTPATIENT)
Dept: FAMILY MEDICINE CLINIC | Facility: CLINIC | Age: 45
End: 2020-01-17

## 2020-01-17 ENCOUNTER — APPOINTMENT (OUTPATIENT)
Dept: CARDIAC REHAB | Age: 45
End: 2020-01-17
Payer: COMMERCIAL

## 2020-01-17 ENCOUNTER — TELEPHONE (OUTPATIENT)
Dept: BEHAVIORAL/MENTAL HEALTH CLINIC | Facility: CLINIC | Age: 45
End: 2020-01-17

## 2020-01-17 NOTE — PROGRESS NOTES
Brigid Murguia      Dear Dr Peter Douglas,    Thank you for referring your patient to our cardiac rehabilitation program  Santhosh Almaguer has completed 4  Visits but had decided to withdraw from the program d/t high co-pay  She is tolerating 40 mins at 2 0-4 1 METs  No cardiac complaints  Resting BP in her 4 sessions - 124/80 - 134/84 with appropriate increase during exercise reaching 144//90  NSR, no ectopy  She is walking 30+ mins at home and focusing on heart healthy eating  She continues to struggle with depression related to her event  She states she cannot complete daily house hold tasks and becomes tearful quite easily  She continues to take her ativan  I had a long discussion with her and encouraged her to reach out to behavioral health  She will continue to exercise at home  Please contact us at [phone] if you have any questions about this patents case  Thank you for your continued support of cardiac rehabilitation         Sincerely,      Naeem Harman, MS, CEP, CCRP  Exercise Physiologist

## 2020-01-17 NOTE — TELEPHONE ENCOUNTER
Pt is scheduled for Feb 12th to see you, would like to know if you have any earlier openings at other offices or if she can please be placed on a cancellation list, please and thank you

## 2020-01-17 NOTE — TELEPHONE ENCOUNTER
[id number] on voicemail advising that no earlier appointments are available, but that she was placed on wait list   If there is a cancellation, office will call her to come in sooner

## 2020-01-20 ENCOUNTER — APPOINTMENT (OUTPATIENT)
Dept: CARDIAC REHAB | Age: 45
End: 2020-01-20
Payer: COMMERCIAL

## 2020-01-21 ENCOUNTER — OFFICE VISIT (OUTPATIENT)
Dept: FAMILY MEDICINE CLINIC | Facility: CLINIC | Age: 45
End: 2020-01-21
Payer: COMMERCIAL

## 2020-01-21 VITALS
RESPIRATION RATE: 14 BRPM | HEIGHT: 63 IN | TEMPERATURE: 98.3 F | DIASTOLIC BLOOD PRESSURE: 84 MMHG | SYSTOLIC BLOOD PRESSURE: 124 MMHG | OXYGEN SATURATION: 98 % | HEART RATE: 67 BPM | BODY MASS INDEX: 29.98 KG/M2 | WEIGHT: 169.2 LBS

## 2020-01-21 DIAGNOSIS — J01.90 ACUTE SINUSITIS, RECURRENCE NOT SPECIFIED, UNSPECIFIED LOCATION: Primary | ICD-10-CM

## 2020-01-21 DIAGNOSIS — F41.8 DEPRESSION WITH ANXIETY: ICD-10-CM

## 2020-01-21 PROCEDURE — 3074F SYST BP LT 130 MM HG: CPT | Performed by: FAMILY MEDICINE

## 2020-01-21 PROCEDURE — 3008F BODY MASS INDEX DOCD: CPT | Performed by: FAMILY MEDICINE

## 2020-01-21 PROCEDURE — 99213 OFFICE O/P EST LOW 20 MIN: CPT | Performed by: FAMILY MEDICINE

## 2020-01-21 PROCEDURE — 3079F DIAST BP 80-89 MM HG: CPT | Performed by: FAMILY MEDICINE

## 2020-01-21 RX ORDER — CEFPROZIL 500 MG/1
500 TABLET, FILM COATED ORAL 2 TIMES DAILY
Qty: 14 TABLET | Refills: 0 | Status: SHIPPED | OUTPATIENT
Start: 2020-01-21 | End: 2020-01-28

## 2020-01-21 NOTE — PROGRESS NOTES
FAMILY PRACTICE OFFICE VISIT       NAME: Ary Gama  AGE: 40 y o  SEX: female       : 1975        MRN: [id number]        Assessment and Plan     Problem List Items Addressed This Visit        Other    Depression with anxiety      Other Visit Diagnoses     Acute sinusitis, recurrence not specified, unspecified location    -  Primary    Relevant Medications    cefprosil (CEFZIL) 500 MG tablet       Patient presents for evaluation of URI symptoms/sinusitis  Will start her on Cefzil 500 mg b i d  For 7 days  Patient may use Tylenol over-the-counter as needed for pain in fatigue  Advised her to rest, drink plenty of fluids in gargle  Patient will contact us in a few days if symptoms are not improving significantly  Symptoms of depression anxiety are slowly improving  Patient remains on Zoloft 100 mg b i d  She reports decreased symptoms of irritability, she is still experiencing intermittent symptoms of sadness, fatigue and lack of motivation and drive  Pending start of counseling and February  Patient remains on medications for coronary artery disease and denies symptoms of chest pain, palpitations, dyspnea or dizziness  There are no Patient Instructions on file for this visit  Discussed with the patient and all questioned fully answered  She will call me if any problems arise  M*Modal software was used to dictate this note  It may contain errors with dictating incorrect words/spelling  Please contact provider directly with any questions  Chief Complaint     Chief Complaint   Patient presents with    Sore Throat     swollen glands       History of Present Illness     Patient presents for evaluation of sinus symptoms  She is complaining of sore throat, postnasal drip, sinus congestion, ear pressure  Symptoms started 3-4 days ago  Patient is afebrile  She denies symptoms of chest tightness or wheezing  No colored mucus expectoration  Patient just started cardiac rehab    She is doing well  She denies symptoms of chest pain, palpitations, shortness of breath or dizziness  Patient has been using sertraline at 100 mg twice a day  She is while awaiting start of counseling  She reports to decreased symptoms of irritability and sadness  She is still feeling fatigued and somewhat depressed  Patient denies symptoms of suicidal homicidal ideation or thought  She is looking forward to going back to work and start of counseling  Review of Systems   Review of Systems   Constitutional: Positive for fatigue  Negative for fever  HENT: Positive for congestion, postnasal drip, sinus pressure and sore throat  Eyes: Negative  Respiratory: Negative  Negative for cough, chest tightness and wheezing  Cardiovascular: Negative  Gastrointestinal: Negative  Hematological: Positive for adenopathy (Painful right submandibular gland)  Psychiatric/Behavioral: Positive for dysphoric mood  The patient is nervous/anxious          Active Problem List     Patient Active Problem List   Diagnosis    Benign essential hypertension    Depression with anxiety    Leiomyoma of uterus    Tremor of face and hands    Numbness and tingling of left leg    Syncope    Impaired fasting glucose    Mitral valve regurgitation    STEMI involving left circumflex coronary artery ([facility] [address] )    Smoker    Coronary artery disease involving native coronary artery of native heart without angina pectoris       Past Medical History:  Past Medical History:   Diagnosis Date    HTN (hypertension)     Mitral valve regurgitation     Myocardial infarction ([address] )     Preeclampsia        Past Surgical History:  Past Surgical History:   Procedure Laterality Date    CARDIAC CATHETERIZATION       SECTION      CORONARY STENT PLACEMENT      EYE SURGERY      TUBAL LIGATION         Family History:  Family History   Problem Relation Age of Onset    Hypertension Father     Kidney disease Paternal Grandfather Social History:  Social History     Socioeconomic History    Marital status: /Civil Union     Spouse name: Not on file    Number of children: Not on file    Years of education: Not on file    Highest education level: Not on file   Occupational History    Not on file   Social Needs    Financial resource strain: Not on file    Food insecurity:     Worry: Not on file     Inability: Not on file    Transportation needs:     Medical: Not on file     Non-medical: Not on file   Tobacco Use    Smoking status: Former Smoker     Types: Cigarettes    Smokeless tobacco: Never Used   Substance and Sexual Activity    Alcohol use: Yes    Drug use: No    Sexual activity: Not on file   Lifestyle    Physical activity:     Days per week: Not on file     Minutes per session: Not on file    Stress: Not on file   Relationships    Social connections:     Talks on phone: Not on file     Gets together: Not on file     Attends [employer] service: Not on file     Active member of club or organization: Not on file     Attends meetings of clubs or organizations: Not on file     Relationship status: Not on file    Intimate partner violence:     Fear of current or ex partner: Not on file     Emotionally abused: Not on file     Physically abused: Not on file     Forced sexual activity: Not on file   Other Topics Concern    Not on file   Social History Narrative    Not on file           Objective     Vitals:    01/21/20 1349   BP: 124/84   BP Location: Left arm   Patient Position: Sitting   Cuff Size: Adult   Pulse: 67   Resp: 14   Temp: 98 3 °F (36 8 °C)   TempSrc: Tympanic   SpO2: 98%   Weight: 76 7 kg (169 lb 3 2 oz)   Height: 5' 3" (1 6 m)     Wt Readings from Last 3 Encounters:   01/21/20 76 7 kg (169 lb 3 2 oz)   01/09/20 76 2 kg (168 lb)   01/07/20 76 kg (167 lb 9 6 oz)       Physical Exam   Constitutional: She is oriented to person, place, and time  She appears well-developed and well-nourished     HENT:   Head: Normocephalic and atraumatic  Right Ear: Tympanic membrane normal    Left Ear: Tympanic membrane normal    Nose: Mucosal edema present  Mouth/Throat: Posterior oropharyngeal erythema present  No oropharyngeal exudate  Postnasal drip, erythema, no exudates  Eyes: Conjunctivae are normal    Neck: Neck supple  Cardiovascular: Normal rate, regular rhythm and normal heart sounds  No murmur heard  Pulmonary/Chest: Effort normal and breath sounds normal  No respiratory distress  She has no wheezes  She has no rales  Lymphadenopathy:     She has cervical adenopathy (Tender right submandibular lymph nodes, no warmth, no erythema)  Neurological: She is alert and oriented to person, place, and time  She has normal reflexes  No cranial nerve deficit  Psychiatric: She has a normal mood and affect  Her behavior is normal    Nursing note and vitals reviewed        Pertinent Laboratory/Diagnostic Studies:  Lab Results   Component Value Date    GLUCOSE 84 10/27/2015    BUN 10 12/29/2019    CREATININE 0 56 (L) 12/29/2019    CALCIUM 8 2 (L) 12/29/2019     10/27/2015    K 3 4 (L) 12/29/2019    CO2 24 12/29/2019     (H) 12/29/2019     Lab Results   Component Value Date    ALT 16 12/29/2019    AST 25 12/29/2019    ALKPHOS 65 12/29/2019    BILITOT 0 24 07/09/2015       Lab Results   Component Value Date    WBC 7 31 12/29/2019    HGB 10 6 (L) 12/29/2019    HCT 33 2 (L) 12/29/2019    MCV 88 12/29/2019     12/29/2019       No results found for: TSH    Lab Results   Component Value Date    CHOL 194 07/09/2015     Lab Results   Component Value Date    TRIG 101 12/29/2019     Lab Results   Component Value Date    HDL 40 12/29/2019     Lab Results   Component Value Date    LDLCALC 117 (H) 12/29/2019     Lab Results   Component Value Date    HGBA1C 5 4 10/16/2019       Results for orders placed or performed during the hospital encounter of 12/28/19   CBC and differential   Result Value Ref Range    WBC 6 00 4 31 - 10 16 Thousand/uL    RBC 4 54 3 81 - 5 12 Million/uL    Hemoglobin 12 7 11 5 - 15 4 g/dL    Hematocrit 39 0 34 8 - 46 1 %    MCV 86 82 - 98 fL    MCH 28 0 26 8 - 34 3 pg    MCHC 32 6 31 4 - 37 4 g/dL    RDW 15 5 (H) 11 6 - 15 1 %    MPV 10 2 8 9 - 12 7 fL    Platelets 977 174 - 390 Thousands/uL    nRBC 0 /100 WBCs    Neutrophils Relative 53 43 - 75 %    Immat GRANS % 0 0 - 2 %    Lymphocytes Relative 35 14 - 44 %    Monocytes Relative 10 4 - 12 %    Eosinophils Relative 1 0 - 6 %    Basophils Relative 1 0 - 1 %    Neutrophils Absolute 3 27 1 85 - 7 62 Thousands/µL    Immature Grans Absolute 0 02 0 00 - 0 20 Thousand/uL    Lymphocytes Absolute 2 07 0 60 - 4 47 Thousands/µL    Monocytes Absolute 0 58 0 17 - 1 22 Thousand/µL    Eosinophils Absolute 0 03 0 00 - 0 61 Thousand/µL    Basophils Absolute 0 03 0 00 - 0 10 Thousands/µL   Comprehensive metabolic panel   Result Value Ref Range    Sodium 141 136 - 145 mmol/L    Potassium 3 4 (L) 3 5 - 5 3 mmol/L    Chloride 110 (H) 100 - 108 mmol/L    CO2 24 21 - 32 mmol/L    ANION GAP 7 4 - 13 mmol/L    BUN 13 5 - 25 mg/dL    Creatinine 0 62 0 60 - 1 30 mg/dL    Glucose 90 65 - 140 mg/dL    Calcium 8 6 8 3 - 10 1 mg/dL    AST 17 5 - 45 U/L    ALT 20 12 - 78 U/L    Alkaline Phosphatase 80 46 - 116 U/L    Total Protein 7 5 6 4 - 8 2 g/dL    Albumin 3 4 (L) 3 5 - 5 0 g/dL    Total Bilirubin 0 20 0 20 - 1 00 mg/dL    eGFR 110 ml/min/1 73sq m   Troponin I   Result Value Ref Range    Troponin I 0 60 (H) <=0 04 ng/mL   Lipid panel   Result Value Ref Range    Cholesterol 224 (H) 50 - 200 mg/dL    Triglycerides 130 <=150 mg/dL    HDL, Direct 48 >=40 mg/dL    LDL Calculated 150 (H) 0 - 100 mg/dL    Non-HDL-Chol (CHOL-HDL) 176 mg/dl   Magnesium   Result Value Ref Range    Magnesium 2 0 1 6 - 2 6 mg/dL   Protime-INR   Result Value Ref Range    Protime 12 2 11 6 - 14 5 seconds    INR 0 94 0 84 - 1 19   APTT   Result Value Ref Range    PTT 27 23 - 37 seconds   Troponin I   Result Value Ref Range    Troponin I 3 49 (H) <=0 04 ng/mL   Troponin I   Result Value Ref Range    Troponin I 4 69 (H) <=0 04 ng/mL   Pregnancy Test (HCG Qualitative)   Result Value Ref Range    Preg, Serum Negative Negative   Troponin I   Result Value Ref Range    Troponin I 4 91 (H) <=0 04 ng/mL   Comprehensive metabolic panel   Result Value Ref Range    Sodium 142 136 - 145 mmol/L    Potassium 3 4 (L) 3 5 - 5 3 mmol/L    Chloride 111 (H) 100 - 108 mmol/L    CO2 24 21 - 32 mmol/L    ANION GAP 7 4 - 13 mmol/L    BUN 10 5 - 25 mg/dL    Creatinine 0 56 (L) 0 60 - 1 30 mg/dL    Glucose 106 65 - 140 mg/dL    Calcium 8 2 (L) 8 3 - 10 1 mg/dL    AST 25 5 - 45 U/L    ALT 16 12 - 78 U/L    Alkaline Phosphatase 65 46 - 116 U/L    Total Protein 6 1 (L) 6 4 - 8 2 g/dL    Albumin 2 9 (L) 3 5 - 5 0 g/dL    Total Bilirubin 0 26 0 20 - 1 00 mg/dL    eGFR 114 ml/min/1 73sq m   Magnesium   Result Value Ref Range    Magnesium 1 9 1 6 - 2 6 mg/dL   Phosphorus   Result Value Ref Range    Phosphorus 3 0 2 7 - 4 5 mg/dL   Lipid panel   Result Value Ref Range    Cholesterol 177 50 - 200 mg/dL    Triglycerides 101 <=150 mg/dL    HDL, Direct 40 >=40 mg/dL    LDL Calculated 117 (H) 0 - 100 mg/dL    Non-HDL-Chol (CHOL-HDL) 137 mg/dl   CBC (With Platelets)   Result Value Ref Range    WBC 7 31 4 31 - 10 16 Thousand/uL    RBC 3 78 (L) 3 81 - 5 12 Million/uL    Hemoglobin 10 6 (L) 11 5 - 15 4 g/dL    Hematocrit 33 2 (L) 34 8 - 46 1 %    MCV 88 82 - 98 fL    MCH 28 0 26 8 - 34 3 pg    MCHC 31 9 31 4 - 37 4 g/dL    RDW 15 6 (H) 11 6 - 15 1 %    Platelets 717 490 - 319 Thousands/uL    MPV 9 9 8 9 - 12 7 fL   TSH, 3rd generation   Result Value Ref Range    TSH 3RD GENERATON 1 960 0 358 - 3 740 uIU/mL   ECG 12 lead   Result Value Ref Range    Ventricular Rate 79 BPM    Atrial Rate 79 BPM    IA Interval 140 ms    QRSD Interval 78 ms    QT Interval 352 ms    QTC Interval 403 ms    P [place] 69 degrees    QRS Axis -9 degrees    T Wave Axis 158 degrees   ECG 12 lead Result Value Ref Range    Ventricular Rate 80 BPM    Atrial Rate 80 BPM    CA Interval 144 ms    QRSD Interval 82 ms    QT Interval 390 ms    QTC Interval 449 ms    P [place] 73 degrees    QRS Axis -14 degrees    T Wave Axis 195 degrees   POCT activated clotting time   Result Value Ref Range    Activated Clotting Time, i-STAT 248 (H) 89 - 137 sec    Specimen Type VENOUS        No orders of the defined types were placed in this encounter  ALLERGIES:  Allergies   Allergen Reactions    Augmentin [Amoxicillin-Pot Clavulanate] Vomiting     Vomiting and  myalgias       Current Medications     Current Outpatient Medications   Medication Sig Dispense Refill    aspirin 81 mg chewable tablet Chew 1 tablet (81 mg total) daily 30 tablet 11    atorvastatin (LIPITOR) 80 mg tablet Take 1 tablet (80 mg total) by mouth daily with dinner 90 tablet 3    irbesartan (AVAPRO) 300 mg tablet Take 1 tablet (300 mg total) by mouth daily 90 tablet 3    LORazepam (ATIVAN) 0 5 mg tablet Take 1 tablet (0 5 mg total) by mouth 2 (two) times a day as needed for anxiety 180 tablet 0    metoprolol succinate (TOPROL-XL) 50 mg 24 hr tablet Take 1 tablet (50 mg total) by mouth daily 90 tablet 3    nitroglycerin (NITROSTAT) 0 4 mg SL tablet Place 1 tablet (0 4 mg total) under the tongue every 5 (five) minutes as needed for chest pain 50 tablet 3    sertraline (ZOLOFT) 100 mg tablet Take 1 tablet (100 mg total) by mouth 2 (two) times a day 180 tablet 3    ticagrelor (BRILINTA) 90 MG Take 1 tablet (90 mg total) by mouth 2 (two) times a day 180 tablet 3    cefprosil (CEFZIL) 500 MG tablet Take 1 tablet (500 mg total) by mouth 2 (two) times a day for 7 days 14 tablet 0     No current facility-administered medications for this visit  There are no discontinued medications      Health Maintenance     Health Maintenance   Topic Date Due    MAMMOGRAM  1975    Pneumococcal Vaccine: Pediatrics (0 to 5 Years) and At-Risk Patients (6 to 59 Years) (1 of 1 - PPSV23) 09/05/1981    Influenza Vaccine  07/01/2019    Annual Physical  12/12/2020    BMI: Followup Plan  12/16/2020    BMI: Adult  01/21/2021    Cervical Cancer Screening  01/24/2021    DTaP,Tdap,and Td Vaccines (2 - Td) 07/12/2024    Pneumococcal Vaccine: 65+ Years (1 of 2 - PCV13) 09/05/2040    HIV Screening  Completed    HIB Vaccine  Aged Out    Hepatitis B Vaccine  Aged Out    IPV Vaccine  Aged Out    Hepatitis A Vaccine  Aged Out    Meningococcal ACWY Vaccine  Aged Out    HPV Vaccine  Aged [employer] History   Administered Date(s) Administered     Influenza (IM) Preservative Free 10/29/2018    Influenza TIV (IM) 10/16/2015    Tdap 07/12/2014    Tuberculin Skin Test-PPD Intradermal 09/10/2012       Fuentes Sanchez MD

## 2020-01-22 ENCOUNTER — APPOINTMENT (OUTPATIENT)
Dept: CARDIAC REHAB | Age: 45
End: 2020-01-22
Payer: COMMERCIAL

## 2020-01-23 ENCOUNTER — TELEPHONE (OUTPATIENT)
Dept: CARDIOLOGY CLINIC | Facility: CLINIC | Age: 45
End: 2020-01-23

## 2020-01-24 ENCOUNTER — APPOINTMENT (OUTPATIENT)
Dept: CARDIAC REHAB | Age: 45
End: 2020-01-24
Payer: COMMERCIAL

## 2020-01-27 ENCOUNTER — TELEPHONE (OUTPATIENT)
Dept: OTHER | Facility: HOSPITAL | Age: 45
End: 2020-01-27

## 2020-01-27 ENCOUNTER — APPOINTMENT (OUTPATIENT)
Dept: CARDIAC REHAB | Age: 45
End: 2020-01-27
Payer: COMMERCIAL

## 2020-01-27 NOTE — TELEPHONE ENCOUNTER
Attempted to contact the patient to see if she would be interested in participating in the HARP Stress study with Dr Martine Gonzalez  Left a voice at this time asking the patient to please call back to get more detail about the study

## 2020-01-29 ENCOUNTER — APPOINTMENT (OUTPATIENT)
Dept: CARDIAC REHAB | Age: 45
End: 2020-01-29
Payer: COMMERCIAL

## 2020-01-31 ENCOUNTER — APPOINTMENT (OUTPATIENT)
Dept: CARDIAC REHAB | Age: 45
End: 2020-01-31
Payer: COMMERCIAL

## 2020-01-31 PROCEDURE — 93010 ELECTROCARDIOGRAM REPORT: CPT | Performed by: INTERNAL MEDICINE

## 2020-02-05 ENCOUNTER — TELEPHONE (OUTPATIENT)
Dept: BEHAVIORAL/MENTAL HEALTH CLINIC | Facility: CLINIC | Age: 45
End: 2020-02-05

## 2020-02-05 NOTE — TELEPHONE ENCOUNTER
[id number] on voicemail that this writer is not covered by her insurance  Gave option to see this writer as self-pay patient, or suggested that she contact member services on back of insurance card to obtain list of therapists in her network  Also asked that she call and cancel appointment should she not wish to keep it       ----- Message from Chapo Acevedo sent at 1/29/2020  7:49 AM EST -----  Regarding: cori/kai preferred  New pt scheduled 2/12 unfortunately has [facility] Preferred

## 2020-02-06 ENCOUNTER — APPOINTMENT (OUTPATIENT)
Dept: LAB | Facility: CLINIC | Age: 45
End: 2020-02-06
Payer: COMMERCIAL

## 2020-02-06 DIAGNOSIS — D64.9 ANEMIA, UNSPECIFIED TYPE: ICD-10-CM

## 2020-02-06 DIAGNOSIS — I21.21 STEMI INVOLVING LEFT CIRCUMFLEX CORONARY ARTERY (HCC): ICD-10-CM

## 2020-02-06 DIAGNOSIS — E78.00 ELEVATED CHOLESTEROL: ICD-10-CM

## 2020-02-06 LAB
ANION GAP SERPL CALCULATED.3IONS-SCNC: 4 MMOL/L (ref 4–13)
BUN SERPL-MCNC: 16 MG/DL (ref 5–25)
CALCIUM SERPL-MCNC: 8.9 MG/DL (ref 8.3–10.1)
CHLORIDE SERPL-SCNC: 108 MMOL/L (ref 100–108)
CHOLEST SERPL-MCNC: 122 MG/DL (ref 50–200)
CO2 SERPL-SCNC: 25 MMOL/L (ref 21–32)
CREAT SERPL-MCNC: 0.63 MG/DL (ref 0.6–1.3)
ERYTHROCYTE [DISTWIDTH] IN BLOOD BY AUTOMATED COUNT: 15 % (ref 11.6–15.1)
GFR SERPL CREATININE-BSD FRML MDRD: 109 ML/MIN/1.73SQ M
GLUCOSE P FAST SERPL-MCNC: 82 MG/DL (ref 65–99)
HCT VFR BLD AUTO: 35.2 % (ref 34.8–46.1)
HDLC SERPL-MCNC: 49 MG/DL
HGB BLD-MCNC: 10.6 G/DL (ref 11.5–15.4)
IRON SERPL-MCNC: 42 UG/DL (ref 50–170)
LDLC SERPL CALC-MCNC: 60 MG/DL (ref 0–100)
MCH RBC QN AUTO: 26.1 PG (ref 26.8–34.3)
MCHC RBC AUTO-ENTMCNC: 30.1 G/DL (ref 31.4–37.4)
MCV RBC AUTO: 87 FL (ref 82–98)
PLATELET # BLD AUTO: 277 THOUSANDS/UL (ref 149–390)
PMV BLD AUTO: 10.3 FL (ref 8.9–12.7)
POTASSIUM SERPL-SCNC: 3.9 MMOL/L (ref 3.5–5.3)
RBC # BLD AUTO: 4.06 MILLION/UL (ref 3.81–5.12)
SODIUM SERPL-SCNC: 137 MMOL/L (ref 136–145)
TRIGL SERPL-MCNC: 63 MG/DL
VIT B12 SERPL-MCNC: 509 PG/ML (ref 100–900)
WBC # BLD AUTO: 5.79 THOUSAND/UL (ref 4.31–10.16)

## 2020-02-06 PROCEDURE — 83540 ASSAY OF IRON: CPT

## 2020-02-06 PROCEDURE — 80048 BASIC METABOLIC PNL TOTAL CA: CPT

## 2020-02-06 PROCEDURE — 85027 COMPLETE CBC AUTOMATED: CPT

## 2020-02-06 PROCEDURE — 82607 VITAMIN B-12: CPT

## 2020-02-06 PROCEDURE — 36415 COLL VENOUS BLD VENIPUNCTURE: CPT

## 2020-02-06 PROCEDURE — 80061 LIPID PANEL: CPT

## 2020-02-07 ENCOUNTER — TELEPHONE (OUTPATIENT)
Dept: FAMILY MEDICINE CLINIC | Facility: CLINIC | Age: 45
End: 2020-02-07

## 2020-02-07 DIAGNOSIS — D50.9 IRON DEFICIENCY ANEMIA, UNSPECIFIED IRON DEFICIENCY ANEMIA TYPE: Primary | ICD-10-CM

## 2020-02-07 RX ORDER — IRON POLYSACCHARIDE COMPLEX 150 MG
150 CAPSULE ORAL DAILY
Qty: 30 CAPSULE | Refills: 3 | Status: SHIPPED | OUTPATIENT
Start: 2020-02-07 | End: 2020-10-06

## 2020-02-07 NOTE — TELEPHONE ENCOUNTER
Please contact patient  I received her blood work  Her hemoglobin is still decrease in iron level is still decreased  I would like her to start iron supplements  I will send prescription to the pharmacy  She should continue same daily medications otherwise    Thank you

## 2020-02-11 ENCOUNTER — OFFICE VISIT (OUTPATIENT)
Dept: CARDIOLOGY CLINIC | Facility: CLINIC | Age: 45
End: 2020-02-11
Payer: COMMERCIAL

## 2020-02-11 VITALS
DIASTOLIC BLOOD PRESSURE: 84 MMHG | BODY MASS INDEX: 30.12 KG/M2 | HEIGHT: 63 IN | SYSTOLIC BLOOD PRESSURE: 128 MMHG | WEIGHT: 170 LBS | HEART RATE: 78 BPM | OXYGEN SATURATION: 98 %

## 2020-02-11 DIAGNOSIS — I25.10 CORONARY ARTERY DISEASE INVOLVING NATIVE CORONARY ARTERY OF NATIVE HEART WITHOUT ANGINA PECTORIS: Primary | ICD-10-CM

## 2020-02-11 DIAGNOSIS — I10 BENIGN ESSENTIAL HYPERTENSION: ICD-10-CM

## 2020-02-11 PROCEDURE — 1111F DSCHRG MED/CURRENT MED MERGE: CPT | Performed by: INTERNAL MEDICINE

## 2020-02-11 PROCEDURE — 3008F BODY MASS INDEX DOCD: CPT | Performed by: INTERNAL MEDICINE

## 2020-02-11 PROCEDURE — 99214 OFFICE O/P EST MOD 30 MIN: CPT | Performed by: INTERNAL MEDICINE

## 2020-02-11 PROCEDURE — 3079F DIAST BP 80-89 MM HG: CPT | Performed by: INTERNAL MEDICINE

## 2020-02-11 PROCEDURE — 1036F TOBACCO NON-USER: CPT | Performed by: INTERNAL MEDICINE

## 2020-02-11 PROCEDURE — 3074F SYST BP LT 130 MM HG: CPT | Performed by: INTERNAL MEDICINE

## 2020-02-11 NOTE — PROGRESS NOTES
Cardiology Follow Up    Tala Martinez  1975  [id number]  [facility]  [address] [facility] [address]  [address] [facility] [address] [phone]    1  Coronary artery disease involving native coronary artery of native heart without angina pectoris     2  Benign essential hypertension         Discussion/Summary:    - CAD, MI in 12/2019  4 stents placed between LAD, LCx, OM1  EF low normal  Currently without angina  Was unable to do all of cardiac rehab, but did learn some helpful methods from the few visits she was able to attend  Has quit smoking almost entirely (very occasional rare cigarette)  Discussed dangers of smoking, even small amounts  At a very young age of developing CAD, we need to be as aggressive as possible about reducing risk of recurrence  Smoking cessation is key  Continue with BP control, lipid panel well controlled as well  She will continue DAPT for 1 year  Anemic with this, but heavy menses  Will start iron  - HTN: BP controlled    - HL: Lipid panel improved with atorvastatin  History of Present Illness:     Basil Gonzales is 40years old  She was previously seen as an outpatient by Dr Darrell Russell  She had symptoms of angina, a borderline/abnormal stress test and was planned for a cardiac catheterization, but developed acceleration of symptoms in the meantime she developed an MI  She had several stents placed: MYA to the LAD, LCx (culprit for MI), OM x 2  Her EF was low normal post MI  Since discharge, repeat blood work showed improvement in lipid panel (LDL went from 117 to 60)  She has mostly quit smoking  Still occasionally has a craving and has a cigarette, but feels very guilty about this  Also struggling some with depression post MI, and is going to see counselor tomorrow  Could only do a few sessions of cardiac rehab because of cost     3 children, age range 7-18   No known problems in them     Anemic  She is to start iron, having very heavy menses with being on DAPT  Gets a few sharp chest pains, different than the chest pressure she had previously  Problem List     Benign essential hypertension    Depression with anxiety    Leiomyoma of uterus    Tremor of face and hands    Numbness and tingling of left leg    Syncope    Impaired fasting glucose    Mitral valve regurgitation    STEMI involving left circumflex coronary artery (HCC)    Smoker    Coronary artery disease involving native coronary artery of native heart without angina pectoris    Overview Signed 2020  3:19 PM by Ori Lanza MD     · Emergent heart catheterization for ischemic EKG changes with he rise in troponins concerning for ACS 2019  · She had 3 stents placed in total, DESx2 to OM1 and DESx1 to the mid LAD  · Patient is on regimen of aspirin, Brilinta, metoprolol and Lipitor 80 mg daily  ·   TTE showed an EF of 50% with some lateral wall abnormalities consistent where her ischemia was on heart catheterization  · Patient is starting cardiac rehabilitation  · Patient is [facility]           Iron deficiency anemia        Past Medical History:   Diagnosis Date    HTN (hypertension)     Mitral valve regurgitation     Myocardial infarction ([address] )     Preeclampsia      Social History     Tobacco Use    Smoking status: Former Smoker     Types: Cigarettes    Smokeless tobacco: Never Used   Substance Use Topics    Alcohol use:  Yes    Drug use: No      Family History   Problem Relation Age of Onset    Hypertension Father     Kidney disease Paternal Grandfather      Past Surgical History:   Procedure Laterality Date    CARDIAC CATHETERIZATION       SECTION      CORONARY STENT PLACEMENT      EYE SURGERY      TUBAL LIGATION         Current Outpatient Medications:     aspirin 81 mg chewable tablet, Chew 1 tablet (81 mg total) daily, Disp: 30 tablet, Rfl: 11   atorvastatin (LIPITOR) 80 mg tablet, Take 1 tablet (80 mg total) by mouth daily with dinner, Disp: 90 tablet, Rfl: 3    irbesartan (AVAPRO) 300 mg tablet, Take 1 tablet (300 mg total) by mouth daily, Disp: 90 tablet, Rfl: 3    LORazepam (ATIVAN) 0 5 mg tablet, Take 1 tablet (0 5 mg total) by mouth 2 (two) times a day as needed for anxiety, Disp: 180 tablet, Rfl: 0    metoprolol succinate (TOPROL-XL) 50 mg 24 hr tablet, Take 1 tablet (50 mg total) by mouth daily, Disp: 90 tablet, Rfl: 3    nitroglycerin (NITROSTAT) 0 4 mg SL tablet, Place 1 tablet (0 4 mg total) under the tongue every 5 (five) minutes as needed for chest pain, Disp: 50 tablet, Rfl: 3    sertraline (ZOLOFT) 100 mg tablet, Take 1 tablet (100 mg total) by mouth 2 (two) times a day, Disp: 180 tablet, Rfl: 3    ticagrelor (BRILINTA) 90 MG, Take 1 tablet (90 mg total) by mouth 2 (two) times a day, Disp: 180 tablet, Rfl: 3    iron polysaccharides (FERREX) 150 mg capsule, Take 1 capsule (150 mg total) by mouth daily, Disp: 30 capsule, Rfl: 3  Allergies   Allergen Reactions    Augmentin [Amoxicillin-Pot Clavulanate] Vomiting     Vomiting and  myalgias       Vitals:    02/11/20 1329   BP: 128/84   BP Location: Right arm   Patient Position: Sitting   Cuff Size: Large   Pulse: 78   SpO2: 98%   Weight: 77 1 kg (170 lb)   Height: 5' 3" (1 6 m)     Vitals:    02/11/20 1329   Weight: 77 1 kg (170 lb)      Height: 5' 3" (160 cm)   Body mass index is 30 11 kg/m²      Physical Exam:  GENERAL: Alert, well appearing, and in no distress  HEENT:  PERRL, EOMI, no scleral icterus, no conjunctival pallor  NECK:  Supple, No elevated JVP, no thyromegaly, no carotid bruits  HEART:  Regular rate and rhythm, normal S1/S2, no S3/S4, no murmur or rub  LUNGS:  Clear to auscultation bilaterally  ABDOMEN:  Soft, non-tender, positive bowel sounds, no rebound or guarding  EXTREMITIES:  No edema  VASCULAR:  Normal pedal pulses   NEURO: No focal deficits,  SKIN: Normal without suspicious lesions on exposed skin      ROS:  Positive for anxiety, depression, shortness of breath, sharp pains  Except as noted in HPI, is otherwise reviewed in detail and a 12 point review of systems is negative  Labs:  Lab Results   Component Value Date     10/27/2015    K 3 9 02/06/2020     02/06/2020    CREATININE 0 63 02/06/2020    BUN 16 02/06/2020    CO2 25 02/06/2020    ALT 16 12/29/2019    AST 25 12/29/2019    INR 0 94 12/28/2019    GLUF 82 02/06/2020    HGBA1C 5 4 10/16/2019    WBC 5 79 02/06/2020    HGB 10 6 (L) 02/06/2020    HCT 35 2 02/06/2020     02/06/2020       Lab Results   Component Value Date    CHOL 194 07/09/2015     Lab Results   Component Value Date    LDLCALC 60 02/06/2020    LDLCALC 117 (H) 12/29/2019    LDLCALC 150 (H) 12/28/2019     Lab Results   Component Value Date    HDL 49 02/06/2020    HDL 40 12/29/2019    HDL 48 12/28/2019     Lab Results   Component Value Date    TRIG 63 02/06/2020    TRIG 101 12/29/2019    TRIG 130 12/28/2019       Testing:  Cardiac Cath 12/28/19:  CORONARY CIRCULATION:  Mid LAD: There was a tubular 75 % stenosis  This is a likely culprit for the patient's clinical presentation  An intervention was performed  Mid circumflex: There was a 100 % stenosis at the origin of OM1  There was LOUISE grade 0 flow through the vessel (no flow)  This is a likely culprit for the patient's clinical presentation  An intervention was performed      1ST LESION INTERVENTIONS:  A successful balloon angioplasty with stent procedure was performed on the 99 % lesion in the mid circumflex  Following intervention there was an excellent angiographic appearance with a 0 % residual stenosis      2ND LESION INTERVENTIONS:  A successful drug-eluting stent with balloon angioplasty procedure was performed on the 99 % lesion in the 1st obtuse marginal  Following intervention there was an excellent angiographic appearance with a 0 % residual stenosis    A Earline Henley Rx 2 5 x 15mm drug-eluting stent was placed across the lesion and deployed at a maximum inflation pressure of 10 yolanda  A Xience [address] Rx 2 5 x 08mm drug-eluting stent was placed across the lesion and deployed at a maximum inflation pressure of 9 yolanda      3RD LESION INTERVENTIONS:  A successful drug-eluting stent with balloon angioplasty procedure was performed on the 75 % lesion in the mid LAD  Following intervention there was an excellent angiographic appearance with a 0 % residual stenosis  A Xience Lory Rx 3 0 x 28mm drug-eluting stent was placed across the lesion and deployed at a maximum inflation pressure of 16 yolanda  ï¾·Proximal LAD: Lesion 1: tubular, 40 % stenosis  ï¾·Mid LAD: Lesion 1: tubular, 75 % stenosis  ï¾·Mid circumflex: Lesion 1: 100 % stenosis  Intervention results  Native coronary lesions:  ï¾·Successful balloon angioplasty and stent of the 99 % stenosis in mid circumflex  Appearance excellent with 0 % residual stenosis  ï¾·Successful drug-eluting stent and balloon angioplasty of the 99 % stenosis in OM1  Appearance excellent with 0 % residual stenosis  Stent: Kobe Pardeepold Rx 2 5 x 15mm drug-eluting  Stent: Kobe Robertoold Rx 2 5 x 08mm drug-eluting  ï¾·Successful drug-eluting stent and balloon angioplasty of the 75 % stenosis in mid LAD  Appearance excellent with 0 % residual stenosis  Stent: Kobe Arnold Rx 3 0 x 28mm drug-eluting  Echo:  12/29/19:  LEFT VENTRICLE:  Systolic function was at the lower limits of normal  Ejection fraction was estimated to be 50 %  There was hypokinesis of the basal-mid inferior and the entire lateral walls  Doppler parameters were consistent with abnormal left ventricular relaxation (grade 1 diastolic dysfunction)      MITRAL VALVE:  There was mild regurgitation      COMPARISONS:  Comparison was made with the previous study of 06-Dec-2019  Regional wall motion abnormalities were now present, and ejection fraction has very mildly decreased

## 2020-02-12 ENCOUNTER — SOCIAL WORK (OUTPATIENT)
Dept: BEHAVIORAL/MENTAL HEALTH CLINIC | Facility: CLINIC | Age: 45
End: 2020-02-12
Payer: COMMERCIAL

## 2020-02-12 DIAGNOSIS — F43.23 ADJUSTMENT DISORDER WITH MIXED ANXIETY AND DEPRESSED MOOD: Primary | ICD-10-CM

## 2020-02-12 PROCEDURE — 1036F TOBACCO NON-USER: CPT | Performed by: PSYCHIATRY & NEUROLOGY

## 2020-02-12 PROCEDURE — 90834 PSYTX W PT 45 MINUTES: CPT | Performed by: PSYCHIATRY & NEUROLOGY

## 2020-02-12 NOTE — PSYCH
Assessment/Plan:      Diagnoses and all orders for this visit:    Adjustment disorder with mixed anxiety and depressed mood      Session time 9320-8582 (total time 45 minutes)    Subjective:     Patient ID: Romaine Chappell is a 40 y o  female  HPI Met with Chris Kwon for initial appointment  She shared that she has always had mental health issues and comes from a family with significant mental health issues, but that she has worked through a lot of that  Most of the people she had issues with have , and the only family she has left are her brother and sister in law  She is  to second , and has three children, a 23year old son, 5year old son and 11year old daughter  She said that she is very happy in her marriage and loves her kids  Her main issue is that she had a heart attack in December, had three stents placed, and now has to give up smoking, has to eat healthy and eliminate junk food and sweets, and has to exercise, and she is finding it extremely difficult to do all of that at the same time  She has started walking, and was getting into a regular routine, but the recent bad weather has stopped her  She also has cut her smoking down to only 3 cigarettes a day, down from 15 a day, and now none at all on breaks at work (works night shifts as a CNA at [employer])  She is trying to eat better, but still loves to have her sweets such as cookies with her coffee  Discussed the major change that she has gone through, including the enormous stress on her body of the heart attack and stent procedure  Encouraged Chris Kwon to recognize the success she has already had in cutting back on cigarettes and starting walking  Also discussed setting her expectations of herself somewhat lower and working on one healthy habit at a time instead of all at once    Chris Kwon said that she will hold trying to quit cigarettes for now (will try to keep steady at 3 per day) and will focus on walking regularly until that is an established habit  She will then decide what small changes to make from there  She will work on this and return for follow up in March, next available appointment    Review of Systems   Constitutional: Positive for appetite change and fatigue  Psychiatric/Behavioral: Positive for decreased concentration, dysphoric mood and sleep disturbance  The patient is nervous/anxious  Objective:     Physical Exam    Psychiatric: calm and cooperative with good eye contact; mood depressed and irritable; affect congruent with mood; thought process tangential at times; concentration impaired; speech loud, but normal rate and fluency, behavior normal; insight and judgment intact; denies SI HI and psychosis

## 2020-06-24 ENCOUNTER — TELEPHONE (OUTPATIENT)
Dept: FAMILY MEDICINE CLINIC | Facility: CLINIC | Age: 45
End: 2020-06-24

## 2020-06-24 DIAGNOSIS — I25.10 CORONARY ARTERY DISEASE INVOLVING NATIVE CORONARY ARTERY OF NATIVE HEART WITHOUT ANGINA PECTORIS: Primary | ICD-10-CM

## 2020-06-24 DIAGNOSIS — I10 BENIGN ESSENTIAL HYPERTENSION: ICD-10-CM

## 2020-06-24 RX ORDER — IRBESARTAN 300 MG/1
300 TABLET ORAL DAILY
Qty: 90 TABLET | Refills: 1 | Status: SHIPPED | OUTPATIENT
Start: 2020-06-24 | End: 2021-01-12

## 2020-09-04 ENCOUNTER — APPOINTMENT (OUTPATIENT)
Dept: LAB | Facility: CLINIC | Age: 45
End: 2020-09-04
Payer: COMMERCIAL

## 2020-09-04 ENCOUNTER — TELEPHONE (OUTPATIENT)
Dept: FAMILY MEDICINE CLINIC | Facility: CLINIC | Age: 45
End: 2020-09-04

## 2020-09-04 DIAGNOSIS — I25.10 CORONARY ARTERY DISEASE INVOLVING NATIVE CORONARY ARTERY OF NATIVE HEART WITHOUT ANGINA PECTORIS: ICD-10-CM

## 2020-09-04 LAB
ALBUMIN SERPL BCP-MCNC: 3.6 G/DL (ref 3.5–5)
ALP SERPL-CCNC: 69 U/L (ref 46–116)
ALT SERPL W P-5'-P-CCNC: 22 U/L (ref 12–78)
ANION GAP SERPL CALCULATED.3IONS-SCNC: 6 MMOL/L (ref 4–13)
AST SERPL W P-5'-P-CCNC: 22 U/L (ref 5–45)
BILIRUB SERPL-MCNC: 0.36 MG/DL (ref 0.2–1)
BUN SERPL-MCNC: 16 MG/DL (ref 5–25)
CALCIUM SERPL-MCNC: 8.7 MG/DL (ref 8.3–10.1)
CHLORIDE SERPL-SCNC: 109 MMOL/L (ref 100–108)
CHOLEST SERPL-MCNC: 118 MG/DL (ref 50–200)
CO2 SERPL-SCNC: 23 MMOL/L (ref 21–32)
CREAT SERPL-MCNC: 0.63 MG/DL (ref 0.6–1.3)
ERYTHROCYTE [DISTWIDTH] IN BLOOD BY AUTOMATED COUNT: 14.6 % (ref 11.6–15.1)
GFR SERPL CREATININE-BSD FRML MDRD: 109 ML/MIN/1.73SQ M
GLUCOSE P FAST SERPL-MCNC: 90 MG/DL (ref 65–99)
HCT VFR BLD AUTO: 40.3 % (ref 34.8–46.1)
HDLC SERPL-MCNC: 45 MG/DL
HGB BLD-MCNC: 12.8 G/DL (ref 11.5–15.4)
LDLC SERPL CALC-MCNC: 63 MG/DL (ref 0–100)
MCH RBC QN AUTO: 29.4 PG (ref 26.8–34.3)
MCHC RBC AUTO-ENTMCNC: 31.8 G/DL (ref 31.4–37.4)
MCV RBC AUTO: 93 FL (ref 82–98)
PLATELET # BLD AUTO: 218 THOUSANDS/UL (ref 149–390)
PMV BLD AUTO: 11.1 FL (ref 8.9–12.7)
POTASSIUM SERPL-SCNC: 4 MMOL/L (ref 3.5–5.3)
PROT SERPL-MCNC: 7.4 G/DL (ref 6.4–8.2)
RBC # BLD AUTO: 4.35 MILLION/UL (ref 3.81–5.12)
SODIUM SERPL-SCNC: 138 MMOL/L (ref 136–145)
TRIGL SERPL-MCNC: 52 MG/DL
TSH SERPL DL<=0.05 MIU/L-ACNC: 1.38 UIU/ML (ref 0.36–3.74)
WBC # BLD AUTO: 5.83 THOUSAND/UL (ref 4.31–10.16)

## 2020-09-04 PROCEDURE — 80061 LIPID PANEL: CPT

## 2020-09-04 PROCEDURE — 85027 COMPLETE CBC AUTOMATED: CPT

## 2020-09-04 PROCEDURE — 84443 ASSAY THYROID STIM HORMONE: CPT

## 2020-09-04 PROCEDURE — 36415 COLL VENOUS BLD VENIPUNCTURE: CPT

## 2020-09-04 PROCEDURE — 80053 COMPREHEN METABOLIC PANEL: CPT

## 2020-09-30 ENCOUNTER — OFFICE VISIT (OUTPATIENT)
Dept: FAMILY MEDICINE CLINIC | Facility: CLINIC | Age: 45
End: 2020-09-30
Payer: COMMERCIAL

## 2020-09-30 VITALS
TEMPERATURE: 97.4 F | DIASTOLIC BLOOD PRESSURE: 84 MMHG | BODY MASS INDEX: 30.3 KG/M2 | OXYGEN SATURATION: 98 % | HEIGHT: 63 IN | RESPIRATION RATE: 17 BRPM | SYSTOLIC BLOOD PRESSURE: 124 MMHG | WEIGHT: 171 LBS | HEART RATE: 63 BPM

## 2020-09-30 DIAGNOSIS — I10 BENIGN ESSENTIAL HYPERTENSION: ICD-10-CM

## 2020-09-30 DIAGNOSIS — D25.9 UTERINE LEIOMYOMA, UNSPECIFIED LOCATION: ICD-10-CM

## 2020-09-30 DIAGNOSIS — I25.10 CORONARY ARTERY DISEASE INVOLVING NATIVE CORONARY ARTERY OF NATIVE HEART WITHOUT ANGINA PECTORIS: Primary | ICD-10-CM

## 2020-09-30 DIAGNOSIS — Z23 ENCOUNTER FOR IMMUNIZATION: ICD-10-CM

## 2020-09-30 DIAGNOSIS — F41.8 DEPRESSION WITH ANXIETY: ICD-10-CM

## 2020-09-30 DIAGNOSIS — D50.9 IRON DEFICIENCY ANEMIA, UNSPECIFIED IRON DEFICIENCY ANEMIA TYPE: ICD-10-CM

## 2020-09-30 DIAGNOSIS — Z71.6 TOBACCO ABUSE COUNSELING: Chronic | ICD-10-CM

## 2020-09-30 PROCEDURE — 1036F TOBACCO NON-USER: CPT | Performed by: FAMILY MEDICINE

## 2020-09-30 PROCEDURE — 3079F DIAST BP 80-89 MM HG: CPT | Performed by: FAMILY MEDICINE

## 2020-09-30 PROCEDURE — 99215 OFFICE O/P EST HI 40 MIN: CPT | Performed by: FAMILY MEDICINE

## 2020-09-30 PROCEDURE — 90732 PPSV23 VACC 2 YRS+ SUBQ/IM: CPT

## 2020-09-30 PROCEDURE — 90471 IMMUNIZATION ADMIN: CPT

## 2020-10-02 ENCOUNTER — TELEPHONE (OUTPATIENT)
Dept: FAMILY MEDICINE CLINIC | Facility: CLINIC | Age: 45
End: 2020-10-02

## 2020-10-02 DIAGNOSIS — Z20.828 EXPOSURE TO SARS-ASSOCIATED CORONAVIRUS: ICD-10-CM

## 2020-10-02 DIAGNOSIS — J01.90 ACUTE SINUSITIS, RECURRENCE NOT SPECIFIED, UNSPECIFIED LOCATION: ICD-10-CM

## 2020-10-02 DIAGNOSIS — Z20.828 EXPOSURE TO SARS-ASSOCIATED CORONAVIRUS: Primary | ICD-10-CM

## 2020-10-02 PROCEDURE — U0003 INFECTIOUS AGENT DETECTION BY NUCLEIC ACID (DNA OR RNA); SEVERE ACUTE RESPIRATORY SYNDROME CORONAVIRUS 2 (SARS-COV-2) (CORONAVIRUS DISEASE [COVID-19]), AMPLIFIED PROBE TECHNIQUE, MAKING USE OF HIGH THROUGHPUT TECHNOLOGIES AS DESCRIBED BY CMS-2020-01-R: HCPCS | Performed by: FAMILY MEDICINE

## 2020-10-02 RX ORDER — CEFPROZIL 500 MG/1
500 TABLET, FILM COATED ORAL 2 TIMES DAILY
Qty: 14 TABLET | Refills: 0 | Status: SHIPPED | OUTPATIENT
Start: 2020-10-02 | End: 2020-10-09

## 2020-10-03 LAB — SARS-COV-2 RNA SPEC QL NAA+PROBE: NOT DETECTED

## 2020-10-05 ENCOUNTER — TELEPHONE (OUTPATIENT)
Dept: FAMILY MEDICINE CLINIC | Facility: CLINIC | Age: 45
End: 2020-10-05

## 2020-10-06 ENCOUNTER — TELEPHONE (OUTPATIENT)
Dept: FAMILY MEDICINE CLINIC | Facility: CLINIC | Age: 45
End: 2020-10-06

## 2020-10-06 ENCOUNTER — PATIENT OUTREACH (OUTPATIENT)
Dept: FAMILY MEDICINE CLINIC | Facility: CLINIC | Age: 45
End: 2020-10-06

## 2020-10-06 ENCOUNTER — TELEPHONE (OUTPATIENT)
Dept: OBGYN CLINIC | Facility: CLINIC | Age: 45
End: 2020-10-06

## 2020-10-06 PROBLEM — F43.23 ADJUSTMENT DISORDER WITH MIXED ANXIETY AND DEPRESSED MOOD: Status: RESOLVED | Noted: 2020-02-12 | Resolved: 2020-10-06

## 2020-10-06 PROBLEM — Z71.6 TOBACCO ABUSE COUNSELING: Chronic | Status: ACTIVE | Noted: 2019-12-30

## 2020-10-06 PROBLEM — R55 SYNCOPE: Status: RESOLVED | Noted: 2019-07-28 | Resolved: 2020-10-06

## 2020-10-23 ENCOUNTER — PATIENT OUTREACH (OUTPATIENT)
Dept: FAMILY MEDICINE CLINIC | Facility: CLINIC | Age: 45
End: 2020-10-23

## 2020-11-19 ENCOUNTER — OFFICE VISIT (OUTPATIENT)
Dept: CARDIOLOGY CLINIC | Facility: CLINIC | Age: 45
End: 2020-11-19
Payer: COMMERCIAL

## 2020-11-19 VITALS
OXYGEN SATURATION: 98 % | HEIGHT: 63 IN | BODY MASS INDEX: 30.65 KG/M2 | HEART RATE: 72 BPM | DIASTOLIC BLOOD PRESSURE: 80 MMHG | WEIGHT: 173 LBS | SYSTOLIC BLOOD PRESSURE: 128 MMHG | TEMPERATURE: 97.5 F

## 2020-11-19 DIAGNOSIS — R00.2 PALPITATIONS: ICD-10-CM

## 2020-11-19 DIAGNOSIS — D50.9 IRON DEFICIENCY ANEMIA, UNSPECIFIED IRON DEFICIENCY ANEMIA TYPE: ICD-10-CM

## 2020-11-19 DIAGNOSIS — I10 BENIGN ESSENTIAL HYPERTENSION: ICD-10-CM

## 2020-11-19 DIAGNOSIS — R73.01 IMPAIRED FASTING GLUCOSE: ICD-10-CM

## 2020-11-19 DIAGNOSIS — I25.10 CORONARY ARTERY DISEASE INVOLVING NATIVE CORONARY ARTERY OF NATIVE HEART WITHOUT ANGINA PECTORIS: Primary | ICD-10-CM

## 2020-11-19 PROBLEM — I34.0 MITRAL VALVE REGURGITATION: Status: RESOLVED | Noted: 2019-12-10 | Resolved: 2020-11-19

## 2020-11-19 PROCEDURE — 99214 OFFICE O/P EST MOD 30 MIN: CPT | Performed by: INTERNAL MEDICINE

## 2020-11-19 PROCEDURE — 93000 ELECTROCARDIOGRAM COMPLETE: CPT | Performed by: INTERNAL MEDICINE

## 2020-11-19 RX ORDER — ASPIRIN 81 MG/1
81 TABLET ORAL DAILY
Start: 2020-11-19

## 2020-11-24 ENCOUNTER — OFFICE VISIT (OUTPATIENT)
Dept: OBGYN CLINIC | Facility: CLINIC | Age: 45
End: 2020-11-24
Payer: COMMERCIAL

## 2020-11-24 VITALS
SYSTOLIC BLOOD PRESSURE: 158 MMHG | HEIGHT: 63 IN | BODY MASS INDEX: 30.87 KG/M2 | DIASTOLIC BLOOD PRESSURE: 100 MMHG | WEIGHT: 174.2 LBS

## 2020-11-24 DIAGNOSIS — N93.9 ABNORMAL UTERINE BLEEDING (AUB): Primary | ICD-10-CM

## 2020-11-24 PROCEDURE — 3080F DIAST BP >= 90 MM HG: CPT | Performed by: OBSTETRICS & GYNECOLOGY

## 2020-11-24 PROCEDURE — 99213 OFFICE O/P EST LOW 20 MIN: CPT | Performed by: OBSTETRICS & GYNECOLOGY

## 2020-11-24 PROCEDURE — 58100 BIOPSY OF UTERUS LINING: CPT | Performed by: OBSTETRICS & GYNECOLOGY

## 2020-11-24 PROCEDURE — 3008F BODY MASS INDEX DOCD: CPT | Performed by: OBSTETRICS & GYNECOLOGY

## 2020-11-24 PROCEDURE — 4004F PT TOBACCO SCREEN RCVD TLK: CPT | Performed by: OBSTETRICS & GYNECOLOGY

## 2020-11-24 PROCEDURE — 3077F SYST BP >= 140 MM HG: CPT | Performed by: OBSTETRICS & GYNECOLOGY

## 2020-11-24 RX ORDER — PNV NO.95/FERROUS FUM/FOLIC AC 28MG-0.8MG
TABLET ORAL DAILY
COMMUNITY

## 2020-11-27 ENCOUNTER — HOSPITAL ENCOUNTER (OUTPATIENT)
Dept: NON INVASIVE DIAGNOSTICS | Facility: CLINIC | Age: 45
Discharge: HOME | End: 2020-11-27
Payer: COMMERCIAL

## 2020-11-27 DIAGNOSIS — R00.2 PALPITATIONS: ICD-10-CM

## 2020-11-27 PROCEDURE — 93226 XTRNL ECG REC<48 HR SCAN A/R: CPT

## 2020-11-27 PROCEDURE — 93225 XTRNL ECG REC<48 HRS REC: CPT

## 2020-12-02 LAB
CLINICAL INFO: NORMAL
PATH REPORT.COMMENTS IMP SPEC: NORMAL
SPECIMEN SOURCE: NORMAL

## 2020-12-03 PROCEDURE — 93227 XTRNL ECG REC<48 HR R&I: CPT | Performed by: INTERNAL MEDICINE

## 2020-12-04 ENCOUNTER — TELEPHONE (OUTPATIENT)
Dept: CARDIOLOGY CLINIC | Facility: CLINIC | Age: 45
End: 2020-12-04

## 2020-12-07 ENCOUNTER — TELEPHONE (OUTPATIENT)
Dept: CARDIOLOGY CLINIC | Facility: CLINIC | Age: 45
End: 2020-12-07

## 2020-12-08 ENCOUNTER — OFFICE VISIT (OUTPATIENT)
Dept: OBGYN CLINIC | Facility: CLINIC | Age: 45
End: 2020-12-08
Payer: COMMERCIAL

## 2020-12-08 VITALS
SYSTOLIC BLOOD PRESSURE: 160 MMHG | WEIGHT: 176.2 LBS | DIASTOLIC BLOOD PRESSURE: 100 MMHG | BODY MASS INDEX: 31.22 KG/M2 | HEIGHT: 63 IN

## 2020-12-08 DIAGNOSIS — N92.1 MENORRHAGIA WITH IRREGULAR CYCLE: Primary | ICD-10-CM

## 2020-12-08 PROCEDURE — 3077F SYST BP >= 140 MM HG: CPT | Performed by: OBSTETRICS & GYNECOLOGY

## 2020-12-08 PROCEDURE — 3008F BODY MASS INDEX DOCD: CPT | Performed by: OBSTETRICS & GYNECOLOGY

## 2020-12-08 PROCEDURE — 3080F DIAST BP >= 90 MM HG: CPT | Performed by: OBSTETRICS & GYNECOLOGY

## 2020-12-08 PROCEDURE — 99213 OFFICE O/P EST LOW 20 MIN: CPT | Performed by: OBSTETRICS & GYNECOLOGY

## 2020-12-08 PROCEDURE — 4004F PT TOBACCO SCREEN RCVD TLK: CPT | Performed by: OBSTETRICS & GYNECOLOGY

## 2021-01-12 DIAGNOSIS — I10 BENIGN ESSENTIAL HYPERTENSION: ICD-10-CM

## 2021-01-12 DIAGNOSIS — Z01.419 ENCOUNTER FOR ANNUAL ROUTINE GYNECOLOGICAL EXAMINATION: ICD-10-CM

## 2021-01-12 DIAGNOSIS — I21.21 STEMI INVOLVING LEFT CIRCUMFLEX CORONARY ARTERY (HCC): ICD-10-CM

## 2021-01-12 RX ORDER — IRBESARTAN 300 MG/1
TABLET ORAL
Qty: 90 TABLET | Refills: 3 | Status: SHIPPED | OUTPATIENT
Start: 2021-01-12 | End: 2021-05-27 | Stop reason: SDUPTHER

## 2021-01-12 RX ORDER — SERTRALINE HYDROCHLORIDE 100 MG/1
TABLET, FILM COATED ORAL
Qty: 180 TABLET | Refills: 3 | Status: SHIPPED | OUTPATIENT
Start: 2021-01-12 | End: 2022-02-18

## 2021-01-12 RX ORDER — METOPROLOL SUCCINATE 50 MG/1
TABLET, EXTENDED RELEASE ORAL
Qty: 90 TABLET | Refills: 3 | Status: SHIPPED | OUTPATIENT
Start: 2021-01-12 | End: 2022-02-23 | Stop reason: SDUPTHER

## 2021-01-12 RX ORDER — ATORVASTATIN CALCIUM 80 MG/1
TABLET, FILM COATED ORAL
Qty: 90 TABLET | Refills: 3 | Status: SHIPPED | OUTPATIENT
Start: 2021-01-12 | End: 2022-02-18

## 2021-01-22 ENCOUNTER — TELEMEDICINE (OUTPATIENT)
Dept: FAMILY MEDICINE CLINIC | Facility: CLINIC | Age: 46
End: 2021-01-22
Payer: COMMERCIAL

## 2021-01-22 DIAGNOSIS — J32.9 SINUSITIS, UNSPECIFIED CHRONICITY, UNSPECIFIED LOCATION: Primary | ICD-10-CM

## 2021-01-22 PROCEDURE — 99213 OFFICE O/P EST LOW 20 MIN: CPT | Performed by: FAMILY MEDICINE

## 2021-01-22 RX ORDER — SULFAMETHOXAZOLE AND TRIMETHOPRIM 800; 160 MG/1; MG/1
1 TABLET ORAL EVERY 12 HOURS SCHEDULED
Qty: 20 TABLET | Refills: 0 | Status: SHIPPED | OUTPATIENT
Start: 2021-01-22 | End: 2021-02-01

## 2021-01-22 NOTE — PROGRESS NOTES
Virtual Regular Visit      Assessment/Plan:    Problem List Items Addressed This Visit        Respiratory    Sinusitis - Primary     Sinusitis  Patient with suspected sinusitis and was given prescription for Bactrim DS to use 1 tablet twice daily for 10 days  She may continue with over-the-counter medications as necessary  She was advised to certainly obtain COVID testing upon her return to work this coming weekend  Patient will call if symptoms persist after medication completed         Relevant Medications    sulfamethoxazole-trimethoprim (BACTRIM DS) 800-160 mg per tablet               Reason for visit is No chief complaint on file  Encounter provider Adela Melton MD    Provider located at [address] [place] [id number]      Recent Visits  No visits were found meeting these conditions  Showing recent visits within past 7 days and meeting all other requirements     Today's Visits  Date Type Provider Dept   01/22/21 Telemedicine Adela Melton MD [address] [place] [address] today's visits and meeting all other requirements     Future Appointments  No visits were found meeting these conditions  Showing future appointments within next 150 days and meeting all other requirements        The patient was identified by name and date of birth  Ary Gama was informed that this is a telemedicine visit and that the visit is being conducted through [employer] and patient was informed that this is not a secure, HIPAA-compliant platform  She agrees to proceed     My office door was closed  No one else was in the room  She acknowledged consent and understanding of privacy and security of the video platform  The patient has agreed to participate and understands they can discontinue the visit at any time  Patient is aware this is a billable service       Subjective  Ary Gama is a 39 y o  female       Patient states she began with symptoms a few days ago where she developed sinus congestion with her eyes burning and some pressure behind her eyes  She has bilateral ear pains  She has mild coughing and did have mild fever of 101° earlier this week  She has been using over-the-counter DayQuil and Tylenol for symptoms  She does have a history of prior sinus infections  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 twice a week with latest test being 4 days ago which was negative  She is scheduled to have retesting for COVID upon her return on          Past Medical History:   Diagnosis Date    HTN (hypertension)     Mitral valve regurgitation     Myocardial infarction ([address] )     Preeclampsia        Past Surgical History:   Procedure Laterality Date    CARDIAC CATHETERIZATION       SECTION      CORONARY STENT PLACEMENT      EYE SURGERY      TUBAL LIGATION         Current Outpatient Medications   Medication Sig Dispense Refill    aspirin (ECOTRIN LOW STRENGTH) 81 mg EC tablet Take 1 tablet (81 mg total) by mouth daily      atorvastatin (LIPITOR) 80 mg tablet TAKE 1 TABLET DAILY WITH DINNER 90 tablet 3    Ferrous Sulfate (Iron) 325 (65 Fe) MG TABS Take by mouth daily      irbesartan (AVAPRO) 300 mg tablet TAKE 1 TABLET DAILY 90 tablet 3    LORazepam (ATIVAN) 0 5 mg tablet Take 1 tablet (0 5 mg total) by mouth 2 (two) times a day as needed for anxiety 180 tablet 0    metoprolol succinate (TOPROL-XL) 50 mg 24 hr tablet TAKE 1 TABLET DAILY 90 tablet 3    Multiple Vitamins-Minerals (MULTIVITAMIN GUMMIES ADULT PO) Take by mouth daily      nitroglycerin (NITROSTAT) 0 4 mg SL tablet Place 1 tablet (0 4 mg total) under the tongue every 5 (five) minutes as needed for chest pain 50 tablet 3    sertraline (ZOLOFT) 100 mg tablet TAKE 1 TABLET TWICE A  tablet 3    sulfamethoxazole-trimethoprim (BACTRIM DS) 800-160 mg per tablet Take 1 tablet by mouth every 12 (twelve) hours for 10 days 20 tablet 0     No current facility-administered medications for this visit  Allergies   Allergen Reactions    Augmentin [Amoxicillin-Pot Clavulanate] Vomiting     Vomiting and  myalgias    Iron Polysaccharide      GI upset, constipation, hemorrhoid flare, heartburn       Review of Systems   Constitutional: Positive for fatigue and fever  HENT: Positive for congestion, postnasal drip, sinus pressure and sinus pain  Respiratory: Positive for cough  Cardiovascular: Negative  Gastrointestinal: Negative  Musculoskeletal: Negative  Video Exam    There were no vitals filed for this visit  Physical Exam  Constitutional:       General: She is not in acute distress  Appearance: Normal appearance  She is not ill-appearing  Comments: Patient with significant congestion and blocked nasal passages  She also had some hoarseness in her voice   HENT:      Head: Normocephalic and atraumatic  Eyes:      Extraocular Movements: Extraocular movements intact  Pupils: Pupils are equal, round, and reactive to light  Comments: Mild injection sclera bilaterally   Pulmonary:      Effort: Pulmonary effort is normal  No respiratory distress  Neurological:      Mental Status: She is alert  I spent 15 minutes with patient today in which greater than 50% of the time was spent in counseling/coordination of care regarding [address] acknowledges that she has consented to an online visit or consultation  She understands that the online visit is based solely on information provided by her, and that, in the absence of a face-to-face physical evaluation by the physician, the diagnosis she receives is both limited and provisional in terms of accuracy and completeness  This is not intended to replace a full medical face-to-face evaluation by the physician  Celeste Kim understands and accepts these terms

## 2021-01-22 NOTE — ASSESSMENT & PLAN NOTE
Sinusitis  Patient with suspected sinusitis and was given prescription for Bactrim DS to use 1 tablet twice daily for 10 days  She may continue with over-the-counter medications as necessary  She was advised to certainly obtain COVID testing upon her return to work this coming weekend    Patient will call if symptoms persist after medication completed

## 2021-01-28 ENCOUNTER — TELEPHONE (OUTPATIENT)
Dept: FAMILY MEDICINE CLINIC | Facility: CLINIC | Age: 46
End: 2021-01-28

## 2021-01-28 NOTE — TELEPHONE ENCOUNTER
FYI    Patient tested for Covid by Employer, [facility] [employer], yesterday 1/27/2021  Test results are POSITIVE

## 2021-05-19 ENCOUNTER — TELEPHONE (OUTPATIENT)
Dept: CARDIOLOGY CLINIC | Facility: CLINIC | Age: 46
End: 2021-05-19

## 2021-05-19 NOTE — TELEPHONE ENCOUNTER
Pt last seen in Nov / 2020  Called today, made aware you are out of the office this week  She is c/o "episodes" which have occurred in the past also, consisting of a feeling of warmth in her chest with numbness in fingertips, which comes and goes  No other symptoms,  Advised ER but also given an ov with you on 5/24  She will consider ER if symptoms continue or worsen before ov

## 2021-05-24 ENCOUNTER — OFFICE VISIT (OUTPATIENT)
Dept: CARDIOLOGY CLINIC | Facility: CLINIC | Age: 46
End: 2021-05-24
Payer: COMMERCIAL

## 2021-05-24 VITALS
BODY MASS INDEX: 32.18 KG/M2 | OXYGEN SATURATION: 98 % | HEART RATE: 73 BPM | SYSTOLIC BLOOD PRESSURE: 156 MMHG | DIASTOLIC BLOOD PRESSURE: 100 MMHG | WEIGHT: 181.6 LBS | HEIGHT: 63 IN

## 2021-05-24 DIAGNOSIS — I10 BENIGN ESSENTIAL HYPERTENSION: ICD-10-CM

## 2021-05-24 DIAGNOSIS — I21.21 STEMI INVOLVING LEFT CIRCUMFLEX CORONARY ARTERY (HCC): ICD-10-CM

## 2021-05-24 DIAGNOSIS — I25.118 CORONARY ARTERY DISEASE INVOLVING NATIVE CORONARY ARTERY OF NATIVE HEART WITH OTHER FORM OF ANGINA PECTORIS (HCC): Primary | ICD-10-CM

## 2021-05-24 PROCEDURE — 99215 OFFICE O/P EST HI 40 MIN: CPT | Performed by: INTERNAL MEDICINE

## 2021-05-24 PROCEDURE — 4004F PT TOBACCO SCREEN RCVD TLK: CPT | Performed by: INTERNAL MEDICINE

## 2021-05-24 PROCEDURE — 3008F BODY MASS INDEX DOCD: CPT | Performed by: INTERNAL MEDICINE

## 2021-05-24 PROCEDURE — 3080F DIAST BP >= 90 MM HG: CPT | Performed by: INTERNAL MEDICINE

## 2021-05-24 PROCEDURE — 3077F SYST BP >= 140 MM HG: CPT | Performed by: INTERNAL MEDICINE

## 2021-05-24 RX ORDER — CLOPIDOGREL BISULFATE 75 MG/1
75 TABLET ORAL DAILY
Qty: 30 TABLET | Refills: 0 | Status: SHIPPED | OUTPATIENT
Start: 2021-05-24 | End: 2021-05-27 | Stop reason: SDUPTHER

## 2021-05-24 RX ORDER — AMLODIPINE BESYLATE 10 MG/1
10 TABLET ORAL DAILY
Qty: 90 TABLET | Refills: 3 | Status: SHIPPED | OUTPATIENT
Start: 2021-05-24 | End: 2021-05-24 | Stop reason: SDUPTHER

## 2021-05-24 RX ORDER — CLOPIDOGREL BISULFATE 75 MG/1
75 TABLET ORAL DAILY
Qty: 90 TABLET | Refills: 3 | Status: SHIPPED | OUTPATIENT
Start: 2021-05-24 | End: 2021-05-24 | Stop reason: SDUPTHER

## 2021-05-24 RX ORDER — ISOSORBIDE MONONITRATE 30 MG/1
30 TABLET, EXTENDED RELEASE ORAL DAILY
Qty: 30 TABLET | Refills: 0 | Status: SHIPPED | OUTPATIENT
Start: 2021-05-24 | End: 2021-06-16

## 2021-05-24 RX ORDER — AMLODIPINE BESYLATE 10 MG/1
10 TABLET ORAL DAILY
Qty: 30 TABLET | Refills: 0 | Status: SHIPPED | OUTPATIENT
Start: 2021-05-24 | End: 2021-06-16

## 2021-05-24 RX ORDER — ISOSORBIDE MONONITRATE 30 MG/1
30 TABLET, EXTENDED RELEASE ORAL DAILY
Qty: 90 TABLET | Refills: 3 | Status: SHIPPED | OUTPATIENT
Start: 2021-05-24 | End: 2021-05-24 | Stop reason: SDUPTHER

## 2021-05-24 NOTE — H&P (VIEW-ONLY)
Cardiology Follow Up    Guera Juárez  1975  [id number]  [facility] [place]  [address] [facility] [address]  [address] [id number] [phone]    1  Coronary artery disease involving native coronary artery of native heart with other form of angina pectoris (HCC)  Cardiac catheterization    amLODIPine (NORVASC) 10 mg tablet    clopidogrel (PLAVIX) 75 mg tablet    isosorbide mononitrate (IMDUR) 30 mg 24 hr tablet    DISCONTINUED: amLODIPine (NORVASC) 10 mg tablet    DISCONTINUED: clopidogrel (PLAVIX) 75 mg tablet    DISCONTINUED: isosorbide mononitrate (IMDUR) 30 mg 24 hr tablet   2  STEMI involving left circumflex coronary artery ([address] )     3  Benign essential hypertension         Discussion/Summary:    CAD, MI in 12/2019  4 stents placed between LAD, LCx, OM1  EF low normal   She had heavy bleeding with her menses on DAPT, but in between had no bleeding  She was seen by GYN for this as well  Currently, has been off DAPT, on aspirin alone  Once again having symptoms of her angina which are accelerating  She's noticed something wrong the last few weeks  Getting symptoms with walking from her yard to the house, and coming with just emotional stress  We discussed options  She has financial constraints as well  At this point, her symptoms are concerning for accelerating angina, and I have recommended definitive evaluation with cardiac cath given her known multivessel CAD in the past     I have started her on Plavix  She had heavy bleeding with DAPT, but it was only with her menses, and she understands that she will need to be on DAPT again uninterrupted if she has repeat PCI  Her BP is high  I started her on amlodipine  I also added Imdur to her regimen as an antianginal     Will see her back after the cardiac cath  Previous History:  Mahin Fisher is 39years old      She previously had symptoms of angina, a borderline/abnormal stress test and was planned for a cardiac catheterization, but developed acceleration of symptoms in the meantime she developed an MI  She had several stents placed: MYA to the LAD, LCx ballooned, OM x 2  Her EF was low normal post MI  Previously reported mild-moderate, but then saw mild MR  She has mostly quit smoking  Still occasionally has a craving and has a cigarette, but feels very guilty about this  Continues to have some depressive symptoms, and anxiety  3 children, age range 7-18  No known problems in them  Anemic  Was having very heavy menses with being on DAPT  Also describes palpitations    Interval History:  Returns because she is once again having symptoms of chest discomfort  This is similar, but less mild than before  She feels a "heat" in her chest, radiation to the jaw  Concerned about jaw pain, which she had with prior angina as well  This is occurring with pretty minimal exertion - walking up from her yard to the house, working, emotional stress  Very rare cigarette now  On aspirin alone because Brilinta was causing a lot of heavy menses  In between periods, she didn't have bleeding  She was also seen by GYN for this  She has been regular with her medications  Doesn't take BP at home  Problem List     Benign essential hypertension    Depression with anxiety    Leiomyoma of uterus    Tremor of face and hands    Numbness and tingling of left leg    Syncope    Impaired fasting glucose    Mitral valve regurgitation    STEMI involving left circumflex coronary artery (HCC)    Smoker    Coronary artery disease involving native coronary artery of native heart without angina pectoris    Overview Signed 1/12/2020  3:19 PM by Chay Dee MD     · Emergent heart catheterization for ischemic EKG changes with he rise in troponins concerning for ACS 12/2019  · She had 3 stents placed in total, DESx2 to OM1 and DESx1 to the mid LAD    · Patient is on regimen of aspirin, Brilinta, metoprolol and Lipitor 80 mg daily  ·   TTE showed an EF of 50% with some lateral wall abnormalities consistent where her ischemia was on heart catheterization    · Patient is starting cardiac rehabilitation  · Patient is following up with [facility]           Iron deficiency anemia        Past Medical History:   Diagnosis Date    HTN (hypertension)     Mitral valve regurgitation     Myocardial infarction ([address] )     Preeclampsia      Social History     Tobacco Use    Smoking status: Current Some Day Smoker     Types: Cigarettes    Smokeless tobacco: Never Used   Substance Use Topics    Alcohol use: Not Currently    Drug use: No      Family History   Problem Relation Age of Onset    Hypertension Father     Kidney disease Paternal Grandfather      Past Surgical History:   Procedure Laterality Date    CARDIAC CATHETERIZATION       SECTION      CORONARY STENT PLACEMENT      EYE SURGERY      TUBAL LIGATION         Current Outpatient Medications:     aspirin (ECOTRIN LOW STRENGTH) 81 mg EC tablet, Take 1 tablet (81 mg total) by mouth daily, Disp:  , Rfl:     atorvastatin (LIPITOR) 80 mg tablet, TAKE 1 TABLET DAILY WITH DINNER, Disp: 90 tablet, Rfl: 3    Ferrous Sulfate (Iron) 325 (65 Fe) MG TABS, Take by mouth daily, Disp: , Rfl:     irbesartan (AVAPRO) 300 mg tablet, TAKE 1 TABLET DAILY, Disp: 90 tablet, Rfl: 3    metoprolol succinate (TOPROL-XL) 50 mg 24 hr tablet, TAKE 1 TABLET DAILY, Disp: 90 tablet, Rfl: 3    Multiple Vitamins-Minerals (MULTIVITAMIN GUMMIES ADULT PO), Take by mouth daily, Disp: , Rfl:     sertraline (ZOLOFT) 100 mg tablet, TAKE 1 TABLET TWICE A DAY, Disp: 180 tablet, Rfl: 3    amLODIPine (NORVASC) 10 mg tablet, Take 1 tablet (10 mg total) by mouth daily, Disp: 30 tablet, Rfl: 0    clopidogrel (PLAVIX) 75 mg tablet, Take 1 tablet (75 mg total) by mouth daily, Disp: 30 tablet, Rfl: 0    isosorbide mononitrate (IMDUR) 30 mg 24 hr tablet, Take 1 tablet (30 mg total) by mouth daily, Disp: 30 tablet, Rfl: 0    LORazepam (ATIVAN) 0 5 mg tablet, Take 1 tablet (0 5 mg total) by mouth 2 (two) times a day as needed for anxiety (Patient not taking: Reported on 5/24/2021), Disp: 180 tablet, Rfl: 0    nitroglycerin (NITROSTAT) 0 4 mg SL tablet, Place 1 tablet (0 4 mg total) under the tongue every 5 (five) minutes as needed for chest pain (Patient not taking: Reported on 5/24/2021), Disp: 50 tablet, Rfl: 3  Allergies   Allergen Reactions    Augmentin [Amoxicillin-Pot Clavulanate] Vomiting     Vomiting and  myalgias    Iron Polysaccharide      GI upset, constipation, hemorrhoid flare, heartburn       Vitals:    05/24/21 0936   BP: 156/100   BP Location: Right arm   Patient Position: Sitting   Cuff Size: Standard   Pulse: 73   SpO2: 98%   Weight: 82 4 kg (181 lb 9 6 oz)   Height: 5' 3" (1 6 m)     Vitals:    05/24/21 0936   Weight: 82 4 kg (181 lb 9 6 oz)      Height: 5' 3" (160 cm)   Body mass index is 32 17 kg/m²  Physical Exam:  GEN: Lobo Rebolledo appears well, alert and oriented x 3, pleasant and cooperative   HEENT: pupils equal, round, and reactive to light; extraocular muscles intact  NECK: supple, no carotid bruits   HEART: regular rhythm, normal S1 and S2, no murmurs, clicks, gallops or rubs   LUNGS: clear to auscultation bilaterally; no wheezes, rales, or rhonchi   ABDOMEN: normal bowel sounds, soft, no tenderness, no distention  EXTREMITIES: peripheral pulses normal; no clubbing, cyanosis, or edema  NEURO: no focal findings   SKIN: normal without suspicious lesions on exposed skin      ROS:  Positive for anxiety, depression, shortness of breath, sharp pains  Heavy periods, bleeding, anemia  Fatigue  Weight gain  Except as noted in HPI, is otherwise reviewed in detail and a 12 point review of systems is negative    ROS reviewed and is unchanged    Labs:  Lab Results   Component Value Date     10/27/2015    K 4 0 09/04/2020     (H) 09/04/2020 CREATININE 0 63 09/04/2020    BUN 16 09/04/2020    CO2 23 09/04/2020    ALT 22 09/04/2020    AST 22 09/04/2020    INR 0 94 12/28/2019    GLUF 90 09/04/2020    HGBA1C 5 4 10/16/2019    WBC 5 83 09/04/2020    HGB 12 8 09/04/2020    HCT 40 3 09/04/2020     09/04/2020       Lab Results   Component Value Date    CHOL 194 07/09/2015     Lab Results   Component Value Date    LDLCALC 63 09/04/2020    LDLCALC 60 02/06/2020    LDLCALC 117 (H) 12/29/2019     Lab Results   Component Value Date    HDL 45 09/04/2020    HDL 49 02/06/2020    HDL 40 12/29/2019     Lab Results   Component Value Date    TRIG 52 09/04/2020    TRIG 63 02/06/2020    TRIG 101 12/29/2019       Testing:  Cardiac Cath 12/28/19:  CORONARY CIRCULATION:  Mid LAD: There was a tubular 75 % stenosis  This is a likely culprit for the patient's clinical presentation  An intervention was performed  Mid circumflex: There was a 100 % stenosis at the origin of OM1  There was LOUISE grade 0 flow through the vessel (no flow)  This is a likely culprit for the patient's clinical presentation  An intervention was performed      1ST LESION INTERVENTIONS:  A successful balloon angioplasty with stent procedure was performed on the 99 % lesion in the mid circumflex  Following intervention there was an excellent angiographic appearance with a 0 % residual stenosis      2ND LESION INTERVENTIONS:  A successful drug-eluting stent with balloon angioplasty procedure was performed on the 99 % lesion in the 1st obtuse marginal  Following intervention there was an excellent angiographic appearance with a 0 % residual stenosis  A Xience [address] Rx 2 5 x 15mm drug-eluting stent was placed across the lesion and deployed at a maximum inflation pressure of 10 yolanda    A [address] Rx 2 5 x 08mm drug-eluting stent was placed across the lesion and deployed at a maximum inflation pressure of 9 yolanda      3RD LESION INTERVENTIONS:  A successful drug-eluting stent with balloon angioplasty procedure was performed on the 75 % lesion in the mid LAD  Following intervention there was an excellent angiographic appearance with a 0 % residual stenosis  A Xience Lory Rx 3 0 x 28mm drug-eluting stent was placed across the lesion and deployed at a maximum inflation pressure of 16 yolanda  ï¾·Proximal LAD: Lesion 1: tubular, 40 % stenosis  ï¾·Mid LAD: Lesion 1: tubular, 75 % stenosis  ï¾·Mid circumflex: Lesion 1: 100 % stenosis  Intervention results  Native coronary lesions:  ï¾·Successful balloon angioplasty and stent of the 99 % stenosis in mid circumflex  Appearance excellent with 0 % residual stenosis  ï¾·Successful drug-eluting stent and balloon angioplasty of the 99 % stenosis in OM1  Appearance excellent with 0 % residual stenosis  Stent: Alben Cruise Rx 2 5 x 15mm drug-eluting  Stent: Alben Cruise Rx 2 5 x 08mm drug-eluting  ï¾·Successful drug-eluting stent and balloon angioplasty of the 75 % stenosis in mid LAD  Appearance excellent with 0 % residual stenosis  Stent: Alben Cruise Rx 3 0 x 28mm drug-eluting  Echo:  12/29/19:  LEFT VENTRICLE:  Systolic function was at the lower limits of normal  Ejection fraction was estimated to be 50 %  There was hypokinesis of the basal-mid inferior and the entire lateral walls  Doppler parameters were consistent with abnormal left ventricular relaxation (grade 1 diastolic dysfunction)      MITRAL VALVE:  There was mild regurgitation      COMPARISONS:  Comparison was made with the previous study of 06-Dec-2019  Regional wall motion abnormalities were now present, and ejection fraction has very mildly decreased

## 2021-05-24 NOTE — PROGRESS NOTES
Cardiology Follow Up    Rosana Trejo  1975  [id number]  [facility]  [address] [facility] [address]  [place] [id number] [phone]    1  Coronary artery disease involving native coronary artery of native heart with other form of angina pectoris (HCC)  Cardiac catheterization    amLODIPine (NORVASC) 10 mg tablet    clopidogrel (PLAVIX) 75 mg tablet    isosorbide mononitrate (IMDUR) 30 mg 24 hr tablet    DISCONTINUED: amLODIPine (NORVASC) 10 mg tablet    DISCONTINUED: clopidogrel (PLAVIX) 75 mg tablet    DISCONTINUED: isosorbide mononitrate (IMDUR) 30 mg 24 hr tablet   2  STEMI involving left circumflex coronary artery ([address] )     3  Benign essential hypertension         Discussion/Summary:    CAD, MI in 12/2019  4 stents placed between LAD, LCx, OM1  EF low normal   She had heavy bleeding with her menses on DAPT, but in between had no bleeding  She was seen by GYN for this as well  Currently, has been off DAPT, on aspirin alone  Once again having symptoms of her angina which are accelerating  She's noticed something wrong the last few weeks  Getting symptoms with walking from her yard to the house, and coming with just emotional stress  We discussed options  She has financial constraints as well  At this point, her symptoms are concerning for accelerating angina, and I have recommended definitive evaluation with cardiac cath given her known multivessel CAD in the past     I have started her on Plavix  She had heavy bleeding with DAPT, but it was only with her menses, and she understands that she will need to be on DAPT again uninterrupted if she has repeat PCI  Her BP is high  I started her on amlodipine  I also added Imdur to her regimen as an antianginal     Will see her back after the cardiac cath  Previous History:  Kenneth Gloria is 39years old      She previously had symptoms of angina, a borderline/abnormal stress test and was planned for a cardiac catheterization, but developed acceleration of symptoms in the meantime she developed an MI  She had several stents placed: MYA to the LAD, LCx ballooned, OM x 2  Her EF was low normal post MI  Previously reported mild-moderate, but then saw mild MR  She has mostly quit smoking  Still occasionally has a craving and has a cigarette, but feels very guilty about this  Continues to have some depressive symptoms, and anxiety  3 children, age range 7-18  No known problems in them  Anemic  Was having very heavy menses with being on DAPT  Also describes palpitations    Interval History:  Returns because she is once again having symptoms of chest discomfort  This is similar, but less mild than before  She feels a "heat" in her chest, radiation to the jaw  Concerned about jaw pain, which she had with prior angina as well  This is occurring with pretty minimal exertion - walking up from her yard to the house, working, emotional stress  Very rare cigarette now  On aspirin alone because Brilinta was causing a lot of heavy menses  In between periods, she didn't have bleeding  She was also seen by GYN for this  She has been regular with her medications  Doesn't take BP at home  Problem List     Benign essential hypertension    Depression with anxiety    Leiomyoma of uterus    Tremor of face and hands    Numbness and tingling of left leg    Syncope    Impaired fasting glucose    Mitral valve regurgitation    STEMI involving left circumflex coronary artery (HCC)    Smoker    Coronary artery disease involving native coronary artery of native heart without angina pectoris    Overview Signed 1/12/2020  3:19 PM by Micheline Reese MD     · Emergent heart catheterization for ischemic EKG changes with he rise in troponins concerning for ACS 12/2019  · She had 3 stents placed in total, DESx2 to OM1 and DESx1 to the mid LAD    · Patient is on regimen of aspirin, Brilinta, metoprolol and Lipitor 80 mg daily  ·   TTE showed an EF of 50% with some lateral wall abnormalities consistent where her ischemia was on heart catheterization    · Patient is starting cardiac rehabilitation  · Patient is following up with [facility]           Iron deficiency anemia        Past Medical History:   Diagnosis Date    HTN (hypertension)     Mitral valve regurgitation     Myocardial infarction ([address] )     Preeclampsia      Social History     Tobacco Use    Smoking status: Current Some Day Smoker     Types: Cigarettes    Smokeless tobacco: Never Used   Substance Use Topics    Alcohol use: Not Currently    Drug use: No      Family History   Problem Relation Age of Onset    Hypertension Father     Kidney disease Paternal Grandfather      Past Surgical History:   Procedure Laterality Date    CARDIAC CATHETERIZATION       SECTION      CORONARY STENT PLACEMENT      EYE SURGERY      TUBAL LIGATION         Current Outpatient Medications:     aspirin (ECOTRIN LOW STRENGTH) 81 mg EC tablet, Take 1 tablet (81 mg total) by mouth daily, Disp:  , Rfl:     atorvastatin (LIPITOR) 80 mg tablet, TAKE 1 TABLET DAILY WITH DINNER, Disp: 90 tablet, Rfl: 3    Ferrous Sulfate (Iron) 325 (65 Fe) MG TABS, Take by mouth daily, Disp: , Rfl:     irbesartan (AVAPRO) 300 mg tablet, TAKE 1 TABLET DAILY, Disp: 90 tablet, Rfl: 3    metoprolol succinate (TOPROL-XL) 50 mg 24 hr tablet, TAKE 1 TABLET DAILY, Disp: 90 tablet, Rfl: 3    Multiple Vitamins-Minerals (MULTIVITAMIN GUMMIES ADULT PO), Take by mouth daily, Disp: , Rfl:     sertraline (ZOLOFT) 100 mg tablet, TAKE 1 TABLET TWICE A DAY, Disp: 180 tablet, Rfl: 3    amLODIPine (NORVASC) 10 mg tablet, Take 1 tablet (10 mg total) by mouth daily, Disp: 30 tablet, Rfl: 0    clopidogrel (PLAVIX) 75 mg tablet, Take 1 tablet (75 mg total) by mouth daily, Disp: 30 tablet, Rfl: 0    isosorbide mononitrate (IMDUR) 30 mg 24 hr tablet, Take 1 tablet (30 mg total) by mouth daily, Disp: 30 tablet, Rfl: 0    LORazepam (ATIVAN) 0 5 mg tablet, Take 1 tablet (0 5 mg total) by mouth 2 (two) times a day as needed for anxiety (Patient not taking: Reported on 5/24/2021), Disp: 180 tablet, Rfl: 0    nitroglycerin (NITROSTAT) 0 4 mg SL tablet, Place 1 tablet (0 4 mg total) under the tongue every 5 (five) minutes as needed for chest pain (Patient not taking: Reported on 5/24/2021), Disp: 50 tablet, Rfl: 3  Allergies   Allergen Reactions    Augmentin [Amoxicillin-Pot Clavulanate] Vomiting     Vomiting and  myalgias    Iron Polysaccharide      GI upset, constipation, hemorrhoid flare, heartburn       Vitals:    05/24/21 0936   BP: 156/100   BP Location: Right arm   Patient Position: Sitting   Cuff Size: Standard   Pulse: 73   SpO2: 98%   Weight: 82 4 kg (181 lb 9 6 oz)   Height: 5' 3" (1 6 m)     Vitals:    05/24/21 0936   Weight: 82 4 kg (181 lb 9 6 oz)      Height: 5' 3" (160 cm)   Body mass index is 32 17 kg/m²  Physical Exam:  GEN: Shakira Tsang appears well, alert and oriented x 3, pleasant and cooperative   HEENT: pupils equal, round, and reactive to light; extraocular muscles intact  NECK: supple, no carotid bruits   HEART: regular rhythm, normal S1 and S2, no murmurs, clicks, gallops or rubs   LUNGS: clear to auscultation bilaterally; no wheezes, rales, or rhonchi   ABDOMEN: normal bowel sounds, soft, no tenderness, no distention  EXTREMITIES: peripheral pulses normal; no clubbing, cyanosis, or edema  NEURO: no focal findings   SKIN: normal without suspicious lesions on exposed skin      ROS:  Positive for anxiety, depression, shortness of breath, sharp pains  Heavy periods, bleeding, anemia  Fatigue  Weight gain  Except as noted in HPI, is otherwise reviewed in detail and a 12 point review of systems is negative    ROS reviewed and is unchanged    Labs:  Lab Results   Component Value Date     10/27/2015    K 4 0 09/04/2020     (H) 09/04/2020 CREATININE 0 63 09/04/2020    BUN 16 09/04/2020    CO2 23 09/04/2020    ALT 22 09/04/2020    AST 22 09/04/2020    INR 0 94 12/28/2019    GLUF 90 09/04/2020    HGBA1C 5 4 10/16/2019    WBC 5 83 09/04/2020    HGB 12 8 09/04/2020    HCT 40 3 09/04/2020     09/04/2020       Lab Results   Component Value Date    CHOL 194 07/09/2015     Lab Results   Component Value Date    LDLCALC 63 09/04/2020    LDLCALC 60 02/06/2020    LDLCALC 117 (H) 12/29/2019     Lab Results   Component Value Date    HDL 45 09/04/2020    HDL 49 02/06/2020    HDL 40 12/29/2019     Lab Results   Component Value Date    TRIG 52 09/04/2020    TRIG 63 02/06/2020    TRIG 101 12/29/2019       Testing:  Cardiac Cath 12/28/19:  CORONARY CIRCULATION:  Mid LAD: There was a tubular 75 % stenosis  This is a likely culprit for the patient's clinical presentation  An intervention was performed  Mid circumflex: There was a 100 % stenosis at the origin of OM1  There was LOUISE grade 0 flow through the vessel (no flow)  This is a likely culprit for the patient's clinical presentation  An intervention was performed      1ST LESION INTERVENTIONS:  A successful balloon angioplasty with stent procedure was performed on the 99 % lesion in the mid circumflex  Following intervention there was an excellent angiographic appearance with a 0 % residual stenosis      2ND LESION INTERVENTIONS:  A successful drug-eluting stent with balloon angioplasty procedure was performed on the 99 % lesion in the 1st obtuse marginal  Following intervention there was an excellent angiographic appearance with a 0 % residual stenosis  A [address] Rx 2 5 x 15mm drug-eluting stent was placed across the lesion and deployed at a maximum inflation pressure of 10 yolanda    A [address] Rx 2 5 x 08mm drug-eluting stent was placed across the lesion and deployed at a maximum inflation pressure of 9 yolanda      3RD LESION INTERVENTIONS:  A successful drug-eluting stent with balloon angioplasty procedure was performed on the 75 % lesion in the mid LAD  Following intervention there was an excellent angiographic appearance with a 0 % residual stenosis  A Xience Lory Rx 3 0 x 28mm drug-eluting stent was placed across the lesion and deployed at a maximum inflation pressure of 16 yolanda  ï¾·Proximal LAD: Lesion 1: tubular, 40 % stenosis  ï¾·Mid LAD: Lesion 1: tubular, 75 % stenosis  ï¾·Mid circumflex: Lesion 1: 100 % stenosis  Intervention results  Native coronary lesions:  ï¾·Successful balloon angioplasty and stent of the 99 % stenosis in mid circumflex  Appearance excellent with 0 % residual stenosis  ï¾·Successful drug-eluting stent and balloon angioplasty of the 99 % stenosis in OM1  Appearance excellent with 0 % residual stenosis  Stent: Ladena Crumbly Rx 2 5 x 15mm drug-eluting  Stent: Ladena Crumbly Rx 2 5 x 08mm drug-eluting  ï¾·Successful drug-eluting stent and balloon angioplasty of the 75 % stenosis in mid LAD  Appearance excellent with 0 % residual stenosis  Stent: Ladena Crumbly Rx 3 0 x 28mm drug-eluting  Echo:  12/29/19:  LEFT VENTRICLE:  Systolic function was at the lower limits of normal  Ejection fraction was estimated to be 50 %  There was hypokinesis of the basal-mid inferior and the entire lateral walls  Doppler parameters were consistent with abnormal left ventricular relaxation (grade 1 diastolic dysfunction)      MITRAL VALVE:  There was mild regurgitation      COMPARISONS:  Comparison was made with the previous study of 06-Dec-2019  Regional wall motion abnormalities were now present, and ejection fraction has very mildly decreased

## 2021-05-25 ENCOUNTER — TELEPHONE (OUTPATIENT)
Dept: CARDIOLOGY CLINIC | Facility: CLINIC | Age: 46
End: 2021-05-25

## 2021-05-25 DIAGNOSIS — I25.118 CORONARY ARTERY DISEASE INVOLVING NATIVE CORONARY ARTERY OF NATIVE HEART WITH OTHER FORM OF ANGINA PECTORIS (HCC): Primary | ICD-10-CM

## 2021-05-25 NOTE — TELEPHONE ENCOUNTER
Patient scheduled for LHC on 6/14/21 in [place] with Dr Joi Yoo  Mailing patient instructions  Can I have auth?

## 2021-05-27 DIAGNOSIS — I25.118 CORONARY ARTERY DISEASE INVOLVING NATIVE CORONARY ARTERY OF NATIVE HEART WITH OTHER FORM OF ANGINA PECTORIS (HCC): ICD-10-CM

## 2021-05-27 DIAGNOSIS — I10 BENIGN ESSENTIAL HYPERTENSION: ICD-10-CM

## 2021-05-27 RX ORDER — CLOPIDOGREL BISULFATE 75 MG/1
75 TABLET ORAL DAILY
Qty: 90 TABLET | Refills: 0 | Status: SHIPPED | OUTPATIENT
Start: 2021-05-27 | End: 2021-07-04 | Stop reason: HOSPADM

## 2021-05-27 RX ORDER — IRBESARTAN 300 MG/1
300 TABLET ORAL DAILY
Qty: 90 TABLET | Refills: 0 | Status: SHIPPED | OUTPATIENT
Start: 2021-05-27 | End: 2022-02-18

## 2021-06-01 NOTE — TELEPHONE ENCOUNTER
Her Cath [phone] on 6/14/21 at [facility] was approved    Auth# [id number]  5/28/21-11/24/21

## 2021-06-07 ENCOUNTER — TRANSCRIBE ORDERS (OUTPATIENT)
Dept: LAB | Facility: HOSPITAL | Age: 46
End: 2021-06-07

## 2021-06-07 ENCOUNTER — APPOINTMENT (OUTPATIENT)
Dept: LAB | Facility: HOSPITAL | Age: 46
End: 2021-06-07
Attending: INTERNAL MEDICINE
Payer: COMMERCIAL

## 2021-06-07 LAB
ALBUMIN SERPL BCP-MCNC: 3.7 G/DL (ref 3.5–5)
ALP SERPL-CCNC: 67 U/L (ref 46–116)
ALT SERPL W P-5'-P-CCNC: 20 U/L (ref 12–78)
ANION GAP SERPL CALCULATED.3IONS-SCNC: 6 MMOL/L (ref 4–13)
AST SERPL W P-5'-P-CCNC: 20 U/L (ref 5–45)
BASOPHILS # BLD AUTO: 0.06 THOUSANDS/ΜL (ref 0–0.1)
BASOPHILS NFR BLD AUTO: 1 % (ref 0–1)
BILIRUB SERPL-MCNC: 0.29 MG/DL (ref 0.2–1)
BUN SERPL-MCNC: 15 MG/DL (ref 5–25)
CALCIUM SERPL-MCNC: 8.8 MG/DL (ref 8.3–10.1)
CHLORIDE SERPL-SCNC: 113 MMOL/L (ref 100–108)
CO2 SERPL-SCNC: 21 MMOL/L (ref 21–32)
CREAT SERPL-MCNC: 0.47 MG/DL (ref 0.6–1.3)
EOSINOPHIL # BLD AUTO: 0.03 THOUSAND/ΜL (ref 0–0.61)
EOSINOPHIL NFR BLD AUTO: 1 % (ref 0–6)
ERYTHROCYTE [DISTWIDTH] IN BLOOD BY AUTOMATED COUNT: 14.7 % (ref 11.6–15.1)
GFR SERPL CREATININE-BSD FRML MDRD: 120 ML/MIN/1.73SQ M
GLUCOSE SERPL-MCNC: 100 MG/DL (ref 65–140)
HCT VFR BLD AUTO: 35.4 % (ref 34.8–46.1)
HGB BLD-MCNC: 11 G/DL (ref 11.5–15.4)
IMM GRANULOCYTES # BLD AUTO: 0.03 THOUSAND/UL (ref 0–0.2)
IMM GRANULOCYTES NFR BLD AUTO: 1 % (ref 0–2)
LYMPHOCYTES # BLD AUTO: 1.47 THOUSANDS/ΜL (ref 0.6–4.47)
LYMPHOCYTES NFR BLD AUTO: 25 % (ref 14–44)
MCH RBC QN AUTO: 27 PG (ref 26.8–34.3)
MCHC RBC AUTO-ENTMCNC: 31.1 G/DL (ref 31.4–37.4)
MCV RBC AUTO: 87 FL (ref 82–98)
MONOCYTES # BLD AUTO: 0.47 THOUSAND/ΜL (ref 0.17–1.22)
MONOCYTES NFR BLD AUTO: 8 % (ref 4–12)
NEUTROPHILS # BLD AUTO: 3.86 THOUSANDS/ΜL (ref 1.85–7.62)
NEUTS SEG NFR BLD AUTO: 64 % (ref 43–75)
NRBC BLD AUTO-RTO: 0 /100 WBCS
PLATELET # BLD AUTO: 255 THOUSANDS/UL (ref 149–390)
PMV BLD AUTO: 10.7 FL (ref 8.9–12.7)
POTASSIUM SERPL-SCNC: 3.7 MMOL/L (ref 3.5–5.3)
PROT SERPL-MCNC: 7.5 G/DL (ref 6.4–8.2)
RBC # BLD AUTO: 4.07 MILLION/UL (ref 3.81–5.12)
SODIUM SERPL-SCNC: 140 MMOL/L (ref 136–145)
WBC # BLD AUTO: 5.92 THOUSAND/UL (ref 4.31–10.16)

## 2021-06-07 PROCEDURE — 85025 COMPLETE CBC W/AUTO DIFF WBC: CPT

## 2021-06-07 PROCEDURE — 36415 COLL VENOUS BLD VENIPUNCTURE: CPT

## 2021-06-07 PROCEDURE — 80053 COMPREHEN METABOLIC PANEL: CPT

## 2021-06-11 ENCOUNTER — TELEPHONE (OUTPATIENT)
Dept: SURGERY | Facility: HOSPITAL | Age: 46
End: 2021-06-11

## 2021-06-11 RX ORDER — ASPIRIN 81 MG/1
324 TABLET, CHEWABLE ORAL ONCE
Status: CANCELLED | OUTPATIENT
Start: 2021-06-11 | End: 2021-06-11

## 2021-06-11 RX ORDER — SODIUM CHLORIDE 9 MG/ML
125 INJECTION, SOLUTION INTRAVENOUS CONTINUOUS
Status: CANCELLED | OUTPATIENT
Start: 2021-06-11

## 2021-06-14 ENCOUNTER — HOSPITAL ENCOUNTER (OUTPATIENT)
Dept: NON INVASIVE DIAGNOSTICS | Facility: HOSPITAL | Age: 46
Discharge: HOME | End: 2021-06-14
Attending: INTERNAL MEDICINE | Admitting: INTERNAL MEDICINE
Payer: COMMERCIAL

## 2021-06-14 VITALS
HEART RATE: 61 BPM | DIASTOLIC BLOOD PRESSURE: 94 MMHG | SYSTOLIC BLOOD PRESSURE: 147 MMHG | WEIGHT: 174 LBS | TEMPERATURE: 98.8 F | OXYGEN SATURATION: 96 % | RESPIRATION RATE: 18 BRPM | HEIGHT: 63 IN | BODY MASS INDEX: 30.83 KG/M2

## 2021-06-14 DIAGNOSIS — I25.118 CORONARY ARTERY DISEASE INVOLVING NATIVE CORONARY ARTERY OF NATIVE HEART WITH OTHER FORM OF ANGINA PECTORIS (HCC): ICD-10-CM

## 2021-06-14 LAB
ATRIAL RATE: 65 BPM
HCG SERPL QL: NEGATIVE
P AXIS: 65 DEGREES
PR INTERVAL: 152 MS
QRS AXIS: 6 DEGREES
QRSD INTERVAL: 88 MS
QT INTERVAL: 454 MS
QTC INTERVAL: 472 MS
T WAVE AXIS: 51 DEGREES
VENTRICULAR RATE: 65 BPM

## 2021-06-14 PROCEDURE — 93005 ELECTROCARDIOGRAM TRACING: CPT

## 2021-06-14 PROCEDURE — 99152 MOD SED SAME PHYS/QHP 5/>YRS: CPT | Performed by: INTERNAL MEDICINE

## 2021-06-14 PROCEDURE — C1769 GUIDE WIRE: HCPCS | Performed by: INTERNAL MEDICINE

## 2021-06-14 PROCEDURE — 93454 CORONARY ARTERY ANGIO S&I: CPT | Performed by: INTERNAL MEDICINE

## 2021-06-14 PROCEDURE — 93010 ELECTROCARDIOGRAM REPORT: CPT | Performed by: INTERNAL MEDICINE

## 2021-06-14 PROCEDURE — C1894 INTRO/SHEATH, NON-LASER: HCPCS | Performed by: INTERNAL MEDICINE

## 2021-06-14 PROCEDURE — 84703 CHORIONIC GONADOTROPIN ASSAY: CPT | Performed by: INTERNAL MEDICINE

## 2021-06-14 RX ORDER — NITROGLYCERIN 20 MG/100ML
INJECTION INTRAVENOUS CODE/TRAUMA/SEDATION MEDICATION
Status: COMPLETED | OUTPATIENT
Start: 2021-06-14 | End: 2021-06-14

## 2021-06-14 RX ORDER — ONDANSETRON 2 MG/ML
4 INJECTION INTRAMUSCULAR; INTRAVENOUS EVERY 6 HOURS PRN
Status: DISCONTINUED | OUTPATIENT
Start: 2021-06-14 | End: 2021-06-14 | Stop reason: HOSPADM

## 2021-06-14 RX ORDER — SODIUM CHLORIDE 9 MG/ML
150 INJECTION, SOLUTION INTRAVENOUS CONTINUOUS
Status: DISCONTINUED | OUTPATIENT
Start: 2021-06-14 | End: 2021-06-14 | Stop reason: HOSPADM

## 2021-06-14 RX ORDER — ACETAMINOPHEN 325 MG/1
650 TABLET ORAL EVERY 4 HOURS PRN
Status: DISCONTINUED | OUTPATIENT
Start: 2021-06-14 | End: 2021-06-14 | Stop reason: HOSPADM

## 2021-06-14 RX ORDER — MIDAZOLAM HYDROCHLORIDE 2 MG/2ML
INJECTION, SOLUTION INTRAMUSCULAR; INTRAVENOUS CODE/TRAUMA/SEDATION MEDICATION
Status: COMPLETED | OUTPATIENT
Start: 2021-06-14 | End: 2021-06-14

## 2021-06-14 RX ORDER — ASPIRIN 81 MG/1
324 TABLET, CHEWABLE ORAL ONCE
Status: COMPLETED | OUTPATIENT
Start: 2021-06-14 | End: 2021-06-14

## 2021-06-14 RX ORDER — HEPARIN SODIUM 1000 [USP'U]/ML
INJECTION, SOLUTION INTRAVENOUS; SUBCUTANEOUS CODE/TRAUMA/SEDATION MEDICATION
Status: COMPLETED | OUTPATIENT
Start: 2021-06-14 | End: 2021-06-14

## 2021-06-14 RX ORDER — FENTANYL CITRATE 50 UG/ML
INJECTION, SOLUTION INTRAMUSCULAR; INTRAVENOUS CODE/TRAUMA/SEDATION MEDICATION
Status: COMPLETED | OUTPATIENT
Start: 2021-06-14 | End: 2021-06-14

## 2021-06-14 RX ORDER — SODIUM CHLORIDE 9 MG/ML
125 INJECTION, SOLUTION INTRAVENOUS CONTINUOUS
Status: DISCONTINUED | OUTPATIENT
Start: 2021-06-14 | End: 2021-06-14 | Stop reason: HOSPADM

## 2021-06-14 RX ORDER — VERAPAMIL HYDROCHLORIDE 2.5 MG/ML
INJECTION, SOLUTION INTRAVENOUS CODE/TRAUMA/SEDATION MEDICATION
Status: COMPLETED | OUTPATIENT
Start: 2021-06-14 | End: 2021-06-14

## 2021-06-14 RX ORDER — LIDOCAINE HYDROCHLORIDE 10 MG/ML
INJECTION, SOLUTION EPIDURAL; INFILTRATION; INTRACAUDAL; PERINEURAL CODE/TRAUMA/SEDATION MEDICATION
Status: COMPLETED | OUTPATIENT
Start: 2021-06-14 | End: 2021-06-14

## 2021-06-14 RX ORDER — CLOPIDOGREL BISULFATE 75 MG/1
TABLET ORAL CODE/TRAUMA/SEDATION MEDICATION
Status: COMPLETED | OUTPATIENT
Start: 2021-06-14 | End: 2021-06-14

## 2021-06-14 RX ADMIN — HEPARIN SODIUM 4000 UNITS: 1000 INJECTION INTRAVENOUS; SUBCUTANEOUS at 08:53

## 2021-06-14 RX ADMIN — FENTANYL CITRATE 50 MCG: 50 INJECTION INTRAMUSCULAR; INTRAVENOUS at 08:51

## 2021-06-14 RX ADMIN — VERAPAMIL HYDROCHLORIDE 2.5 MG: 2.5 INJECTION, SOLUTION INTRAVENOUS at 08:52

## 2021-06-14 RX ADMIN — IOHEXOL 60 ML: 350 INJECTION, SOLUTION INTRAVENOUS at 08:59

## 2021-06-14 RX ADMIN — ASPIRIN 324 MG: 81 TABLET, CHEWABLE ORAL at 07:23

## 2021-06-14 RX ADMIN — CLOPIDOGREL BISULFATE 75 MG: 75 TABLET ORAL at 08:20

## 2021-06-14 RX ADMIN — LIDOCAINE HYDROCHLORIDE 1 ML: 10 INJECTION, SOLUTION EPIDURAL; INFILTRATION; INTRACAUDAL; PERINEURAL at 08:52

## 2021-06-14 RX ADMIN — MIDAZOLAM 2 MG: 1 INJECTION INTRAMUSCULAR; INTRAVENOUS at 08:51

## 2021-06-14 RX ADMIN — Medication 200 MCG: at 08:53

## 2021-06-14 RX ADMIN — SODIUM CHLORIDE 125 ML/HR: 0.9 INJECTION, SOLUTION INTRAVENOUS at 07:23

## 2021-06-14 NOTE — INTERVAL H&P NOTE
H&P reviewed  After examining the patient, I find no changed to the H&P since it had been written  /76   Pulse 66   Temp 98 8 °F (37 1 °C) (Oral)   Resp 18   Ht 5' 3" (1 6 m)   Wt 78 9 kg (174 lb)   SpO2 98%   BMI 30 82 kg/m²      Patient re-evaluated   Accept as history and physical     Leighann Hernadez MD/June 14, 2021/7:21 AM      ECG

## 2021-06-14 NOTE — DISCHARGE INSTRUCTIONS
1  Please see the post cardiac catheterization dishcarge instructions  No heavy lifting, greater than 10 lbs  or strenuous  activity for 48 hrs  2 Remove band aid tomorrow  Shower and wash area- wrist gently with soap and water- beginning tomorrow  Rinse and pat dry  Apply new water seal band aid  Repeat this process for 5 days  No powders, creams lotions or antibiotic ointments  for 5 days  No tub baths, hot tubs or swimming for 5 days  3  Please call our office ([phone]) if you have any fever, redness, swelling, discharge from your wrist access site  4 No driving for 2 days       Left Heart Catheterization   WHAT YOU NEED TO KNOW:   A left heart catheterization is a procedure to look at your heart and its arteries  You may need this procedure if you have chest pain, heart disease, or your heart is not working as it should  DISCHARGE INSTRUCTIONS:   Call 911 for any of the following:   · You have any of the following signs of a heart attack:      ? Squeezing, pressure, or pain in your chest     ? and  any of the following:     ? Discomfort or pain in your back, neck, jaw, stomach, or arm     ? Shortness of breath    ? Nausea or vomiting    ? Lightheadedness or a sudden cold sweat    · You have any of the following signs of a stroke:      ? Numbness or drooping on one side of your face     ? Weakness in an arm or leg    ? Confusion or difficulty speaking    ? Dizziness, a severe headache, or vision loss    Seek care immediately if:   · Your arm or leg feels warm, tender, and painful  It may look swollen and red  · The leg or arm used for your angiogram is numb, painful, or changes color  · The bruise at your catheter site gets bigger or becomes swollen  · Your wound does not stop bleeding even after you apply firm pressure for 15 minutes  · You have weakness in an arm or leg  · You become confused or have difficulty speaking      · You have dizziness, a severe headache, or vision loss  Contact your healthcare provider if:   · You have a fever  · Your catheter site is red, leaks pus, or smells bad  · You have increasing pain at your catheter site  · You have questions or concerns about your condition or care  Limit activity as directed:   · Avoid unnecessary stair climbing for 48 hours, if a catheter was put in your groin  · Do not place pressure on your arm, hand, or wrist, if the catheter was placed in your wrist  Avoid pushing, pulling, or heavy lifting with that arm  · If you need to cough, support the area where the catheter was inserted with your hand  · Ask your healthcare provider how long you need to limit movement and avoid certain activities  · You may feel like resting more after your procedure  Slowly start to do more each day  Rest when you feel it is needed  Keep your bandage clean and dry:  Ask your healthcare provider when you can bathe and shower  Do not take baths or go in pools or hot tubs  Check your site every day for signs of infection such as swelling, redness, or pus  Drink liquids as directed:  Liquids help flush the dye used for your procedure out of your body  Ask your healthcare provider how much liquid to drink each day, and which liquids to drink  Some foods, such as soup and fruit, also provide liquid  Limit alcohol:  Do not drink alcohol for 24 hours after your procedure  Then limit alcohol  Women should limit alcohol to 1 drink a day  Men should limit alcohol to 2 drinks a day  A drink of alcohol is 12 ounces of beer, 5 ounces of wine, or 1½ ounces of liquor  Do not smoke:  Nicotine and other chemicals in cigarettes and cigars can damage your blood vessels  Ask your healthcare provider for information if you currently smoke and need help to quit  E-cigarettes or smokeless tobacco still contain nicotine  Talk to your healthcare provider before you use these products     Follow up with your healthcare provider as directed:  Write down your questions so you remember to ask them during your visits  © Copyright [employer] 2018 Information is for End User's use only and may not be sold, redistributed or otherwise used for commercial purposes  All illustrations and images included in CareNotes® are the copyrighted property of A D A M , Inc  or [employer] Dur[employer]  The above information is an  only  It is not intended as medical advice for individual conditions or treatments  Talk to your doctor, nurse or pharmacist before following any medical regimen to see if it is safe and effective for you  Moderate Sedation   WHAT YOU NEED TO KNOW:   Moderate sedation, or conscious sedation, is medicine used during procedures to help you feel relaxed and calm  You will be awake and able to follow directions without anxiety or pain  You will remember little to none of the procedure  You may feel tired, weak, or unsteady on your feet after you get sedation  You may also have trouble concentrating or short-term memory loss  These symptoms should go away in 24 hours or less  DISCHARGE INSTRUCTIONS:   Call 911 or have someone else call for any of the following:   · You have sudden trouble breathing  · You cannot be woken  Return to the emergency department if:   · You have a severe headache or dizziness  · Your heart is beating faster than usual     Contact your healthcare provider if:   · You have a fever  · You have nausea or are vomiting for more than 8 hours after the procedure  · Your skin is itchy, swollen, or you have a rash  · You have questions or concerns about your condition or care  Self-care:   · Have someone stay with you for 24 hours  This person can drive you to errands and help you do things around the house  This person can also watch for problems  · Rest and do quiet activities for 24 hours  Do not exercise, ride a bike, or play sports   Stand up slowly to prevent dizziness and falls  Take short walks around the house with another person  Slowly return to your usual activities the next day  · Do not drive or use dangerous machines or tools for 24 hours  You may injure yourself or others  Examples include a lawnmower, saw, or drill  Do not return to work for 24 hours if you use dangerous machines or tools for work  · Do not make important decisions for 24 hours  For example, do not sign important papers or invest money  · Drink liquids as directed  Liquids help flush the sedation medicine out of your body  Ask how much liquid to drink each day and which liquids are best for you  · Eat small, frequent meals to prevent nausea and vomiting  Start with clear liquids such as juice or broth  If you do not vomit after clear liquids, you can eat your usual foods  · Do not drink alcohol or take medicines that make you drowsy  This includes medicines that help you sleep and anxiety medicines  Ask your healthcare provider if it is safe for you to take pain medicine  Follow up with your healthcare provider as directed:  Write down your questions so you remember to ask them during your visits  © Copyright [address] Information is for End User's use only and may not be sold, redistributed or otherwise used for commercial purposes  All illustrations and images included in CareNotes® are the copyrighted property of A D A M , Inc  or [address] [place], [place] [address]bern[place]   The above information is an  only  It is not intended as medical advice for individual conditions or treatments  Talk to your doctor, nurse or pharmacist before following any medical regimen to see if it is safe and effective for you

## 2021-07-02 ENCOUNTER — HOSPITAL ENCOUNTER (OUTPATIENT)
Facility: HOSPITAL | Age: 46
Setting detail: OBSERVATION
Discharge: HOME | End: 2021-07-04
Attending: SURGERY | Admitting: INTERNAL MEDICINE
Payer: COMMERCIAL

## 2021-07-02 ENCOUNTER — APPOINTMENT (EMERGENCY)
Dept: RADIOLOGY | Facility: HOSPITAL | Age: 46
End: 2021-07-02
Payer: COMMERCIAL

## 2021-07-02 DIAGNOSIS — I10 BENIGN ESSENTIAL HYPERTENSION: ICD-10-CM

## 2021-07-02 DIAGNOSIS — R55 SYNCOPE AND COLLAPSE: ICD-10-CM

## 2021-07-02 DIAGNOSIS — I21.21 STEMI INVOLVING LEFT CIRCUMFLEX CORONARY ARTERY (HCC): Primary | ICD-10-CM

## 2021-07-02 LAB
ALBUMIN SERPL BCP-MCNC: 3.5 G/DL (ref 3.5–5)
ALP SERPL-CCNC: 67 U/L (ref 46–116)
ALT SERPL W P-5'-P-CCNC: 23 U/L (ref 12–78)
ANION GAP SERPL CALCULATED.3IONS-SCNC: 7 MMOL/L (ref 4–13)
AST SERPL W P-5'-P-CCNC: 15 U/L (ref 5–45)
BASE EXCESS BLDA CALC-SCNC: 0 MMOL/L (ref -2–3)
BASOPHILS # BLD AUTO: 0.04 THOUSANDS/ΜL (ref 0–0.1)
BASOPHILS NFR BLD AUTO: 1 % (ref 0–1)
BILIRUB SERPL-MCNC: 0.25 MG/DL (ref 0.2–1)
BUN SERPL-MCNC: 12 MG/DL (ref 5–25)
CALCIUM SERPL-MCNC: 8.6 MG/DL (ref 8.3–10.1)
CHLORIDE SERPL-SCNC: 108 MMOL/L (ref 100–108)
CO2 SERPL-SCNC: 24 MMOL/L (ref 21–32)
CREAT SERPL-MCNC: 0.64 MG/DL (ref 0.6–1.3)
EOSINOPHIL # BLD AUTO: 0.01 THOUSAND/ΜL (ref 0–0.61)
EOSINOPHIL NFR BLD AUTO: 0 % (ref 0–6)
ERYTHROCYTE [DISTWIDTH] IN BLOOD BY AUTOMATED COUNT: 15.5 % (ref 11.6–15.1)
GFR SERPL CREATININE-BSD FRML MDRD: 108 ML/MIN/1.73SQ M
GLUCOSE SERPL-MCNC: 105 MG/DL (ref 65–140)
GLUCOSE SERPL-MCNC: 110 MG/DL (ref 65–140)
HCO3 BLDA-SCNC: 25.3 MMOL/L (ref 24–30)
HCT VFR BLD AUTO: 36.4 % (ref 34.8–46.1)
HCT VFR BLD CALC: 35 % (ref 34.8–46.1)
HGB BLD-MCNC: 11.4 G/DL (ref 11.5–15.4)
HGB BLDA-MCNC: 11.9 G/DL (ref 11.5–15.4)
IMM GRANULOCYTES # BLD AUTO: 0.03 THOUSAND/UL (ref 0–0.2)
IMM GRANULOCYTES NFR BLD AUTO: 0 % (ref 0–2)
INR PPP: 0.88 (ref 0.84–1.19)
LYMPHOCYTES # BLD AUTO: 2.19 THOUSANDS/ΜL (ref 0.6–4.47)
LYMPHOCYTES NFR BLD AUTO: 29 % (ref 14–44)
MCH RBC QN AUTO: 26.3 PG (ref 26.8–34.3)
MCHC RBC AUTO-ENTMCNC: 31.3 G/DL (ref 31.4–37.4)
MCV RBC AUTO: 84 FL (ref 82–98)
MONOCYTES # BLD AUTO: 0.79 THOUSAND/ΜL (ref 0.17–1.22)
MONOCYTES NFR BLD AUTO: 11 % (ref 4–12)
NEUTROPHILS # BLD AUTO: 4.42 THOUSANDS/ΜL (ref 1.85–7.62)
NEUTS SEG NFR BLD AUTO: 59 % (ref 43–75)
NRBC BLD AUTO-RTO: 0 /100 WBCS
PCO2 BLD: 26 MMOL/L (ref 21–32)
PCO2 BLD: 41.2 MM HG (ref 42–50)
PH BLD: 7.4 [PH] (ref 7.3–7.4)
PLATELET # BLD AUTO: 204 THOUSANDS/UL (ref 149–390)
PMV BLD AUTO: 10 FL (ref 8.9–12.7)
PO2 BLD: 21 MM HG (ref 35–45)
POTASSIUM BLD-SCNC: 3.2 MMOL/L (ref 3.5–5.3)
POTASSIUM SERPL-SCNC: 3.1 MMOL/L (ref 3.5–5.3)
PROT SERPL-MCNC: 7.5 G/DL (ref 6.4–8.2)
PROTHROMBIN TIME: 11.9 SECONDS (ref 11.6–14.5)
RBC # BLD AUTO: 4.33 MILLION/UL (ref 3.81–5.12)
SAO2 % BLD FROM PO2: 34 % (ref 60–85)
SODIUM BLD-SCNC: 142 MMOL/L (ref 136–145)
SODIUM SERPL-SCNC: 139 MMOL/L (ref 136–145)
SPECIMEN SOURCE: ABNORMAL
TROPONIN I SERPL-MCNC: <0.02 NG/ML
WBC # BLD AUTO: 7.48 THOUSAND/UL (ref 4.31–10.16)

## 2021-07-02 PROCEDURE — 99220 PR INITIAL OBSERVATION CARE/DAY 70 MINUTES: CPT | Performed by: INTERNAL MEDICINE

## 2021-07-02 PROCEDURE — 85610 PROTHROMBIN TIME: CPT | Performed by: SURGERY

## 2021-07-02 PROCEDURE — 70450 CT HEAD/BRAIN W/O DYE: CPT

## 2021-07-02 PROCEDURE — 85014 HEMATOCRIT: CPT

## 2021-07-02 PROCEDURE — 99285 EMERGENCY DEPT VISIT HI MDM: CPT

## 2021-07-02 PROCEDURE — 93308 TTE F-UP OR LMTD: CPT | Performed by: SURGERY

## 2021-07-02 PROCEDURE — 36415 COLL VENOUS BLD VENIPUNCTURE: CPT | Performed by: SURGERY

## 2021-07-02 PROCEDURE — 76705 ECHO EXAM OF ABDOMEN: CPT | Performed by: SURGERY

## 2021-07-02 PROCEDURE — 80053 COMPREHEN METABOLIC PANEL: CPT | Performed by: SURGERY

## 2021-07-02 PROCEDURE — 82947 ASSAY GLUCOSE BLOOD QUANT: CPT

## 2021-07-02 PROCEDURE — 84132 ASSAY OF SERUM POTASSIUM: CPT

## 2021-07-02 PROCEDURE — 99204 OFFICE O/P NEW MOD 45 MIN: CPT | Performed by: SURGERY

## 2021-07-02 PROCEDURE — 84295 ASSAY OF SERUM SODIUM: CPT

## 2021-07-02 PROCEDURE — 72125 CT NECK SPINE W/O DYE: CPT

## 2021-07-02 PROCEDURE — 85025 COMPLETE CBC W/AUTO DIFF WBC: CPT | Performed by: SURGERY

## 2021-07-02 PROCEDURE — 82803 BLOOD GASES ANY COMBINATION: CPT

## 2021-07-02 PROCEDURE — NC001 PR NO CHARGE: Performed by: EMERGENCY MEDICINE

## 2021-07-02 PROCEDURE — 84484 ASSAY OF TROPONIN QUANT: CPT | Performed by: SURGERY

## 2021-07-02 RX ORDER — AMLODIPINE BESYLATE 5 MG/1
5 TABLET ORAL DAILY
Status: DISCONTINUED | OUTPATIENT
Start: 2021-07-03 | End: 2021-07-04

## 2021-07-02 RX ORDER — METOPROLOL SUCCINATE 50 MG/1
50 TABLET, EXTENDED RELEASE ORAL DAILY
Status: CANCELLED | OUTPATIENT
Start: 2021-07-03

## 2021-07-03 ENCOUNTER — APPOINTMENT (OUTPATIENT)
Dept: NON INVASIVE DIAGNOSTICS | Facility: HOSPITAL | Age: 46
End: 2021-07-03
Payer: COMMERCIAL

## 2021-07-03 LAB
ALBUMIN SERPL BCP-MCNC: 3.2 G/DL (ref 3.5–5)
ALP SERPL-CCNC: 62 U/L (ref 46–116)
ALT SERPL W P-5'-P-CCNC: 22 U/L (ref 12–78)
ANION GAP SERPL CALCULATED.3IONS-SCNC: 6 MMOL/L (ref 4–13)
AST SERPL W P-5'-P-CCNC: 14 U/L (ref 5–45)
BACTERIA UR QL AUTO: ABNORMAL /HPF
BASOPHILS # BLD AUTO: 0.03 THOUSANDS/ΜL (ref 0–0.1)
BASOPHILS NFR BLD AUTO: 0 % (ref 0–1)
BILIRUB SERPL-MCNC: 0.28 MG/DL (ref 0.2–1)
BILIRUB UR QL STRIP: NEGATIVE
BUN SERPL-MCNC: 11 MG/DL (ref 5–25)
CALCIUM ALBUM COR SERPL-MCNC: 9.2 MG/DL (ref 8.3–10.1)
CALCIUM SERPL-MCNC: 8.6 MG/DL (ref 8.3–10.1)
CHLORIDE SERPL-SCNC: 110 MMOL/L (ref 100–108)
CHOLEST SERPL-MCNC: 123 MG/DL (ref 50–200)
CLARITY UR: CLEAR
CO2 SERPL-SCNC: 22 MMOL/L (ref 21–32)
COLOR UR: YELLOW
CREAT SERPL-MCNC: 0.5 MG/DL (ref 0.6–1.3)
EOSINOPHIL # BLD AUTO: 0 THOUSAND/ΜL (ref 0–0.61)
EOSINOPHIL NFR BLD AUTO: 0 % (ref 0–6)
ERYTHROCYTE [DISTWIDTH] IN BLOOD BY AUTOMATED COUNT: 15.7 % (ref 11.6–15.1)
GFR SERPL CREATININE-BSD FRML MDRD: 117 ML/MIN/1.73SQ M
GLUCOSE SERPL-MCNC: 115 MG/DL (ref 65–140)
GLUCOSE UR STRIP-MCNC: NEGATIVE MG/DL
HCT VFR BLD AUTO: 34.9 % (ref 34.8–46.1)
HDLC SERPL-MCNC: 49 MG/DL
HGB BLD-MCNC: 10.9 G/DL (ref 11.5–15.4)
HGB UR QL STRIP.AUTO: NEGATIVE
IMM GRANULOCYTES # BLD AUTO: 0.03 THOUSAND/UL (ref 0–0.2)
IMM GRANULOCYTES NFR BLD AUTO: 0 % (ref 0–2)
KETONES UR STRIP-MCNC: NEGATIVE MG/DL
LDLC SERPL CALC-MCNC: 58 MG/DL (ref 0–100)
LDLC SERPL DIRECT ASSAY-MCNC: 60 MG/DL (ref 0–100)
LEUKOCYTE ESTERASE UR QL STRIP: NEGATIVE
LYMPHOCYTES # BLD AUTO: 1.67 THOUSANDS/ΜL (ref 0.6–4.47)
LYMPHOCYTES NFR BLD AUTO: 21 % (ref 14–44)
MAGNESIUM SERPL-MCNC: 2.1 MG/DL (ref 1.6–2.6)
MCH RBC QN AUTO: 26.2 PG (ref 26.8–34.3)
MCHC RBC AUTO-ENTMCNC: 31.2 G/DL (ref 31.4–37.4)
MCV RBC AUTO: 84 FL (ref 82–98)
MONOCYTES # BLD AUTO: 0.61 THOUSAND/ΜL (ref 0.17–1.22)
MONOCYTES NFR BLD AUTO: 8 % (ref 4–12)
MUCOUS THREADS UR QL AUTO: ABNORMAL
NEUTROPHILS # BLD AUTO: 5.45 THOUSANDS/ΜL (ref 1.85–7.62)
NEUTS SEG NFR BLD AUTO: 71 % (ref 43–75)
NITRITE UR QL STRIP: NEGATIVE
NON-SQ EPI CELLS URNS QL MICRO: ABNORMAL /HPF
NONHDLC SERPL-MCNC: 74 MG/DL
NRBC BLD AUTO-RTO: 0 /100 WBCS
PH UR STRIP.AUTO: 7 [PH]
PHOSPHATE SERPL-MCNC: 3.6 MG/DL (ref 2.7–4.5)
PLATELET # BLD AUTO: 197 THOUSANDS/UL (ref 149–390)
PMV BLD AUTO: 9.9 FL (ref 8.9–12.7)
POTASSIUM SERPL-SCNC: 3.5 MMOL/L (ref 3.5–5.3)
PROT SERPL-MCNC: 7 G/DL (ref 6.4–8.2)
PROT UR STRIP-MCNC: ABNORMAL MG/DL
RBC # BLD AUTO: 4.16 MILLION/UL (ref 3.81–5.12)
RBC #/AREA URNS AUTO: ABNORMAL /HPF
SODIUM SERPL-SCNC: 138 MMOL/L (ref 136–145)
SP GR UR STRIP.AUTO: 1.03 (ref 1–1.03)
TRIGL SERPL-MCNC: 80 MG/DL
TROPONIN I SERPL-MCNC: <0.02 NG/ML
TSH SERPL DL<=0.05 MIU/L-ACNC: 0.75 UIU/ML (ref 0.36–3.74)
UROBILINOGEN UR QL STRIP.AUTO: 1 E.U./DL
WBC # BLD AUTO: 7.79 THOUSAND/UL (ref 4.31–10.16)
WBC #/AREA URNS AUTO: ABNORMAL /HPF

## 2021-07-03 PROCEDURE — 83735 ASSAY OF MAGNESIUM: CPT | Performed by: INTERNAL MEDICINE

## 2021-07-03 PROCEDURE — 93308 TTE F-UP OR LMTD: CPT

## 2021-07-03 PROCEDURE — 80061 LIPID PANEL: CPT | Performed by: STUDENT IN AN ORGANIZED HEALTH CARE EDUCATION/TRAINING PROGRAM

## 2021-07-03 PROCEDURE — 84100 ASSAY OF PHOSPHORUS: CPT | Performed by: INTERNAL MEDICINE

## 2021-07-03 PROCEDURE — 99225 PR SBSQ OBSERVATION CARE/DAY 25 MINUTES: CPT | Performed by: PHYSICIAN ASSISTANT

## 2021-07-03 PROCEDURE — 85025 COMPLETE CBC W/AUTO DIFF WBC: CPT | Performed by: INTERNAL MEDICINE

## 2021-07-03 PROCEDURE — 81001 URINALYSIS AUTO W/SCOPE: CPT | Performed by: SURGERY

## 2021-07-03 PROCEDURE — 83721 ASSAY OF BLOOD LIPOPROTEIN: CPT | Performed by: STUDENT IN AN ORGANIZED HEALTH CARE EDUCATION/TRAINING PROGRAM

## 2021-07-03 PROCEDURE — 84443 ASSAY THYROID STIM HORMONE: CPT | Performed by: INTERNAL MEDICINE

## 2021-07-03 PROCEDURE — 99215 OFFICE O/P EST HI 40 MIN: CPT | Performed by: INTERNAL MEDICINE

## 2021-07-03 PROCEDURE — 80053 COMPREHEN METABOLIC PANEL: CPT | Performed by: INTERNAL MEDICINE

## 2021-07-03 PROCEDURE — 36415 COLL VENOUS BLD VENIPUNCTURE: CPT | Performed by: INTERNAL MEDICINE

## 2021-07-03 PROCEDURE — 84484 ASSAY OF TROPONIN QUANT: CPT | Performed by: INTERNAL MEDICINE

## 2021-07-03 PROCEDURE — 99213 OFFICE O/P EST LOW 20 MIN: CPT | Performed by: SURGERY

## 2021-07-03 RX ORDER — ATORVASTATIN CALCIUM 80 MG/1
80 TABLET, FILM COATED ORAL
Status: DISCONTINUED | OUTPATIENT
Start: 2021-07-03 | End: 2021-07-04 | Stop reason: HOSPADM

## 2021-07-03 RX ORDER — ISOSORBIDE MONONITRATE 30 MG/1
30 TABLET, EXTENDED RELEASE ORAL DAILY
Status: DISCONTINUED | OUTPATIENT
Start: 2021-07-03 | End: 2021-07-04 | Stop reason: HOSPADM

## 2021-07-03 RX ORDER — MAGNESIUM HYDROXIDE/ALUMINUM HYDROXICE/SIMETHICONE 120; 1200; 1200 MG/30ML; MG/30ML; MG/30ML
30 SUSPENSION ORAL EVERY 6 HOURS PRN
Status: DISCONTINUED | OUTPATIENT
Start: 2021-07-03 | End: 2021-07-04 | Stop reason: HOSPADM

## 2021-07-03 RX ORDER — FERROUS SULFATE 325(65) MG
325 TABLET ORAL DAILY
Status: DISCONTINUED | OUTPATIENT
Start: 2021-07-03 | End: 2021-07-04 | Stop reason: HOSPADM

## 2021-07-03 RX ORDER — SODIUM CHLORIDE, SODIUM GLUCONATE, SODIUM ACETATE, POTASSIUM CHLORIDE, MAGNESIUM CHLORIDE, SODIUM PHOSPHATE, DIBASIC, AND POTASSIUM PHOSPHATE .53; .5; .37; .037; .03; .012; .00082 G/100ML; G/100ML; G/100ML; G/100ML; G/100ML; G/100ML; G/100ML
250 INJECTION, SOLUTION INTRAVENOUS CONTINUOUS
Status: DISPENSED | OUTPATIENT
Start: 2021-07-03 | End: 2021-07-03

## 2021-07-03 RX ORDER — SODIUM CHLORIDE, SODIUM GLUCONATE, SODIUM ACETATE, POTASSIUM CHLORIDE, MAGNESIUM CHLORIDE, SODIUM PHOSPHATE, DIBASIC, AND POTASSIUM PHOSPHATE .53; .5; .37; .037; .03; .012; .00082 G/100ML; G/100ML; G/100ML; G/100ML; G/100ML; G/100ML; G/100ML
75 INJECTION, SOLUTION INTRAVENOUS CONTINUOUS
Status: DISCONTINUED | OUTPATIENT
Start: 2021-07-03 | End: 2021-07-03

## 2021-07-03 RX ORDER — ACETAMINOPHEN 325 MG/1
650 TABLET ORAL EVERY 6 HOURS PRN
Status: DISCONTINUED | OUTPATIENT
Start: 2021-07-03 | End: 2021-07-04 | Stop reason: HOSPADM

## 2021-07-03 RX ORDER — SERTRALINE HYDROCHLORIDE 100 MG/1
100 TABLET, FILM COATED ORAL 2 TIMES DAILY
Status: DISCONTINUED | OUTPATIENT
Start: 2021-07-03 | End: 2021-07-04 | Stop reason: HOSPADM

## 2021-07-03 RX ORDER — NICOTINE 21 MG/24HR
1 PATCH, TRANSDERMAL 24 HOURS TRANSDERMAL DAILY
Status: DISCONTINUED | OUTPATIENT
Start: 2021-07-03 | End: 2021-07-04 | Stop reason: HOSPADM

## 2021-07-03 RX ORDER — LOSARTAN POTASSIUM 50 MG/1
100 TABLET ORAL DAILY
Status: DISCONTINUED | OUTPATIENT
Start: 2021-07-03 | End: 2021-07-04 | Stop reason: HOSPADM

## 2021-07-03 RX ORDER — ONDANSETRON 2 MG/ML
4 INJECTION INTRAMUSCULAR; INTRAVENOUS EVERY 6 HOURS PRN
Status: DISCONTINUED | OUTPATIENT
Start: 2021-07-03 | End: 2021-07-04 | Stop reason: HOSPADM

## 2021-07-03 RX ORDER — CLOPIDOGREL BISULFATE 75 MG/1
75 TABLET ORAL DAILY
Status: DISCONTINUED | OUTPATIENT
Start: 2021-07-03 | End: 2021-07-03

## 2021-07-03 RX ORDER — ASPIRIN 81 MG/1
81 TABLET ORAL DAILY
Status: DISCONTINUED | OUTPATIENT
Start: 2021-07-03 | End: 2021-07-04 | Stop reason: HOSPADM

## 2021-07-03 RX ORDER — DOCUSATE SODIUM 100 MG/1
100 CAPSULE, LIQUID FILLED ORAL 2 TIMES DAILY PRN
Status: DISCONTINUED | OUTPATIENT
Start: 2021-07-03 | End: 2021-07-04 | Stop reason: HOSPADM

## 2021-07-03 RX ADMIN — AMLODIPINE BESYLATE 5 MG: 5 TABLET ORAL at 10:13

## 2021-07-03 RX ADMIN — SERTRALINE HYDROCHLORIDE 100 MG: 100 TABLET, FILM COATED ORAL at 08:36

## 2021-07-03 RX ADMIN — ISOSORBIDE MONONITRATE 30 MG: 30 TABLET, EXTENDED RELEASE ORAL at 08:36

## 2021-07-03 RX ADMIN — ATORVASTATIN CALCIUM 80 MG: 80 TABLET, FILM COATED ORAL at 17:10

## 2021-07-03 RX ADMIN — ACETAMINOPHEN 650 MG: 325 TABLET, FILM COATED ORAL at 10:13

## 2021-07-03 RX ADMIN — CLOPIDOGREL BISULFATE 75 MG: 75 TABLET ORAL at 08:36

## 2021-07-03 RX ADMIN — SODIUM CHLORIDE, SODIUM GLUCONATE, SODIUM ACETATE, POTASSIUM CHLORIDE, MAGNESIUM CHLORIDE, SODIUM PHOSPHATE, DIBASIC, AND POTASSIUM PHOSPHATE 250 ML/HR: .53; .5; .37; .037; .03; .012; .00082 INJECTION, SOLUTION INTRAVENOUS at 13:43

## 2021-07-03 RX ADMIN — NICOTINE 1 PATCH: 21 PATCH, EXTENDED RELEASE TRANSDERMAL at 08:34

## 2021-07-03 RX ADMIN — FERROUS SULFATE TAB 325 MG (65 MG ELEMENTAL FE) 325 MG: 325 (65 FE) TAB at 08:36

## 2021-07-03 RX ADMIN — ASPIRIN 81 MG: 81 TABLET, COATED ORAL at 08:36

## 2021-07-03 RX ADMIN — SERTRALINE HYDROCHLORIDE 100 MG: 100 TABLET, FILM COATED ORAL at 17:09

## 2021-07-03 RX ADMIN — SODIUM CHLORIDE, SODIUM GLUCONATE, SODIUM ACETATE, POTASSIUM CHLORIDE, MAGNESIUM CHLORIDE, SODIUM PHOSPHATE, DIBASIC, AND POTASSIUM PHOSPHATE 75 ML/HR: .53; .5; .37; .037; .03; .012; .00082 INJECTION, SOLUTION INTRAVENOUS at 01:37

## 2021-07-03 RX ADMIN — Medication 1 TABLET: at 08:36

## 2021-07-03 RX ADMIN — ENOXAPARIN SODIUM 40 MG: 40 INJECTION SUBCUTANEOUS at 08:35

## 2021-07-03 RX ADMIN — LOSARTAN POTASSIUM 100 MG: 50 TABLET, FILM COATED ORAL at 08:36

## 2021-07-03 NOTE — ED PROVIDER NOTES
Emergency Department Airway Evaluation and Management Form    History  Obtained from: pt  Augmentin [amoxicillin-pot clavulanate] and Iron polysaccharide  Chief Complaint   Patient presents with    Syncope     pt arrives via EMS after syncopal episode, pt states she felt "hot and sweaty", +headstrike, +thinners, started on new BP medications     HPI   Patient brought in by EMS for evaluation of multiple episodes of syncope tonight she did strike her head  Patient take aspirin as well as Plavix  Past Medical History:   Diagnosis Date    HTN (hypertension)     Mitral valve regurgitation     Myocardial infarction ([address] )     Preeclampsia      Past Surgical History:   Procedure Laterality Date    CARDIAC CATHETERIZATION       SECTION      CORONARY STENT PLACEMENT      EYE SURGERY      TUBAL LIGATION       Family History   Problem Relation Age of Onset    Hypertension Father     Kidney disease Paternal Grandfather      Social History     Tobacco Use    Smoking status: Current Some Day Smoker     Types: Cigarettes    Smokeless tobacco: Never Used   Substance Use Topics    Alcohol use: Not Currently    Drug use: No     I have reviewed and agree with the history as documented      Review of Systems     Syncope    Physical Exam  Temp (!) 97 4 °F (36 3 °C) (Oral)   LMP  (LMP Unknown)     Physical Exam     No oral trauma  No stridor  Lungs clear to auscultation bilaterally  Moves all extremities    ED Medications  Medications - No data to display    Intubation  Procedures    Notes      Final Diagnosis  Final diagnoses:   None       ED Provider  Electronically Signed by     Stephanie Meade DO  [phone]

## 2021-07-03 NOTE — UTILIZATION REVIEW
Initial Clinical Review    Admission: Date/Time/Statement:   Admission Orders (From admission, onward)     Ordered        07/02/21 2311  Place in Observation  Once                   Orders Placed This Encounter   Procedures    Place in Observation     Standing Status:   Standing     Number of Occurrences:   1     Order Specific Question:   Level of Care     Answer:   Med Surg [16]     ED Arrival Information     Expected Arrival Acuity    - 7/2/2021 22:01 Emergent         Means of arrival Escorted by Service Admission type    Ambulance [facility] ([address]) Hospitalist Emergency         Arrival complaint    syncope        Chief Complaint   Patient presents with    Syncope     pt arrives via EMS after syncopal episode, pt states she felt "hot and sweaty", +headstrike, +thinners, started on new BP medications       Initial Presentation: 38 y/o female with PMhx of CAD s/p stenting and recent cardiac cath, HTN, depression/anxiety, iron deficiency presents to ED with s/p syncope  Trauma eval'd in ED with no acute injuries found  Patient had syncope x2 this evening while having dinner  Pt just started taking Norvaxc  Takes plavix daily  Admit observation to M/S/Tele unit under medicine service with syncope -- telem monitoring, telemetry shows bradycardia variable from 51 to 59 with BP's at 110s  Hold etoprolol for now and cut Norvasc at 5 mg for now  Check orthostatic BP's  Gentle IVF's  Trop x3  Cardiology consulted  Cardiology consult 7/3 -- Patient has signs and symptoms consistent with orthostatic hypotension  Recommend IVF's today  Check a limited echo and monitor on telemetry overnight  Can adjust patient's antihypertensive and antianginal regimen    Date: 7/3  Day 2: continue IVF's  Reg diet  Echo pending  Continue asa, plavix, statin, imdur, decrease norvasc dose  Holding metoprolol  Check orthostatic BP's      ED Triage Vitals   Temperature Pulse Respirations Blood Pressure SpO2   07/02/21 [phone] 07/02/21 2211 07/02/21 2211 07/02/21 2211 07/02/21 2211   (!) 97 4 °F (36 3 °C) 64 18 122/77 100 %      Temp Source Heart Rate Source Patient Position - Orthostatic VS BP Location FiO2 (%)   07/02/21 2205 07/02/21 2211 07/02/21 2211 07/03/21 0115 --   Oral Monitor Lying Right arm       Pain Score       07/03/21 0235       4          Wt Readings from Last 1 Encounters:   06/14/21 78 9 kg (174 lb)     Additional Vital Signs:   Date/Time  Temp  Pulse  Resp  BP  MAP (mmHg)  SpO2  O2 Device  Patient Position - Orthostatic VS   07/03/21 0830  --  56  16  --  --  96 %  --  --   07/03/21 0618  --  62  16  122/70  --  98 %  None (Room air)  Lying   07/03/21 0235  --  52Abnormal   16  115/59  --  98 %  None (Room air)  --   07/03/21 0115  --  54Abnormal   15  109/59  77  96 %  None (Room air)  Lying   07/02/21 2300  --  54Abnormal   14  117/74  --  97 %  --  --   07/02/21 2245  --  54Abnormal   14  127/76  --  98 %  --  --   07/02/21 2230  --  56  18  124/58  --  96 %  --  --   07/02/21 22:16:51  --  55  18  116/69  --  98 %  --  --   07/02/21 2211  --  64  18  122/77  --  100 %  --  Lying   07/02/21 2205  97 4 °F (36 3 °C)Abnormal   --  --  --  --  --  --  --       Pertinent Labs/Diagnostic Test Results:   CT head 7/2 -- No acute intracranial abnormality  CT c-spine 7/2 -- No cervical spine fracture or traumatic malalignment         Results from last 7 days   Lab Units 07/03/21  0617 07/02/21 2234 07/02/21 2218   WBC Thousand/uL 7 79 7 48  --    HEMOGLOBIN g/dL 10 9* 11 4*  --    I STAT HEMOGLOBIN g/dl  --   --  11 9   HEMATOCRIT % 34 9 36 4  --    HEMATOCRIT, ISTAT %  --   --  35   PLATELETS Thousands/uL 197 204  --    NEUTROS ABS Thousands/µL 5 45 4 42  --      Results from last 7 days   Lab Units 07/03/21  0617 07/02/21  2234 07/02/21  2218   SODIUM mmol/L 138 139  --    POTASSIUM mmol/L 3 5 3 1*  --    CHLORIDE mmol/L 110* 108  --    CO2 mmol/L 22 24  --    CO2, I-STAT mmol/L  --   --  26   ANION GAP mmol/L 6 7 --    BUN mg/dL 11 12  --    CREATININE mg/dL 0 50* 0 64  --    EGFR ml/min/1 73sq m 117 108  --    CALCIUM mg/dL 8 6 8 6  --    MAGNESIUM mg/dL 2 1  --   --    PHOSPHORUS mg/dL 3 6  --   --      Results from last 7 days   Lab Units 07/03/21  0617 07/02/21  2234   AST U/L 14 15   ALT U/L 22 23   ALK PHOS U/L 62 67   TOTAL PROTEIN g/dL 7 0 7 5   ALBUMIN g/dL 3 2* 3 5   TOTAL BILIRUBIN mg/dL 0 28 0 25     Results from last 7 days   Lab Units 07/03/21  0617 07/02/21  2234   GLUCOSE RANDOM mg/dL 115 105     Results from last 7 days   Lab Units 07/02/21  2218   PH, HEIKE I-STAT  7 396   PCO2, HEIKE ISTAT mm HG 41 2*   PO2, HEIKE ISTAT mm HG 21 0*   HCO3, HEIKE ISTAT mmol/L 25 3   I STAT BASE EXC mmol/L 0   I STAT O2 SAT % 34*     Results from last 7 days   Lab Units 07/03/21  0617 07/03/21  0416 07/03/21  0138 07/02/21  2234   TROPONIN I ng/mL <0 02 <0 02 <0 02 <0 02     Results from last 7 days   Lab Units 07/02/21  2234   PROTIME seconds 11 9   INR  0 88     Results from last 7 days   Lab Units 07/03/21  0617   TSH 3RD GENERATON uIU/mL 0 754     Results from last 7 days   Lab Units 07/03/21  0237   CLARITY UA  Clear   COLOR UA  Yellow   SPEC GRAV UA  1 026   PH UA  7 0   GLUCOSE UA mg/dl Negative   KETONES UA mg/dl Negative   BLOOD UA  Negative   PROTEIN UA mg/dl 30 (1+)*   NITRITE UA  Negative   BILIRUBIN UA  Negative   UROBILINOGEN UA E U /dl 1 0   LEUKOCYTES UA  Negative   WBC UA /hpf 2-4   RBC UA /hpf None Seen   BACTERIA UA /hpf Occasional   EPITHELIAL CELLS WET PREP /hpf Moderate*   MUCUS THREADS  Occasional*     ED Treatment:   Medication Administration from 07/02/2021 2201 to 07/03/2021 0912       Date/Time Order Dose Route Action Action by Comments     07/03/2021 0836 aspirin (ECOTRIN LOW STRENGTH) EC tablet 81 mg 81 mg Oral Given Shelbi Samantha, RN      07/03/2021 0836 clopidogrel (PLAVIX) tablet 75 mg 75 mg Oral Given Shelbi Singh RN      07/03/2021 1626 ferrous sulfate tablet 325 mg 325 mg Oral Given [facility] José Sanderson RN      07/03/2021 7027 losartan (COZAAR) tablet 100 mg 100 mg Oral Given Debbie Long RN      07/03/2021 0836 isosorbide mononitrate (IMDUR) 24 hr tablet 30 mg 30 mg Oral Given Debbie Long RN      07/03/2021 2662 multivitamin-minerals (CENTRUM) tablet 1 tablet 1 tablet Oral Given Debbie Long RN      07/03/2021 8623 sertraline (ZOLOFT) tablet 100 mg 100 mg Oral Given Debbie Long RN      07/03/2021 1147 multi-electrolyte (PLASMALYTE-A/ISOLYTE-S PH 7 4) IV solution 75 mL/hr Intravenous [address] [place], [place]      07/03/2021 8823 nicotine (NICODERM CQ) 21 mg/24 hr TD 24 hr patch 1 patch 1 patch Transdermal Medication Applied Debbie Long RN      07/03/2021 0835 enoxaparin (LOVENOX) subcutaneous injection 40 mg 40 mg Subcutaneous Given Debbie Long RN         Past Medical History:   Diagnosis Date    HTN (hypertension)     Mitral valve regurgitation     My[address] infarction ([facility] [address] )     Preeclampsia      Present on Admission:   Benign essential hypertension   Depression with anxiety   Coronary artery disease involving native coronary artery of native heart without angina pectoris      Admitting Diagnosis: Syncope [R55]  Age/Sex: 39 y o  female  Admission Orders:  Scheduled Medications:  amLODIPine, 5 mg, Oral, Daily  aspirin, 81 mg, Oral, Daily  atorvastatin, 80 mg, Oral, Daily With Dinner  clopidogrel, 75 mg, Oral, Daily  enoxaparin, 40 mg, Subcutaneous, Daily  ferrous sulfate, 325 mg, Oral, Daily  isosorbide mononitrate, 30 mg, Oral, Daily  losartan, 100 mg, Oral, Daily  multivitamin-minerals, 1 tablet, Oral, Daily  nicotine, 1 patch, Transdermal, Daily  sertraline, 100 mg, Oral, BID      Continuous IV Infusions:  multi-electrolyte, 75 mL/hr, Intravenous, Continuous      PRN Meds:  acetaminophen, 650 mg, Oral, Q6H PRN  aluminum-magnesium hydroxide-simethicone, 30 mL, Oral, Q6H PRN  docusate sodium, 100 mg, Oral, BID PRN  ondansetron, 4 mg, Intravenous, Q6H PRN        IP CONSULT TO CASE MANAGEMENT  IP CONSULT TO CARDIOLOGY    Network Utilization Review Department  ATTENTION: Please call with any questions or concerns to [phone] and carefully listen to the prompts so that you are directed to the right person  All voicemails are confidential   Tyesha Castillo all requests for admission clinical reviews, approved or denied determinations and any other requests to dedicated fax number below belonging to the campus where the patient is receiving treatment   List of dedicated fax numbers for the Facilities:  [address] DENIALS (Administrative/Medical Necessity) [phone]   [address] (Maternity/NICU/Pediatrics) [phone]   [address] [place] [address] [phone] [place] [address] Cely T[address]edi [address] [place] [address] [phone]

## 2021-07-03 NOTE — PROGRESS NOTES
Progress Note - Tertiary Trauma [place]   Chava Joe 39 y o  female MRN: [id number]  Unit/Bed#: CRCARLITO Encounter: [id number]    Summary of Diagnosed Injuries:   syncope and collapse on asa plavix    Clinical Plan:   No trauma surgery intervention at this time  Prophylaxis: Enoxaparin (Lovenox)    Disposition:   Per medicine primary team, syncope work up    Code status:  Level 1 - Full Code    Consultants: none    Is the patient 72 years or older?: No        SUBJECTIVE:     Transfer from: home  Outside Films Received: not applicable  Tertiary Exam Due on: 7/3/21    Mechanism of Injury:Fall    Details related to Injury: +LOC:  X2      Chief Complaint: headache    HPI/Last 24 hour events:  Patient presents as a level B trauma secondary to a fall complicated by head strike  On further evaluation, patient had a syncopal event and CT imaging was negative for any traumatic injuries  Patient was transferred to the Medicine Service, for syncopal workup      Active medications:           Current Facility-Administered Medications:     acetaminophen (TYLENOL) tablet 650 mg, 650 mg, Oral, Q6H PRN    aluminum-magnesium hydroxide-simethicone (MYLANTA) oral suspension 30 mL, 30 mL, Oral, Q6H PRN    amLODIPine (NORVASC) tablet 5 mg, 5 mg, Oral, Daily    aspirin (ECOTRIN LOW STRENGTH) EC tablet 81 mg, 81 mg, Oral, Daily    atorvastatin (LIPITOR) tablet 80 mg, 80 mg, Oral, Daily With Dinner    clopidogrel (PLAVIX) tablet 75 mg, 75 mg, Oral, Daily    docusate sodium (COLACE) capsule 100 mg, 100 mg, Oral, BID PRN    enoxaparin (LOVENOX) subcutaneous injection 40 mg, 40 mg, Subcutaneous, Daily    ferrous sulfate tablet 325 mg, 325 mg, Oral, Daily    isosorbide mononitrate (IMDUR) 24 hr tablet 30 mg, 30 mg, Oral, Daily    losartan (COZAAR) tablet 100 mg, 100 mg, Oral, Daily    multi-electrolyte (PLASMALYTE-A/ISOLYTE-S PH 7 4) IV solution, 75 mL/hr, Intravenous, Continuous, 75 mL/hr at 07/03/21 0137   multivitamin-minerals (CENTRUM) tablet 1 tablet, 1 tablet, Oral, Daily    nicotine (NICODERM CQ) 21 mg/24 hr TD 24 hr patch 1 patch, 1 patch, Transdermal, Daily    ondansetron (ZOFRAN) injection 4 mg, 4 mg, Intravenous, Q6H PRN    sertraline (ZOLOFT) tablet 100 mg, 100 mg, Oral, BID    Current Outpatient Medications:     amLODIPine (NORVASC) 10 mg tablet    aspirin (ECOTRIN LOW STRENGTH) 81 mg EC tablet    atorvastatin (LIPITOR) 80 mg tablet    clopidogrel (PLAVIX) 75 mg tablet    Ferrous Sulfate (Iron) 325 (65 Fe) MG TABS    irbesartan (AVAPRO) 300 mg tablet    isosorbide mononitrate (IMDUR) 30 mg 24 hr tablet    LORazepam (ATIVAN) 0 5 mg tablet    metoprolol succinate (TOPROL-XL) 50 mg 24 hr tablet    Multiple Vitamins-Minerals (MULTIVITAMIN GUMMIES ADULT PO)    nitroglycerin (NITROSTAT) 0 4 mg SL tablet    sertraline (ZOLOFT) 100 mg tablet      OBJECTIVE:     Vitals:   Vitals:    07/03/21 0618   BP: 122/70   Pulse: 62   Resp: 16   Temp:    SpO2: 98%       Physical Exam:   GENERAL APPEARANCE:  No acute signs of distress  NEURO:  Cranial nerves 2-12 grossly intact  Strength and sensation bilateral upper and lower extremities full  HEENT:  Pupils equal round reactive to light  CV:  Regular rate rhythm  LUNGS:  Equal bilaterally  Clear  GI:  Abdomen soft nontender nondistended  :  No issues  MSK:  No issues  SKIN:  No issues      I/O:   I/O     None          Invasive Devices: Invasive Devices     Peripheral Intravenous Line            Peripheral IV Left Antecubital -- days                  Imaging:   TRAUMA - CT head wo contrast    Result Date: 7/2/2021  Impression: No acute intracranial abnormality  Workstation performed: [id number]     TRAUMA - CT spine cervical wo contrast    Result Date: 7/2/2021  Impression: No cervical spine fracture or traumatic malalignment    Workstation performed: [id number]     XR Trauma multiple (SLB/SLRA trauma bay ONLY)    Result Date: 7/3/2021  Impression: No acute cardiopulmonary disease within limitations of supine imaging   Workstation performed: [id number]       Labs:   CBC:   Lab Results   Component Value Date    WBC 7 79 07/03/2021    HGB 10 9 (L) 07/03/2021    HCT 34 9 07/03/2021    MCV 84 07/03/2021     07/03/2021    MCH 26 2 (L) 07/03/2021    MCHC 31 2 (L) 07/03/2021    RDW 15 7 (H) 07/03/2021    MPV 9 9 07/03/2021    NRBC 0 07/03/2021     CMP:   Lab Results   Component Value Date     (H) 07/03/2021    CO2 22 07/03/2021    CO2 26 07/02/2021    BUN 11 07/03/2021    CREATININE 0 50 (L) 07/03/2021    GLUCOSE 110 07/02/2021    CALCIUM 8 6 07/03/2021    AST 14 07/03/2021    ALT 22 07/03/2021    ALKPHOS 62 07/03/2021    EGFR 117 07/03/2021     Phosphorus:   Lab Results   Component Value Date    PHOS 3 6 07/03/2021     Magnesium: No results found for: MG  Urinalysis:   Lab Results   Component Value Date    COLORU Yellow 07/03/2021    CLARITYU Clear 07/03/2021    SPECGRAV 1 026 07/03/2021    PHUR 7 0 07/03/2021    LEUKOCYTESUR Negative 07/03/2021    NITRITE Negative 07/03/2021    GLUCOSEU Negative 07/03/2021    KETONESU Negative 07/03/2021    BILIRUBINUR Negative 07/03/2021    BLOODU Negative 07/03/2021

## 2021-07-03 NOTE — ASSESSMENT & PLAN NOTE
· Patient reports to me that she had 2 episodes of syncope and collapse on day of admission (7/2/21)  · Unclear etiology, possible orthostatic hypotension vs dehydration vs other  · Cardiology consult appreciated  · Echo pending  · Continue telemetry monitoring   · Continue IVF

## 2021-07-03 NOTE — PROGRESS NOTES
[address] [facility]  Progress Note - Jessica Jones 1975, 39 y o  female MRN: [id number]  Unit/Bed#: CW2 210-01 Encounter: [id number]  Primary Care Provider: Cherylene Quarry, MD   Date and time admitted to hospital: 7/2/2021 10:01 PM    * Syncope and collapse  Assessment & Plan  · Patient reports to me that she had 2 episodes of syncope and collapse on day of admission (7/2/21)  · Unclear etiology, possible orthostatic hypotension vs dehydration vs other  · Cardiology consult appreciated  · Echo pending  · Continue telemetry monitoring   · Continue IVF        Coronary artery disease involving native coronary artery of native heart without angina pectoris  Assessment & Plan  · History of MI in 12/2019 with 4 stents between LAD, LCx, OM1   · Cardiology consult appreciated  · On aspirin, Plavix, statin, Imdur  · Troponins negative x4    Benign essential hypertension  Assessment & Plan  · Norvasc 5 mg decreased from 10 mg previously  · Metoprolol was held given bradycradia  · Continue Avapro 300 mg daily (currently Cozaar 100 mg as substitute while hospitalized)  · Check orthostats as above    Depression with anxiety  Assessment & Plan  · Continue Zoloft     Hypokalemia  Assessment & Plan  · POA, now resolved  · Monitor       VTE Pharmacologic Prophylaxis:   Pharmacologic: Enoxaparin (Lovenox)  Mechanical VTE Prophylaxis in Place: Yes    Patient Centered Rounds: I have performed bedside rounds with nursing staff today  Discussions with Specialists or Other Care Team Provider: nurse Marilu Machado     Education and Discussions with Family / Patient: patient; when asked if there was anyone she would like me to call today with an update the patient kindly declined     Time Spent for Care: 30 minutes  More than 50% of total time spent on counseling and coordination of care as described above      Current Length of Stay: 0 day(s)    Current Patient Status: Observation   Certification Statement: The patient, admitted on an observation basis, will now require > 2 midnight hospital stay due to ongoing syncope work up as above    Discharge Plan: pending further syncope work up as above and Cardiology clearance     Code Status: Level 1 - Full Code      Subjective:   Ms Gilford Ramsay reports that her 1st episode of syncope yesterday occurred after she got out of the pool which she was in for a few hours  She reports that she was sitting and did not feel well and went to get up to go line the couch, stood up, and passed out  She reports that she came to after which she suspects was only a few seconds and her  was there  She reports that she fell like she was about to have a bowel movement and vomit and her  helped her get up to go to the bathroom  Coming out of the bathroom, she was ambulating in the hallway and passed out again  She reports that she drank coffee, ice water, and half of 1 alcoholic root beer drink yesterday  She reports feeling dizzy when she sat up to talk to Cardiology earlier today  Objective:     Vitals:   Temp (24hrs), Av 8 °F (36 6 °C), Min:97 4 °F (36 3 °C), Max:98 °F (36 7 °C)    Temp:  [97 4 °F (36 3 °C)-98 °F (36 7 °C)] 98 °F (36 7 °C)  HR:  [52-64] 64  Resp:  [14-18] 18  BP: (109-127)/(58-79) 126/79  SpO2:  [96 %-100 %] 96 %  Body mass index is 31 74 kg/m²  Input and Output Summary (last 24 hours):     No intake or output data in the 24 hours ending 21 1346    Physical Exam:     Physical Exam  Vitals and nursing note reviewed  Constitutional:       Comments: Patient seen lying in bed comfortably resting  Pleasant and cooperative   Cardiovascular:      Rate and Rhythm: Normal rate and regular rhythm  Pulmonary:      Effort: Pulmonary effort is normal  No respiratory distress  Breath sounds: Normal breath sounds  Abdominal:      General: Bowel sounds are normal       Palpations: Abdomen is soft  Tenderness: There is no guarding  Musculoskeletal:      Right lower leg: No edema  Left lower leg: No edema  Skin:     General: Skin is warm  Neurological:      Mental Status: She is alert and oriented to person, place, and time  Psychiatric:         Mood and Affect: Mood normal          Behavior: Behavior normal            Additional Data:     Labs:    Results from last 7 days   Lab Units 07/03/21  0617   WBC Thousand/uL 7 79   HEMOGLOBIN g/dL 10 9*   HEMATOCRIT % 34 9   PLATELETS Thousands/uL 197   NEUTROS PCT % 71   LYMPHS PCT % 21   MONOS PCT % 8   EOS PCT % 0     Results from last 7 days   Lab Units 07/03/21  0617   SODIUM mmol/L 138   POTASSIUM mmol/L 3 5   CHLORIDE mmol/L 110*   CO2 mmol/L 22   BUN mg/dL 11   CREATININE mg/dL 0 50*   ANION GAP mmol/L 6   CALCIUM mg/dL 8 6   ALBUMIN g/dL 3 2*   TOTAL BILIRUBIN mg/dL 0 28   ALK PHOS U/L 62   ALT U/L 22   AST U/L 14   GLUCOSE RANDOM mg/dL 115     Results from last 7 days   Lab Units 07/02/21  2234   INR  0 88                       * I Have Reviewed All Lab Data Listed Above  * Additional Pertinent Lab Tests Reviewed:  All Labs Within Last 24 Hours Reviewed    Imaging:    Imaging Reports Reviewed Today Include: CT head, CT C spine  Imaging Personally Reviewed by Myself Includes:  none    Recent Cultures (last 7 days):           Last 24 Hours Medication List:   Current Facility-Administered Medications   Medication Dose Route Frequency Provider Last Rate    acetaminophen  650 mg Oral Q6H PRN Michael Larkin MD      aluminum-magnesium hydroxide-simethicone  30 mL Oral Q6H PRN Michael Larkin MD      amLODIPine  5 mg Oral Daily Michael Larkin MD      aspirin  81 mg Oral Daily Michael Larkin MD      atorvastatin  80 mg Oral Daily With Jerry Maya MD      clopidogrel  75 mg Oral Daily Michael Larkin MD      docusate sodium  100 mg Oral BID PRN Michael Larkin MD      enoxaparin  40 mg Subcutaneous Daily Michael Larkin MD      ferrous sulfate  325 mg Oral Daily Doris Watson MD      isosorbide mononitrate  30 mg Oral Daily Doris Watson MD      losartan  100 mg Oral Daily Doris Watson MD      multi-electrolyte  250 mL/hr Intravenous Continuous Donn Mcneil  mL/hr (07/03/21 1343)    multivitamin-minerals  1 tablet Oral Daily Doris Watson MD      nicotine  1 patch Transdermal Daily Doris Watson MD      ondansetron  4 mg Intravenous Q6H PRN Doris Watson MD      sertraline  100 mg Oral BID Doris Watson MD          Today, Patient Was Seen By: Ramon Whipple PA-C    ** Please Note: Dictation voice to text software may have been used in the creation of this document   **

## 2021-07-03 NOTE — CONSULTS
Consultation - General Cardiology Team [address] [place] Sires 39 y o  female MRN: [id number]  Unit/Bed#: CRB Encounter: [id number]      Inpatient consult to Cardiology  Consult performed by: Glen Gary MD  Consult ordered by: Kellie Kwon MD        PCP: Parvez Lewis MD   Outpatient Cardiologist: Jules Bojorquez MD     History of Present Illness   Physician Requesting Consult: Kellie Kwon MD  Reason for Consult / Principal Problem:  Syncope collapse    HPI: Fabiola Santos is a 39y o  year old female who presented to [facility] from home after 2 episodes of lightheadedness /dizziness followed by syncope with head strike  She was initially level be trauma however there was no noted injury  She was admitted to [facility] for syncope workup  Cardiology was consulted in that regard  She has a history of coronary artery disease s/p type 1 MI s/p MYA x2 to OM1 culprit + mid LAD in 12/2019, active tobacco use disorder, essential hypertension, mild MR and previous preeclampsia  She was recently worked up for accelerating angina with a cardiac catheterization on 06/14 which showed patent OM1 + mid LAD stents, nonobstructive prox LAD/ distal LCX, mid RCA disease and 100%  of small D1  No intervention was warranted  On interview, patient reports feeling well since her cardiac catheterization with no symptoms of palpitations, lightheadedness, dizziness, chest pain, diaphoresis, nausea, vomiting, exertional dyspnea, orthopnea, PND or lower extremity edema  She has been spending time with her family outside in the pool for the past 3 days  She attempted to maintain adequate hydration given the high temperatures over that time period  She had no significant alcohol consumption but for half adult root beer last night  She had just completed dinner with her family and was still seated when she became lightheaded with scotomas    She attempted to alert her  to help her to the couch but she lost consciousness and struck her head  Upon awakening, her  helped up  She was nauseous and diaphoretic  She again became lightheaded and lost consciousness with head strike again  Prior to yesterday's episodes, the last time she had lightheadedness was roughly 5 weeks ago  At the time of my evaluation, patient became lightheaded upon sitting up from a lying position  Of note, she was initiated on amlodipine and isosorbide mononitrate for angina on  in the cardiology office  Review of Systems  ROS as noted above         Historical Information   Past Medical History:   Diagnosis Date    HTN (hypertension)     Mitral valve regurgitation     Myocardial infarction ([address] )     Preeclampsia      Past Surgical History:   Procedure Laterality Date    CARDIAC CATHETERIZATION       SECTION      CORONARY STENT PLACEMENT      EYE SURGERY      TUBAL LIGATION       Social History     Substance and Sexual Activity   Alcohol Use Not Currently     Social History     Substance and Sexual Activity   Drug Use No     Social History     Tobacco Use   Smoking Status Current Some Day Smoker    Types: Cigarettes   Smokeless Tobacco Never Used     Family History:   Family History   Problem Relation Age of Onset    Hypertension Father     Kidney disease Paternal Grandfather        Meds/Allergies   Hospital Medications:   Current Facility-Administered Medications   Medication Dose Route Frequency    acetaminophen (TYLENOL) tablet 650 mg  650 mg Oral Q6H PRN    aluminum-magnesium hydroxide-simethicone (MYLANTA) oral suspension 30 mL  30 mL Oral Q6H PRN    amLODIPine (NORVASC) tablet 5 mg  5 mg Oral Daily    aspirin (ECOTRIN LOW STRENGTH) EC tablet 81 mg  81 mg Oral Daily    atorvastatin (LIPITOR) tablet 80 mg  80 mg Oral Daily With Dinner    clopidogrel (PLAVIX) tablet 75 mg  75 mg Oral Daily    docusate sodium (COLACE) capsule 100 mg  100 mg Oral BID PRN    enoxaparin (LOVENOX) subcutaneous injection 40 mg  40 mg Subcutaneous Daily    ferrous sulfate tablet 325 mg  325 mg Oral Daily    isosorbide mononitrate (IMDUR) 24 hr tablet 30 mg  30 mg Oral Daily    losartan (COZAAR) tablet 100 mg  100 mg Oral Daily    multi-electrolyte (PLASMALYTE-A/ISOLYTE-S PH 7 4) IV solution  75 mL/hr Intravenous Continuous    multivitamin-minerals (CENTRUM) tablet 1 tablet  1 tablet Oral Daily    nicotine (NICODERM CQ) 21 mg/24 hr TD 24 hr patch 1 patch  1 patch Transdermal Daily    ondansetron (ZOFRAN) injection 4 mg  4 mg Intravenous Q6H PRN    sertraline (ZOLOFT) tablet 100 mg  100 mg Oral BID     Home Medications: (Not in a hospital admission)      Allergies   Allergen Reactions    Augmentin [Amoxicillin-Pot Clavulanate] Vomiting     Vomiting and  myalgias    Iron Polysaccharide      GI upset, constipation, hemorrhoid flare, heartburn       Objective   Vitals: Blood pressure 122/70, pulse 62, temperature (!) 97 4 °F (36 3 °C), temperature source Oral, resp  rate 16, SpO2 98 %    Orthostatic Blood Pressures      Most Recent Value   Blood Pressure  122/70 filed at 07/03/2021 4392   Patient Position - Orthostatic VS  Lying filed at 07/03/2021 0618            Invasive Devices     Peripheral Intravenous Line            Peripheral IV Left Antecubital -- days                Physical Exam    GEN: Phillip Leopoldo appears well, alert and oriented x 3, pleasant and cooperative   HEENT:  Normocephalic, atraumatic, anicteric, dry oral mucosa  NECK: No JVD or carotid bruits   HEART:  Regular rhythm, normal rate, normal S1 and S2, no murmurs, clicks, gallops or rubs   LUNGS: Clear to auscultation bilaterally; no wheezes, rales, or rhonchi; respiration nonlabored   ABDOMEN:  Normoactive bowel sounds, soft, no tenderness, no distention  EXTREMITIES: peripheral pulses palpable with well-healed right radial access site; no edema  NEURO: no gross focal findings; cranial nerves grossly intact   SKIN:  Dry, intact, warm to touch    Lab Results: I have personally reviewed pertinent lab results  Results from last 7 days   Lab Units 07/03/21  0617 07/03/21  0416 07/03/21  0138   TROPONIN I ng/mL <0 02 <0 02 <0 02     Results from last 7 days   Lab Units 07/03/21  0617 07/02/21 2234 07/02/21  2218   POTASSIUM mmol/L 3 5 3 1*  --    CO2 mmol/L 22 24  --    CO2, I-STAT mmol/L  --   --  26   CHLORIDE mmol/L 110* 108  --    BUN mg/dL 11 12  --    CREATININE mg/dL 0 50* 0 64  --      Results from last 7 days   Lab Units 07/03/21  0617 07/02/21 2234 07/02/21 2218   HEMOGLOBIN g/dL 10 9* 11 4*  --    I STAT HEMOGLOBIN g/dl  --   --  11 9   HEMATOCRIT % 34 9 36 4  --    HEMATOCRIT, ISTAT %  --   --  35   PLATELETS Thousands/uL 197 204  --                Imaging: I have personally reviewed pertinent reports  and I have personally reviewed pertinent films in PACS    Telemetry:   Sinus bradycardia to sinus rhythm with heart rate ranging mid 50s to 60s and no ectopy or dysrhythmia    ECHO: Results for orders placed during the hospital encounter of 12/06/19    Echo complete with contrast if indicated    [facility]  Transthoracic Echocardiogram 2D, M-mode, Doppler, and Color Doppler  Study date:  06-Dec-2019    Sonographer:  Martell Judd  Referring Physician:  Krissy Henry MD  Group:  [id number]  Interpreting Physician:  Khloe Hernandez MD    SUMMARY    LEFT VENTRICLE:  Systolic function was normal  Ejection fraction was estimated to be 60 %  There were no regional wall motion abnormalities  RIGHT VENTRICLE:  The size was normal   Systolic function was normal     MITRAL VALVE:  There was mild to moderate regurgitation  HISTORY: PRIOR HISTORY: HTN;Syncope  LEFT VENTRICLE: Size was normal  Systolic function was normal  Ejection fraction was estimated to be 60 %  There were no regional wall motion abnormalities   Wall thickness was normal  No evidence of apical thrombus  DOPPLER: Left ventricular diastolic function parameters were normal     RIGHT VENTRICLE: The size was normal  Systolic function was normal  Wall thickness was normal     LEFT ATRIUM: Size was normal     RIGHT ATRIUM: Size was normal     MITRAL VALVE: Valve structure was normal  There was normal leaflet separation  DOPPLER: The transmitral velocity was within the normal range  There was no evidence for stenosis  There was mild to moderate regurgitation  AORTIC VALVE: The valve was trileaflet  Leaflets exhibited normal thickness and normal cuspal separation  DOPPLER: Transaortic velocity was within the normal range  There was no evidence for stenosis  There was no significant regurgitation  TRICUSPID VALVE: The valve structure was normal  There was normal leaflet separation  DOPPLER: The transtricuspid velocity was within the normal range  There was no evidence for stenosis  There was no significant regurgitation  PULMONIC VALVE: Leaflets exhibited normal thickness, no calcification, and normal cuspal separation  DOPPLER: The transpulmonic velocity was within the normal range  There was no significant regurgitation  PERICARDIUM: There was no pericardial effusion  AORTA: The root exhibited normal size  SYSTEMIC VEINS: IVC: The inferior vena cava was normal in size  Respirophasic changes were normal     [employer] Accredited Echocardiography Laboratory    Prepared and electronically signed by  Puneet Isidro MD  Signed 06-Dec-2019 16:37:02      CATH:   Narrative & Impression   [facility]  Invasive Cardiovascular Lab Complete Report  Study date: 06/14/2021     Diagnostic Cardiologist:  Sheila Vieira DO  Primary Physician:  Roberta Long MD     SUMMARY  CORONARY VESSELS:     --  The coronary circulation is right dominant  --  Left main: Angiography showed no evidence of disease  --  Proximal LAD: There was a 50 % stenosis    --  Mid LAD: There was a 10 % stenosis at the site of a prior stent  --  1st diagonal: There was a 100 % stenosis  This lesion is a chronic total occlusion  --  Distal circumflex: There was a diffuse 50 % stenosis  --  1st obtuse marginal: There was a 25 % stenosis at the proximal margin of the stented segment  --  Mid RCA: There was a diffuse 40 % stenosis      IMPRESSIONS:  Patent stents in LAD and circumflex  Small 1mm occluded diagonal (diffuse disease in 2019 cath), collateralized      RECOMMENDATIONS  gdmt cad     DISPOSITION:  The patient left the catheterization laboratory in stable condition      Prepared and signed by  Chandra Bhat DO  Signed 06/14/2021 09:08:49         VTE Prophylaxis: Enoxaparin (Lovenox) PPX      Assessment/Plan     Assessment:    1  Syncope/collapse   -interrupted telemetry reveals sinus bradycardia to sinus rhythm with heart rate ranging mid 50s to 60s and no dysrhythmias  - K 3 1, phos 3 6, Mag 2 1, TSH 0 75, glucose 110  - troponin negative x4  - Hgb at baseline  - BP range 109-127/58-77, MAP >65    2  Coronary artery disease  - 12/2019 LHC: 100% OM1 culprit t/w 2 5 x 15 mm +  2 5 x 8 mm Xience,  75% mid LAD non culprit t/w 3 0 x 28 mm Xience  - 12/2019 TTE: LVEF 50%, inferior/lateral WMA, G1 DD, normal RV, no significant valvulopathy   -6/14 LHC: LAD + LCX stents patent; residual 50% prox LAD, 40% mid RCA, 50% distal %  of small D1  - 9/2020 lipids: T 118, TG 52, HDL 45, calculated LDL 63; 2019 A1c 5 4%  - aspirin, clopidogrel, atorvastatin 80 mg, isosorbide mononitrate, losartan 100 mg  - amlodipine 5 mg      Plan:  1  Based on the information available, patient may possibly have been hypovolemic  She remains symptomatic during my evaluation upon sitting from a lying position after 675 cc of Plasma-Lyte  Would increase to 250 cc/hour for the next 4 hours      2  Limited transthoracic echocardiogram ordered to assess any changes in LV function as well as IVC visualization  3  Would obtain 12 lead ECG as there are non documented in MUSE, in physical chart or at the bedside  4  Could consider discontinuation of clopidogrel as patient had no recent stent implantation  5  Cardiology to continue following along           Case discussed and reviewed with Dr David Walters who agrees with my assessment and plan  Thank you for involving us in the care of your patient  Trino Sorensen MD  Cardiology Fellow PGY-6    ==========================================================================================    Counseling / Coordination of Care  Total floor / unit time spent today 20 minutes  Greater than 50% of total time was spent with the patient and / or family counseling and / or coordination of care  A description of the counseling / coordination of care:  Rehydration and limited transthoracic echocardiogram         Epic/ Allscripts/Care Everywhere records reviewed:  Yes    ** Please Note: Fluency DirectDictation voice to text software may have been used in the creation of this document   **

## 2021-07-03 NOTE — H&P
[address] [facility],[address] 1975, 39 y o  female MRN: [id number]  Unit/Bed#: CRB Encounter: [id number]  Primary Care Provider: Shelle Halsted, MD   Date and time admitted to hospital: 7/2/2021 10:01 PM    * Syncope and collapse  Assessment & Plan  Patient had syncope x2 this evening while having dinner  Patient's medications have been revised to include Norvasc 10 mg daily including Plavix 75 mg daily as per cardiology office visit of May 24, 2021  Patient's telemetry shows bradycardia variable from 51 to 59 with blood pressure at 110s  Will put on hold for metoprolol for now and cut Norvasc at 5 mg for now  Orthostatic blood pressures  Gentle hydration  Recheck metabolic profile with CBC in the morning  Troponin x3  Coronary artery disease involving native coronary artery of native heart without angina pectoris  Assessment & Plan  Recent cardiac catheterization showing diffuse coronary artery disease  Cardiology consult for further opinion  Noted patient has stents placed at DS x2 to OM1, MYA x1 to mid LAD  Patient currently without chest discomfort  Depression with anxiety  Assessment & Plan  Continue Zoloft 100 mg twice daily  Benign essential hypertension  Assessment & Plan  Norvasc 5 mg decreased from 10 mg previously  Patient with note of bradycardia hence metoprolol is put on hold  Continue Avapro 300 mg daily (currently Cozaar 100 mg as substitute)  Orthostatic blood pressures  VTE Prophylaxis: Enoxaparin (Lovenox)  / sequential compression device   Code Status: Prior full Code  POLST: There is no POLST form on file for this patient (pre-hospital)    Anticipated Length of Stay:  Patient will be admitted on an Observation basis with an anticipated length of stay of  less that 2 midnights  Justification for Hospital Stay: Please see detailed plans noted above      Chief Complaint:     Passed out x2  History of Present Illness:  Rmia Zuñiga is a 39 y o  female who has past medical history significant for CAD with status post stenting and recent cardiac catheterization showing diffuse disease and currently on medical management, benign essential hypertension for which she is on Avapro, metoprolol and recently placed on Norvasc 10 mg, depression with anxiety, iron deficiency  The patient comes in initially as a trauma level B and after evaluation showing absence of any acute traumatic injury, was then passed on to Medicine due to syncopal episodes  Reportedly, the patient's blood pressure medications have been revised on May 24 by Cardiology adding Norvasc 10 mg to her metoprolol and Avapro  Patient took her medications for blood pressure at 4 p m  this afternoon and had dinner tonight, while on the dinner table and after having her meal, she began to have dizziness and lightheadedness and suddenly lost consciousness  She did not exactly know what happened next  However as she got up she became came dizzy and lightheaded once again that she had a second syncopal episode  These episodes lasted just for a few seconds  In any case, because of the fall and the head strike with Plavix and aspirin; she was then referred to the emergency room where trauma saw her not to have any acute traumatic injury  Currently, patient is feeling much better  She claims that her nausea is almost gone  She also vomited earlier her digested dinner       Review of Systems:    Constitutional:  Denies fever or chills   Eyes:  Denies change in visual acuity   HENT:  Denies nasal congestion or sore throat   Respiratory:  Denies cough or shortness of breath   Cardiovascular:  Denies chest pain or edema   GI:  Denies abdominal pain, nausea, vomiting, bloody stools or diarrhea   :  Denies dysuria   Musculoskeletal:  Denies back pain or joint pain   Integument:  Denies rash   Neurologic:  Denies headache, focal weakness or sensory changes Endocrine:  Denies polyuria or polydipsia   Lymphatic:  Denies swollen glands   Psychiatric:  Denies depression or anxiety     Past Medical and Surgical History:   Past Medical History:   Diagnosis Date    HTN (hypertension)     Mitral valve regurgitation     Myocardial infarction ([address] )     Preeclampsia      Past Surgical History:   Procedure Laterality Date    CARDIAC CATHETERIZATION       SECTION      CORONARY STENT PLACEMENT      EYE SURGERY      TUBAL LIGATION         Meds/Allergies:  (Not in a hospital admission)      Allergies: Allergies   Allergen Reactions    Augmentin [Amoxicillin-Pot Clavulanate] Vomiting     Vomiting and  myalgias    Iron Polysaccharide      GI upset, constipation, hemorrhoid flare, heartburn     History:  Marital Status: /Civil Union   Occupation: --  Patient Pre-hospital Living Situation:  Lives at home  Patient Pre-hospital Level of Mobility:  Ambulatory  Patient Pre-hospital Diet Restrictions:  Regular cardiac  Substance Use History:   Social History     Substance and Sexual Activity   Alcohol Use Not Currently     Social History     Tobacco Use   Smoking Status Current Some Day Smoker    Types: Cigarettes   Smokeless Tobacco Never Used   Her cigarette used is decreased to few a day and she is actively trying to quit    Social History     Substance and Sexual Activity   Drug Use No       Family History:  Family History   Problem Relation Age of Onset    Hypertension Father     Kidney disease Paternal Grandfather        Physical Exam:     Vitals:   Blood Pressure: 117/74 (21)  Pulse: (!) 54 (21)  Temperature: (!) 97 4 °F (36 3 °C) (21)  Temp Source: Oral (21)  Respirations: 14 (21)  SpO2: 97 % (21)    Constitutional:  Well developed, well nourished, no acute distress, non-toxic appearance   Eyes:  PERRL, conjunctiva normal   HENT:  Atraumatic, external ears normal, nose normal, oropharynx moist, no pharyngeal exudates  Neck- normal range of motion, no tenderness, supple   Respiratory:  No respiratory distress, normal breath sounds, no rales, no wheezing   Cardiovascular:  Bradycardic normal rhythm, no murmurs, no gallops, no rubs   GI:  Soft, nondistended, normal bowel sounds, nontender, no organomegaly, no mass, no rebound, no guarding   :  No costovertebral angle tenderness   Musculoskeletal:  No edema, no tenderness, no deformities  Back- no tenderness  Integument:  Well hydrated, no rash   Lymphatic:  No lymphadenopathy noted   Neurologic:  Alert &awake, communicative, CN 2-12 normal, normal motor function, normal sensory function, no focal deficits noted   Psychiatric:  Speech and behavior appropriate       Lab Results: I have personally reviewed pertinent reports  Results from last 7 days   Lab Units 07/02/21  2234   WBC Thousand/uL 7 48   HEMOGLOBIN g/dL 11 4*   HEMATOCRIT % 36 4   PLATELETS Thousands/uL 204   NEUTROS PCT % 59   LYMPHS PCT % 29   MONOS PCT % 11   EOS PCT % 0     Results from last 7 days   Lab Units 07/02/21 2234 07/02/21  2218   POTASSIUM mmol/L 3 1*  --    CHLORIDE mmol/L 108  --    CO2 mmol/L 24  --    CO2, I-STAT mmol/L  --  26   BUN mg/dL 12  --    CREATININE mg/dL 0 64  --    CALCIUM mg/dL 8 6  --    ALK PHOS U/L 67  --    ALT U/L 23  --    AST U/L 15  --    GLUCOSE, ISTAT mg/dl  --  110     Results from last 7 days   Lab Units 07/02/21  2234   INR  0 88           Imaging: I have personally reviewed pertinent reports  TRAUMA - CT head wo contrast    Result Date: 7/2/2021  Narrative: CT BRAIN - WITHOUT CONTRAST INDICATION:   TRAUMA  COMPARISON:  CT brain dated August 6, 2019  TECHNIQUE:  CT examination of the brain was performed  In addition to axial images, sagittal and coronal 2D reformatted images were created and submitted for interpretation  Radiation dose length product (DLP) for this visit:  875 61 mGy-cm     This examination, like all CT scans performed in the [facility], was performed utilizing techniques to minimize radiation dose exposure, including the use of iterative  reconstruction and automated exposure control  IMAGE QUALITY:  Diagnostic  FINDINGS: PARENCHYMA:  No intracranial mass, mass effect or midline shift  No CT signs of acute infarction  No acute parenchymal hemorrhage  VENTRICLES AND EXTRA-AXIAL SPACES:  Normal for the patient's age  VISUALIZED ORBITS AND PARANASAL SINUSES:  There is an 18 mm mucous retention cyst versus mucosal polyp at the floor of the left maxillary sinus  CALVARIUM AND EXTRACRANIAL SOFT TISSUES:  Normal      Impression: No acute intracranial abnormality  Workstation performed: [id number]     TRAUMA - CT spine cervical wo contrast    Result Date: 7/2/2021  Narrative: CT CERVICAL SPINE - WITHOUT CONTRAST INDICATION:   TRAUMA  COMPARISON:  None  TECHNIQUE:  CT examination of the cervical spine was performed without intravenous contrast   Contiguous axial images were obtained  Sagittal and coronal reconstructions were performed  Radiation dose length product (DLP) for this visit:  451 02 mGy-cm   This examination, like all CT scans performed in the [facility], was performed utilizing techniques to minimize radiation dose exposure, including the use of iterative  reconstruction and automated exposure control  IMAGE QUALITY:  Diagnostic  FINDINGS: ALIGNMENT:  Normal alignment of the cervical spine  No subluxation  VERTEBRAL BODIES:  No fracture  DEGENERATIVE CHANGES:  Mild multilevel cervical degenerative changes are noted without critical central canal stenosis  PREVERTEBRAL AND PARASPINAL SOFT TISSUES:  Unremarkable  THORACIC INLET:  Normal      Impression: No cervical spine fracture or traumatic malalignment    Workstation performed: [id number]     Cardiac catheterization    Result Date: 6/14/2021  Narrative: [place] [address] [place] [id number] [phone] [facility] Invasive Cardiovascular Lab Complete Report Patient: Funmilayo Jeronimo MR number: [id number] Account number: [de-identified] Study date: 2021 Gender: Female : 1975 Height: 63 in Weight: 173 6 lb BSA: 1 82 mï¾² Allergies: AMOXICILLIN-POT CLAVULANATE, IRON POLYSACCHARIDE Diagnostic Cardiologist:  Salbador Rasmussen DO Primary Physician:  Chen Esteban MD SUMMARY CORONARY CIRCULATION: Proximal LAD: There was a 50 % stenosis  Mid LAD: There was a 10 % stenosis at the site of a prior stent  1st diagonal: There was a 100 % stenosis  This lesion is a chronic total occlusion  Distal circumflex: There was a diffuse 50 % stenosis  1st obtuse marginal: There was a 25 % stenosis at the proximal margin of the stented segment  Mid RCA: There was a diffuse 40 % stenosis  INDICATIONS: --  Possible CAD: unstable angina  PROCEDURES PERFORMED --  Left coronary angiography  --  Right coronary angiography  --  Outpatient  --  Mod Sedation Same Physician Initial 15min  --  Coronary Catheterization (w/o [facility])  PROCEDURE: The risks and alternatives of the procedures and conscious sedation were explained to the patient and informed consent was obtained  The patient was brought to the cath lab and placed on the table  The planned puncture sites were prepped and draped in the usual sterile fashion  --  Right radial artery access  After performing an Hemant's test to verify adequate ulnar artery supply to the hand, the radial site was prepped  The puncture site was infiltrated with local anesthetic  The vessel was accessed using the modified Seldinger technique, a wire was advanced into the vessel, and a sheath was advanced over the wire into the vessel  --  Left coronary artery angiography  A catheter was advanced over a guidewire into the aorta and positioned in the left coronary artery ostium under fluoroscopic guidance  Angiography was performed  --  Right coronary artery angiography   A catheter was advanced over a guidewire into the aorta and positioned in the right coronary artery ostium under fluoroscopic guidance  Angiography was performed  --  Outpatient  --  Mod Sedation Same Physician Initial 15min  --  Coronary Catheterization (w/o [facility])  PROCEDURE COMPLETION: The patient tolerated the procedure well and was discharged from the cath lab  TIMING: Test started at 08:53  Test concluded at 08:59  HEMOSTASIS: The sheath was removed  The site was compressed with a Hemoband device  Hemostasis was obtained  MEDICATIONS GIVEN: Verapamil (Isoptin, Calan, Covera), 2 5 mg, IA, at 08:54  Plavix 75mg, 75 mg, PO, at 08:23  Fentanyl (1OOmcg/2 ml), 50 mcg, IV, at 08:49  Versed (2mg/2ml), 2 mg, IV, at 08:49  1% Lidocaine, 1 ml, subcutaneously, at 08:53  Nitroglycerin (200mcg/ml), 200 mcg, at 08:54  Heparin 1000 units/ml, 4,000 units, IV, at 08:54  CONTRAST GIVEN: 60 ml Omnipaque (350mg I /ml)  RADIATION EXPOSURE: Fluoroscopy time: 1 4 min  CORONARY VESSELS:   --  The coronary circulation is right dominant  --  Left main: Angiography showed no evidence of disease  --  Proximal LAD: There was a 50 % stenosis  --  Mid LAD: There was a 10 % stenosis at the site of a prior stent  --  1st diagonal: There was a 100 % stenosis  This lesion is a chronic total occlusion  --  Distal circumflex: There was a diffuse 50 % stenosis  --  1st obtuse marginal: There was a 25 % stenosis at the proximal margin of the stented segment  --  Mid RCA: There was a diffuse 40 % stenosis  IMPRESSIONS: Patent stents in LAD and circumflex  Small 1mm occluded diagonal (diffuse disease in 2019 cath), collateralized  RECOMMENDATIONS gdmt cad DISPOSITION: The patient left the catheterization laboratory in stable condition  Prepared and signed by Enrique Lynch DO Signed 06/14/2021 09:08:49 Study diagram Angiographic findings Native coronary lesions: ï¾·Proximal LAD: Lesion 1: 50 % stenosis   ï¾·Mid LAD: Lesion 1: 10 % stenosis, site of prior stent  ï¾·D1: Lesion 1: 100 % stenosis  ï¾·Distal circumflex: Lesion 1: diffuse, 50 % stenosis  ï¾·OM1: Lesion 1: 25 % stenosis  ï¾·Mid RCA: Lesion 1: diffuse, 40 % stenosis  Hemodynamic tables Outputs:  Baseline Outputs:  -- CALCULATIONS[age] Outputs:  -- CALCULATIONS: Body Surface Area: 1 82 Outputs:  -- CALCULATIONS: Height in cm: 160 00 Outputs:  -- CALCULATIONS: Sex: Female Outputs:  -- CALCULATIONS: Weight in k 90         ** Please Note: Dragon 360 Dictation voice to text software was used in the creation of this document   **

## 2021-07-03 NOTE — ASSESSMENT & PLAN NOTE
Norvasc 5 mg decreased from 10 mg previously  Patient with note of bradycardia hence metoprolol is put on hold  Continue Avapro 300 mg daily (currently Cozaar 100 mg as substitute)  Orthostatic blood pressures

## 2021-07-03 NOTE — ASSESSMENT & PLAN NOTE
Patient had syncope x2 this evening while having dinner  Patient's medications have been revised to include Norvasc 10 mg daily including Plavix 75 mg daily as per cardiology office visit of May 24, 2021  Patient's telemetry shows bradycardia variable from 51 to 59 with blood pressure at 110s  Will put on hold for metoprolol for now and cut Norvasc at 5 mg for now  Orthostatic blood pressures  Gentle hydration  Recheck metabolic profile with CBC in the morning  Troponin x3

## 2021-07-03 NOTE — ASSESSMENT & PLAN NOTE
· Norvasc 5 mg decreased from 10 mg previously    · Metoprolol was held given bradycradia  · Continue Avapro 300 mg daily (currently Cozaar 100 mg as substitute while hospitalized)  · Check orthostats as above

## 2021-07-03 NOTE — CONSULTS
Evaluation - Trauma   Radha Bean 39 y o  female MRN: [id number]  Unit/Bed#: CRB Encounter: [id number]    Assessment/Plan   Trauma Alert: Level B  Model of Arrival: Ambulance  Trauma Team: Attending Dr Marcel Clement  Resident: Sydni Arizmendi  Consultants: None    Trauma Active Problems:  Syncope w head strike on asa & plavix, no traumatic injuries  Trauma Plan:   CT H and CT cspine: negative for traumatic injuries  Admit to medicine for syncope work up   F/u trops/ coags/ u/a  Tertiary exam on 7/3  Chief Complaint:   Headache  History of Present Illness   Physician Requesting Evaluation: Leighann Sellers DO  Reason for Evaluation / Principal Problem: syncope w head strike on asa, plavix  HPI:  Radha Bean is a 39 y o  female past medical history of coronary artery disease status post cardiac catheterization and coronary artery stent placements on aspirin Plavix, who presents with fall secondary to syncope complicated by head strike  Patient explained that she had spent a lot time swimming and playing in the pool, drinking moderate amount of alcohol, later in the afternoon she was listening to music and felt uncomfortable, passed out and struck her head on the way down  This happened a 2nd time later in the evening  On arrival to the [address] [place] [address], patient's GCS is 15  Completely neuro intact  CT head and C-spine negative for traumatic injuries  Patient's chief complaint is headache, however denied any current chest pain or shortness of breath  Denied any fevers chills  Denied any nausea vomiting or vision changes  Mechanism:Fall    Consults    Review of Systems   Constitutional: Negative for chills and fever  HENT: Negative  Eyes: Negative  Respiratory: Negative  Cardiovascular: Negative  Gastrointestinal: Negative  Endocrine: Negative  Genitourinary: Negative  Musculoskeletal: Negative  Skin: Negative  Allergic/Immunologic: Negative  Neurological: Negative  Hematological: Negative  Psychiatric/Behavioral: Negative  Historical Information   History is unobtainable from the patient due to none    Efforts to obtain history included the following sources: other medical personnel, obtained from other records, patient    Past Medical History:   Diagnosis Date    HTN (hypertension)     Mitral valve regurgitation     Myocardial infarction ([address] )     Preeclampsia      Past Surgical History:   Procedure Laterality Date    CARDIAC CATHETERIZATION       SECTION      CORONARY STENT PLACEMENT      EYE SURGERY      TUBAL LIGATION       Social History   Social History     Substance and Sexual Activity   Alcohol Use Not Currently     Social History     Substance and Sexual Activity   Drug Use No     E-Cigarette/Vaping     E-Cigarette/Vaping Substances     Social History     Tobacco Use   Smoking Status Current Some Day Smoker    Types: Cigarettes   Smokeless Tobacco Never Used     Immunization History   Administered Date(s) Administered    Influenza, seasonal, injectable 10/16/2015    Influenza, seasonal, injectable, preservative free 10/29/2018    Pneumococcal Polysaccharide PPV23 2020    Tdap 2014    Tuberculin Skin Test-PPD Intradermal 09/10/2012     Last Tetanus: unknown  Family History: Non-contributory  Unable to obtain/limited by none      Meds/Allergies   all current active meds have been reviewed    Allergies   Allergen Reactions    Augmentin [Amoxicillin-Pot Clavulanate] Vomiting     Vomiting and  myalgias    Iron Polysaccharide      GI upset, constipation, hemorrhoid flare, heartburn         Objective   Vitals:   First set: Temperature: (!) 97 4 °F (36 3 °C) (21)  Pulse: 64 (21)  Respirations: 18 (21)  Blood Pressure: 122/77 (21)    Invasive Devices     Peripheral Intravenous Line            Peripheral IV Left Antecubital -- days                Neurologic Exam     Mental Status Oriented to person, place, and time  Disoriented to year, date, day and season  Level of consciousness: alert  Knowledge: good  Cranial Nerves     CN III, IV, VI   Pupils are equal, round, and reactive to light  Physical Exam  Vitals reviewed  Constitutional:       General: She is not in acute distress  Appearance: She is not toxic-appearing  HENT:      Head: Normocephalic and atraumatic  Nose: Nose normal       Mouth/Throat:      Mouth: Mucous membranes are moist    Eyes:      Pupils: Pupils are equal, round, and reactive to light  Cardiovascular:      Rate and Rhythm: Normal rate  Pulses: Normal pulses  Pulmonary:      Effort: Pulmonary effort is normal    Abdominal:      General: There is no distension  Palpations: Abdomen is soft  Tenderness: There is no abdominal tenderness  Genitourinary:     Comments: deferred  Musculoskeletal:         General: Normal range of motion  Cervical back: Normal range of motion  Skin:     General: Skin is warm  Capillary Refill: Capillary refill takes less than 2 seconds  Neurological:      General: No focal deficit present  Mental Status: She is alert and oriented to person, place, and time  Psychiatric:         Mood and Affect: Mood normal        PE limited by: none       Lab Results:   Results: I have personally reviewed pertinent reports   , BMP/CMP:   Lab Results   Component Value Date    SODIUM 139 07/02/2021    K 3 1 (L) 07/02/2021     07/02/2021    CO2 24 07/02/2021    CO2 26 07/02/2021    BUN 12 07/02/2021    CREATININE 0 64 07/02/2021    GLUCOSE 110 07/02/2021    CALCIUM 8 6 07/02/2021    AST 15 07/02/2021    ALT 23 07/02/2021    ALKPHOS 67 07/02/2021    EGFR 108 07/02/2021   , CBC:   Lab Results   Component Value Date    WBC 7 48 07/02/2021    HGB 11 4 (L) 07/02/2021    HCT 36 4 07/02/2021    MCV 84 07/02/2021     07/02/2021    MCH 26 3 (L) 07/02/2021    MCHC 31 3 (L) 07/02/2021    RDW 15 5 (H) 07/02/2021    MPV 10 0 07/02/2021    NRBC 0 07/02/2021   , Coagulation:   Lab Results   Component Value Date    INR 0 88 07/02/2021   , Lactate: No results found for: LACTATE and Amylase: No results found for: AMYLASE  Imaging/EKG Studies: Results: I have personally reviewed pertinent reports  Other Studies:   7/3 CTH and CT cspine: negative  7/3 FAST: negative  Code Status: Prior  Advance Directive and Living Will:      Power of :    POLST:      Counseling / Coordination of Care  Total Critical Care time spent 30 minutes excluding procedures, teaching and family updates

## 2021-07-03 NOTE — ASSESSMENT & PLAN NOTE
Recent cardiac catheterization showing diffuse coronary artery disease  Cardiology consult for further opinion  Noted patient has stents placed at DS x2 to OM1, MYA x1 to mid LAD  Patient currently without chest discomfort

## 2021-07-03 NOTE — PROGRESS NOTES
Pastoral Care Progress Note    7/3/2021  Patient: Author Knutson : 1975  Admission Date & Time: 2021  MRN: [id number] [facility]: [id number]                     Chaplaincy Interventions Utilized:   Empowerment: Clarified, confirmed, or reviewed information from treatment team  and Encouraged focus on present    Exploration:     Collaboration: Consulted with interdisciplinary team    Relationship Building: Cultivated a relationship of care and support, Listened empathically and Provided silent and supportive presence    Ritual:     Chaplaincy Outcomes Achieved:  Declined  support    Spiritual Coping Strategies Utilized:

## 2021-07-03 NOTE — ASSESSMENT & PLAN NOTE
· History of MI in 12/2019 with 4 stents between LAD, LCx, OM1   · Cardiology consult appreciated  · On aspirin, Plavix, statin, Imdur  · Troponins negative x4

## 2021-07-03 NOTE — PROCEDURES
POC FAST US    Date/Time: 7/3/2021 12:11 AM  Performed by: Pretty Avalos MD  Authorized by: Pretty Avalos MD     Patient location:  Trauma  Procedure details:     Exam Type:  Diagnostic    Indications: blunt abdominal trauma      Assess for:  Intra-abdominal fluid and pericardial effusion    Technique: FAST      Views obtained:  Heart - Pericardial sac, RUQ - Schreiber's Pouch, LUQ - Splenorenal space and Suprapubic - Pouch of Angel    Image quality: diagnostic    FAST Findings:     RUQ (Hepatorenal) free fluid: absent      LUQ (Splenorenal) free fluid: absent      Suprapubic free fluid: absent      Cardiac wall motion: identified      Pericardial effusion: absent    Interpretation:     Impressions: negative

## 2021-07-04 VITALS
DIASTOLIC BLOOD PRESSURE: 58 MMHG | OXYGEN SATURATION: 96 % | SYSTOLIC BLOOD PRESSURE: 126 MMHG | RESPIRATION RATE: 18 BRPM | HEIGHT: 63 IN | BODY MASS INDEX: 31.75 KG/M2 | HEART RATE: 64 BPM | TEMPERATURE: 98.8 F | WEIGHT: 179.2 LBS

## 2021-07-04 PROBLEM — E87.6 HYPOKALEMIA: Status: RESOLVED | Noted: 2019-07-28 | Resolved: 2021-07-04

## 2021-07-04 LAB
ANION GAP SERPL CALCULATED.3IONS-SCNC: 6 MMOL/L (ref 4–13)
BUN SERPL-MCNC: 7 MG/DL (ref 5–25)
CALCIUM SERPL-MCNC: 8.7 MG/DL (ref 8.3–10.1)
CHLORIDE SERPL-SCNC: 110 MMOL/L (ref 100–108)
CO2 SERPL-SCNC: 23 MMOL/L (ref 21–32)
CREAT SERPL-MCNC: 0.42 MG/DL (ref 0.6–1.3)
ERYTHROCYTE [DISTWIDTH] IN BLOOD BY AUTOMATED COUNT: 15.8 % (ref 11.6–15.1)
GFR SERPL CREATININE-BSD FRML MDRD: 124 ML/MIN/1.73SQ M
GLUCOSE P FAST SERPL-MCNC: 96 MG/DL (ref 65–99)
GLUCOSE SERPL-MCNC: 96 MG/DL (ref 65–140)
HCT VFR BLD AUTO: 37.1 % (ref 34.8–46.1)
HGB BLD-MCNC: 11.6 G/DL (ref 11.5–15.4)
MCH RBC QN AUTO: 26.2 PG (ref 26.8–34.3)
MCHC RBC AUTO-ENTMCNC: 31.3 G/DL (ref 31.4–37.4)
MCV RBC AUTO: 84 FL (ref 82–98)
PLATELET # BLD AUTO: 198 THOUSANDS/UL (ref 149–390)
PMV BLD AUTO: 10.2 FL (ref 8.9–12.7)
POTASSIUM SERPL-SCNC: 3.5 MMOL/L (ref 3.5–5.3)
RBC # BLD AUTO: 4.43 MILLION/UL (ref 3.81–5.12)
SODIUM SERPL-SCNC: 139 MMOL/L (ref 136–145)
WBC # BLD AUTO: 4.51 THOUSAND/UL (ref 4.31–10.16)

## 2021-07-04 PROCEDURE — 93321 DOPPLER ECHO F-UP/LMTD STD: CPT | Performed by: INTERNAL MEDICINE

## 2021-07-04 PROCEDURE — 93325 DOPPLER ECHO COLOR FLOW MAPG: CPT | Performed by: INTERNAL MEDICINE

## 2021-07-04 PROCEDURE — 80048 BASIC METABOLIC PNL TOTAL CA: CPT | Performed by: PHYSICIAN ASSISTANT

## 2021-07-04 PROCEDURE — 99217 PR OBSERVATION CARE DISCHARGE MANAGEMENT: CPT | Performed by: PHYSICIAN ASSISTANT

## 2021-07-04 PROCEDURE — 85027 COMPLETE CBC AUTOMATED: CPT | Performed by: PHYSICIAN ASSISTANT

## 2021-07-04 PROCEDURE — 93308 TTE F-UP OR LMTD: CPT | Performed by: INTERNAL MEDICINE

## 2021-07-04 PROCEDURE — 99214 OFFICE O/P EST MOD 30 MIN: CPT | Performed by: INTERNAL MEDICINE

## 2021-07-04 RX ORDER — AMLODIPINE BESYLATE 2.5 MG/1
2.5 TABLET ORAL DAILY
Qty: 30 TABLET | Refills: 0 | Status: SHIPPED | OUTPATIENT
Start: 2021-07-05 | End: 2021-07-27 | Stop reason: SDUPTHER

## 2021-07-04 RX ORDER — AMLODIPINE BESYLATE 2.5 MG/1
2.5 TABLET ORAL DAILY
Status: DISCONTINUED | OUTPATIENT
Start: 2021-07-04 | End: 2021-07-04 | Stop reason: HOSPADM

## 2021-07-04 RX ADMIN — ENOXAPARIN SODIUM 40 MG: 40 INJECTION SUBCUTANEOUS at 08:14

## 2021-07-04 RX ADMIN — Medication 1 TABLET: at 08:14

## 2021-07-04 RX ADMIN — ASPIRIN 81 MG: 81 TABLET, COATED ORAL at 08:15

## 2021-07-04 RX ADMIN — AMLODIPINE BESYLATE 2.5 MG: 2.5 TABLET ORAL at 08:14

## 2021-07-04 RX ADMIN — SERTRALINE HYDROCHLORIDE 100 MG: 100 TABLET, FILM COATED ORAL at 08:15

## 2021-07-04 RX ADMIN — NICOTINE 1 PATCH: 21 PATCH, EXTENDED RELEASE TRANSDERMAL at 08:16

## 2021-07-04 RX ADMIN — FERROUS SULFATE TAB 325 MG (65 MG ELEMENTAL FE) 325 MG: 325 (65 FE) TAB at 08:14

## 2021-07-04 RX ADMIN — LOSARTAN POTASSIUM 100 MG: 50 TABLET, FILM COATED ORAL at 08:14

## 2021-07-04 RX ADMIN — ISOSORBIDE MONONITRATE 30 MG: 30 TABLET, EXTENDED RELEASE ORAL at 08:14

## 2021-07-04 NOTE — DISCHARGE INSTRUCTIONS
Syncope   WHAT YOU NEED TO KNOW:   Syncope is also called fainting or passing out  Syncope is a sudden, temporary loss of consciousness, followed by a fall from a standing or sitting position  Syncope ranges from not serious to a sign of a more serious condition that needs to be treated  You can control some health conditions that cause syncope  Your healthcare providers can help you create a plan to manage syncope and prevent episodes  DISCHARGE INSTRUCTIONS:   Seek care immediately if:   · You are bleeding because you hit your head when you fainted  · You suddenly have double vision, difficulty speaking, numbness, and cannot move your arms or legs  · You have chest pain and trouble breathing  · You vomit blood or material that looks like coffee grounds  · You see blood in your bowel movement  Contact your healthcare provider if:   · You have new or worsening symptoms  · You have another syncope episode  · You have a headache, fast heartbeat, or feel too dizzy to stand up  · You have questions or concerns about your condition or care  Medicines:   · Medicines  may be needed to help your heart pump strongly and regularly  Your healthcare provider may also make changes to any medicines that are causing syncope  · Take your medicine as directed  Contact your healthcare provider if you think your medicine is not helping or if you have side effects  Tell him or her if you are allergic to any medicine  Keep a list of the medicines, vitamins, and herbs you take  Include the amounts, and when and why you take them  Bring the list or the pill bottles to follow-up visits  Carry your medicine list with you in case of an emergency  Follow up with your healthcare provider as directed:  Write down your questions so you remember to ask them during your visits  Manage syncope:   · Keep a record of your syncope episodes    Include your symptoms and your activity before and after the episode  The record can help your healthcare provider find the cause of your syncope and help you manage episodes  · Sit or lie down when needed  This includes when you feel dizzy, your throat is getting tight, and your vision changes  Raise your legs above the level of your heart  · Take slow, deep breaths if you start to breathe faster with anxiety or fear  This can help decrease dizziness and the feeling that you might faint  · Check your blood pressure often  This is important if you take medicine to lower your blood pressure  Check your blood pressure when you are lying down and when you are standing  Ask how often to check during the day  Keep a record of your blood pressure numbers  Your healthcare provider may use the record to help plan your treatment  Prevent a syncope episode:   · Move slowly and let yourself get used to one position before you move to another position  This is very important when you change from a lying or sitting position to a standing position  Take some deep breaths before you stand up from a lying position  Stand up slowly  Sudden movements may cause a fainting spell  Sit on the side of the bed or couch for a few minutes before you stand up  If you are on bedrest, try to be upright for about 2 hours each day, or as directed  Do not lock your legs if you are standing for a long period of time  Move your legs and bend your knees to keep blood flowing  · Follow your healthcare provider's recommendations  Your provider may  recommend that you drink more liquids to prevent dehydration  You may also need to have more salt to keep your blood pressure from dropping too low and causing syncope  Your provider will tell you how much liquid and sodium to have each day  He or she will also tell you how much physical activity is safe for you  This will depend on what is causing your syncope  · Watch for signs of low blood sugar    These include hunger, nervousness, sweating, and fast or fluttery heartbeats  Talk with your healthcare provider about ways to keep your blood sugar level steady  · Do not strain if you are constipated  You may faint if you strain to have a bowel movement  Walking is the best way to get your bowels moving  Eat foods high in fiber to make it easier to have a bowel movement  Good examples are high-fiber cereals, beans, vegetables, and whole-grain breads  Prune juice may help make bowel movements softer  · Be careful in hot weather  Heat can cause a syncope episode  Limit activity done outside on hot days  Physical activity in hot weather can lead to dehydration  This can cause an episode  © Copyright [address] Information is for End User's use only and may not be sold, redistributed or otherwise used for commercial purposes  All illustrations and images included in CareNotes® are the copyrighted property of A D A M , Inc  or [address] [place], [place] [address]mahnaz bernarda   The above information is an  only  It is not intended as medical advice for individual conditions or treatments  Talk to your doctor, nurse or pharmacist before following any medical regimen to see if it is safe and effective for you

## 2021-07-04 NOTE — PLAN OF CARE
Problem: MOBILITY - ADULT  Goal: Maintain or return to baseline ADL function  Description: INTERVENTIONS:  -  Assess patient's ability to carry out ADLs; assess patient's baseline for ADL function and identify physical deficits which impact ability to perform ADLs (bathing, care of mouth/teeth, toileting, grooming, dressing, etc )  - Assess/evaluate cause of self-care deficits   - Assess range of motion  - Assess patient's mobility; develop plan if impaired  - Assess patient's need for assistive devices and provide as appropriate  - Encourage maximum independence but intervene and supervise when necessary  - Involve family in performance of ADLs  - Assess for home care needs following discharge   - Consider OT consult to assist with ADL evaluation and planning for discharge  - Provide patient education as appropriate  Outcome: Progressing  Goal: Maintains/Returns to pre admission functional level  Description: INTERVENTIONS:  - Perform BMAT or MOVE assessment daily    - Set and communicate daily mobility goal to care team and patient/family/caregiver  - Collaborate with rehabilitation services on mobility goals if consulted  - Perform Range of Motion  times a day  - Reposition patient every  hours    - Dangle patient  times a day  - Stand patient  times a day  - Ambulate patient  times a day  - Out of bed to chair  times a day   - Out of bed for meals times a day  - Out of bed for toileting  - Record patient progress and toleration of activity level   Outcome: Progressing     Problem: Potential for Falls  Goal: Patient will remain free of falls  Description: INTERVENTIONS:  - Educate patient/family on patient safety including physical limitations  - Instruct patient to call for assistance with activity   - Consult OT/PT to assist with strengthening/mobility   - Keep Call bell within reach  - Keep bed low and locked with side rails adjusted as appropriate  - Keep care items and personal belongings within reach  - Initiate and maintain comfort rounds  - Make Fall Risk Sign visible to staff  - Offer Toileting every  Hours, in advance of need  - Initiate/Maintain alarm  - Obtain necessary fall risk management equipment:   - Apply yellow socks and bracelet for high fall risk patients  - Consider moving patient to room near nurses station  Outcome: Progressing

## 2021-07-04 NOTE — DISCHARGE SUMMARY
Discharge Summary - [facility]    Patient Information: Phillip Mina 39 y o  female MRN: [id number]  Unit/Bed#: CW2 210-01 Encounter: [id number]    Discharging Physician / Practitioner: Marcia Faust PA-C  PCP: Collins Alvarez MD  Admission Date: 7/2/2021  Discharge Date: 07/04/21    Reason for Admission: syncope    Discharge Diagnoses:     Principal Problem:    Syncope and collapse  Active Problems:    Coronary artery disease involving native coronary artery of native heart without angina pectoris    Benign essential hypertension    Depression with anxiety  Resolved Problems:    Hypokalemia      Consultations During Hospital Stay:  · Trauma  · Cardiology    Procedures Performed:   · Echo  · Trauma XRs  · CT head  · CT C-spine  · CMP/BMP  · Mag  · Phos  · Troponins  · CBC  · Lipid panel  · TSH    Significant Findings:   · Echo: final report pending, however per Cardiology note revealed LV 75% and flat IVC  · Trauma XRs:  No acute cardiopulmonary disease within limitations of supine imaging  · CT head:  No acute intracranial abnormality  · CT C-spine:  No cervical spine fracture or traumatic malalignment  · Hypokalemia, resolved  · Troponins: <0 02 x4  · TSH: 0 754    Incidental Findings:   · None     Test Results Pending at Discharge (will require follow up): · None     Outpatient Tests Requested:  · Outpatient 2 week Zio patch and office follow up with Cardiology  · Follow up with PCP    Complications:  None    Hospital Course:     Phillip Mina is a 39 y o  female with CAD s/p MI in 2019 (with 4 stents between LAD,LCx, OM1), HTN, depression, and obesity (Body mass index is 31 74 kg/m² ) who originally presented to the hospital on 7/2/2021 due to syncope  Patient initially came in as a trauma alert, however after evaluation showed no traumatic injury she was admitted under Medicine for syncope work up  She was seen in consultation by Cardiology and monitored on telemetry   Her amlodipine was decreased to 2 5mg and her metoprolol was held given bradycardia, however per Cardiology she may resume metoprolol at discharge  Plavix was stopped by Cardiology  Orthostatic BPs were checked and negative, however the patient had already received IVF  Final echo read pending, however per Cardiologist this showed LV at 75% and flat IVC  Clinical story and symptoms suggestive of volume depletion and orthostasis  Cardiology cleared for discharge today and recommended 2 week Zio patch and OP office follow up  I discussed with Dr Agus Canchola of Cardiology on day of discharge  Condition at Discharge: stable     Discharge Day Visit / Exam:     Subjective:    Ms Gilford Ramsay reports feeling well today  She wants to go home  She denies any dizziness/lightheadedness this AM  She denies CP or SOB    Vitals: Blood Pressure: 126/58 (07/04/21 0740)  Pulse: 64 (07/03/21 1015)  Temperature: 98 8 °F (37 1 °C) (07/04/21 0740)  Temp Source: Oral (07/02/21 2205)  Respirations: 18 (07/04/21 0740)  Height: 5' 3" (160 cm) (07/03/21 1215)  Weight - Scale: 81 3 kg (179 lb 3 2 oz) (07/03/21 1215)  SpO2: 96 % (07/03/21 1015)  Exam:   Physical Exam  Vitals and nursing note reviewed  Constitutional:       Comments: Patient seen sitting in bed comfortably resting, NAD   Cardiovascular:      Rate and Rhythm: Normal rate and regular rhythm  Pulmonary:      Effort: Pulmonary effort is normal  No respiratory distress  Breath sounds: Normal breath sounds  Abdominal:      General: Bowel sounds are normal       Palpations: Abdomen is soft  Tenderness: There is no abdominal tenderness  Musculoskeletal:      Right lower leg: No edema  Left lower leg: No edema  Skin:     General: Skin is warm  Neurological:      Mental Status: She is alert and oriented to person, place, and time     Psychiatric:         Mood and Affect: Mood normal          Behavior: Behavior normal          Discharge instructions/Information to patient and family:   See after visit summary for information provided to patient and family  Provisions for Follow-Up Care:  See after visit summary for information related to follow-up care and any pertinent home health orders  Disposition:     Home    For Discharges to [facility] SNF:   · Not Applicable to this Patient - Not Applicable to this Patient    Planned Readmission: None     Discharge Statement:  I spent 32 minutes discharging the patient  This time was spent on the day of discharge  I had direct contact with the patient on the day of discharge  Greater than 50% of the total time was spent examining patient, answering all patient questions, arranging and discussing plan of care with patient as well as directly providing post-discharge instructions  Additional time then spent on discharge activities  Discharge Medications:  See after visit summary for reconciled discharge medications provided to patient and family  ** Please Note: Dragon 360 Dictation voice to text software may have been used in the creation of this document   **

## 2021-07-04 NOTE — PROGRESS NOTES
Cardiology Progress Note - Marianne Shelley 39 y o  female MRN: [id number]    Unit/Bed#: CW2 210-01 Encounter: [id number]    Assessment and plan  1  Syncope likely orthostatic  2  Coronary disease currently stable without angina  3  Hypertension  4  Hyperlipidemia    Recommendations: The patient's clinical story sounds like she had volume depletion and was orthostatic  Symptoms have significantly improved with IV fluid resuscitation  Echocardiogram was personally reviewed showing hyperdynamic LV at 75% and flat IVC  No events on telemetry  Decrease amlodipine to 2 5 mg daily  Decrease caffeine and alcohol intake  Remain very well hydrated  Would ambulate her in the halls today is feeling well with no significant drop in blood pressure would be stable for discharge  Will arrange for an outpatient 2 week Zio patch to screen for any potential dysrhythmias as well as office follow-up  Subjective:    No significant events overnight  Denies any chest pain, shortness of breath, palpitations  No events on telemetry  Sensations of lightheadedness significantly improved following IV fluids  ROS    Objective:   Vitals: Blood pressure 128/74, pulse 64, temperature 98 3 °F (36 8 °C), resp  rate 17, height 5' 3" (1 6 m), weight 81 3 kg (179 lb 3 2 oz), SpO2 96 %  , Body mass index is 31 74 kg/m² ,   Orthostatic Blood Pressures      Most Recent Value   Blood Pressure  128/74 filed at 07/04/2021 0302   Patient Position - Orthostatic VS  Standing for 3 minutes - Orthostatic VS filed at 07/03/2021 3779         Systolic (16GMQ), WXN:457 , Min:118 , ASQ:834     Diastolic (80IBO), OZU:24, Min:70, Max:96      Intake/Output Summary (Last 24 hours) at 7/4/2021 0635  Last data filed at 7/3/2021 2100  Gross per 24 hour   Intake --   Output 950 ml   Net -950 ml     Weight (last 2 days)     Date/Time   Weight    07/03/21 1215   81 3 (179 2)                Telemetry Review: No significant arrhythmias seen on telemetry review  EKG personally reviewed by Diamond Macedo DO  Physical Exam  Vitals and nursing note reviewed  Constitutional:       General: She is not in acute distress  Appearance: She is well-developed  HENT:      Head: Normocephalic and atraumatic  Eyes:      Conjunctiva/sclera: Conjunctivae normal       Pupils: Pupils are equal, round, and reactive to light  Cardiovascular:      Rate and Rhythm: Normal rate and regular rhythm  Heart sounds: Normal heart sounds  No murmur heard  No friction rub  Pulmonary:      Effort: Pulmonary effort is normal  No respiratory distress  Breath sounds: Normal breath sounds  No wheezing or rales  Abdominal:      General: Bowel sounds are normal  There is no distension  Palpations: Abdomen is soft  Tenderness: There is no abdominal tenderness  There is no rebound  Musculoskeletal:         General: No tenderness or deformity  Normal range of motion  Cervical back: Neck supple  Skin:     General: Skin is warm and dry  Findings: No erythema  Neurological:      Mental Status: She is alert and oriented to person, place, and time  Cranial Nerves: No cranial nerve deficit             Laboratory Results:  Results from last 7 days   Lab Units 07/03/21  0617 07/03/21  0416 07/03/21  0138   TROPONIN I ng/mL <0 02 <0 02 <0 02       CBC with diff:   Results from last 7 days   Lab Units 07/04/21  0445 07/03/21  0617 07/02/21  2234 07/02/21  2218   WBC Thousand/uL 4 51 7 79 7 48  --    HEMOGLOBIN g/dL 11 6 10 9* 11 4*  --    I STAT HEMOGLOBIN g/dl  --   --   --  11 9   HEMATOCRIT % 37 1 34 9 36 4  --    HEMATOCRIT, ISTAT %  --   --   --  35   MCV fL 84 84 84  --    PLATELETS Thousands/uL 198 197 204  --    MCH pg 26 2* 26 2* 26 3*  --    MCHC g/dL 31 3* 31 2* 31 3*  --    RDW % 15 8* 15 7* 15 5*  --    MPV fL 10 2 9 9 10 0  --    NRBC AUTO /100 WBCs  --  0 0  --          CMP:  Results from last 7 days   Lab Units 07/04/21 0445 21  0621  2218   POTASSIUM mmol/L 3 5 3 5 3 1*  --    CHLORIDE mmol/L 110* 110* 108  --    CO2 mmol/L 23 22 24  --    CO2, I-STAT mmol/L  --   --   --  26   BUN mg/dL 7 11 12  --    CREATININE mg/dL 0 42* 0 50* 0 64  --    GLUCOSE, ISTAT mg/dl  --   --   --  110   CALCIUM mg/dL 8 7 8 6 8 6  --    AST U/L  --  14 15  --    ALT U/L  --  22 23  --    ALK PHOS U/L  --  62 67  --    EGFR ml/min/1 73sq m 124 117 108  --          BMP:  Results from last 7 days   Lab Units 21  0445 21  2218   POTASSIUM mmol/L 3 5 3 5 3 1*  --    CHLORIDE mmol/L 110* 110* 108  --    CO2 mmol/L 23 22 24  --    CO2, I-STAT mmol/L  --   --   --  26   BUN mg/dL 7 11 12  --    CREATININE mg/dL 0 42* 0 50* 0 64  --    GLUCOSE, ISTAT mg/dl  --   --   --  110   CALCIUM mg/dL 8 7 8 6 8 6  --        BNP: No results for input(s): BNP in the last 72 hours  Magnesium:   Results from last 7 days   Lab Units 21  0617   MAGNESIUM mg/dL 2 1       Coags:   Results from last 7 days   Lab Units 214   INR  0 88       TSH:        Hemoglobin A1C       Lipid Profile:   Results from last 7 days   Lab Units 21  0617   TRIGLYCERIDES mg/dL 80   HDL mg/dL 49       Cardiac testing:   Results for orders placed during the hospital encounter of 19    Echo complete with contrast if indicated    Narrative  [address]  [address] [place] [address]  [phone]    Transthoracic Echocardiogram  2D, M-mode, Doppler, and Color Doppler    Study date:  06-Dec-2019    Patient: Shruthi Cabello  MR number: [id number]  Account number: [de-identified]  : 1975  Age: 40 years  Gender: Female  Status: Outpatient  Location: Echo lab  Height: 63 in  Weight: 162 6 lb  BP: 140/ 90 mmHg    Indications: Cardiomegaly;Myocarditis      Diagnoses: I51 4 - Myocarditis, unspecified, I51 7 - Cardiomegaly    Sonographer:  Beth Thomas  Referring Physician: Kecia Ortiz MD  Group:  [facility]  Interpreting Physician:  Shelvy Meckel, MD    SUMMARY    LEFT VENTRICLE:  Systolic function was normal  Ejection fraction was estimated to be 60 %  There were no regional wall motion abnormalities  RIGHT VENTRICLE:  The size was normal   Systolic function was normal     MITRAL VALVE:  There was mild to moderate regurgitation  HISTORY: PRIOR HISTORY: HTN;Syncope  PROCEDURE: The procedure was performed in the echo lab  This was a stat study  The transthoracic approach was used  The study included complete 2D imaging, M-mode, complete spectral Doppler, and color Doppler  The heart rate was 63 bpm, at  the start of the study  Images were obtained from the parasternal, apical, subcostal, and suprasternal notch acoustic windows  Image quality was adequate  LEFT VENTRICLE: Size was normal  Systolic function was normal  Ejection fraction was estimated to be 60 %  There were no regional wall motion abnormalities  Wall thickness was normal  No evidence of apical thrombus  DOPPLER: Left  ventricular diastolic function parameters were normal     RIGHT VENTRICLE: The size was normal  Systolic function was normal  Wall thickness was normal     LEFT ATRIUM: Size was normal     RIGHT ATRIUM: Size was normal     MITRAL VALVE: Valve structure was normal  There was normal leaflet separation  DOPPLER: The transmitral velocity was within the normal range  There was no evidence for stenosis  There was mild to moderate regurgitation  AORTIC VALVE: The valve was trileaflet  Leaflets exhibited normal thickness and normal cuspal separation  DOPPLER: Transaortic velocity was within the normal range  There was no evidence for stenosis  There was no significant  regurgitation  TRICUSPID VALVE: The valve structure was normal  There was normal leaflet separation  DOPPLER: The transtricuspid velocity was within the normal range  There was no evidence for stenosis  There was no significant regurgitation  PULMONIC VALVE: Leaflets exhibited normal thickness, no calcification, and normal cuspal separation  DOPPLER: The transpulmonic velocity was within the normal range  There was no significant regurgitation  PERICARDIUM: There was no pericardial effusion  AORTA: The root exhibited normal size  SYSTEMIC VEINS: IVC: The inferior vena cava was normal in size  Respirophasic changes were normal     SYSTEM MEASUREMENT TABLES    2D  %FS: 37 07 %  Ao Diam: 2 9 cm  EDV(Teich): 97 37 ml  EF(Cube): 75 07 %  EF(Teich): 67 05 %  ESV(Cube): 24 27 ml  ESV(Teich): 32 08 ml  IVSd: 0 88 cm  LA Area: 13 25 cm2  LA Diam: 2 44 cm  LVEDV MOD A4C: 75 94 ml  LVEF MOD A4C: 59 91 %  LVESV MOD A4C: 30 45 ml  LVIDd: 4 6 cm  LVIDs: 2 9 cm  LVLd A4C: 7 81 cm  LVLs A4C: 6 3 cm  LVOT Diam: 1 91 cm  LVPWd: 0 93 cm  RA Area: 12 44 cm2  SI(Cube): 41 3 ml/m2  SI(Teich): 36 88 ml/m2  SV MOD A4C: 45 49 ml  SV(Cube): 73 1 ml  SV(Teich): 65 28 ml  rv diam: 3 27 cm    CW  AV Env  Ti: 288 35 ms  AV VTI: 29 12 cm  AV Vmax: 1 51 m/s  AV Vmean: 1 01 m/s  AV maxP 08 mmHg  AV meanP 72 mmHg  MR VTI: 226 55 cm  MR Vmax: 6 05 m/s  MR Vmean: 5 11 m/s  MR maxP 2 mmHg  MR meanP 35 mmHg  PRend P 94 mmHg  PRend Vmax: 1 22 m/s    MM  TAPSE: 1 87 cm    PW  JAD (VTI): 2 97 cm2  JAD Vmax: 2 74 cm2  E': 0 05 m/s  E/E': 13 43  LVOT Env  Ti: 354 9 ms  LVOT VTI: 30 29 cm  LVOT Vmax: 1 45 m/s  LVOT Vmean: 0 85 m/s  LVOT maxP 36 mmHg  LVOT meanPG: 3 64 mmHg  LVSI Dopp: 48 93 ml/m2  LVSV Dopp: 86 61 ml  MV A Nikolay: 0 74 m/s  MV Dec Wilkes: 3 17 m/s2  MV DecT: 231 91 ms  MV E Nikolay: 0 73 m/s  MV E/A Ratio: 0 99    [employer] Accredited Echocardiography Laboratory    Prepared and electronically signed by    Jazmin Gilmore MD  Signed 06-Dec-2019 16:37:02    No results found for this or any previous visit  No results found for this or any previous visit      No results found for this or any previous visit       Meds/Allergies   all current active meds have been reviewed  Medications Prior to Admission   Medication    LORazepam (ATIVAN) 0 5 mg tablet    amLODIPine (NORVASC) 10 mg tablet    aspirin (ECOTRIN LOW STRENGTH) 81 mg EC tablet    atorvastatin (LIPITOR) 80 mg tablet    clopidogrel (PLAVIX) 75 mg tablet    Ferrous Sulfate (Iron) 325 (65 Fe) MG TABS    irbesartan (AVAPRO) 300 mg tablet    isosorbide mononitrate (IMDUR) 30 mg 24 hr tablet    metoprolol succinate (TOPROL-XL) 50 mg 24 hr tablet    Multiple Vitamins-Minerals (MULTIVITAMIN GUMMIES ADULT PO)    nitroglycerin (NITROSTAT) 0 4 mg SL tablet    sertraline (ZOLOFT) 100 mg tablet          Assessment:  Principal Problem:    Syncope and collapse  Active Problems:    Benign essential hypertension    Depression with anxiety    Hypokalemia    Coronary artery disease involving native coronary artery of native heart without angina pectoris            Counseling / Coordination of Care  Total floor / unit time spent today 25 minutes  Greater than 50% of total time was spent with the patient and / or family counseling and / or coordination of care  A description of the counseling / coordination of care: Stella Lee

## 2021-07-06 ENCOUNTER — TRANSITIONAL CARE MANAGEMENT (OUTPATIENT)
Dept: FAMILY MEDICINE CLINIC | Facility: CLINIC | Age: 46
End: 2021-07-06

## 2021-07-08 ENCOUNTER — TELEPHONE (OUTPATIENT)
Dept: CARDIOLOGY CLINIC | Facility: CLINIC | Age: 46
End: 2021-07-08

## 2021-07-08 DIAGNOSIS — R55 SYNCOPE, UNSPECIFIED SYNCOPE TYPE: ICD-10-CM

## 2021-07-08 DIAGNOSIS — R00.2 PALPITATIONS: Primary | ICD-10-CM

## 2021-07-08 NOTE — TELEPHONE ENCOUNTER
Per Dr Óscar Mcmahon [facility] note from 7/4/2021, patient is to wear a 2 week Zio and follow up in the office  Placing order and routing for Prior Auth

## 2021-07-09 NOTE — TELEPHONE ENCOUNTER
She does not need auth for her Zio Patch per JS at [address] [place] [address] Dr   Ref# [id number]

## 2021-07-16 ENCOUNTER — OFFICE VISIT (OUTPATIENT)
Dept: FAMILY MEDICINE CLINIC | Facility: CLINIC | Age: 46
End: 2021-07-16
Payer: COMMERCIAL

## 2021-07-16 DIAGNOSIS — I25.2 HISTORY OF ST ELEVATION MYOCARDIAL INFARCTION (STEMI): ICD-10-CM

## 2021-07-16 DIAGNOSIS — I25.10 CORONARY ARTERY DISEASE INVOLVING NATIVE CORONARY ARTERY OF NATIVE HEART WITHOUT ANGINA PECTORIS: ICD-10-CM

## 2021-07-16 DIAGNOSIS — Z12.31 ENCOUNTER FOR SCREENING MAMMOGRAM FOR BREAST CANCER: ICD-10-CM

## 2021-07-16 DIAGNOSIS — D50.9 IRON DEFICIENCY ANEMIA, UNSPECIFIED IRON DEFICIENCY ANEMIA TYPE: ICD-10-CM

## 2021-07-16 DIAGNOSIS — R55 SYNCOPE AND COLLAPSE: Primary | ICD-10-CM

## 2021-07-16 DIAGNOSIS — F41.8 DEPRESSION WITH ANXIETY: ICD-10-CM

## 2021-07-16 DIAGNOSIS — I10 BENIGN ESSENTIAL HYPERTENSION: ICD-10-CM

## 2021-07-16 PROCEDURE — 99495 TRANSJ CARE MGMT MOD F2F 14D: CPT | Performed by: FAMILY MEDICINE

## 2021-07-16 PROCEDURE — 1111F DSCHRG MED/CURRENT MED MERGE: CPT | Performed by: FAMILY MEDICINE

## 2021-07-16 NOTE — PROGRESS NOTES
FAMILY PRACTICE OFFICE VISIT       NAME: Bj Wright  AGE: 39 y o  SEX: female       : 1975        MRN: [id number]        Assessment and Plan     1  Syncope and collapse  Assessment & Plan:  Patient presented with 2 episodes of sudden collapse and syncope at home  Extensive cardiac workup is unremarkable  Dose of amlodipine was reduced from 10 mg daily to 2 5 mg daily upon discharge  Plavix was discontinued by Cardiology  Volume depletion/ orthostasis was suspected as primary etiology  Patient improved with IV fluids  No recurrences of symptoms  Patient remains on isosorbide  No symptoms of dizziness or lightheadedness  Continue close follow-up with [facility]  Patient should proceed with Zio patch ambulatory monitoring as per discharge instructions  2  Coronary artery disease involving native coronary artery of native heart without angina pectoris  Assessment & Plan:   Cardiac catheterization 2021:  Patent stents    Cardiac workup during recent hospitalization is unremarkable  History of ST-elevation myocardial infarction include hoping left circumflex coronary artery in 2019, s/p MYA x3  Continue aspirin, isosorbide, high potency statin (Lipitor 80 mg daily), isosorbide and metoprolol  Patient denies symptoms of chest pain, palpitations or leg edema  Symptoms of dyspnea on exertion have improved   patient remains under care of [facility]      3  Benign essential hypertension  Assessment & Plan:   Blood pressure is well controlled, continue regimen of amlodipine 2 5 mg daily, Avapro 300 mg daily, metoprolol XL 50 mg once a day and isosorbide 30 mg daily    Orders:  -     Comprehensive metabolic panel; Future; Expected date: 10/28/2021    4  Iron deficiency anemia, unspecified iron deficiency anemia type  Assessment & Plan:   Overall iron/ hemoglobin have improved  Iron deficiency anemia due to dysmenorrhea      Patient cannot tolerate iron on a daily basis due to side effects of constipation  She will take iron tablets 2 to 3 times per week    Orders:  -     CBC and differential; Future; Expected date: 10/28/2021  -     Iron, TIBC and Ferritin Panel; Future; Expected date: 10/28/2021    5  History of ST elevation myocardial infarction (STEMI)  -     Lipid Panel with Direct LDL reflex; Future; Expected date: 10/28/2021  -     TSH, 3rd generation; Future; Expected date: 10/28/2021    6  Depression with anxiety  Assessment & Plan:   Symptoms are well controlled on Zoloft 100 mg b i d       7  Encounter for screening mammogram for breast cancer  -     Mammo screening bilateral w 3d & cad; Future; Expected date: 07/16/2021          BMI Counseling: Body mass index is 31 53 kg/m²  The BMI is above normal  Nutrition recommendations include encouraging healthy choices of fruits and vegetables, consuming healthier snacks, moderation in carbohydrate intake and reducing intake of cholesterol  Exercise recommendations include exercising 3-5 times per week  No pharmacotherapy was ordered  Patient referred to PCP due to patient being overweight  Tobacco Cessation Counseling: Tobacco cessation counseling was provided  The patient is sincerely urged to quit consumption of tobacco  She is ready to quit tobacco  Medication options not discussed  There are no Patient Instructions on file for this visit  No follow-ups on file  Discussed with the patient and all questioned fully answered  She will call me if any problems arise  M*Modal software was used to dictate this note  It may contain errors with dictating incorrect words/spelling  Please contact provider directly with any questions         Chief Complaint     Chief Complaint   Patient presents with    Transition of Care Management       History of Present Illness     TCM Call (since 6/15/2021)     Date and time call was made  7/6/2021 [address] [place] [address] reviewed  Records reviewed    Patient was hospitialized at  [facility]        Date of Admission  07/02/21    Date of discharge  07/04/21    Diagnosis  Syncope & Collapse, STEMI    Disposition  Home    Were the patients medications reviewed and updated  No    Current Symptoms  None      TCM Call (since 6/15/2021)     Post hospital issues  None    Should patient be enrolled in anticoag monitoring? No    Scheduled for follow up? Yes    Did you obtain your prescribed medications  Yes    Do you need help managing your prescriptions or medications  No    Is transportation to your appointment needed  No    I have advised the patient to call PCP with any new or worsening symptoms    July Chandler, [address] members    Support System  Family    Interperter language line needed  No    Counseling  Patient    Counseling topics  Importance of RX compliance    Comments  Apt scheduled for 7/14th at 12:30 pm       Patient presents for evaluation after recent hospitalization for 2 episodes of sudden syncope and collapse at home  No preceding signs and symptoms  Patient with history coronary artery disease and ST myocardial infarction in December of 2019  She was recently evaluated by [facility] due to increased symptoms of dyspnea on exertion and concerned about angina symptoms  She underwent cardiac catheterization on June 14th which did not reveal any acute findings, stents are patent  Cardiology has started her on regimen of   Plavix 75 mg daily  along with isosorbide 30 mg daily  Patient reports that she developed to sudden falls and syncope after relaxing evening at home and swimming at the pool  She denies any preceding chest pain, dyspnea palpitations  She collapsed suddenly  Monico Just, PA-C Vona Harada, MD   inpatient records reviewed  It was felt that patient's symptoms likely related to volume depletion/ orthostasis  She has improved with IV hydration    Cardiology evaluated patient while inpatient recommended outpatient 2 week Zio patch  Dose of amlodipine was reduced from 10-2 5 mg daily  Cardiology has discontinued Plavix  No traumatic injuries during recent hospitalization  Patient states that she did hit right side of the head and is still experiencing residual  Right parietal head pain but no generally headaches, nausea, vomiting or lightheadedness  CT brain was negative  CT neck was negative  Patient had normal echo and chest x-ray during recent hospitalization  LDL is 60, hemoglobin ranging from 11-11  6  Patient remains on Zoloft 100 mg b i d  for depression/ anxiety, she has been compliant with medication  Discharge blood pressure meds include Avapro, metoprolol, amlodipine 2 5 and isosorbide  Chronic dysmenorrhea/heavy menses  Iron has cause significant constipation  Patient is unable to tolerate on daily basis but will continue using it 2-3 times per week and during her heavy menstrual cycles  Review of Systems   Review of Systems   Constitutional: Positive for fatigue ( at baseline)  Negative for activity change and appetite change  HENT: Negative  Eyes: Negative  Respiratory: Negative  Cardiovascular: Negative  Negative for chest pain, palpitations and leg swelling  Gastrointestinal: Negative  Endocrine: Negative  Genitourinary: Positive for menstrual problem ( heavy menses)  Musculoskeletal: Negative  Skin: Negative  Neurological: Positive for syncope and headaches ( right-sided parietal head pain, no generalized headaches)  Negative for dizziness, weakness and light-headedness  Hematological: Negative  Psychiatric/Behavioral: Negative for sleep disturbance  The patient is nervous/anxious ( chronic, symptoms are well controlled)          Active Problem List     Patient Active Problem List   Diagnosis    Benign essential hypertension    Depression with anxiety    Leiomyoma of uterus    Tremor of face and hands    Numbness and tingling of left leg    Syncope and collapse    Impaired fasting glucose    History of ST elevation myocardial infarction (STEMI)    Tobacco abuse counseling    Coronary artery disease involving native coronary artery of native heart without angina pectoris    Iron deficiency anemia       Past Medical History:  Past Medical History:   Diagnosis Date    HTN (hypertension)     Mitral valve regurgitation     Myocardial infarction (HCC)     Preeclampsia        Past Surgical History:  Past Surgical History:   Procedure Laterality Date    CARDIAC CATHETERIZATION       SECTION      CORONARY STENT PLACEMENT      EYE SURGERY      TUBAL LIGATION         Family History:  Family History   Problem Relation Age of Onset    Hypertension Father     Kidney disease Paternal Grandfather        Social History:  Social History     Socioeconomic History    Marital status: /Civil Union     Spouse name: Not on file    Number of children: Not on file    Years of education: Not on file    Highest education level: Not on file   Occupational History    Not on file   Tobacco Use    Smoking status: Current Some Day Smoker     Types: Cigarettes    Smokeless tobacco: Never Used    Tobacco comment: vapes   Vaping Use    Vaping Use: Some days   Substance and Sexual Activity    Alcohol use: Yes     Comment: rarely    Drug use: No    Sexual activity: Not Currently   Other Topics Concern    Not on file   Social History Narrative    Not on file     Social Determinants of Health     Financial Resource Strain:     Difficulty of Paying Living Expenses:    Food Insecurity: No Food Insecurity    Worried About Running Out of Food in the Last Year: Never true    Russ of Food in the Last Year: Never true   Transportation Needs: No Transportation Needs    Lack of Transportation (Medical): No    Lack of Transportation (Non-Medical):  No   Physical Activity:     Days of Exercise per Week:     Minutes of Exercise per Session:    Stress:     Feeling of Stress :    Social Connections:     Frequency of Communication with Friends and Family:     Frequency of Social Gatherings with Friends and Family:     Attends Sabianist Services:     Active Member of Clubs or Organizations:     Attends Club or Organization Meetings:     Marital Status:    Intimate Partner Violence:     Fear of Current or Ex-Partner:     Emotionally Abused:     Physically Abused:     Sexually Abused:          Objective     Vitals:    07/16/21 1350 07/16/21 1445   BP: 136/98 130/82   BP Location: Left arm    Patient Position: Sitting    Cuff Size: Adult    Pulse: 63    Resp: 17    Temp: 97 6 °F (36 4 °C)    TempSrc: Temporal    SpO2: 98%    Weight: 80 7 kg (178 lb)    Height: 5' 3" (1 6 m)        Wt Readings from Last 3 Encounters:   07/16/21 80 7 kg (178 lb)   07/03/21 81 3 kg (179 lb 3 2 oz)   06/14/21 78 9 kg (174 lb)       Physical Exam  Vitals and nursing note reviewed  Constitutional:       General: She is not in acute distress  Appearance: Normal appearance  She is well-developed  She is not ill-appearing  HENT:      Head: Normocephalic and atraumatic  Eyes:      Conjunctiva/sclera: Conjunctivae normal    Neck:      Thyroid: No thyromegaly  Vascular: No carotid bruit  Cardiovascular:      Rate and Rhythm: Normal rate and regular rhythm  Heart sounds: Normal heart sounds  No murmur heard  Pulmonary:      Effort: Pulmonary effort is normal  No respiratory distress  Breath sounds: Normal breath sounds  No wheezing  Abdominal:      General: There is no abdominal bruit  Musculoskeletal:         General: Normal range of motion  Cervical back: Neck supple  Right lower leg: No edema  Left lower leg: No edema  Skin:     General: Skin is warm  Neurological:      General: No focal deficit present        Mental Status: She is alert and oriented to person, place, and time       Cranial Nerves: No cranial nerve deficit  Coordination: Coordination normal    Psychiatric:         Mood and Affect: Mood normal          Behavior: Behavior normal          Thought Content:  Thought content normal           Pertinent Laboratory/Diagnostic Studies:    Lab Results   Component Value Date    WBC 4 51 07/04/2021    HGB 11 6 07/04/2021    HCT 37 1 07/04/2021    MCV 84 07/04/2021     07/04/2021       No results found for: TSH    Lab Results   Component Value Date    CHOL 194 07/09/2015     Lab Results   Component Value Date    TRIG 80 07/03/2021     Lab Results   Component Value Date    HDL 49 07/03/2021     Lab Results   Component Value Date    LDLCALC 58 07/03/2021     Lab Results   Component Value Date    HGBA1C 5 4 10/16/2019     Lab Results   Component Value Date    SODIUM 139 07/04/2021    K 3 5 07/04/2021     (H) 07/04/2021    CO2 23 07/04/2021    ANIONGAP 8 10/27/2015    AGAP 6 07/04/2021    BUN 7 07/04/2021    CREATININE 0 42 (L) 07/04/2021    GLUC 96 07/04/2021    GLUF 96 07/04/2021    CALCIUM 8 7 07/04/2021    AST 14 07/03/2021    ALT 22 07/03/2021    ALKPHOS 62 07/03/2021    PROT 7 7 07/09/2015    TP 7 0 07/03/2021    BILITOT 0 24 07/09/2015    TBILI 0 28 07/03/2021    EGFR 124 07/04/2021       Orders Placed This Encounter   Procedures    Mammo screening bilateral w 3d & cad    CBC and differential    Comprehensive metabolic panel    Lipid Panel with Direct LDL reflex    TSH, 3rd generation    Iron, TIBC and Ferritin Panel       ALLERGIES:  Allergies   Allergen Reactions    Augmentin [Amoxicillin-Pot Clavulanate] Vomiting     Vomiting and  myalgias    Iron Polysaccharide      GI upset, constipation, hemorrhoid flare, heartburn       Current Medications     Current Outpatient Medications   Medication Sig Dispense Refill    amLODIPine (NORVASC) 2 5 mg tablet Take 1 tablet (2 5 mg total) by mouth daily 30 tablet 0    aspirin (ECOTRIN LOW STRENGTH) 81 mg EC tablet Take 1 tablet (81 mg total) by mouth daily      atorvastatin (LIPITOR) 80 mg tablet TAKE 1 TABLET DAILY WITH DINNER 90 tablet 3    Ferrous Sulfate (Iron) 325 (65 Fe) MG TABS Take by mouth daily      irbesartan (AVAPRO) 300 mg tablet Take 1 tablet (300 mg total) by mouth daily 90 tablet 0    isosorbide mononitrate (IMDUR) 30 mg 24 hr tablet TAKE 1 TABLET BY MOUTH EVERY DAY 30 tablet 0    LORazepam (ATIVAN) 0 5 mg tablet Take 1 tablet (0 5 mg total) by mouth 2 (two) times a day as needed for anxiety 180 tablet 0    metoprolol succinate (TOPROL-XL) 50 mg 24 hr tablet TAKE 1 TABLET DAILY 90 tablet 3    Multiple Vitamins-Minerals (MULTIVITAMIN GUMMIES ADULT PO) Take by mouth daily      sertraline (ZOLOFT) 100 mg tablet TAKE 1 TABLET TWICE A  tablet 3     No current facility-administered medications for this visit         Medications Discontinued During This Encounter   Medication Reason    nitroglycerin (NITROSTAT) 0 4 mg SL tablet        Health Maintenance     Health Maintenance   Topic Date Due    Hepatitis C Screening  Never done    COVID-19 Vaccine (1) Never done    Breast Cancer Screening: Mammogram  Never done    Annual Physical  12/12/2020    Cervical Cancer Screening  01/24/2021    Influenza Vaccine (1) 09/01/2021    BMI: Followup Plan  10/06/2021    BMI: Adult  07/16/2022    DTaP,Tdap,and Td Vaccines (2 - Td or Tdap) 07/12/2024    Pneumococcal Vaccine: Pediatrics (0 to 5 Years) and At-Risk Patients (6 to 59 Years) (2 of 2 - PPSV23) 09/05/2040    HIV Screening  Completed    HIB Vaccine  Aged Out    Hepatitis B Vaccine  Aged Out    IPV Vaccine  Aged Out    Hepatitis A Vaccine  Aged Out    Meningococcal ACWY Vaccine  Aged Out    HPV Vaccine  Aged Out       Immunization History   Administered Date(s) Administered    Influenza, seasonal, injectable 10/16/2015    Influenza, seasonal, injectable, preservative free 10/29/2018    Pneumococcal Polysaccharide PPV23 09/30/2020    Tdap 07/12/2014    Tuberculin Skin Test-PPD Intradermal 09/10/2012       Mason Meyer MD

## 2021-07-20 VITALS
DIASTOLIC BLOOD PRESSURE: 82 MMHG | SYSTOLIC BLOOD PRESSURE: 130 MMHG | RESPIRATION RATE: 17 BRPM | OXYGEN SATURATION: 98 % | HEART RATE: 63 BPM | HEIGHT: 63 IN | TEMPERATURE: 97.6 F | BODY MASS INDEX: 31.54 KG/M2 | WEIGHT: 178 LBS

## 2021-07-20 PROBLEM — J32.9 SINUSITIS: Status: RESOLVED | Noted: 2021-01-22 | Resolved: 2021-07-20

## 2021-07-20 PROBLEM — I25.2 HISTORY OF ST ELEVATION MYOCARDIAL INFARCTION (STEMI): Status: ACTIVE | Noted: 2019-12-28

## 2021-07-20 NOTE — ASSESSMENT & PLAN NOTE
Patient presented with 2 episodes of sudden collapse and syncope at home  Extensive cardiac workup is unremarkable  Dose of amlodipine was reduced from 10 mg daily to 2 5 mg daily upon discharge  Plavix was discontinued by Cardiology  Volume depletion/ orthostasis was suspected as primary etiology  Patient improved with IV fluids  No recurrences of symptoms  Patient remains on isosorbide  No symptoms of dizziness or lightheadedness  Continue close follow-up with [facility]  Patient should proceed with Zio patch ambulatory monitoring as per discharge instructions

## 2021-07-20 NOTE — ASSESSMENT & PLAN NOTE
Overall iron/ hemoglobin have improved  Iron deficiency anemia due to dysmenorrhea  Patient cannot tolerate iron on a daily basis due to side effects of constipation      She will take iron tablets 2 to 3 times per week

## 2021-07-20 NOTE — ASSESSMENT & PLAN NOTE
Cardiac catheterization June 16, 2021:  Patent stents    Cardiac workup during recent hospitalization is unremarkable  History of ST-elevation myocardial infarction include hoping left circumflex coronary artery in December 2019, s/p MYA x3  Continue aspirin, isosorbide, high potency statin (Lipitor 80 mg daily), isosorbide and metoprolol  Patient denies symptoms of chest pain, palpitations or leg edema      Symptoms of dyspnea on exertion have improved   patient remains under care of [facility]

## 2021-07-20 NOTE — ASSESSMENT & PLAN NOTE
Blood pressure is well controlled, continue regimen of amlodipine 2 5 mg daily, Avapro 300 mg daily, metoprolol XL 50 mg once a day and isosorbide 30 mg daily

## 2021-07-22 ENCOUNTER — TELEPHONE (OUTPATIENT)
Dept: CARDIOLOGY CLINIC | Facility: CLINIC | Age: 46
End: 2021-07-22

## 2021-07-22 DIAGNOSIS — I10 BENIGN ESSENTIAL HYPERTENSION: ICD-10-CM

## 2021-07-22 NOTE — TELEPHONE ENCOUNTER
Amlodipine 10 mg -6/16 refill has stop taking at discharge? Pharmacy is asking for Amlodipine 2  5?      DD patient -who is on vacation

## 2021-07-27 DIAGNOSIS — I10 BENIGN ESSENTIAL HYPERTENSION: ICD-10-CM

## 2021-07-27 RX ORDER — AMLODIPINE BESYLATE 2.5 MG/1
2.5 TABLET ORAL DAILY
Qty: 90 TABLET | Refills: 2 | Status: SHIPPED | OUTPATIENT
Start: 2021-07-27 | End: 2022-05-25

## 2021-11-15 ENCOUNTER — OFFICE VISIT (OUTPATIENT)
Dept: FAMILY MEDICINE CLINIC | Facility: CLINIC | Age: 46
End: 2021-11-15
Payer: COMMERCIAL

## 2021-11-15 VITALS
RESPIRATION RATE: 16 BRPM | OXYGEN SATURATION: 97 % | HEART RATE: 80 BPM | TEMPERATURE: 97.8 F | WEIGHT: 180 LBS | SYSTOLIC BLOOD PRESSURE: 130 MMHG | DIASTOLIC BLOOD PRESSURE: 100 MMHG | HEIGHT: 63 IN | BODY MASS INDEX: 31.89 KG/M2

## 2021-11-15 DIAGNOSIS — J06.9 UPPER RESPIRATORY TRACT INFECTION, UNSPECIFIED TYPE: Primary | ICD-10-CM

## 2021-11-15 PROCEDURE — 99213 OFFICE O/P EST LOW 20 MIN: CPT | Performed by: FAMILY MEDICINE

## 2021-11-15 PROCEDURE — 4004F PT TOBACCO SCREEN RCVD TLK: CPT | Performed by: FAMILY MEDICINE

## 2021-11-15 PROCEDURE — 3008F BODY MASS INDEX DOCD: CPT | Performed by: FAMILY MEDICINE

## 2021-11-15 PROCEDURE — 3075F SYST BP GE 130 - 139MM HG: CPT | Performed by: FAMILY MEDICINE

## 2021-11-15 PROCEDURE — 3080F DIAST BP >= 90 MM HG: CPT | Performed by: FAMILY MEDICINE

## 2021-11-15 RX ORDER — SULFAMETHOXAZOLE AND TRIMETHOPRIM 800; 160 MG/1; MG/1
1 TABLET ORAL EVERY 12 HOURS SCHEDULED
Qty: 20 TABLET | Refills: 0 | Status: SHIPPED | OUTPATIENT
Start: 2021-11-15 | End: 2021-11-25

## 2021-11-15 RX ORDER — PROMETHAZINE HYDROCHLORIDE AND CODEINE PHOSPHATE 6.25; 1 MG/5ML; MG/5ML
5 SYRUP ORAL EVERY 4 HOURS PRN
Qty: 180 ML | Refills: 0 | Status: SHIPPED | OUTPATIENT
Start: 2021-11-15 | End: 2022-02-08

## 2022-02-08 ENCOUNTER — TELEMEDICINE (OUTPATIENT)
Dept: FAMILY MEDICINE CLINIC | Facility: CLINIC | Age: 47
End: 2022-02-08
Payer: COMMERCIAL

## 2022-02-08 VITALS — BODY MASS INDEX: 31.89 KG/M2 | HEIGHT: 63 IN | WEIGHT: 180 LBS

## 2022-02-08 DIAGNOSIS — J06.9 UPPER RESPIRATORY TRACT INFECTION, UNSPECIFIED TYPE: Primary | ICD-10-CM

## 2022-02-08 PROCEDURE — 4004F PT TOBACCO SCREEN RCVD TLK: CPT | Performed by: FAMILY MEDICINE

## 2022-02-08 PROCEDURE — 99213 OFFICE O/P EST LOW 20 MIN: CPT | Performed by: FAMILY MEDICINE

## 2022-02-08 RX ORDER — METHYLPREDNISOLONE 4 MG/1
TABLET ORAL
Qty: 21 EACH | Refills: 0 | Status: SHIPPED | OUTPATIENT
Start: 2022-02-08 | End: 2022-02-25

## 2022-02-08 RX ORDER — ALBUTEROL SULFATE 90 UG/1
2 AEROSOL, METERED RESPIRATORY (INHALATION) EVERY 4 HOURS PRN
Qty: 8.5 G | Refills: 1 | Status: SHIPPED | OUTPATIENT
Start: 2022-02-08

## 2022-02-08 RX ORDER — AZITHROMYCIN 250 MG/1
TABLET, FILM COATED ORAL
Qty: 6 TABLET | Refills: 0 | Status: SHIPPED | OUTPATIENT
Start: 2022-02-08 | End: 2022-02-13

## 2022-02-08 NOTE — PROGRESS NOTES
Virtual Regular Visit    Verification of patient location:    Patient is located in the following state in which I hold an active license [place]      Assessment/Plan:    Problem List Items Addressed This Visit        Respiratory    Upper respiratory tract infection - Primary    Relevant Medications    azithromycin (Zithromax) 250 mg tablet    methylPREDNISolone 4 MG tablet therapy pack    albuterol (ProAir HFA) 90 mcg/act inhaler      Patient presents with ongoing symptoms of dry spastic cough  I suspect that she is experiencing symptoms of bronchitis with bronchospasm  Patient is a smoker  Negative COVID test with the onset of symptoms  Patient is vaccinated with 1 dose of J&J in December 2021  She denies symptoms of fever or generalized body aches  Start Zithromax Z-Oliver, Medrol Dosepak and albuterol inhaler  Patient will continue with over-the-counter cough remedies  She will contact me in a few days if her symptoms are not improving  Reason for visit is   Chief Complaint   Patient presents with    Cold Like Symptoms     sinus drip, ears and cough 1 + wk    Virtual Regular Visit        Encounter provider Vero Garcia MD    Provider located at [address] [place] [phone]      Recent Visits  Date Type Provider Dept   02/08/22 Telemedicine Vero Garcia MD [facility] recent visits within past 7 days and meeting all other requirements  Future Appointments  No visits were found meeting these conditions  Showing future appointments within next 150 days and meeting all other requirements       The patient was identified by name and date of birth  Laura Rodriguez was informed that this is a telemedicine visit and that the visit is being conducted through [address] [place] [address] and patient was informed that this is not a secure, HIPAA-compliant platform  She agrees to proceed     My office door was closed   No one else was in the room  She acknowledged consent and understanding of privacy and security of the video platform  The patient has agreed to participate and understands they can discontinue the visit at any time  Patient is aware this is a billable service  Subjective  Chitra Negron is a 55 y o  female    Patient presents for evaluation of ongoing URI symptoms  She has been experiencing symptoms of persistent cough for over a week  She reports sore throat due to cough  Her ribs hurt  She is complaining of ongoing postnasal drip  Cough is dry and occasionally productive  Patient is afebrile  She was tested negative for COVID for the onset of symptoms  No generalized body aches, normal smell and taste  No GI symptoms  Patient reports occasional shortness of breath, chest tightness and wheezing  She reports intermittent symptoms of stress urinary incontinence due to uncontrollable coughing attack  Patient is a smoker, she did cut down on smoking within past few days        COVID-19 vaccination: 1 dose of Florette Cork in 2021         Past Medical History:   Diagnosis Date    HTN (hypertension)     Mitral valve regurgitation     Myocardial infarction ([address] )     Preeclampsia        Past Surgical History:   Procedure Laterality Date    CARDIAC CATHETERIZATION       SECTION      CORONARY STENT PLACEMENT      EYE SURGERY      TUBAL LIGATION         Current Outpatient Medications   Medication Sig Dispense Refill    amLODIPine (NORVASC) 2 5 mg tablet Take 1 tablet (2 5 mg total) by mouth daily 90 tablet 2    aspirin (ECOTRIN LOW STRENGTH) 81 mg EC tablet Take 1 tablet (81 mg total) by mouth daily      atorvastatin (LIPITOR) 80 mg tablet TAKE 1 TABLET DAILY WITH DINNER 90 tablet 3    Ferrous Sulfate (Iron) 325 (65 Fe) MG TABS Take by mouth daily      irbesartan (AVAPRO) 300 mg tablet Take 1 tablet (300 mg total) by mouth daily 90 tablet 0    isosorbide mononitrate (IMDUR) 30 mg 24 hr tablet TAKE 1 TABLET BY MOUTH EVERY DAY 30 tablet 0    LORazepam (ATIVAN) 0 5 mg tablet Take 1 tablet (0 5 mg total) by mouth 2 (two) times a day as needed for anxiety 180 tablet 0    metoprolol succinate (TOPROL-XL) 50 mg 24 hr tablet TAKE 1 TABLET DAILY 90 tablet 3    Multiple Vitamins-Minerals (MULTIVITAMIN GUMMIES ADULT PO) Take by mouth daily      sertraline (ZOLOFT) 100 mg tablet TAKE 1 TABLET TWICE A  tablet 3    albuterol (ProAir HFA) 90 mcg/act inhaler Inhale 2 puffs every 4 (four) hours as needed for wheezing or shortness of breath (cough) 8 5 g 1    azithromycin (Zithromax) 250 mg tablet Take 2 tablets (500 mg total) by mouth daily for 1 day, THEN 1 tablet (250 mg total) daily for 4 days  6 tablet 0    methylPREDNISolone 4 MG tablet therapy pack Use as directed on package 21 each 0     No current facility-administered medications for this visit  Allergies   Allergen Reactions    Augmentin [Amoxicillin-Pot Clavulanate] Vomiting     Vomiting and  myalgias    Iron Polysaccharide      GI upset, constipation, hemorrhoid flare, heartburn       Review of Systems   Constitutional: Positive for fatigue  Negative for appetite change, chills and fever  HENT: Positive for postnasal drip  Negative for rhinorrhea  Eyes: Negative  Respiratory: Positive for cough, chest tightness, shortness of breath and wheezing  Cardiovascular: Negative  Gastrointestinal: Negative  Genitourinary:        Stress incontinence due to coughing   Musculoskeletal: Negative  Neurological: Negative  Video Exam    Vitals:    02/08/22 1337   Weight: 81 6 kg (180 lb)   Height: 5' 3" (1 6 m)       Physical Exam  Constitutional:       General: She is not in acute distress  Appearance: Normal appearance  HENT:      Head: Normocephalic and atraumatic  Pulmonary:      Comments: Unlabored breathing    No tachypnea or wheezing throughout exam   Persistent dry cough throughout exam   Cough is worse with taking deep breath or articulation  Neurological:      General: No focal deficit present  Mental Status: She is alert  Psychiatric:         Mood and Affect: Mood normal          Behavior: Behavior normal          Thought Content: Thought content normal           I spent 12 minutes directly with the patient during this visit    [address] [place] [address] verbally agrees to participate in [employer]  Pt is aware that [employer] could be limited without vital signs or the ability to perform a full hands-on physical exam  Elyse Suarez understands she or the provider may request at any time to terminate the video visit and request the patient to seek care or treatment in person

## 2022-02-18 DIAGNOSIS — Z01.419 ENCOUNTER FOR ANNUAL ROUTINE GYNECOLOGICAL EXAMINATION: ICD-10-CM

## 2022-02-18 DIAGNOSIS — I21.21 STEMI INVOLVING LEFT CIRCUMFLEX CORONARY ARTERY (HCC): ICD-10-CM

## 2022-02-18 DIAGNOSIS — I10 BENIGN ESSENTIAL HYPERTENSION: ICD-10-CM

## 2022-02-18 RX ORDER — ATORVASTATIN CALCIUM 80 MG/1
TABLET, FILM COATED ORAL
Qty: 90 TABLET | Refills: 3 | Status: SHIPPED | OUTPATIENT
Start: 2022-02-18

## 2022-02-18 RX ORDER — IRBESARTAN 300 MG/1
TABLET ORAL
Qty: 90 TABLET | Refills: 3 | Status: SHIPPED | OUTPATIENT
Start: 2022-02-18

## 2022-02-18 RX ORDER — SERTRALINE HYDROCHLORIDE 100 MG/1
TABLET, FILM COATED ORAL
Qty: 180 TABLET | Refills: 3 | Status: SHIPPED | OUTPATIENT
Start: 2022-02-18

## 2022-02-23 DIAGNOSIS — I10 BENIGN ESSENTIAL HYPERTENSION: ICD-10-CM

## 2022-02-23 DIAGNOSIS — I21.21 STEMI INVOLVING LEFT CIRCUMFLEX CORONARY ARTERY (HCC): ICD-10-CM

## 2022-02-23 RX ORDER — METOPROLOL SUCCINATE 50 MG/1
50 TABLET, EXTENDED RELEASE ORAL DAILY
Qty: 90 TABLET | Refills: 3 | Status: SHIPPED | OUTPATIENT
Start: 2022-02-23

## 2022-02-25 ENCOUNTER — OFFICE VISIT (OUTPATIENT)
Dept: FAMILY MEDICINE CLINIC | Facility: CLINIC | Age: 47
End: 2022-02-25
Payer: COMMERCIAL

## 2022-02-25 VITALS
DIASTOLIC BLOOD PRESSURE: 82 MMHG | BODY MASS INDEX: 32.6 KG/M2 | HEIGHT: 63 IN | OXYGEN SATURATION: 100 % | HEART RATE: 75 BPM | SYSTOLIC BLOOD PRESSURE: 136 MMHG | TEMPERATURE: 97.5 F | WEIGHT: 184 LBS | RESPIRATION RATE: 16 BRPM

## 2022-02-25 DIAGNOSIS — J20.9 ACUTE BRONCHITIS WITH BRONCHOSPASM: Primary | ICD-10-CM

## 2022-02-25 DIAGNOSIS — F41.8 DEPRESSION WITH ANXIETY: ICD-10-CM

## 2022-02-25 DIAGNOSIS — I25.10 CORONARY ARTERY DISEASE INVOLVING NATIVE CORONARY ARTERY OF NATIVE HEART WITHOUT ANGINA PECTORIS: ICD-10-CM

## 2022-02-25 DIAGNOSIS — I10 BENIGN ESSENTIAL HYPERTENSION: ICD-10-CM

## 2022-02-25 DIAGNOSIS — Z72.0 TOBACCO USE: ICD-10-CM

## 2022-02-25 PROBLEM — J06.9 UPPER RESPIRATORY TRACT INFECTION: Status: RESOLVED | Noted: 2021-11-15 | Resolved: 2022-02-25

## 2022-02-25 PROCEDURE — 3008F BODY MASS INDEX DOCD: CPT | Performed by: FAMILY MEDICINE

## 2022-02-25 PROCEDURE — 99214 OFFICE O/P EST MOD 30 MIN: CPT | Performed by: FAMILY MEDICINE

## 2022-02-25 RX ORDER — VARENICLINE TARTRATE 1 MG/1
1 TABLET, FILM COATED ORAL 2 TIMES DAILY
Qty: 60 TABLET | Refills: 4 | Status: SHIPPED | OUTPATIENT
Start: 2022-03-25

## 2022-02-25 RX ORDER — CEFUROXIME AXETIL 500 MG/1
500 TABLET ORAL
Qty: 14 TABLET | Refills: 0 | Status: SHIPPED | OUTPATIENT
Start: 2022-02-25 | End: 2022-03-04

## 2022-02-25 RX ORDER — FLUTICASONE FUROATE, UMECLIDINIUM BROMIDE AND VILANTEROL TRIFENATATE 100; 62.5; 25 UG/1; UG/1; UG/1
1 POWDER RESPIRATORY (INHALATION) DAILY
Qty: 60 BLISTER | Refills: 2 | Status: SHIPPED | OUTPATIENT
Start: 2022-02-25 | End: 2022-05-26

## 2022-02-25 RX ORDER — VARENICLINE TARTRATE 25 MG
KIT ORAL
Qty: 53 TABLET | Refills: 0 | Status: SHIPPED | OUTPATIENT
Start: 2022-02-25

## 2022-02-25 NOTE — PROGRESS NOTES
FAMILY PRACTICE OFFICE VISIT       NAME: Macho Moe  AGE: 55 y o  SEX: female       : 1975        MRN: [id number]        Assessment and Plan     1  Acute bronchitis with bronchospasm  Assessment & Plan:  Patient presents for re-evaluation of cough and chest congestion along with some yellow mucus expectoration and ongoing postnasal sinus trip  Overall her symptoms have significantly improved after completion of Z-Jordan, Medrol Dosepak and p r n  albuterol  Patient complains of residual noise and crackles at her chest     Exam reveals rhonchi and large trials at bases  Good air entry bilaterally, patient is nontoxic and afebrile  She is reluctant to proceed with chest x-ray reviewed now due to high insurance Co pays and deductible  · Start Ceftin 500 mg twice a day for 7 days  · Start Trelegy 1 puff daily, continue p r n  albuterol  · If symptoms will not improve/resolve within next 5 to 7 days-patient will proceed with chest x-ray  · We discussed importance of tobacco cessation  Patient is a good candidate for Chantix  Prescriptions sent to the pharmacy  Orders:  -     fluticasone-umeclidinium-vilanterol (Trelegy Ellipta) 100-62 5-25 MCG/INH inhaler; Inhale 1 puff daily Rinse mouth after use  -     cefuroxime (CEFTIN) 500 mg tablet; Take 1 tablet (500 mg total) by mouth 2 (two) times daily after meals for 7 days  -     XR chest pa & lateral; Future; Expected date: 2022    2  Tobacco use  -     varenicline (CHANTIX JORDAN) 0 5 MG X 11 & 1 MG X 42 tablet; Take one 0 5 mg tablet by mouth once daily for 3 days, then one 0 5 mg tablet by mouth twice daily for 4 days, then one 1 mg tablet by mouth twice daily  -     varenicline (CHANTIX) 1 mg tablet; Take 1 tablet (1 mg total) by mouth 2 (two) times a day    3  Benign essential hypertension  Assessment & Plan:  Blood pressure is on the high side of normal     Patient appears bit anxious      Continue regimen of amlodipine, Avapro, metoprolol and isosorbide      4  Depression with anxiety  Assessment & Plan:  Continue Zoloft 100 mg twice a day      5  Coronary artery disease involving native coronary artery of native heart without angina pectoris  Assessment & Plan:  CAD, status post DESx3 in 2019  Continue regimen of aspirin, beta-blocker, high potency statin  Patient denies symptoms of angina or palpitations  She is due for blood work and has orders on hand  I reminded patient to proceed with gyn exam and mammogram (orders are in epic)  There are no Patient Instructions on file for this visit  No follow-ups on file  Discussed with the patient and all questioned fully answered  She will call me if any problems arise  M*Modal software was used to dictate this note  It may contain errors with dictating incorrect words/spelling  Please contact provider directly with any questions  Chief Complaint     Chief Complaint   Patient presents with    Chest Congestion     Completed ABT/steroid inhaler last week but continues with congestion    Cough     Decreased but when she lays down hears a wheeze/crackle    Mammogram Due     Needs script    Gynecologic Exam     Due for exam       History of Present Illness     Patient presents for follow-up of URI symptoms  She had virtual visit with me on February 8th at the time she has been experiencing persistent cough and chest tightness  Patient was treated with regimen of albuterol, Zithromax and Medrol Dosepak  Overall she reports significant improvement of her symptoms  Patient reports residual cough, no fever  She is concerned about intermittent crackling noise in her chest specially when she is laying down  Patient states that she is still expect draining yellow/pale colored mucus  She admits to sinus drainage, postnasal drip but denies sinus pressure  Patient is still smoking, she was able to cut down her tobacco to 5 to 10 cigarettes daily    She would like to quit  She is afraid to try Chantix due to underlying history of depression and anxiety  Patient has been stably controlled on Zoloft 100 mg twice a day  Patient is past due health screenings including mammogram and gyn exam     She is past due blood work  Patient states that she has hesitant to proceed due to very high insurance Co pays and deductible  Patient has been compliant with daily medications for coronary artery disease and hypertension  Cough  Associated symptoms include postnasal drip  Pertinent negatives include no shortness of breath  Gynecologic Exam        Review of Systems   Review of Systems   Constitutional: Negative  HENT: Positive for congestion and postnasal drip  Negative for sinus pressure and sinus pain  Eyes: Negative  Respiratory: Positive for cough  Negative for chest tightness and shortness of breath  As per HPI   Gastrointestinal: Negative  Endocrine: Negative  Genitourinary: Negative  Neurological: Negative  Hematological: Negative  Psychiatric/Behavioral: The patient is nervous/anxious          Active Problem List     Patient Active Problem List   Diagnosis    Benign essential hypertension    Depression with anxiety    Leiomyoma of uterus    Tremor of face and hands    Numbness and tingling of left leg    Syncope and collapse    Impaired fasting glucose    History of ST elevation myocardial infarction (STEMI)    Tobacco abuse counseling    Coronary artery disease involving native coronary artery of native heart without angina pectoris    Iron deficiency anemia    Acute bronchitis with bronchospasm       Past Medical History:  Past Medical History:   Diagnosis Date    HTN (hypertension)     Mitral valve regurgitation     Myocardial infarction ([address] )     Preeclampsia        Past Surgical History:  Past Surgical History:   Procedure Laterality Date    CARDIAC CATHETERIZATION       SECTION      CORONARY STENT PLACEMENT      EYE SURGERY      TUBAL LIGATION         Family History:  Family History   Problem Relation Age of Onset    Hypertension Father     Kidney disease Paternal Grandfather        Social History:  Social History     Socioeconomic History    Marital status: /Civil Union     Spouse name: Not on file    Number of children: Not on file    Years of education: Not on file    Highest education level: Not on file   Occupational History    Not on file   Tobacco Use    Smoking status: Current Some Day Smoker     Types: Cigarettes    Smokeless tobacco: Never Used   Vaping Use    Vaping Use: Former   Substance and Sexual Activity    Alcohol use: Not Currently     Comment: rarely    Drug use: No    Sexual activity: Not Currently   Other Topics Concern    Not on file   Social History Narrative    Not on file     Social Determinants of Health     Financial Resource Strain: Not on file   Food Insecurity: No Food Insecurity    Worried About [employer] in the Last Year: Never true    [address] in the Last Year: Never true   Transportation Needs: No Transportation Needs    Lack of Transportation (Medical): No    Lack of Transportation (Non-Medical): No   Physical Activity: Not on file   Stress: Not on file   Social Connections: Not on file   Intimate Partner Violence: Not on file   Housing Stability: Not on file         Objective     Vitals:    02/25/22 1447 02/25/22 1547   BP: 140/80 136/82   BP Location: Left arm    Patient Position: Sitting    Cuff Size: Standard    Pulse: 75    Resp: 16    Temp: 97 5 °F (36 4 °C)    TempSrc: Temporal    SpO2: 100%    Weight: 83 5 kg (184 lb)    Height: 5' 3" (1 6 m)        Wt Readings from Last 3 Encounters:   02/25/22 83 5 kg (184 lb)   02/08/22 81 6 kg (180 lb)   11/15/21 81 6 kg (180 lb)       Physical Exam  Vitals and nursing note reviewed  Constitutional:       General: She is not in acute distress  Appearance: Normal appearance   She is well-developed  She is not ill-appearing  HENT:      Head: Normocephalic and atraumatic  Right Ear: Tympanic membrane normal       Left Ear: Tympanic membrane normal    Eyes:      Conjunctiva/sclera: Conjunctivae normal    Neck:      Thyroid: No thyromegaly  Vascular: No carotid bruit  Cardiovascular:      Rate and Rhythm: Normal rate and regular rhythm  Heart sounds: Normal heart sounds  No murmur heard  Pulmonary:      Effort: Pulmonary effort is normal  No respiratory distress  Breath sounds: Wheezing (Audible, no wheezing with auscultation), rhonchi ( bases) and rales ( few large rales at bases) present  Abdominal:      General: There is no abdominal bruit  Musculoskeletal:         General: Normal range of motion  Cervical back: Neck supple  Neurological:      General: No focal deficit present  Mental Status: She is alert and oriented to person, place, and time  Cranial Nerves: No cranial nerve deficit  Coordination: Coordination normal    Psychiatric:         Mood and Affect: Mood normal          Behavior: Behavior normal          Thought Content:  Thought content normal           Pertinent Laboratory/Diagnostic Studies:    Lab Results   Component Value Date    WBC 4 51 07/04/2021    HGB 11 6 07/04/2021    HCT 37 1 07/04/2021    MCV 84 07/04/2021     07/04/2021       No results found for: TSH    Lab Results   Component Value Date    CHOL 194 07/09/2015     Lab Results   Component Value Date    TRIG 80 07/03/2021     Lab Results   Component Value Date    HDL 49 07/03/2021     Lab Results   Component Value Date    LDLCALC 58 07/03/2021     Lab Results   Component Value Date    HGBA1C 5 4 10/16/2019     Lab Results   Component Value Date    SODIUM 139 07/04/2021    K 3 5 07/04/2021     (H) 07/04/2021    CO2 23 07/04/2021    ANIONGAP 8 10/27/2015    AGAP 6 07/04/2021    BUN 7 07/04/2021    CREATININE 0 42 (L) 07/04/2021    GLUC 96 07/04/2021 GLUF 96 07/04/2021    CALCIUM 8 7 07/04/2021    AST 14 07/03/2021    ALT 22 07/03/2021    ALKPHOS 62 07/03/2021    PROT 7 7 07/09/2015    TP 7 0 07/03/2021    BILITOT 0 24 07/09/2015    TBILI 0 28 07/03/2021    EGFR 124 07/04/2021       Orders Placed This Encounter   Procedures    XR chest pa & lateral       ALLERGIES:  Allergies   Allergen Reactions    Augmentin [Amoxicillin-Pot Clavulanate] Vomiting     Vomiting and  myalgias    Iron Polysaccharide      GI upset, constipation, hemorrhoid flare, heartburn       Current Medications     Current Outpatient Medications   Medication Sig Dispense Refill    albuterol (ProAir HFA) 90 mcg/act inhaler Inhale 2 puffs every 4 (four) hours as needed for wheezing or shortness of breath (cough) 8 5 g 1    amLODIPine (NORVASC) 2 5 mg tablet Take 1 tablet (2 5 mg total) by mouth daily 90 tablet 2    aspirin (ECOTRIN LOW STRENGTH) 81 mg EC tablet Take 1 tablet (81 mg total) by mouth daily      atorvastatin (LIPITOR) 80 mg tablet TAKE 1 TABLET DAILY WITH DINNER 90 tablet 3    Ferrous Sulfate (Iron) 325 (65 Fe) MG TABS Take by mouth daily      irbesartan (AVAPRO) 300 mg tablet TAKE 1 TABLET DAILY 90 tablet 3    isosorbide mononitrate (IMDUR) 30 mg 24 hr tablet TAKE 1 TABLET BY MOUTH EVERY DAY 30 tablet 0    LORazepam (ATIVAN) 0 5 mg tablet Take 1 tablet (0 5 mg total) by mouth 2 (two) times a day as needed for anxiety 180 tablet 0    metoprolol succinate (TOPROL-XL) 50 mg 24 hr tablet Take 1 tablet (50 mg total) by mouth daily 90 tablet 3    Multiple Vitamins-Minerals (MULTIVITAMIN GUMMIES ADULT PO) Take by mouth daily      sertraline (ZOLOFT) 100 mg tablet TAKE 1 TABLET TWICE A  tablet 3    cefuroxime (CEFTIN) 500 mg tablet Take 1 tablet (500 mg total) by mouth 2 (two) times daily after meals for 7 days 14 tablet 0    fluticasone-umeclidinium-vilanterol (Trelegy Ellipta) 100-62 5-25 MCG/INH inhaler Inhale 1 puff daily Rinse mouth after use   60 blister 2    varenicline (CHANTIX JORDAN) 0 5 MG X 11 & 1 MG X 42 tablet Take one 0 5 mg tablet by mouth once daily for 3 days, then one 0 5 mg tablet by mouth twice daily for 4 days, then one 1 mg tablet by mouth twice daily  53 tablet 0    [START ON 3/25/2022] varenicline (CHANTIX) 1 mg tablet Take 1 tablet (1 mg total) by mouth 2 (two) times a day 60 tablet 4     No current facility-administered medications for this visit         Medications Discontinued During This Encounter   Medication Reason    methylPREDNISolone 4 MG tablet therapy pack        Health Maintenance     Health Maintenance   Topic Date Due    Hepatitis C Screening  Never done    Breast Cancer Screening: Mammogram  Never done    Annual Physical  12/12/2020    Cervical Cancer Screening  01/24/2021    COVID-19 Vaccine (2 - Booster for Kimberley series) 01/30/2022    BMI: Followup Plan  07/16/2022    Influenza Vaccine (1) 06/30/2022 (Originally 9/1/2021)    BMI: Adult  02/25/2023    DTaP,Tdap,and Td Vaccines (2 - Td or Tdap) 07/12/2024    Pneumococcal Vaccine: Pediatrics (0 to 5 Years) and At-Risk Patients (6 to 59 Years) (2 of 2 - PPSV23) 09/05/2040    HIV Screening  Completed    HIB Vaccine  Aged Out    Hepatitis B Vaccine  Aged Out    IPV Vaccine  Aged Out    Hepatitis A Vaccine  Aged Out    Meningococcal ACWY Vaccine  Aged Out    HPV Vaccine  Aged Out       Immunization History   Administered Date(s) Administered    COVID-19 J&J (Kimberley) vaccine 0 5 mL 12/05/2021    Influenza, seasonal, injectable 10/16/2015    Influenza, seasonal, injectable, preservative free 10/29/2018    Pneumococcal Polysaccharide PPV23 09/30/2020    Tdap 07/12/2014    Tuberculin Skin Test-PPD Intradermal 09/10/2012       Arlette Arauz MD

## 2022-02-27 PROBLEM — J20.9 ACUTE BRONCHITIS WITH BRONCHOSPASM: Status: ACTIVE | Noted: 2022-02-27

## 2022-02-27 NOTE — ASSESSMENT & PLAN NOTE
Blood pressure is on the high side of normal     Patient appears bit anxious      Continue regimen of amlodipine, Avapro, metoprolol and isosorbide

## 2022-02-27 NOTE — ASSESSMENT & PLAN NOTE
Patient presents for re-evaluation of cough and chest congestion along with some yellow mucus expectoration and ongoing postnasal sinus trip  Overall her symptoms have significantly improved after completion of Z-Oliver, Medrol Dosepak and p r n  albuterol  Patient complains of residual noise and crackles at her chest     Exam reveals rhonchi and large trials at bases  Good air entry bilaterally, patient is nontoxic and afebrile  She is reluctant to proceed with chest x-ray reviewed now due to high insurance Co pays and deductible  · Start Ceftin 500 mg twice a day for 7 days  · Start Trelegy 1 puff daily, continue p r n  albuterol  · If symptoms will not improve/resolve within next 5 to 7 days-patient will proceed with chest x-ray  · We discussed importance of tobacco cessation  Patient is a good candidate for Chantix  Prescriptions sent to the pharmacy

## 2022-02-27 NOTE — ASSESSMENT & PLAN NOTE
CAD, status post DESx3 in 2019  Continue regimen of aspirin, beta-blocker, high potency statin  Patient denies symptoms of angina or palpitations  She is due for blood work and has orders on hand

## 2022-05-24 ENCOUNTER — TELEPHONE (OUTPATIENT)
Dept: FAMILY MEDICINE CLINIC | Facility: CLINIC | Age: 47
End: 2022-05-24

## 2022-05-24 DIAGNOSIS — I10 BENIGN ESSENTIAL HYPERTENSION: ICD-10-CM

## 2022-05-24 NOTE — TELEPHONE ENCOUNTER
Patient would like to discontinue Chantix  Experiencing nausea and dizziness after taking the medication  She hasn't smoked in 36 days  She is inquiring is if that is a long enough period to have been taking it before stopping

## 2022-05-25 RX ORDER — AMLODIPINE BESYLATE 2.5 MG/1
TABLET ORAL
Qty: 90 TABLET | Refills: 3 | Status: SHIPPED | OUTPATIENT
Start: 2022-05-25

## 2022-05-25 NOTE — TELEPHONE ENCOUNTER
Chantix is recommended to be taken for full 12 weeks to obtain best results  However, if you are not able to tolerate chantix, you can stop at any time

## 2022-07-06 DIAGNOSIS — I25.118 CORONARY ARTERY DISEASE INVOLVING NATIVE CORONARY ARTERY OF NATIVE HEART WITH OTHER FORM OF ANGINA PECTORIS (HCC): ICD-10-CM

## 2022-07-07 RX ORDER — ISOSORBIDE MONONITRATE 30 MG/1
TABLET, EXTENDED RELEASE ORAL
Qty: 90 TABLET | Refills: 0 | Status: SHIPPED | OUTPATIENT
Start: 2022-07-07

## 2022-11-11 DIAGNOSIS — I25.118 CORONARY ARTERY DISEASE INVOLVING NATIVE CORONARY ARTERY OF NATIVE HEART WITH OTHER FORM OF ANGINA PECTORIS (HCC): ICD-10-CM

## 2022-11-11 RX ORDER — ISOSORBIDE MONONITRATE 30 MG/1
30 TABLET, EXTENDED RELEASE ORAL DAILY
Qty: 90 TABLET | Refills: 3 | Status: SHIPPED | OUTPATIENT
Start: 2022-11-11

## 2022-11-11 NOTE — TELEPHONE ENCOUNTER
Requested medication(s) are due for refill today:yes  Patient has already received a courtesy refill:no  Other reason request has been forwarded to provider: script failed

## 2023-04-26 DIAGNOSIS — I10 BENIGN ESSENTIAL HYPERTENSION: ICD-10-CM

## 2023-04-27 RX ORDER — IRBESARTAN 300 MG/1
TABLET ORAL
Qty: 90 TABLET | Refills: 3 | Status: SHIPPED | OUTPATIENT
Start: 2023-04-27

## 2023-05-31 ENCOUNTER — OFFICE VISIT (OUTPATIENT)
Dept: FAMILY MEDICINE CLINIC | Facility: CLINIC | Age: 48
End: 2023-05-31

## 2023-05-31 VITALS
OXYGEN SATURATION: 98 % | DIASTOLIC BLOOD PRESSURE: 82 MMHG | HEART RATE: 96 BPM | HEIGHT: 63 IN | TEMPERATURE: 98.4 F | SYSTOLIC BLOOD PRESSURE: 126 MMHG | RESPIRATION RATE: 16 BRPM | BODY MASS INDEX: 34.62 KG/M2 | WEIGHT: 195.4 LBS

## 2023-05-31 DIAGNOSIS — I25.10 CORONARY ARTERY DISEASE INVOLVING NATIVE CORONARY ARTERY OF NATIVE HEART WITHOUT ANGINA PECTORIS: ICD-10-CM

## 2023-05-31 DIAGNOSIS — I25.2 HISTORY OF ST ELEVATION MYOCARDIAL INFARCTION (STEMI): ICD-10-CM

## 2023-05-31 DIAGNOSIS — F41.8 DEPRESSION WITH ANXIETY: Chronic | ICD-10-CM

## 2023-05-31 DIAGNOSIS — I10 BENIGN ESSENTIAL HYPERTENSION: ICD-10-CM

## 2023-05-31 DIAGNOSIS — Z12.31 SCREENING MAMMOGRAM FOR BREAST CANCER: ICD-10-CM

## 2023-05-31 DIAGNOSIS — L68.0 EXCESSIVE HAIR ON FEMALES: ICD-10-CM

## 2023-05-31 DIAGNOSIS — Z00.00 ENCOUNTER FOR WELLNESS EXAMINATION IN ADULT: Primary | ICD-10-CM

## 2023-05-31 NOTE — PROGRESS NOTES
Name: Josué Mar      : 1975      MRN: [id number]  Encounter Provider: Homer Sanchez MD  Encounter Date: 2023   Encounter department: [address] [place] [address]     1  Encounter for wellness examination in adult  Assessment & Plan:  Patient is not ready to proceed with colonoscopy/Cologuard   We discussed current guidelines  She would like to consider her options and will get back to me with her decision  Patient should proceed with labs, GYN examination and mammogram         2  Screening mammogram for breast cancer  -     Mammo screening bilateral w 3d & cad; Future    3  Coronary artery disease involving native coronary artery of native heart without angina pectoris  Assessment & Plan:  Asymptomatic, on aspirin, isosorbide, high intensity statin and beta blocker  Patient should re-establish care with [facility]  I strongly advised her to quit tobacco    Orders:  -     CBC and differential; Future  -     Comprehensive metabolic panel; Future  -     Lipid Panel with Direct LDL reflex; Future  -     TSH, 3rd generation; Future  -     Ambulatory Referral to Cardiology; Future    4  Benign essential hypertension    5  History of ST elevation myocardial infarction (STEMI)  -     Ambulatory Referral to Cardiology; Future    6  Depression with anxiety  Assessment & Plan:  Current dose of Zoloft is 100 mg daily  Patient has not taken lorazepam for quite a while      7  Excessive hair on females  -     Testosterone, free, total; Future  -     Ambulatory referral to Dermatology; Future         BMI Counseling: Body mass index is 34 61 kg/m²  The BMI is above normal  Nutrition recommendations include encouraging healthy choices of fruits and vegetables, consuming healthier snacks, moderation in carbohydrate intake, reducing intake of saturated and trans fat and reducing intake of cholesterol  Exercise recommendations include exercising 3-5 times per week   No pharmacotherapy was ordered  Patient referred to PCP  Rationale for BMI follow-up plan is due to patient being overweight or obese  Subjective     Annual well exam/follow-up  No recurrences of syncope  No recent follow-up with [facility] cardiology, no complaints of chest pain, palpitations, shortness of breath or dizziness  Patient ibeth on daily medications as directed  Never had a mammogram She is past due GYN exam Patient reports that menses are becoming bit irregular, few days of heavy flow  On Iron daily during menses and qod rest of the month  We discussed colonoscopy/Cologuard/colorectal screening  Patient prefers to hold off  No family history of colon cancer, no GI symptoms  Patient is concerned about weight gain, abdominal obesity  She reports lack of exercise  Depression/anxiety symptoms have been well controlled on Zoloft 100 mg daily  Review of Systems   Constitutional: Negative  HENT: Negative  Eyes: Negative  Respiratory: Negative  Cardiovascular: Negative  Gastrointestinal: Negative  Endocrine: Negative  Genitourinary: Positive for menstrual problem  As per HPI   Musculoskeletal: Negative  Skin: Negative  Allergic/Immunologic: Negative  Neurological: Negative  Hematological: Negative  Psychiatric/Behavioral: Negative          Past Medical History:   Diagnosis Date   • HTN (hypertension)    • Mitral valve regurgitation    • Myocardial infarction [facility])    • Preeclampsia      Past Surgical History:   Procedure Laterality Date   • CARDIAC CATHETERIZATION     •  SECTION     • CORONARY STENT PLACEMENT     • EYE SURGERY     • TUBAL LIGATION       Family History   Problem Relation Age of Onset   • Hypertension Father    • Kidney disease Paternal Grandfather      Social History     Socioeconomic History   • Marital status: /Civil Union     Spouse name: None   • Number of children: None   • Years of education: None   • Highest education level: None   Occupational History   • None   Tobacco Use   • Smoking status: Some Days     Types: Cigarettes   • Smokeless tobacco: Never   Vaping Use   • Vaping Use: Former   Substance and Sexual Activity   • Alcohol use: Not Currently     Comment: rarely   • Drug use: No   • Sexual activity: Not Currently   Other Topics Concern   • None   Social History Narrative   • None     Social Determinants of Health     Financial Resource Strain: Not on file   Food Insecurity: No Food Insecurity (5/24/2021)    Hunger Vital Sign    • Worried About Running Out of Food in the Last Year: Never true    • Ran Out of Food in the Last Year: Never true   Transportation Needs: No Transportation Needs (5/24/2021)    PRAPARE - Transportation    • Lack of Transportation (Medical): No    • Lack of Transportation (Non-Medical):  No   Physical Activity: Not on file   Stress: Not on file   Social Connections: Not on file   Intimate Partner Violence: Not on file   Housing Stability: Not on file     Current Outpatient Medications on File Prior to Visit   Medication Sig   • amLODIPine (NORVASC) 2 5 mg tablet TAKE 1 TABLET DAILY   • aspirin (ECOTRIN LOW STRENGTH) 81 mg EC tablet Take 1 tablet (81 mg total) by mouth daily   • atorvastatin (LIPITOR) 80 mg tablet TAKE 1 TABLET DAILY WITH DINNER   • Ferrous Sulfate (Iron) 325 (65 Fe) MG TABS Take by mouth daily   • irbesartan (AVAPRO) 300 mg tablet TAKE 1 TABLET DAILY   • isosorbide mononitrate (IMDUR) 30 mg 24 hr tablet Take 1 tablet (30 mg total) by mouth daily   • metoprolol succinate (TOPROL-XL) 50 mg 24 hr tablet TAKE 1 TABLET DAILY   • Multiple Vitamins-Minerals (MULTIVITAMIN GUMMIES ADULT PO) Take by mouth daily   • sertraline (ZOLOFT) 100 mg tablet TAKE 1 TABLET TWICE A DAY (Patient taking differently: Take 100 mg by mouth daily One tablet daily)   • LORazepam (ATIVAN) 0 5 mg tablet Take 1 tablet (0 5 mg total) by mouth 2 (two) times a day as needed for anxiety (Patient not "taking: Reported on 5/31/2023)     Allergies   Allergen Reactions   • Augmentin [Amoxicillin-Pot Clavulanate] Vomiting     Vomiting and  myalgias   • Iron Polysaccharide      GI upset, constipation, hemorrhoid flare, heartburn     Immunization History   Administered Date(s) Administered   • COVID-19 J&J (MAPPING) vaccine 0 5 mL 12/05/2021   • Influenza, seasonal, injectable 10/16/2015   • Influenza, seasonal, injectable, preservative free 10/29/2018   • Pneumococcal Polysaccharide PPV23 09/30/2020   • Tdap 07/12/2014   • Tuberculin Skin Test-PPD Intradermal 09/10/2012       Objective     /82   Pulse 96   Temp 98 4 °F (36 9 °C) (Temporal)   Resp 16   Ht 5' 3\" (1 6 m)   Wt 88 6 kg (195 lb 6 4 oz)   LMP 05/29/2023 (Exact Date)   SpO2 98%   BMI 34 61 kg/m²     Physical Exam  Vitals and nursing note reviewed  Constitutional:       General: She is not in acute distress  Appearance: Normal appearance  She is well-developed  She is not ill-appearing  HENT:      Head: Normocephalic and atraumatic  Eyes:      Conjunctiva/sclera: Conjunctivae normal    Neck:      Thyroid: No thyromegaly  Vascular: No carotid bruit  Cardiovascular:      Rate and Rhythm: Normal rate and regular rhythm  Heart sounds: Normal heart sounds  No murmur heard  Pulmonary:      Effort: Pulmonary effort is normal  No respiratory distress  Breath sounds: Normal breath sounds  No wheezing  Abdominal:      General: Bowel sounds are normal  There is no distension or abdominal bruit  Palpations: Abdomen is soft  Tenderness: There is no abdominal tenderness  Hernia: No hernia is present  Musculoskeletal:         General: Normal range of motion  Cervical back: Neck supple  Right lower leg: No edema  Left lower leg: No edema  Skin:     General: Skin is warm  Neurological:      General: No focal deficit present  Mental Status: She is alert and oriented to person, place, and time   " Cranial Nerves: No cranial nerve deficit        Coordination: Coordination normal    Psychiatric:         Mood and Affect: Mood normal          Behavior: Behavior normal        Daniella Ibrahim MD

## 2023-06-03 PROBLEM — Z00.00 ENCOUNTER FOR WELLNESS EXAMINATION IN ADULT: Status: ACTIVE | Noted: 2019-07-28

## 2023-06-03 PROBLEM — R20.0 NUMBNESS AND TINGLING OF LEFT LEG: Status: RESOLVED | Noted: 2018-08-21 | Resolved: 2023-06-03

## 2023-06-03 PROBLEM — R20.2 NUMBNESS AND TINGLING OF LEFT LEG: Status: RESOLVED | Noted: 2018-08-21 | Resolved: 2023-06-03

## 2023-06-03 PROBLEM — J20.9 ACUTE BRONCHITIS WITH BRONCHOSPASM: Status: RESOLVED | Noted: 2022-02-27 | Resolved: 2023-06-03

## 2023-06-03 PROBLEM — R25.1 TREMOR OF FACE AND HANDS: Status: RESOLVED | Noted: 2018-08-21 | Resolved: 2023-06-03

## 2023-06-03 NOTE — ASSESSMENT & PLAN NOTE
Patient is not ready to proceed with colonoscopy/Cologuard   We discussed current guidelines  She would like to consider her options and will get back to me with her decision      Patient should proceed with labs, GYN examination and mammogram

## 2023-06-03 NOTE — ASSESSMENT & PLAN NOTE
Asymptomatic, on aspirin, isosorbide, high intensity statin and beta blocker  Patient should re-establish care with [facility] cardiology    I strongly advised her to quit tobacco

## 2023-06-17 DIAGNOSIS — I21.21 STEMI INVOLVING LEFT CIRCUMFLEX CORONARY ARTERY (HCC): ICD-10-CM

## 2023-06-17 RX ORDER — METOPROLOL SUCCINATE 50 MG/1
TABLET, EXTENDED RELEASE ORAL
Qty: 90 TABLET | Refills: 3 | Status: SHIPPED | OUTPATIENT
Start: 2023-06-17

## 2023-06-17 RX ORDER — ATORVASTATIN CALCIUM 80 MG/1
TABLET, FILM COATED ORAL
Qty: 90 TABLET | Refills: 0 | Status: SHIPPED | OUTPATIENT
Start: 2023-06-17

## 2023-06-29 ENCOUNTER — APPOINTMENT (OUTPATIENT)
Dept: LAB | Facility: CLINIC | Age: 48
End: 2023-06-29
Payer: COMMERCIAL

## 2023-06-29 DIAGNOSIS — I25.10 CORONARY ARTERY DISEASE INVOLVING NATIVE CORONARY ARTERY OF NATIVE HEART WITHOUT ANGINA PECTORIS: ICD-10-CM

## 2023-06-29 DIAGNOSIS — L68.0 EXCESSIVE HAIR ON FEMALES: ICD-10-CM

## 2023-06-29 LAB
ALBUMIN SERPL BCP-MCNC: 3.5 G/DL (ref 3.5–5)
ALP SERPL-CCNC: 91 U/L (ref 46–116)
ALT SERPL W P-5'-P-CCNC: 19 U/L (ref 12–78)
ANION GAP SERPL CALCULATED.3IONS-SCNC: 6 MMOL/L
AST SERPL W P-5'-P-CCNC: 16 U/L (ref 5–45)
BASOPHILS # BLD AUTO: 0.04 THOUSANDS/ÂΜL (ref 0–0.1)
BASOPHILS NFR BLD AUTO: 1 % (ref 0–1)
BILIRUB SERPL-MCNC: 0.27 MG/DL (ref 0.2–1)
BUN SERPL-MCNC: 10 MG/DL (ref 5–25)
CALCIUM SERPL-MCNC: 9 MG/DL (ref 8.3–10.1)
CHLORIDE SERPL-SCNC: 112 MMOL/L (ref 96–108)
CHOLEST SERPL-MCNC: 134 MG/DL
CO2 SERPL-SCNC: 24 MMOL/L (ref 21–32)
CREAT SERPL-MCNC: 0.55 MG/DL (ref 0.6–1.3)
EOSINOPHIL # BLD AUTO: 0.01 THOUSAND/ÂΜL (ref 0–0.61)
EOSINOPHIL NFR BLD AUTO: 0 % (ref 0–6)
ERYTHROCYTE [DISTWIDTH] IN BLOOD BY AUTOMATED COUNT: 12.6 % (ref 11.6–15.1)
GFR SERPL CREATININE-BSD FRML MDRD: 112 ML/MIN/1.73SQ M
GLUCOSE P FAST SERPL-MCNC: 96 MG/DL (ref 65–99)
HCT VFR BLD AUTO: 43.3 % (ref 34.8–46.1)
HDLC SERPL-MCNC: 42 MG/DL
HGB BLD-MCNC: 14.5 G/DL (ref 11.5–15.4)
IMM GRANULOCYTES # BLD AUTO: 0.02 THOUSAND/UL (ref 0–0.2)
IMM GRANULOCYTES NFR BLD AUTO: 0 % (ref 0–2)
LDLC SERPL CALC-MCNC: 70 MG/DL (ref 0–100)
LYMPHOCYTES # BLD AUTO: 1.5 THOUSANDS/ÂΜL (ref 0.6–4.47)
LYMPHOCYTES NFR BLD AUTO: 24 % (ref 14–44)
MCH RBC QN AUTO: 31.9 PG (ref 26.8–34.3)
MCHC RBC AUTO-ENTMCNC: 33.5 G/DL (ref 31.4–37.4)
MCV RBC AUTO: 95 FL (ref 82–98)
MONOCYTES # BLD AUTO: 0.55 THOUSAND/ÂΜL (ref 0.17–1.22)
MONOCYTES NFR BLD AUTO: 9 % (ref 4–12)
NEUTROPHILS # BLD AUTO: 4.1 THOUSANDS/ÂΜL (ref 1.85–7.62)
NEUTS SEG NFR BLD AUTO: 66 % (ref 43–75)
NRBC BLD AUTO-RTO: 0 /100 WBCS
PLATELET # BLD AUTO: 262 THOUSANDS/UL (ref 149–390)
PMV BLD AUTO: 10.6 FL (ref 8.9–12.7)
POTASSIUM SERPL-SCNC: 3.9 MMOL/L (ref 3.5–5.3)
PROT SERPL-MCNC: 7.4 G/DL (ref 6.4–8.4)
RBC # BLD AUTO: 4.54 MILLION/UL (ref 3.81–5.12)
SODIUM SERPL-SCNC: 142 MMOL/L (ref 135–147)
TRIGL SERPL-MCNC: 111 MG/DL
TSH SERPL DL<=0.05 MIU/L-ACNC: 1.31 UIU/ML (ref 0.45–4.5)
WBC # BLD AUTO: 6.22 THOUSAND/UL (ref 4.31–10.16)

## 2023-06-29 PROCEDURE — 84443 ASSAY THYROID STIM HORMONE: CPT

## 2023-06-29 PROCEDURE — 36415 COLL VENOUS BLD VENIPUNCTURE: CPT

## 2023-06-29 PROCEDURE — 80061 LIPID PANEL: CPT

## 2023-06-29 PROCEDURE — 84403 ASSAY OF TOTAL TESTOSTERONE: CPT

## 2023-06-29 PROCEDURE — 85025 COMPLETE CBC W/AUTO DIFF WBC: CPT

## 2023-06-29 PROCEDURE — 84402 ASSAY OF FREE TESTOSTERONE: CPT

## 2023-06-29 PROCEDURE — 80053 COMPREHEN METABOLIC PANEL: CPT

## 2023-06-30 ENCOUNTER — OFFICE VISIT (OUTPATIENT)
Dept: FAMILY MEDICINE CLINIC | Facility: CLINIC | Age: 48
End: 2023-06-30
Payer: COMMERCIAL

## 2023-06-30 VITALS
SYSTOLIC BLOOD PRESSURE: 140 MMHG | OXYGEN SATURATION: 98 % | HEART RATE: 72 BPM | DIASTOLIC BLOOD PRESSURE: 98 MMHG | WEIGHT: 193.2 LBS | HEIGHT: 63 IN | BODY MASS INDEX: 34.23 KG/M2 | TEMPERATURE: 98 F | RESPIRATION RATE: 16 BRPM

## 2023-06-30 DIAGNOSIS — R10.32 LLQ PAIN: Primary | ICD-10-CM

## 2023-06-30 DIAGNOSIS — K64.9 HEMORRHOIDS, UNSPECIFIED HEMORRHOID TYPE: ICD-10-CM

## 2023-06-30 LAB
SL AMB  POCT GLUCOSE, UA: ABNORMAL
SL AMB LEUKOCYTE ESTERASE,UA: ABNORMAL
SL AMB POCT BILIRUBIN,UA: ABNORMAL
SL AMB POCT BLOOD,UA: ABNORMAL
SL AMB POCT CLARITY,UA: ABNORMAL
SL AMB POCT COLOR,UA: ABNORMAL
SL AMB POCT KETONES,UA: ABNORMAL
SL AMB POCT NITRITE,UA: ABNORMAL
SL AMB POCT PH,UA: 6
SL AMB POCT SPECIFIC GRAVITY,UA: 1.02
SL AMB POCT URINE PROTEIN: ABNORMAL
SL AMB POCT UROBILINOGEN: ABNORMAL

## 2023-06-30 PROCEDURE — 81002 URINALYSIS NONAUTO W/O SCOPE: CPT | Performed by: FAMILY MEDICINE

## 2023-06-30 PROCEDURE — 87086 URINE CULTURE/COLONY COUNT: CPT | Performed by: FAMILY MEDICINE

## 2023-06-30 PROCEDURE — 99214 OFFICE O/P EST MOD 30 MIN: CPT | Performed by: FAMILY MEDICINE

## 2023-06-30 RX ORDER — HYDROCORTISONE 25 MG/G
CREAM TOPICAL 2 TIMES DAILY
Qty: 28 G | Refills: 2 | Status: SHIPPED | OUTPATIENT
Start: 2023-06-30

## 2023-06-30 NOTE — PROGRESS NOTES
Name: Jaki Renteria      : 1975      MRN: [id number]  Encounter Provider: Cathy Gurrola MD  Encounter Date: 2023   Encounter department: [address] [place] [address]     1. LLQ pain  -     POCT urine dip  -     CT abdomen pelvis w contrast; Future; Expected date: 2023  -     Urine culture; Future  -     Urine culture    2. Hemorrhoids, unspecified hemorrhoid type  -     hydrocortisone (ANUSOL-HC) 2.5 % rectal cream; Apply topically 2 (two) times a day    Patient presents for evaluation of severe left lower quadrant/pelvic pain that has coincided with her menstrual cycle and has essentially resolved by now. Due to severe nature of her pain and unclear etiology-I strongly recommend to proceed with CT abdomen and pelvis for further evaluation. Differential diagnosis includes diverticulitis versus fibroid versus ovarian cyst versus kidney stones. Abdominal exam is benign today. Patient will contact me immediately with any recurrences. Proceed with UA C&S. Patient will use hydrocortisone cream along with soaking attacks for external hemorrhoids. We discussed importance of bath avoidance and prevention of UTI. I again strongly advised patient to proceed with GYN exam.       Subjective     Patient presents for evaluation of left lower quadrant abdominal pain. Her symptoms coincided with the start of recent LMP over a week ago. Pain was excruciating at start, patient rates it as "20 out of 10". Pain was localized in the left lower quadrant area, radiated to the lower back. No dysuria, urgency, frequency, fever or flank pain. No nausea, vomiting or diarrhea. Intermittent constipation. Patient reports painful external hemorrhoids. Patient states that her pain has significantly subsided now as menses are over.   She is concerned about possible kidney stones    Patient is past due GYN exam.    She never had colonoscopy    Review of Systems   Constitutional: Negative. HENT: Negative. Eyes: Negative. Respiratory: Negative. Cardiovascular: Negative. Gastrointestinal: Positive for abdominal pain. Negative for nausea and vomiting. Hemorrhoids   Endocrine: Negative. Genitourinary: Negative. Musculoskeletal: Negative. Allergic/Immunologic: Negative. Neurological: Negative. Past Medical History:   Diagnosis Date   • HTN (hypertension)    • Mitral valve regurgitation    • Myocardial infarction ([address])    • Preeclampsia      Past Surgical History:   Procedure Laterality Date   • CARDIAC CATHETERIZATION     •  SECTION     • CORONARY STENT PLACEMENT     • EYE SURGERY     • TUBAL LIGATION       Family History   Problem Relation Age of Onset   • Hypertension Father    • Kidney disease Paternal Grandfather      Social History     Socioeconomic History   • Marital status: /Civil Union     Spouse name: None   • Number of children: None   • Years of education: None   • Highest education level: None   Occupational History   • None   Tobacco Use   • Smoking status: Some Days     Types: Cigarettes   • Smokeless tobacco: Never   Vaping Use   • Vaping Use: Former   Substance and Sexual Activity   • Alcohol use: Not Currently     Comment: rarely   • Drug use: No   • Sexual activity: Not Currently   Other Topics Concern   • None   Social History Narrative   • None     Social Determinants of Health     Financial Resource Strain: Not on file   Food Insecurity: No Food Insecurity (2021)    Hunger Vital Sign    • Worried About Running Out of Food in the Last Year: Never true    • Ran Out of Food in the Last Year: Never true   Transportation Needs: No Transportation Needs (2021)    PRAPARE - Transportation    • Lack of Transportation (Medical): No    • Lack of Transportation (Non-Medical):  No   Physical Activity: Not on file   Stress: Not on file   Social Connections: Not on file   Intimate Partner Violence: Not on file   Housing Stability: Not on file     Current Outpatient Medications on File Prior to Visit   Medication Sig   • amLODIPine (NORVASC) 2.5 mg tablet TAKE 1 TABLET DAILY   • aspirin (ECOTRIN LOW STRENGTH) 81 mg EC tablet Take 1 tablet (81 mg total) by mouth daily   • atorvastatin (LIPITOR) 80 mg tablet TAKE 1 TABLET DAILY WITH DINNER   • irbesartan (AVAPRO) 300 mg tablet TAKE 1 TABLET DAILY   • isosorbide mononitrate (IMDUR) 30 mg 24 hr tablet Take 1 tablet (30 mg total) by mouth daily   • metoprolol succinate (TOPROL-XL) 50 mg 24 hr tablet TAKE 1 TABLET DAILY   • Multiple Vitamins-Minerals (MULTIVITAMIN GUMMIES ADULT PO) Take by mouth daily   • sertraline (ZOLOFT) 100 mg tablet TAKE 1 TABLET TWICE A DAY (Patient taking differently: Take 100 mg by mouth daily One tablet daily)   • Ferrous Sulfate (Iron) 325 (65 Fe) MG TABS Take by mouth daily (Patient not taking: Reported on 6/30/2023)   • LORazepam (ATIVAN) 0.5 mg tablet Take 1 tablet (0.5 mg total) by mouth 2 (two) times a day as needed for anxiety     Allergies   Allergen Reactions   • Iron Polysaccharide      GI upset, constipation, hemorrhoid flare, heartburn     Immunization History   Administered Date(s) Administered   • COVID-19 J&J (CICCWORLD) vaccine 0.5 mL 12/05/2021   • Influenza, seasonal, injectable 10/16/2015   • Influenza, seasonal, injectable, preservative free 10/29/2018   • Pneumococcal Polysaccharide PPV23 09/30/2020   • Tdap 07/12/2014   • Tuberculin Skin Test-PPD Intradermal 09/10/2012       Objective     /98 (BP Location: Left arm, Patient Position: Sitting, Cuff Size: Large)   Pulse 72   Temp 98 °F (36.7 °C) (Temporal)   Resp 16   Ht 5' 3" (1.6 m)   Wt 87.6 kg (193 lb 3.2 oz)   LMP 05/29/2023 (Exact Date)   SpO2 98%   BMI 34.22 kg/m²     Physical Exam  Vitals and nursing note reviewed. Constitutional:       General: She is not in acute distress. Appearance: Normal appearance. She is not ill-appearing.    Abdominal:      General: Abdomen is flat. Bowel sounds are normal. There is no distension. Palpations: Abdomen is soft. Tenderness: There is no abdominal tenderness. Hernia: No hernia is present. Neurological:      Mental Status: She is alert. Psychiatric:         Mood and Affect: Mood normal.         Behavior: Behavior normal.         Thought Content:  Thought content normal.       Seda Torres MD

## 2023-07-01 LAB
TESTOST FREE SERPL-MCNC: 0.9 PG/ML (ref 0–4.2)
TESTOST SERPL-MCNC: 23 NG/DL (ref 4–50)

## 2023-07-02 ENCOUNTER — TELEPHONE (OUTPATIENT)
Dept: FAMILY MEDICINE CLINIC | Facility: CLINIC | Age: 48
End: 2023-07-02

## 2023-07-02 LAB — BACTERIA UR CULT: NORMAL

## 2023-07-17 ENCOUNTER — HOSPITAL ENCOUNTER (EMERGENCY)
Facility: HOSPITAL | Age: 48
End: 2023-07-18
Attending: EMERGENCY MEDICINE
Payer: COMMERCIAL

## 2023-07-17 ENCOUNTER — APPOINTMENT (EMERGENCY)
Dept: RADIOLOGY | Facility: HOSPITAL | Age: 48
End: 2023-07-17
Payer: COMMERCIAL

## 2023-07-17 ENCOUNTER — TELEPHONE (OUTPATIENT)
Dept: FAMILY MEDICINE CLINIC | Facility: CLINIC | Age: 48
End: 2023-07-17

## 2023-07-17 VITALS
TEMPERATURE: 98.7 F | SYSTOLIC BLOOD PRESSURE: 167 MMHG | WEIGHT: 195 LBS | BODY MASS INDEX: 34.54 KG/M2 | HEART RATE: 109 BPM | OXYGEN SATURATION: 100 % | DIASTOLIC BLOOD PRESSURE: 108 MMHG | RESPIRATION RATE: 20 BRPM

## 2023-07-17 DIAGNOSIS — R10.9 ABDOMINAL PAIN: ICD-10-CM

## 2023-07-17 DIAGNOSIS — D21.9 LEIOMYOMA: Primary | ICD-10-CM

## 2023-07-17 PROBLEM — R65.10 SIRS (SYSTEMIC INFLAMMATORY RESPONSE SYNDROME) (HCC): Status: ACTIVE | Noted: 2023-07-17

## 2023-07-17 PROBLEM — R10.32 LLQ PAIN: Status: ACTIVE | Noted: 2023-07-17

## 2023-07-17 LAB
ANION GAP SERPL CALCULATED.3IONS-SCNC: 5 MMOL/L
ATRIAL RATE: 101 BPM
BACTERIA UR QL AUTO: ABNORMAL /HPF
BASOPHILS # BLD MANUAL: 0.11 THOUSAND/UL (ref 0–0.1)
BASOPHILS NFR MAR MANUAL: 1 % (ref 0–1)
BILIRUB UR QL STRIP: NEGATIVE
BUN SERPL-MCNC: 12 MG/DL (ref 5–25)
CALCIUM SERPL-MCNC: 9.2 MG/DL (ref 8.3–10.1)
CHLORIDE SERPL-SCNC: 108 MMOL/L (ref 96–108)
CLARITY UR: ABNORMAL
CO2 SERPL-SCNC: 25 MMOL/L (ref 21–32)
COLOR UR: YELLOW
CREAT SERPL-MCNC: 0.74 MG/DL (ref 0.6–1.3)
EOSINOPHIL # BLD MANUAL: 0 THOUSAND/UL (ref 0–0.4)
EOSINOPHIL NFR BLD MANUAL: 0 % (ref 0–6)
ERYTHROCYTE [DISTWIDTH] IN BLOOD BY AUTOMATED COUNT: 12.7 % (ref 11.6–15.1)
EXT PREGNANCY TEST URINE: NEGATIVE
EXT. CONTROL: NORMAL
GFR SERPL CREATININE-BSD FRML MDRD: 96 ML/MIN/1.73SQ M
GLUCOSE SERPL-MCNC: 105 MG/DL (ref 65–140)
GLUCOSE UR STRIP-MCNC: NEGATIVE MG/DL
HCT VFR BLD AUTO: 38.7 % (ref 34.8–46.1)
HGB BLD-MCNC: 13.1 G/DL (ref 11.5–15.4)
HGB UR QL STRIP.AUTO: ABNORMAL
KETONES UR STRIP-MCNC: ABNORMAL MG/DL
LEUKOCYTE ESTERASE UR QL STRIP: ABNORMAL
LYMPHOCYTES # BLD AUTO: 0.34 THOUSAND/UL (ref 0.6–4.47)
LYMPHOCYTES # BLD AUTO: 2 % (ref 14–44)
MCH RBC QN AUTO: 31.4 PG (ref 26.8–34.3)
MCHC RBC AUTO-ENTMCNC: 33.9 G/DL (ref 31.4–37.4)
MCV RBC AUTO: 93 FL (ref 82–98)
MONOCYTES # BLD AUTO: 0.46 THOUSAND/UL (ref 0–1.22)
MONOCYTES NFR BLD: 4 % (ref 4–12)
MUCOUS THREADS UR QL AUTO: ABNORMAL
NEUTROPHILS # BLD MANUAL: 10.51 THOUSAND/UL (ref 1.85–7.62)
NEUTS BAND NFR BLD MANUAL: 15 % (ref 0–8)
NEUTS SEG NFR BLD AUTO: 77 % (ref 43–75)
NITRITE UR QL STRIP: NEGATIVE
NON-SQ EPI CELLS URNS QL MICRO: ABNORMAL /HPF
P AXIS: 65 DEGREES
PH UR STRIP.AUTO: 6 [PH]
PLATELET # BLD AUTO: 168 THOUSANDS/UL (ref 149–390)
PLATELET BLD QL SMEAR: ADEQUATE
PMV BLD AUTO: 10.6 FL (ref 8.9–12.7)
POTASSIUM SERPL-SCNC: 3 MMOL/L (ref 3.5–5.3)
PR INTERVAL: 138 MS
PROT UR STRIP-MCNC: ABNORMAL MG/DL
QRS AXIS: 15 DEGREES
QRSD INTERVAL: 82 MS
QT INTERVAL: 350 MS
QTC INTERVAL: 453 MS
RBC # BLD AUTO: 4.17 MILLION/UL (ref 3.81–5.12)
RBC #/AREA URNS AUTO: ABNORMAL /HPF
RBC MORPH BLD: NORMAL
SODIUM SERPL-SCNC: 138 MMOL/L (ref 135–147)
SP GR UR STRIP.AUTO: 1.03 (ref 1–1.03)
T WAVE AXIS: 28 DEGREES
UROBILINOGEN UR STRIP-ACNC: 2 MG/DL
VARIANT LYMPHS # BLD AUTO: 1 %
VENTRICULAR RATE: 101 BPM
WBC # BLD AUTO: 11.42 THOUSAND/UL (ref 4.31–10.16)
WBC #/AREA URNS AUTO: ABNORMAL /HPF

## 2023-07-17 PROCEDURE — 81025 URINE PREGNANCY TEST: CPT

## 2023-07-17 PROCEDURE — 96372 THER/PROPH/DIAG INJ SC/IM: CPT

## 2023-07-17 PROCEDURE — 81001 URINALYSIS AUTO W/SCOPE: CPT

## 2023-07-17 PROCEDURE — 96375 TX/PRO/DX INJ NEW DRUG ADDON: CPT

## 2023-07-17 PROCEDURE — 74176 CT ABD & PELVIS W/O CONTRAST: CPT

## 2023-07-17 PROCEDURE — 85027 COMPLETE CBC AUTOMATED: CPT | Performed by: EMERGENCY MEDICINE

## 2023-07-17 PROCEDURE — 87077 CULTURE AEROBIC IDENTIFY: CPT

## 2023-07-17 PROCEDURE — 87040 BLOOD CULTURE FOR BACTERIA: CPT

## 2023-07-17 PROCEDURE — 96365 THER/PROPH/DIAG IV INF INIT: CPT

## 2023-07-17 PROCEDURE — 85007 BL SMEAR W/DIFF WBC COUNT: CPT | Performed by: EMERGENCY MEDICINE

## 2023-07-17 PROCEDURE — G1004 CDSM NDSC: HCPCS

## 2023-07-17 PROCEDURE — 93005 ELECTROCARDIOGRAM TRACING: CPT

## 2023-07-17 PROCEDURE — 80048 BASIC METABOLIC PNL TOTAL CA: CPT | Performed by: EMERGENCY MEDICINE

## 2023-07-17 PROCEDURE — 99284 EMERGENCY DEPT VISIT MOD MDM: CPT

## 2023-07-17 PROCEDURE — 93010 ELECTROCARDIOGRAM REPORT: CPT | Performed by: INTERNAL MEDICINE

## 2023-07-17 PROCEDURE — 87086 URINE CULTURE/COLONY COUNT: CPT

## 2023-07-17 PROCEDURE — 76856 US EXAM PELVIC COMPLETE: CPT

## 2023-07-17 PROCEDURE — 36415 COLL VENOUS BLD VENIPUNCTURE: CPT | Performed by: EMERGENCY MEDICINE

## 2023-07-17 PROCEDURE — 76830 TRANSVAGINAL US NON-OB: CPT

## 2023-07-17 PROCEDURE — 99285 EMERGENCY DEPT VISIT HI MDM: CPT | Performed by: EMERGENCY MEDICINE

## 2023-07-17 RX ORDER — KETOROLAC TROMETHAMINE 30 MG/ML
15 INJECTION, SOLUTION INTRAMUSCULAR; INTRAVENOUS ONCE
Status: COMPLETED | OUTPATIENT
Start: 2023-07-17 | End: 2023-07-17

## 2023-07-17 RX ADMIN — KETOROLAC TROMETHAMINE 15 MG: 30 INJECTION, SOLUTION INTRAMUSCULAR; INTRAVENOUS at 16:38

## 2023-07-17 RX ADMIN — CEFTRIAXONE SODIUM 1000 MG: 10 INJECTION, POWDER, FOR SOLUTION INTRAVENOUS at 22:36

## 2023-07-17 RX ADMIN — MORPHINE SULFATE 2 MG: 2 INJECTION, SOLUTION INTRAMUSCULAR; INTRAVENOUS at 20:11

## 2023-07-17 NOTE — ED ATTENDING ATTESTATION
7/17/2023  I, Leni Alva MD, saw and evaluated the patient. I have discussed the patient with the resident/non-physician practitioner and agree with the resident's/non-physician practitioner's findings, Plan of Care, and MDM as documented in the resident's/non-physician practitioner's note, except where noted. All available labs and Radiology studies were reviewed. I was present for key portions of any procedure(s) performed by the resident/non-physician practitioner and I was immediately available to provide assistance. At this point I agree with the current assessment done in the Emergency Department. I have conducted an independent evaluation of this patient a history and physical is as follows:  Pt has history tubal ligation and ?  Uterine leiomyoma Pt has had vaginal bleeding onset vaginal bleeding that was heavy last month that was associated with pain in llq Pt saw pmd and had blood work Pt improved at end of menses this returned with this menses  Pain is constant in nature no urinary symptoms  PE: alert tender llq heart reg lungs clear abd soft tender llq MDM: will do us labs   ED Course         Critical Care Time  Procedures

## 2023-07-17 NOTE — TELEPHONE ENCOUNTER
Patient called and stated that she is in agonizing pain and is sick as a dog. She is shaking and had a fever for 3 nights. I advised patient to proceed to the ER and if they feel she needs to have a CT scan they will do it right away. She just wanted me to let you know.   She will proceed to the emergency room

## 2023-07-18 ENCOUNTER — HOSPITAL ENCOUNTER (INPATIENT)
Facility: HOSPITAL | Age: 48
LOS: 2 days | Discharge: HOME | End: 2023-07-20
Attending: STUDENT IN AN ORGANIZED HEALTH CARE EDUCATION/TRAINING PROGRAM | Admitting: STUDENT IN AN ORGANIZED HEALTH CARE EDUCATION/TRAINING PROGRAM
Payer: COMMERCIAL

## 2023-07-18 DIAGNOSIS — N73.9 PELVIC INFLAMMATORY DISEASE (PID): Primary | ICD-10-CM

## 2023-07-18 LAB
BASOPHILS # BLD MANUAL: 0 THOUSAND/UL (ref 0–0.1)
BASOPHILS NFR MAR MANUAL: 0 % (ref 0–1)
EOSINOPHIL # BLD MANUAL: 0.13 THOUSAND/UL (ref 0–0.4)
EOSINOPHIL NFR BLD MANUAL: 1 % (ref 0–6)
ERYTHROCYTE [DISTWIDTH] IN BLOOD BY AUTOMATED COUNT: 13.2 % (ref 11.6–15.1)
HCT VFR BLD AUTO: 36.3 % (ref 34.8–46.1)
HGB BLD-MCNC: 12.2 G/DL (ref 11.5–15.4)
LACTATE SERPL-SCNC: 1.6 MMOL/L (ref 0.5–2)
LYMPHOCYTES # BLD AUTO: 0.39 THOUSAND/UL (ref 0.6–4.47)
LYMPHOCYTES # BLD AUTO: 3 % (ref 14–44)
MCH RBC QN AUTO: 31.6 PG (ref 26.8–34.3)
MCHC RBC AUTO-ENTMCNC: 33.6 G/DL (ref 31.4–37.4)
MCV RBC AUTO: 94 FL (ref 82–98)
MONOCYTES # BLD AUTO: 0.13 THOUSAND/UL (ref 0–1.22)
MONOCYTES NFR BLD: 1 % (ref 4–12)
NEUTROPHILS # BLD MANUAL: 12.36 THOUSAND/UL (ref 1.85–7.62)
NEUTS BAND NFR BLD MANUAL: 21 % (ref 0–8)
NEUTS SEG NFR BLD AUTO: 74 % (ref 43–75)
PLATELET # BLD AUTO: 157 THOUSANDS/UL (ref 149–390)
PLATELET BLD QL SMEAR: ABNORMAL
PMV BLD AUTO: 10.4 FL (ref 8.9–12.7)
PROCALCITONIN SERPL-MCNC: 0.26 NG/ML
RBC # BLD AUTO: 3.86 MILLION/UL (ref 3.81–5.12)
RBC MORPH BLD: NORMAL
WBC # BLD AUTO: 13.01 THOUSAND/UL (ref 4.31–10.16)

## 2023-07-18 PROCEDURE — 83605 ASSAY OF LACTIC ACID: CPT

## 2023-07-18 PROCEDURE — 85027 COMPLETE CBC AUTOMATED: CPT | Performed by: OBSTETRICS & GYNECOLOGY

## 2023-07-18 PROCEDURE — 85007 BL SMEAR W/DIFF WBC COUNT: CPT | Performed by: OBSTETRICS & GYNECOLOGY

## 2023-07-18 PROCEDURE — 99222 1ST HOSP IP/OBS MODERATE 55: CPT | Performed by: STUDENT IN AN ORGANIZED HEALTH CARE EDUCATION/TRAINING PROGRAM

## 2023-07-18 PROCEDURE — 84145 PROCALCITONIN (PCT): CPT

## 2023-07-18 RX ORDER — SODIUM CHLORIDE, SODIUM GLUCONATE, SODIUM ACETATE, POTASSIUM CHLORIDE, MAGNESIUM CHLORIDE, SODIUM PHOSPHATE, DIBASIC, AND POTASSIUM PHOSPHATE .53; .5; .37; .037; .03; .012; .00082 G/100ML; G/100ML; G/100ML; G/100ML; G/100ML; G/100ML; G/100ML
1000 INJECTION, SOLUTION INTRAVENOUS ONCE
Status: COMPLETED | OUTPATIENT
Start: 2023-07-18 | End: 2023-07-18

## 2023-07-18 RX ORDER — SODIUM CHLORIDE, SODIUM GLUCONATE, SODIUM ACETATE, POTASSIUM CHLORIDE, MAGNESIUM CHLORIDE, SODIUM PHOSPHATE, DIBASIC, AND POTASSIUM PHOSPHATE .53; .5; .37; .037; .03; .012; .00082 G/100ML; G/100ML; G/100ML; G/100ML; G/100ML; G/100ML; G/100ML
125 INJECTION, SOLUTION INTRAVENOUS CONTINUOUS
Status: DISCONTINUED | OUTPATIENT
Start: 2023-07-18 | End: 2023-07-20 | Stop reason: HOSPADM

## 2023-07-18 RX ORDER — ATORVASTATIN CALCIUM 40 MG/1
80 TABLET, FILM COATED ORAL
Status: DISCONTINUED | OUTPATIENT
Start: 2023-07-18 | End: 2023-07-20 | Stop reason: HOSPADM

## 2023-07-18 RX ORDER — DOCUSATE SODIUM 100 MG/1
100 CAPSULE, LIQUID FILLED ORAL 2 TIMES DAILY
Status: DISCONTINUED | OUTPATIENT
Start: 2023-07-18 | End: 2023-07-20 | Stop reason: HOSPADM

## 2023-07-18 RX ORDER — KETOROLAC TROMETHAMINE 30 MG/ML
15 INJECTION, SOLUTION INTRAMUSCULAR; INTRAVENOUS EVERY 6 HOURS SCHEDULED
Status: COMPLETED | OUTPATIENT
Start: 2023-07-18 | End: 2023-07-18

## 2023-07-18 RX ORDER — SERTRALINE HYDROCHLORIDE 100 MG/1
100 TABLET, FILM COATED ORAL DAILY
Status: DISCONTINUED | OUTPATIENT
Start: 2023-07-18 | End: 2023-07-20 | Stop reason: HOSPADM

## 2023-07-18 RX ORDER — METOPROLOL SUCCINATE 50 MG/1
50 TABLET, EXTENDED RELEASE ORAL DAILY
Status: DISCONTINUED | OUTPATIENT
Start: 2023-07-18 | End: 2023-07-20 | Stop reason: HOSPADM

## 2023-07-18 RX ORDER — ONDANSETRON 2 MG/ML
4 INJECTION INTRAMUSCULAR; INTRAVENOUS EVERY 6 HOURS PRN
Status: DISCONTINUED | OUTPATIENT
Start: 2023-07-18 | End: 2023-07-20 | Stop reason: HOSPADM

## 2023-07-18 RX ORDER — CALCIUM CARBONATE 500 MG/1
1000 TABLET, CHEWABLE ORAL DAILY PRN
Status: DISCONTINUED | OUTPATIENT
Start: 2023-07-18 | End: 2023-07-20 | Stop reason: HOSPADM

## 2023-07-18 RX ORDER — ENOXAPARIN SODIUM 100 MG/ML
40 INJECTION SUBCUTANEOUS DAILY
Status: DISCONTINUED | OUTPATIENT
Start: 2023-07-18 | End: 2023-07-20 | Stop reason: HOSPADM

## 2023-07-18 RX ORDER — PANTOPRAZOLE SODIUM 40 MG/1
40 TABLET, DELAYED RELEASE ORAL DAILY
Status: DISCONTINUED | OUTPATIENT
Start: 2023-07-18 | End: 2023-07-20 | Stop reason: HOSPADM

## 2023-07-18 RX ORDER — OXYCODONE HYDROCHLORIDE 5 MG/1
5 TABLET ORAL EVERY 4 HOURS PRN
Status: DISCONTINUED | OUTPATIENT
Start: 2023-07-18 | End: 2023-07-20 | Stop reason: HOSPADM

## 2023-07-18 RX ORDER — METHOCARBAMOL 500 MG/1
500 TABLET, FILM COATED ORAL EVERY 6 HOURS PRN
Status: DISCONTINUED | OUTPATIENT
Start: 2023-07-18 | End: 2023-07-20 | Stop reason: HOSPADM

## 2023-07-18 RX ORDER — OXYCODONE HYDROCHLORIDE 10 MG/1
10 TABLET ORAL EVERY 4 HOURS PRN
Status: DISCONTINUED | OUTPATIENT
Start: 2023-07-18 | End: 2023-07-20 | Stop reason: HOSPADM

## 2023-07-18 RX ORDER — SENNOSIDES 8.6 MG
1 TABLET ORAL DAILY
Status: DISCONTINUED | OUTPATIENT
Start: 2023-07-18 | End: 2023-07-20 | Stop reason: HOSPADM

## 2023-07-18 RX ORDER — CEFOXITIN SODIUM 2 G/50ML
2000 INJECTION, SOLUTION INTRAVENOUS EVERY 6 HOURS
Status: DISCONTINUED | OUTPATIENT
Start: 2023-07-18 | End: 2023-07-20

## 2023-07-18 RX ORDER — AMLODIPINE BESYLATE 2.5 MG/1
2.5 TABLET ORAL DAILY
Status: DISCONTINUED | OUTPATIENT
Start: 2023-07-18 | End: 2023-07-20 | Stop reason: HOSPADM

## 2023-07-18 RX ADMIN — AMLODIPINE BESYLATE 2.5 MG: 2.5 TABLET ORAL at 08:40

## 2023-07-18 RX ADMIN — OXYCODONE HYDROCHLORIDE 5 MG: 5 TABLET ORAL at 14:17

## 2023-07-18 RX ADMIN — OXYCODONE HYDROCHLORIDE 5 MG: 5 TABLET ORAL at 20:07

## 2023-07-18 RX ADMIN — KETOROLAC TROMETHAMINE 15 MG: 30 INJECTION, SOLUTION INTRAMUSCULAR at 08:40

## 2023-07-18 RX ADMIN — SERTRALINE 100 MG: 100 TABLET, FILM COATED ORAL at 08:41

## 2023-07-18 RX ADMIN — SODIUM CHLORIDE, SODIUM GLUCONATE, SODIUM ACETATE, POTASSIUM CHLORIDE, MAGNESIUM CHLORIDE, SODIUM PHOSPHATE, DIBASIC, AND POTASSIUM PHOSPHATE 125 ML/HR: .53; .5; .37; .037; .03; .012; .00082 INJECTION, SOLUTION INTRAVENOUS at 13:16

## 2023-07-18 RX ADMIN — PANTOPRAZOLE SODIUM 40 MG: 40 TABLET, DELAYED RELEASE ORAL at 08:41

## 2023-07-18 RX ADMIN — DOXYCYCLINE 100 MG: 100 INJECTION, POWDER, LYOPHILIZED, FOR SOLUTION INTRAVENOUS at 14:11

## 2023-07-18 RX ADMIN — CEFOXITIN SODIUM 2000 MG: 2 INJECTION, SOLUTION INTRAVENOUS at 08:40

## 2023-07-18 RX ADMIN — KETOROLAC TROMETHAMINE 15 MG: 30 INJECTION, SOLUTION INTRAMUSCULAR at 20:07

## 2023-07-18 RX ADMIN — CEFOXITIN SODIUM 2000 MG: 2 INJECTION, SOLUTION INTRAVENOUS at 13:16

## 2023-07-18 RX ADMIN — METOPROLOL SUCCINATE 50 MG: 50 TABLET, EXTENDED RELEASE ORAL at 08:41

## 2023-07-18 RX ADMIN — ATORVASTATIN CALCIUM 80 MG: 40 TABLET, FILM COATED ORAL at 17:26

## 2023-07-18 RX ADMIN — SODIUM CHLORIDE, SODIUM GLUCONATE, SODIUM ACETATE, POTASSIUM CHLORIDE, MAGNESIUM CHLORIDE, SODIUM PHOSPHATE, DIBASIC, AND POTASSIUM PHOSPHATE 1000 ML: .53; .5; .37; .037; .03; .012; .00082 INJECTION, SOLUTION INTRAVENOUS at 12:05

## 2023-07-18 RX ADMIN — SODIUM CHLORIDE, SODIUM GLUCONATE, SODIUM ACETATE, POTASSIUM CHLORIDE, MAGNESIUM CHLORIDE, SODIUM PHOSPHATE, DIBASIC, AND POTASSIUM PHOSPHATE 125 ML/HR: .53; .5; .37; .037; .03; .012; .00082 INJECTION, SOLUTION INTRAVENOUS at 02:27

## 2023-07-18 RX ADMIN — CEFOXITIN SODIUM 2000 MG: 2 INJECTION, SOLUTION INTRAVENOUS at 20:08

## 2023-07-18 RX ADMIN — KETOROLAC TROMETHAMINE 15 MG: 30 INJECTION, SOLUTION INTRAMUSCULAR at 13:18

## 2023-07-18 RX ADMIN — SODIUM CHLORIDE, SODIUM GLUCONATE, SODIUM ACETATE, POTASSIUM CHLORIDE, MAGNESIUM CHLORIDE, SODIUM PHOSPHATE, DIBASIC, AND POTASSIUM PHOSPHATE 1000 ML: .53; .5; .37; .037; .03; .012; .00082 INJECTION, SOLUTION INTRAVENOUS at 12:42

## 2023-07-18 RX ADMIN — DOXYCYCLINE 100 MG: 100 INJECTION, POWDER, LYOPHILIZED, FOR SOLUTION INTRAVENOUS at 02:27

## 2023-07-18 RX ADMIN — KETOROLAC TROMETHAMINE 15 MG: 30 INJECTION, SOLUTION INTRAMUSCULAR at 02:41

## 2023-07-18 RX ADMIN — CEFOXITIN SODIUM 2000 MG: 2 INJECTION, SOLUTION INTRAVENOUS at 03:35

## 2023-07-18 RX ADMIN — NICOTINE 7 MG: 7 PATCH, EXTENDED RELEASE TRANSDERMAL at 08:40

## 2023-07-18 RX ADMIN — SODIUM CHLORIDE, SODIUM GLUCONATE, SODIUM ACETATE, POTASSIUM CHLORIDE, MAGNESIUM CHLORIDE, SODIUM PHOSPHATE, DIBASIC, AND POTASSIUM PHOSPHATE 1000 ML: .53; .5; .37; .037; .03; .012; .00082 INJECTION, SOLUTION INTRAVENOUS at 11:04

## 2023-07-18 RX ADMIN — ENOXAPARIN SODIUM 40 MG: 40 INJECTION SUBCUTANEOUS at 08:40

## 2023-07-18 NOTE — H&P
H&P Exam - Gynecology  Michael Manzanares 52 y.o. female MRN: [id number]  Unit/Bed#: W -01 Encounter: [id number]        Assessment/Plan  Patient is a 53 y/o with fever, abdominal pain and ultrasound findings concerning for PID requiring inpatient antibiotics     * Pelvic inflammatory disease (PID)  Assessment & Plan  Patient noted to have fever, elevated WBC with US and physical exam findings concerning for PID. She has been afebrile since arrival to [facility]   WBC 11k with bandemia --> continue to trend  Blood and urine cultures pending   Tylenol and toradol for pain management as well as robaxin   IV Cefoxitin and doxycycline for treatment   SCDs for DVT ppx and lovenox     Tobacco abuse counseling  Assessment & Plan  Nicotine patch ordered     Primary hypertension  Assessment & Plan  Home amlodipine and metoprolol ordered          History of Present Illness     HPI:  Michael Manzanares is a 52 y.o. female who presents after being transferred from [facility] ED with abdominal pain and fever. Patient reports that 5 days ago she started to "not feel right". She was out of town with her  and on the way home she says that her pain and "not feeling right" was worsening. She came to the ED when her pain became intolerable. She reports lower abdominal pain left greater than right and vaginal bleeding. She is currently on her period. This is cycle day 5. Her cycles are becoming more irregular and she has gone 3 months this year without a period. She is not having heavy vaginal bleeding at this time. She reports subjective fevers at home but nothing was documented. She denies foul-smelling discharge, nausea, vomiting, chest pain, shortness of breath. She has regular bowel movements. She does have dysuria. She has not been sexually active in 5 years. She denies any history of sexually transmitted infections. Review of Systems   Constitutional: Positive for chills and fatigue.  Negative for diaphoresis and fever.   Respiratory: Negative for shortness of breath. Cardiovascular: Negative for chest pain. Gastrointestinal: Positive for abdominal pain. Negative for nausea and vomiting. Genitourinary: Positive for dysuria and vaginal bleeding. Negative for vaginal discharge. Musculoskeletal: Positive for back pain. Neurological: Negative for headaches. Historical Information   Past Medical History:   Diagnosis Date   • HTN (hypertension)    • Mitral valve regurgitation    • Myocardial infarction [facility])    • Preeclampsia      Past Surgical History:   Procedure Laterality Date   • CARDIAC CATHETERIZATION     •  SECTION     • CORONARY STENT PLACEMENT     • EYE SURGERY     • TUBAL LIGATION       OB History   No obstetric history on file.      Family History   Problem Relation Age of Onset   • Hypertension Father    • Kidney disease Paternal Grandfather      Social History   Social History     Substance and Sexual Activity   Alcohol Use Not Currently    Comment: rarely     Social History     Substance and Sexual Activity   Drug Use No     Social History     Tobacco Use   Smoking Status Some Days   • Types: Cigarettes   Smokeless Tobacco Never       Meds/Allergies   Medications Prior to Admission   Medication   • amLODIPine (NORVASC) 2.5 mg tablet   • aspirin (ECOTRIN LOW STRENGTH) 81 mg EC tablet   • atorvastatin (LIPITOR) 80 mg tablet   • Ferrous Sulfate (Iron) 325 (65 Fe) MG TABS   • hydrocortisone (ANUSOL-HC) 2.5 % rectal cream   • irbesartan (AVAPRO) 300 mg tablet   • isosorbide mononitrate (IMDUR) 30 mg 24 hr tablet   • LORazepam (ATIVAN) 0.5 mg tablet   • metoprolol succinate (TOPROL-XL) 50 mg 24 hr tablet   • sertraline (ZOLOFT) 100 mg tablet   • Multiple Vitamins-Minerals (MULTIVITAMIN GUMMIES ADULT PO)     Allergies   Allergen Reactions   • Iron Polysaccharide      GI upset, constipation, hemorrhoid flare, heartburn       Objective     /85   Pulse 98   Temp 100.1 °F (37.8 °C)   SpO2 97% No intake/output data recorded. No intake/output data recorded. Lab Results   Component Value Date    WBC 11.42 (H) 07/17/2023    HGB 13.1 07/17/2023    HCT 38.7 07/17/2023    MCV 93 07/17/2023     07/17/2023       Lab Results   Component Value Date    GLUCOSE 110 07/02/2021    CALCIUM 9.2 07/17/2023     10/27/2015    K 3.0 (L) 07/17/2023    CO2 25 07/17/2023     07/17/2023    BUN 12 07/17/2023    CREATININE 0.74 07/17/2023       Physical Exam  Constitutional:       Appearance: She is ill-appearing. HENT:      Head: Normocephalic and atraumatic. Cardiovascular:      Rate and Rhythm: Regular rhythm. Tachycardia present. Pulmonary:      Effort: Pulmonary effort is normal.      Breath sounds: No wheezing, rhonchi or rales. Abdominal:      Palpations: Abdomen is soft. Tenderness: There is abdominal tenderness. There is no right CVA tenderness, left CVA tenderness, guarding or rebound. Comments: Lower abdominal pain L>R    Genitourinary:     Comments: Normal external female genitalia   No lesions or skin changes noted on the vulva, perineum or perianal region   On speculum exam, there vaginal mucosa is well estrogenized. There is old blood in the vault. Cervix is normal in appearance. No purulence. On bimanual exam, she is exquisitely tender on bimanual exam with manipulation of the cervix. She has significant uterine tenderness as well. Exam aborted due to patient discomfort. Musculoskeletal:      Cervical back: Normal range of motion. Right lower leg: No edema. Left lower leg: No edema. Neurological:      General: No focal deficit present. Mental Status: She is alert. Mental status is at baseline. Imaging: I have personally reviewed pertinent reports. PELVIC ULTRASOUND, COMPLETE     INDICATION:  The patient is 52years old.   heavy vaginal bleeding w/ LLQ pain.     COMPARISON: No prior pelvic ultrasound exams.     TECHNIQUE:   Transabdominal pelvic ultrasound was performed in sagittal and transverse planes with a curvilinear transducer. Additional transvaginal imaging was performed to better evaluate the endometrium and ovaries. Imaging included volumetric   sweeps as well as traditional still imaging technique.     FINDINGS:     UTERUS:  The uterus is retroverted retroflexed in position, measuring 12.3 x 7.8 x 9.3 cm. The myometrium in the region of the uterine fundus appears markedly heterogeneous which could be due to fibroids and/or endometriotic implants. A 2.7 x 2.2 x 2.4 cm area of   heterogeneity along the posterior surface of the uterus (series 1 image 55) could potentially represent an endometriotic implant. Alternatively, fibroid or focus of adenomyosis is in the differential.  The cervix appears within normal limits.     ENDOMETRIUM:  The endometrial echo complex has an AP caliber of 5.0 mm. Its appearance is within normal limits. OVARIES/ADNEXA:  Right ovary:  2.6 x 1.2 x 1.1 cm. 1.7 mL. Left ovary:  5.0 x 5.8 x 5.7 cm. 87.0 mL. 4.6 x 3.9 x 4.9 cm simple left ovarian cyst.  Ovarian Doppler flow is within normal limits. Both adnexa are remarkable for thick-walled tubular structures bilaterally likely thickened fallopian tubes     OTHER:  No free fluid or loculated fluid collections.        IMPRESSION:     4.6 x 3.9 x 4.9 cm left ovarian simple cyst, likely physiologic.     Thickened tubular structure in both adnexa, most likely representing thickened fallopian tubes which can be seen in the setting of chronic salpingitis.     Heterogeneous appearance of the myometrium in the region of the uterine fundus. Differential diagnosis includes atypical appearance of intramural fibroids, adenomyosis, or endometriotic implants if the patient has a history of deep endometriosis.  Suggest   further characterization of these findings with pelvic MRI.         Code Status: Level 1 - Full Code    Rubi Han MD  7/18/2023  5:37 AM

## 2023-07-18 NOTE — ED NOTES
0030 p/u w/ [facility]    Report to be called [id number] [facility],[address], [place]  07/17/23 [address] [place], [place]  07/17/23 [id number]

## 2023-07-18 NOTE — ASSESSMENT & PLAN NOTE
Afebrile overnight  WBC 11K -> 13K -> 7K -> AM CBC pending   Blood pending   Urine culture negative   Now s/p IV Cefoxitin and doxycycline for 48 hours   Plan to transition to flagyl/doxy PO today for remainder of 14 day course  Scheduled tylenol/motrin for pain, only required PRN chino 1x yesterday  SCDs for DVT ppx and lovenox   Anticipate discharge home today

## 2023-07-18 NOTE — ED PROVIDER NOTES
History  Chief Complaint   Patient presents with   • Groin Pain     Left sided groin pain over the past couple of days, has happened before and coincides with menstrual cycle. HPI  Patient is a 52 y.o. female with history of hypertension, MI, tubal ligation, presenting to the emergency department for lower left abdominal pain. Patient states that she has had abdominal pain for approximately 1 month. It started when she had her menses with extremely heavy flow a month ago and associated 10 out of 10 pain in the left lower quadrant. Patient states that she had menses that started several days ago and the pain started again and now it is intolerable she cannot eat or drink. She states that she had a heavy vaginal bleeding again this time soaking through multiple pads in a day. Patient states that nothing helps with her pain. Patient denies any headache dizziness chest pain shortness of breath. Patient states that she also feels warm and has been told that she is going through the beginning of menopause. Prior to Admission Medications   Prescriptions Last Dose Informant Patient Reported? Taking?    Ferrous Sulfate (Iron) 325 (65 Fe) MG TABS  Self Yes No   Sig: Take by mouth daily   Patient not taking: Reported on 6/30/2023   LORazepam (ATIVAN) 0.5 mg tablet  Self No No   Sig: Take 1 tablet (0.5 mg total) by mouth 2 (two) times a day as needed for anxiety   Multiple Vitamins-Minerals (MULTIVITAMIN GUMMIES ADULT PO)  Self Yes No   Sig: Take by mouth daily   amLODIPine (NORVASC) 2.5 mg tablet   No No   Sig: TAKE 1 TABLET DAILY   aspirin (ECOTRIN LOW STRENGTH) 81 mg EC tablet  Self No No   Sig: Take 1 tablet (81 mg total) by mouth daily   atorvastatin (LIPITOR) 80 mg tablet   No No   Sig: TAKE 1 TABLET DAILY WITH DINNER   hydrocortisone (ANUSOL-HC) 2.5 % rectal cream   No No   Sig: Apply topically 2 (two) times a day   irbesartan (AVAPRO) 300 mg tablet   No No   Sig: TAKE 1 TABLET DAILY   isosorbide mononitrate (IMDUR) 30 mg 24 hr tablet   No No   Sig: Take 1 tablet (30 mg total) by mouth daily   metoprolol succinate (TOPROL-XL) 50 mg 24 hr tablet   No No   Sig: TAKE 1 TABLET DAILY   sertraline (ZOLOFT) 100 mg tablet   No No   Sig: TAKE 1 TABLET TWICE A DAY   Patient taking differently: Take 100 mg by mouth daily One tablet daily      Facility-Administered Medications: None       Past Medical History:   Diagnosis Date   • HTN (hypertension)    • Mitral valve regurgitation    • Myocardial infarction ([address])    • Preeclampsia        Past Surgical History:   Procedure Laterality Date   • CARDIAC CATHETERIZATION     •  SECTION     • CORONARY STENT PLACEMENT     • EYE SURGERY     • TUBAL LIGATION         Family History   Problem Relation Age of Onset   • Hypertension Father    • Kidney disease Paternal Grandfather      I have reviewed and agree with the history as documented. E-Cigarette/Vaping   • E-Cigarette Use Former User      E-Cigarette/Vaping Substances   • Nicotine No    • THC No    • CBD No    • Flavoring No    • Other No    • Unknown No      Social History     Tobacco Use   • Smoking status: Some Days     Types: Cigarettes   • Smokeless tobacco: Never   Vaping Use   • Vaping Use: Former   Substance Use Topics   • Alcohol use: Not Currently     Comment: rarely   • Drug use: No        Review of Systems   Constitutional: Negative for chills and fever. HENT: Negative for ear pain and sore throat. Eyes: Negative for pain and visual disturbance. Respiratory: Negative for cough and shortness of breath. Cardiovascular: Negative for chest pain and palpitations. Gastrointestinal: Positive for abdominal pain. Negative for vomiting. Genitourinary: Positive for menstrual problem, pelvic pain and vaginal bleeding. Negative for dysuria and hematuria. Musculoskeletal: Negative for arthralgias and back pain. Skin: Negative for color change and rash.    Neurological: Negative for seizures and syncope. All other systems reviewed and are negative. Physical Exam  ED Triage Vitals   Temperature Pulse Respirations Blood Pressure SpO2   07/17/23 1416 07/17/23 1416 07/17/23 1416 07/17/23 1417 07/17/23 1417   98.7 °F (37.1 °C) (!) 109 20 (!) 167/108 100 %      Temp Source Heart Rate Source Patient Position - Orthostatic VS BP Location FiO2 (%)   07/17/23 1416 -- -- -- --   Oral          Pain Score       07/17/23 1638       10 - Worst Possible Pain             Orthostatic Vital Signs  Vitals:    07/17/23 1416 07/17/23 1417   BP:  (!) 167/108   Pulse: (!) 109        Physical Exam  Vitals and nursing note reviewed. Constitutional:       General: She is not in acute distress. Appearance: She is well-developed. Comments: Visibly in pain   HENT:      Head: Normocephalic and atraumatic. Eyes:      Conjunctiva/sclera: Conjunctivae normal.   Cardiovascular:      Rate and Rhythm: Regular rhythm. Tachycardia present. Pulses: Normal pulses. Heart sounds: No murmur heard. Pulmonary:      Effort: Pulmonary effort is normal. No respiratory distress. Breath sounds: Normal breath sounds. Abdominal:      Palpations: Abdomen is soft. There is no mass. Tenderness: There is abdominal tenderness. There is guarding and rebound. Hernia: No hernia is present. Musculoskeletal:      Cervical back: Neck supple. Skin:     General: Skin is warm and dry. Capillary Refill: Capillary refill takes less than 2 seconds. Neurological:      General: No focal deficit present. Mental Status: She is alert and oriented to person, place, and time.          ED Medications  Medications   ketorolac (TORADOL) injection 15 mg (15 mg Intramuscular Given 7/17/23 1638)   morphine injection 2 mg (2 mg Intravenous Given 7/17/23 2011)   ceftriaxone (ROCEPHIN) 1 g/50 mL in dextrose IVPB (0 mg Intravenous Stopped 7/17/23 2320)       Diagnostic Studies  Results Reviewed     Procedure Component Value Units Date/Time    Blood culture #1 [[id number]] Collected: 07/17/23 2208    Lab Status: In process Specimen: Blood from Arm, Left Updated: 07/17/23 2246    Blood culture #2 [[id number]] Collected: 07/17/23 2208    Lab Status: In process Specimen: Blood from Arm, Right Updated: 07/17/23 2246    RBC Morphology Reflex Test [[id number]] Collected: 07/17/23 1758    Lab Status: Final result Specimen: Blood from Arm, Right Updated: 07/17/23 1901    CBC and differential [[id number]]  (Abnormal) Collected: 07/17/23 1758    Lab Status: Final result Specimen: Blood from Arm, Right Updated: 07/17/23 1839     WBC 11.42 Thousand/uL      RBC 4.17 Million/uL      Hemoglobin 13.1 g/dL      Hematocrit 38.7 %      MCV 93 fL      MCH 31.4 pg      MCHC 33.9 g/dL      RDW 12.7 %      MPV 10.6 fL      Platelets 181 Thousands/uL     Narrative: This is an appended report. These results have been appended to a previously verified report.     Manual Differential(PHLEBS Do Not Order) [[id number]]  (Abnormal) Collected: 07/17/23 1758    Lab Status: Final result Specimen: Blood from Arm, Right Updated: 07/17/23 1839     Segmented % 77 %      Bands % 15 %      Lymphocytes % 2 %      Monocytes % 4 %      Eosinophils, % 0 %      Basophils % 1 %      Atypical Lymphocytes % 1 %      Absolute Neutrophils 10.51 Thousand/uL      Lymphocytes Absolute 0.34 Thousand/uL      Monocytes Absolute 0.46 Thousand/uL      Eosinophils Absolute 0.00 Thousand/uL      Basophils Absolute 0.11 Thousand/uL      Total Counted --     RBC Morphology Normal     Platelet Estimate Adequate    Basic metabolic panel [[id number]]  (Abnormal) Collected: 07/17/23 1758    Lab Status: Final result Specimen: Blood from Arm, Right Updated: 07/17/23 1836     Sodium 138 mmol/L      Potassium 3.0 mmol/L      Chloride 108 mmol/L      CO2 25 mmol/L      ANION GAP 5 mmol/L      BUN 12 mg/dL      Creatinine 0.74 mg/dL      Glucose 105 mg/dL      Calcium 9.2 mg/dL      eGFR 96 ml/min/1.73sq m Narrative:      National Kidney Disease Foundation guidelines for Chronic Kidney Disease (CKD):   •  Stage 1 with normal or high GFR (GFR > 90 mL/min/1.73 square meters)  •  Stage 2 Mild CKD (GFR = 60-89 mL/min/1.73 square meters)  •  Stage 3A Moderate CKD (GFR = 45-59 mL/min/1.73 square meters)  •  Stage 3B Moderate CKD (GFR = 30-44 mL/min/1.73 square meters)  •  Stage 4 Severe CKD (GFR = 15-29 mL/min/1.73 square meters)  •  Stage 5 End Stage CKD (GFR <15 mL/min/1.73 square meters)  Note: GFR calculation is accurate only with a steady state creatinine    Urine Microscopic [[id number]]  (Abnormal) Collected: 07/17/23 1649    Lab Status: Final result Specimen: Urine, Clean Catch Updated: 07/17/23 1736     RBC, UA 4-10 /hpf      WBC, UA 10-20 /hpf      Epithelial Cells Occasional /hpf      Bacteria, UA Innumerable /hpf      MUCUS THREADS Innumerable    Urine culture [[id number]] Collected: 07/17/23 1649    Lab Status: In process Specimen: Urine, Clean Catch Updated: 07/17/23 1736    UA w Reflex to Microscopic w Reflex to Culture [[id number]]  (Abnormal) Collected: 07/17/23 1649    Lab Status: Final result Specimen: Urine, Clean Catch Updated: 07/17/23 1718     Color, UA Yellow     Clarity, UA Turbid     Specific Gravity, UA 1.029     pH, UA 6.0     Leukocytes, UA Small     Nitrite, UA Negative     Protein,  (2+) mg/dl      Glucose, UA Negative mg/dl      Ketones, UA 10 (1+) mg/dl      Urobilinogen, UA 2.0 mg/dl      Bilirubin, UA Negative     Occult Blood, UA Large    POCT pregnancy, urine [[id number]]  (Normal) Resulted: 07/17/23 1656    Lab Status: Final result Updated: 07/17/23 1656     EXT Preg Test, Ur Negative     Control Valid                 CT renal stone study abdomen pelvis wo contrast   Final Result by Christel Reeves MD (07/17 2306)      Negative for nephroureterolithiasis. Stranding of the perivesicular fat compatible with known urinary tract infection.       Left ovarian cyst, and large lobulated uterus. Evaluation is not optimal due to lack of IV contrast but was better characterized on earlier pelvic ultrasound exam.      Workstation performed: [id number]         US pelvis complete w transvaginal   Final Result by Evan Nichole MD (07/17 1850)      4.6 x 3.9 x 4.9 cm left ovarian simple cyst, likely physiologic. Thickened tubular structure in both adnexa, most likely representing thickened fallopian tubes which can be seen in the setting of chronic salpingitis. Heterogeneous appearance of the myometrium in the region of the uterine fundus. Differential diagnosis includes atypical appearance of intramural fibroids, adenomyosis, or endometriotic implants if the patient has a history of deep endometriosis. Suggest    further characterization of these findings with pelvic MRI. Imaging follow-up reminder notification scheduled in the electronic medical record. Workstation performed: [id number]               Procedures  Procedures      ED Course                                       MDM   Patient is a 52 y.o. female with PMH of leiomyoma, hypertension, tubal ligation who presents to the ED with abdominal pain. Vital signs tachycardic but otherwise unremarkable. On exam tenderness to the left lower abdomen with referred tenderness when palpating the rest of the abdomen. Lung and heart sounds normal.    History and physical exam most consistent with fibroids, leiomyoma, adenomyosis, ovarian cyst. However, differential diagnosis included but not limited to UTI. Plan ultrasound of the abdomen, labs UA. View ED course above for further discussion on patient workup. On review of previous records patient has leiomyomas. All labs reviewed and utilized in the medical decision making process  All radiology studies independently viewed by me and interpreted by the radiologist.  I reviewed all testing with the patient.      Upon re-evaluation is uncomfortable. And is now febrile. Was admitted to [address] [facility] here however given her primary concern and fever will transfer to [facility] under [facility]N. Spoke to receiving team and they are agreeable and think it is the best course of action. .        Disposition  Final diagnoses:   Leiomyoma   Abdominal pain     Time reflects when diagnosis was documented in both MDM as applicable and the Disposition within this note     Time User Action Codes Description Comment    7/18/2023 12:41 AM Gissel Sermon Add [D21.9] Leiomyoma     7/18/2023 12:41 AM Gissel Sermon Add [R10.9] Abdominal pain       ED Disposition     ED Disposition   Transfer to Another Facility-In Network    Condition   --    Date/Time   Tue Jul 18, 2023 12:41 AM    Comment   Dawood Castro should be transferred out to [facility]           MD Documentation    [facility] Most Recent Value   Patient Condition The patient has been stabilized such that within reasonable medical probability, no material deterioration of the patient condition or the condition of the unborn child(anthony) is likely to result from the transfer   Reason for Transfer Level of Care needed not available at this facility   Benefits of Transfer Specialized equipment and/or services available at the receiving facility (Include comment)________________________   Accepting Physician [id number] [facility] [place], [facility]   Giovanny SELLERS [address] Name, [address] [place] [address] life support      Follow-up Information    None         Patient's Medications   Discharge Prescriptions    No medications on file     No discharge procedures on file. PDMP Review       Value Time User    PDMP Reviewed  Yes 1/7/2020  2:53 PM Nancy Fields MD           ED Provider  Attending physically available and evaluated Dawood Castro.  I managed the patient along with the ED Attending.     Electronically Signed by         Chico Barker MD  07/19/23 8018

## 2023-07-18 NOTE — SEPSIS NOTE
Sepsis Note   Fabby Clark 52 y.o. female MRN: [id number]  Unit/Bed#: W -01 Encounter: [id number]     Now meeting criteria for sepsis in the setting of tachycardia and elevated WBC. BP remains wnl   Will given fluid bolus per sepsis protocol   Continue cefoxitin and doxycycline   F/u lactate and procal      Initial Sepsis Screening     Row Name 07/18/23 1051                Is the patient's history suggestive of a new or worsening infection? Yes (Proceed)  -KG        Suspected source of infection --  pelvis  -KG              User Key  (r) = Recorded By, (t) = Taken By, (c) = Cosigned By    [facility] Name Provider Fior Land MD Resident                    There is no height or weight on file to calculate BMI.   Wt Readings from Last 1 Encounters:   07/17/23 88.5 kg (195 lb)        Ideal body weight: 52.4 kg (115 lb 8.3 oz)  Adjusted ideal body weight: 66.8 kg (147 lb 5 oz)

## 2023-07-18 NOTE — EMTALA/ACUTE CARE TRANSFER
[facility] [address]  Dept: [phone]      [id number] TRANSFER CONSENT    NAME Veena Barrett                                         1975                              MRN [id number]    I have been informed of my rights regarding examination, treatment, and transfer   by Dr. Ariana Weinstein MD    Benefits:      Risks:        Consent for Transfer:  I acknowledge that my medical condition has been evaluated and explained to me by the emergency department physician or other qualified medical person and/or my attending physician, who has recommended that I be transferred to the service of    at  . The above potential benefits of such transfer, the potential risks associated with such transfer, and the probable risks of not being transferred have been explained to me, and I fully understand them. The doctor has explained that, in my case, the benefits of transfer outweigh the risks. I agree to be transferred. I authorize the performance of emergency medical procedures and treatments upon me in both transit and upon arrival at the receiving facility. Additionally, I authorize the release of any and all medical records to the receiving facility and request they be transported with me, if possible. I understand that the safest mode of transportation during a medical emergency is an ambulance and that the Hospital advocates the use of this mode of transport. Risks of traveling to the receiving facility by car, including absence of medical control, life sustaining equipment, such as oxygen, and medical personnel has been explained to me and I fully understand them. (LESLIE CORRECT BOX BELOW)  [  ]  I consent to the stated transfer and to be transported by ambulance/helicopter. [  ]  I consent to the stated transfer, but refuse transportation by ambulance and accept full responsibility for my transportation by car.   I understand the risks of non-ambulance transfers and I exonerate the Hospital and its staff from any deterioration in my condition that results from this refusal.    X___________________________________________    DATE  23  TIME________  Signature of patient or legally responsible individual signing on patient behalf           RELATIONSHIP TO PATIENT_________________________          Provider Certification    NAME Yelena Martini                                         1975                              MRN [id number]    A medical screening exam was performed on the above named patient. Based on the examination:    Condition Necessitating Transfer The primary encounter diagnosis was Leiomyoma. A diagnosis of Abdominal pain was also pertinent to this visit. Patient Condition:      Reason for Transfer:      Transfer Requirements: Facility     · Space available and qualified personnel available for treatment as acknowledged by    · Agreed to accept transfer and to provide appropriate medical treatment as acknowledged by          · Appropriate medical records of the examination and treatment of the patient are provided at the time of transfer   [address] _______  · Transfer will be performed by qualified personnel from    and appropriate transfer equipment as required, including the use of necessary and appropriate life support measures.     Provider Certification: I have examined the patient and explained the following risks and benefits of being transferred/refusing transfer to the patient/family:         Based on these reasonable risks and benefits to the patient and/or the unborn child(anthony), and based upon the information available at the time of the patient’s examination, I certify that the medical benefits reasonably to be expected from the provision of appropriate medical treatments at another medical facility outweigh the increasing risks, if any, to the individual’s medical condition, and in the case of labor to the unborn child, from effecting the transfer.     X____________________________________________ DATE 07/18/23        TIME_______      ORIGINAL - SEND TO MEDICAL RECORDS   COPY - SEND WITH PATIENT DURING TRANSFER

## 2023-07-18 NOTE — PLAN OF CARE
Problem: PAIN - ADULT  Goal: Verbalizes/displays adequate comfort level or baseline comfort level  Description: Interventions:  - Encourage patient to monitor pain and request assistance  - Assess pain using appropriate pain scale  - Administer analgesics based on type and severity of pain and evaluate response  - Implement non-pharmacological measures as appropriate and evaluate response  - Consider cultural and social influences on pain and pain management  - Notify physician/advanced practitioner if interventions unsuccessful or patient reports new pain  Outcome: Progressing     Problem: INFECTION - ADULT  Goal: Absence or prevention of progression during hospitalization  Description: INTERVENTIONS:  - Assess and monitor for signs and symptoms of infection  - Monitor lab/diagnostic results  - Monitor all insertion sites, i.e. indwelling lines, tubes, and drains  - Monitor endotracheal if appropriate and nasal secretions for changes in amount and color  - [place] appropriate cooling/warming therapies per order  - Administer medications as ordered  - Instruct and encourage patient and family to use good hand hygiene technique  - Identify and instruct in appropriate isolation precautions for identified infection/condition  Outcome: Progressing     Problem: SAFETY ADULT  Goal: Patient will remain free of falls  Description: INTERVENTIONS:  - Educate patient/family on patient safety including physical limitations  - Instruct patient to call for assistance with activity   - Consult OT/PT to assist with strengthening/mobility   - Keep Call bell within reach  - Keep bed low and locked with side rails adjusted as appropriate  - Keep care items and personal belongings within reach  - Initiate and maintain comfort rounds  - Make Fall Risk Sign visible to staff  - Consider moving patient to room near nurses station  Outcome: Progressing     Problem: DISCHARGE PLANNING  Goal: Discharge to home or other facility with appropriate resources  Description: INTERVENTIONS:  - Identify barriers to discharge w/patient and caregiver  - Arrange for needed discharge resources and transportation as appropriate  - Identify discharge learning needs (meds, wound care, etc.)  - Arrange for interpretive services to assist at discharge as needed  - Refer to Case Management Department for coordinating discharge planning if the patient needs post-hospital services based on physician/advanced practitioner order or complex needs related to functional status, cognitive ability, or social support system  Outcome: Progressing     Problem: Knowledge Deficit  Goal: Patient/family/caregiver demonstrates understanding of disease process, treatment plan, medications, and discharge instructions  Description: Complete learning assessment and assess knowledge base.   Interventions:  - Provide teaching at level of understanding  - Provide teaching via preferred learning methods  Outcome: Progressing     Problem: GENITOURINARY - ADULT  Goal: Maintains or returns to baseline urinary function  Description: INTERVENTIONS:  - Assess urinary function  - Encourage oral fluids to ensure adequate hydration if ordered  - Administer IV fluids as ordered to ensure adequate hydration  - Administer ordered medications as needed  - Offer frequent toileting  - Follow urinary retention protocol if ordered  Outcome: Progressing  Goal: Absence of urinary retention  Description: INTERVENTIONS:  - Assess patient’s ability to void and empty bladder  - Monitor I/O  - Bladder scan as needed  - Discuss with physician/AP medications to alleviate retention as needed  - Discuss catheterization for long term situations as appropriate  Outcome: Progressing

## 2023-07-19 LAB
BASOPHILS # BLD AUTO: 0.04 THOUSANDS/ÂΜL (ref 0–0.1)
BASOPHILS NFR BLD AUTO: 1 % (ref 0–1)
EOSINOPHIL # BLD AUTO: 0.01 THOUSAND/ÂΜL (ref 0–0.61)
EOSINOPHIL NFR BLD AUTO: 0 % (ref 0–6)
ERYTHROCYTE [DISTWIDTH] IN BLOOD BY AUTOMATED COUNT: 13.2 % (ref 11.6–15.1)
HCT VFR BLD AUTO: 35 % (ref 34.8–46.1)
HGB BLD-MCNC: 11.7 G/DL (ref 11.5–15.4)
IMM GRANULOCYTES # BLD AUTO: 0.16 THOUSAND/UL (ref 0–0.2)
IMM GRANULOCYTES NFR BLD AUTO: 2 % (ref 0–2)
LYMPHOCYTES # BLD AUTO: 0.79 THOUSANDS/ÂΜL (ref 0.6–4.47)
LYMPHOCYTES NFR BLD AUTO: 10 % (ref 14–44)
MCH RBC QN AUTO: 31.6 PG (ref 26.8–34.3)
MCHC RBC AUTO-ENTMCNC: 33.4 G/DL (ref 31.4–37.4)
MCV RBC AUTO: 95 FL (ref 82–98)
MONOCYTES # BLD AUTO: 0.44 THOUSAND/ÂΜL (ref 0.17–1.22)
MONOCYTES NFR BLD AUTO: 6 % (ref 4–12)
NEUTROPHILS # BLD AUTO: 6.49 THOUSANDS/ÂΜL (ref 1.85–7.62)
NEUTS SEG NFR BLD AUTO: 81 % (ref 43–75)
NRBC BLD AUTO-RTO: 0 /100 WBCS
PLATELET # BLD AUTO: 148 THOUSANDS/UL (ref 149–390)
PMV BLD AUTO: 10.2 FL (ref 8.9–12.7)
PROCALCITONIN SERPL-MCNC: 0.21 NG/ML
RBC # BLD AUTO: 3.7 MILLION/UL (ref 3.81–5.12)
WBC # BLD AUTO: 7.93 THOUSAND/UL (ref 4.31–10.16)

## 2023-07-19 PROCEDURE — 84145 PROCALCITONIN (PCT): CPT

## 2023-07-19 PROCEDURE — 85025 COMPLETE CBC W/AUTO DIFF WBC: CPT

## 2023-07-19 PROCEDURE — 99232 SBSQ HOSP IP/OBS MODERATE 35: CPT | Performed by: OBSTETRICS & GYNECOLOGY

## 2023-07-19 RX ORDER — IBUPROFEN 600 MG/1
600 TABLET ORAL EVERY 6 HOURS
Status: DISCONTINUED | OUTPATIENT
Start: 2023-07-19 | End: 2023-07-20 | Stop reason: HOSPADM

## 2023-07-19 RX ORDER — ACETAMINOPHEN 325 MG/1
975 TABLET ORAL EVERY 6 HOURS
Status: DISCONTINUED | OUTPATIENT
Start: 2023-07-19 | End: 2023-07-20 | Stop reason: HOSPADM

## 2023-07-19 RX ORDER — DOXYCYCLINE HYCLATE 100 MG/1
100 CAPSULE ORAL EVERY 12 HOURS
Status: DISCONTINUED | OUTPATIENT
Start: 2023-07-19 | End: 2023-07-19

## 2023-07-19 RX ADMIN — ACETAMINOPHEN 975 MG: 325 TABLET, FILM COATED ORAL at 23:03

## 2023-07-19 RX ADMIN — SERTRALINE 100 MG: 100 TABLET, FILM COATED ORAL at 08:29

## 2023-07-19 RX ADMIN — CEFOXITIN SODIUM 2000 MG: 2 INJECTION, SOLUTION INTRAVENOUS at 08:31

## 2023-07-19 RX ADMIN — IBUPROFEN 600 MG: 600 TABLET, FILM COATED ORAL at 20:37

## 2023-07-19 RX ADMIN — CEFOXITIN SODIUM 2000 MG: 2 INJECTION, SOLUTION INTRAVENOUS at 02:40

## 2023-07-19 RX ADMIN — ACETAMINOPHEN 975 MG: 325 TABLET, FILM COATED ORAL at 17:20

## 2023-07-19 RX ADMIN — OXYCODONE HYDROCHLORIDE 10 MG: 10 TABLET ORAL at 02:39

## 2023-07-19 RX ADMIN — PANTOPRAZOLE SODIUM 40 MG: 40 TABLET, DELAYED RELEASE ORAL at 08:29

## 2023-07-19 RX ADMIN — DOXYCYCLINE 100 MG: 100 INJECTION, POWDER, LYOPHILIZED, FOR SOLUTION INTRAVENOUS at 11:54

## 2023-07-19 RX ADMIN — CEFOXITIN SODIUM 2000 MG: 2 INJECTION, SOLUTION INTRAVENOUS at 20:37

## 2023-07-19 RX ADMIN — ACETAMINOPHEN 975 MG: 325 TABLET, FILM COATED ORAL at 12:00

## 2023-07-19 RX ADMIN — DOXYCYCLINE 100 MG: 100 INJECTION, POWDER, LYOPHILIZED, FOR SOLUTION INTRAVENOUS at 23:03

## 2023-07-19 RX ADMIN — METOPROLOL SUCCINATE 50 MG: 50 TABLET, EXTENDED RELEASE ORAL at 08:29

## 2023-07-19 RX ADMIN — AMLODIPINE BESYLATE 2.5 MG: 2.5 TABLET ORAL at 08:29

## 2023-07-19 RX ADMIN — IBUPROFEN 600 MG: 600 TABLET, FILM COATED ORAL at 09:53

## 2023-07-19 RX ADMIN — DOXYCYCLINE 100 MG: 100 INJECTION, POWDER, LYOPHILIZED, FOR SOLUTION INTRAVENOUS at 01:38

## 2023-07-19 RX ADMIN — NICOTINE 7 MG: 7 PATCH, EXTENDED RELEASE TRANSDERMAL at 08:29

## 2023-07-19 RX ADMIN — IBUPROFEN 600 MG: 600 TABLET, FILM COATED ORAL at 14:22

## 2023-07-19 RX ADMIN — ATORVASTATIN CALCIUM 80 MG: 40 TABLET, FILM COATED ORAL at 17:20

## 2023-07-19 RX ADMIN — SODIUM CHLORIDE, SODIUM GLUCONATE, SODIUM ACETATE, POTASSIUM CHLORIDE, MAGNESIUM CHLORIDE, SODIUM PHOSPHATE, DIBASIC, AND POTASSIUM PHOSPHATE 125 ML/HR: .53; .5; .37; .037; .03; .012; .00082 INJECTION, SOLUTION INTRAVENOUS at 06:44

## 2023-07-19 RX ADMIN — ENOXAPARIN SODIUM 40 MG: 40 INJECTION SUBCUTANEOUS at 08:29

## 2023-07-19 RX ADMIN — CEFOXITIN SODIUM 2000 MG: 2 INJECTION, SOLUTION INTRAVENOUS at 14:22

## 2023-07-19 NOTE — QUICK NOTE
0831: RN starts IV antibiotic.     8935: RN responding to IV pump. Alarming "priming stopped". RN noticed air filling IV tubing past IV pump continuing to J-loop. RN inquires how long pump has been going off. Pt reports OB doc was in to round and did something with the pump. Pt denies SOB, chest pain at this time. [facility] notified. 1000: RN attempts to change J-loop to remove the air. RN unable to flush IV at this time. RN removes IV and obtains new IV access. Restarts continuous fluids.

## 2023-07-19 NOTE — PLAN OF CARE
Problem: PAIN - ADULT  Goal: Verbalizes/displays adequate comfort level or baseline comfort level  Description: Interventions:  - Encourage patient to monitor pain and request assistance  - Assess pain using appropriate pain scale  - Administer analgesics based on type and severity of pain and evaluate response  - Implement non-pharmacological measures as appropriate and evaluate response  - Consider cultural and social influences on pain and pain management  - Notify physician/advanced practitioner if interventions unsuccessful or patient reports new pain  Outcome: Progressing     Problem: INFECTION - ADULT  Goal: Absence or prevention of progression during hospitalization  Description: INTERVENTIONS:  - Assess and monitor for signs and symptoms of infection  - Monitor lab/diagnostic results  - Monitor all insertion sites, i.e. indwelling lines, tubes, and drains  - Monitor endotracheal if appropriate and nasal secretions for changes in amount and color  - [place] appropriate cooling/warming therapies per order  - Administer medications as ordered  - Instruct and encourage patient and family to use good hand hygiene technique  - Identify and instruct in appropriate isolation precautions for identified infection/condition  Outcome: Progressing     Problem: SAFETY ADULT  Goal: Patient will remain free of falls  Description: INTERVENTIONS:  - Educate patient/family on patient safety including physical limitations  - Instruct patient to call for assistance with activity   - Consult OT/PT to assist with strengthening/mobility   - Keep Call bell within reach  - Keep bed low and locked with side rails adjusted as appropriate  - Keep care items and personal belongings within reach  - Initiate and maintain comfort rounds  - Make Fall Risk Sign visible to staff  - Consider moving patient to room near nurses station  Outcome: Progressing     Problem: DISCHARGE PLANNING  Goal: Discharge to home or other facility with appropriate resources  Description: INTERVENTIONS:  - Identify barriers to discharge w/patient and caregiver  - Arrange for needed discharge resources and transportation as appropriate  - Identify discharge learning needs (meds, wound care, etc.)  - Arrange for interpretive services to assist at discharge as needed  - Refer to Case Management Department for coordinating discharge planning if the patient needs post-hospital services based on physician/advanced practitioner order or complex needs related to functional status, cognitive ability, or social support system  Outcome: Progressing     Problem: Knowledge Deficit  Goal: Patient/family/caregiver demonstrates understanding of disease process, treatment plan, medications, and discharge instructions  Description: Complete learning assessment and assess knowledge base.   Interventions:  - Provide teaching at level of understanding  - Provide teaching via preferred learning methods  Outcome: Progressing     Problem: GENITOURINARY - ADULT  Goal: Maintains or returns to baseline urinary function  Description: INTERVENTIONS:  - Assess urinary function  - Encourage oral fluids to ensure adequate hydration if ordered  - Administer IV fluids as ordered to ensure adequate hydration  - Administer ordered medications as needed  - Offer frequent toileting  - Follow urinary retention protocol if ordered  Outcome: Progressing  Goal: Absence of urinary retention  Description: INTERVENTIONS:  - Assess patient’s ability to void and empty bladder  - Monitor I/O  - Bladder scan as needed  - Discuss with physician/AP medications to alleviate retention as needed  - Discuss catheterization for long term situations as appropriate  Outcome: Progressing

## 2023-07-19 NOTE — PROGRESS NOTES
[facility] IV-to-PO Auto-Conversion Protocol for Adults     The doxycycline has / have been converted to Oral per [facility] IV-to-PO Auto-Conversion Protocol for Adults as approved by the Pharmacy and Therapeutics Committee. The patient met all eligible criteria:  3 Age = 25years old   2) Received at least one dose of the IV form   3) Receiving at least one other scheduled oral/enteral medication   4) Tolerating an oral/enteral diet   and did not have any exclusions:   1) Critical care patient   2) Active GI bleed (IF assessing H2RAs or PPIs)   3) Continuous tube feeding (IF assessing cipro, doxycycline, levofloxacin, minocycline, rifampin, or voriconazole)   4) Receiving PO vancomycin (IF assessing metronidazole)   5) Persistent nausea and/or vomiting   6) Ileus or gastrointestinal obstruction   7) Jabari/nasogastric tube set for continuous suction   8) Specific order not to automatically convert to PO (in the order's comments or if discussed in the most recent Infectious Disease or primary team's progress notes).      Mary Bailey, PharmD  Pharmacist

## 2023-07-19 NOTE — PROGRESS NOTES
Gyn Progress Note   Anika Hurt 52 y.o. female MRN: [id number]  Unit/Bed#: W -01 Encounter: [id number]      Patient is a 53 y/o with fever, abdominal pain and ultrasound findings concerning for PID requiring inpatient antibiotics   Tobacco abuse counseling  Assessment & Plan  Nicotine patch ordered     Primary hypertension  Assessment & Plan  Home amlodipine and metoprolol ordered     * Pelvic inflammatory disease (PID)  Assessment & Plan  Afebrile overnight  WBC 11K -> 13K -> f/u AM CBC   Blood and urine cultures pending   Tylenol and toradol for pain management as well as robaxin   IV Cefoxitin and doxycycline for treatment   SCDs for DVT ppx and lovenox       Subjective  Patient has no complaints. No new events over night. Her pain is improved but still present, 6/10 this AM. She states her vaginal bleeding is decreasing. She denies any nausea/vomiting but dose endorse sweats that she believes to be a fever. She states that she feels "bloated". She reports that she did have a bowel movement yesterday and endorses "normal" bowel function for her. /95   Pulse 90   Temp 98.3 °F (36.8 °C)   Resp 18   Ht 5' 3" (1.6 m)   SpO2 91%   BMI 34.54 kg/m²     Lab Results   Component Value Date    WBC 13.01 (H) 07/18/2023    HGB 12.2 07/18/2023    HCT 36.3 07/18/2023    MCV 94 07/18/2023     07/18/2023       Lab Results   Component Value Date    GLUCOSE 110 07/02/2021    CALCIUM 9.2 07/17/2023     10/27/2015    K 3.0 (L) 07/17/2023    CO2 25 07/17/2023     07/17/2023    BUN 12 07/17/2023    CREATININE 0.74 07/17/2023       Lab Results   Component Value Date/Time    POCGLU 129 11/24/2019 06:14 AM       I/O last 3 completed shifts: In: 2963.5 [P.O.:420; I.V.:2443.5; IV Piggyback:100]  Out: -   I/O this shift:  In: 2180.7 [P.O.:240;  I.V.:1676; IV Piggyback:264.7]  Out: -       Physical Exam  General: NAD in bed   Cardio: Regular rate   Lungs: No apparent respiratory distress  Abdomen: Soft, mild tenderness to palpation in the bilateral lower quadrants   Extremities: Non-tender, no erythema, no palpable cords, negative Homans, SCDs in place    Lisset Miller MD  Obstetrics & Gynecology PGY-3  7/19/2023  5:46 AM

## 2023-07-19 NOTE — UTILIZATION REVIEW
Initial Clinical Review    Admission: Date/Time/Statement:   Admission Orders (From admission, onward)     Ordered        07/18/23 0135  Inpatient Admission  Once                      Orders Placed This Encounter   Procedures   • Inpatient Admission     Standing Status:   Standing     Number of Occurrences:   1     Order Specific Question:   Level of Care     Answer:   Med Surg [16]     Order Specific Question:   Estimated length of stay     Answer:   More than 2 Midnights     Order Specific Question:   Certification     Answer:   I certify that inpatient services are medically necessary for this patient for a duration of greater than two midnights. See H&P and MD Progress Notes for additional information about the patient's course of treatment. ED Arrival Information     Patient not seen in ED                     No chief complaint on file. Initial Presentation: 52 y.o. female with hx HTN, smoker  who presents as transfer 7/18/23 from [facility] to [facility] for higher level of care . Pt initially presented [facility] ED 7/17with subjective fever and abdominal pain that had become unbearable . Pt reports lower abdominal pain left greater than right and vaginal bleeding. She is currently on her period- cycle day 5. Her cycles are becoming more irregular and she has gone 3 months this year without a period. Reports dysuria. Not sexually active x 5 yrs . On exam at [facility] temp 100.1 F, tachycardic in 90's. speculum exam,- there vaginal mucosa is well estrogenized. There is old blood in the vault. Cervix is normal in appearance. No purulence. On bimanual exam, she is exquisitely tender on bimanual exam with manipulation of the cervix. She has significant uterine tenderness as well. Exam aborted due to patient discomfort. Labs WBC 11.42-->13.01, bands 15--->21, K 3,  procal 0.26. UA with innumerable bacteria . US concerning for PID. Pt admitted as Inpatient with PID, sepsis. . Plan - IV Cefoxitin and IV Doxycycline , pain control-scheduled IV Toradol, prn analgesics. DVT ppx, trend WBC's . F/U blood and urine cx. IVF bolus x 3 for sepsis       Date:7/19   Day 2:   Pt continues with abdominal pain, 6/10, improved from admission . Pt reports sweats and feeling bloated . Pt tender on palpation bilat lower abdomen Pt has received IV Toradol x24 hrs, d/c'ed today, added Ibuprofen and Tylenol. Continue dual IV abx -do not convert to po abx per OB/GYN. If pain improves and WBC stable can consider change to po abx tomorrow . WBC down to 7 today from 13 yesterday. Vaginal bleeding decreasing. IVF continues.  CBC in am.       Initial  Vitals   Temperature Pulse Respirations Blood Pressure SpO2   07/18/23 0142 07/18/23 0142 07/18/23 0819 07/18/23 0140 07/18/23 0142   100.1 °F (37.8 °C) 98 16 137/85 97 %      Temp src Heart Rate Source Patient Position - Orthostatic VS BP Location FiO2 (%)   -- -- -- -- --             Pain Score       07/18/23 0130       5          Wt Readings from Last 1 Encounters:   07/17/23 88.5 kg (195 lb)     Additional Vital Signs:   Date/Time Temp Pulse Resp BP MAP (mmHg) SpO2   07/19/23 08:26:37 98.3 °F (36.8 °C) 79 16 141/86 104 96 %   07/19/23 03:40:44 98.3 °F (36.8 °C) 90 -- 149/95 113 91 %   07/18/23 2300 -- 81 -- -- -- 93 %   07/18/23 2200 -- 82 -- -- -- --   07/18/23 21:20:53 97.8 °F (36.6 °C) 94 18 153/97 116 95 %   07/18/23 2100 -- 93 -- -- -- --   07/18/23 2022 -- -- -- -- -- 96 %   07/18/23 19:49:53 99 °F (37.2 °C) 92 18 153/99 117 94 %   07/18/23 17:10:39 99.4 °F (37.4 °C) 98 18 142/94 110 96 %   07/18/23 1500 -- 100 -- 125/76 92 96 %   07/18/23 1440 -- 86 -- 126/77 93 96 %   07/18/23 1420 -- 92 -- 127/77 94 95 %   07/18/23 1410 -- 93 -- 128/76 93 95 %   07/18/23 1400 -- 91 -- 130/76 94 96 %   07/18/23 13:52:14 98.2 °F (36.8 °C) 95 16 130/76 94 96 %   07/18/23 11:14:34 98.3 °F (36.8 °C) 82 16 118/74 89 98 %   07/18/23 0819 99.1 °F (37.3 °C) 98 16 133/85 101 98 %     Pertinent Labs/Diagnostic Test Results:   7/17- [facility] pelvic- transvag  4.6 x 3.9 x 4.9 cm left ovarian simple cyst, likely physiologic.   Thickened tubular structure in both adnexa, most likely representing thickened fallopian tubes which can be seen in the setting of chronic salpingitis.   Heterogeneous appearance of the myometrium in the region of the uterine fundus. Differential diagnosis includes atypical appearance of intramural fibroids, adenomyosis, or endometriotic implants if the patient has a history of deep endometriosis. Suggest further characterization of these findings with pelvic MRI.   CT A/P , renal stone study   Negative for nephroureterolithiasis.   Stranding of the perivesicular fat compatible with known urinary tract infection.   Left ovarian cyst, and large lobulated uterus.  Evaluation is not optimal due to lack of IV contrast but was better characterized on earlier pelvic ultrasound exam.             Results from last 7 days   Lab Units 07/19/23  0557 07/18/23  0600 07/17/23  1758   WBC Thousand/uL 7.93 13.01* 11.42*   HEMOGLOBIN g/dL 11.7 12.2 13.1   HEMATOCRIT % 35.0 36.3 38.7   PLATELETS Thousands/uL 148* 157 168   NEUTROS ABS Thousands/µL 6.49  --   --    BANDS PCT %  --  21* 15*         Results from last 7 days   Lab Units 07/17/23  1758   SODIUM mmol/L 138   POTASSIUM mmol/L 3.0*   CHLORIDE mmol/L 108   CO2 mmol/L 25   ANION GAP mmol/L 5   BUN mg/dL 12   CREATININE mg/dL 0.74   EGFR ml/min/1.73sq m 96   CALCIUM mg/dL 9.2             Results from last 7 days   Lab Units 07/17/23  1758   GLUCOSE RANDOM mg/dL 105               Results from last 7 days   Lab Units 07/19/23  0557 07/18/23  1210   PROCALCITONIN ng/ml 0.21 0.26*     Results from last 7 days   Lab Units 07/18/23  1210   LACTIC ACID mmol/L 1.6                   Results from last 7 days   Lab Units 07/17/23  1649   CLARITY UA  Turbid   COLOR UA  Yellow   SPEC GRAV UA  1.029   PH UA  6.0   GLUCOSE UA mg/dl Negative   KETONES UA mg/dl 10 (1+)*   BLOOD UA  Large*   PROTEIN UA mg/dl 100 (2+)*   NITRITE UA  Negative   BILIRUBIN UA  Negative   UROBILINOGEN UA (BE) mg/dl 2.0*   LEUKOCYTES UA  Small*   WBC UA /hpf 10-20*   RBC UA /hpf 4-10*   BACTERIA UA /hpf Innumerable*   EPITHELIAL CELLS WET PREP /hpf Occasional   MUCUS THREADS  Innumerable*                           Results from last 7 days   Lab Units 07/17/23  2208   BLOOD CULTURE  No Growth at 24 hrs. No Growth at 24 hrs. Past Medical History:   Diagnosis Date   • HTN (hypertension)    • Mitral valve regurgitation    • Myocardial infarction (HCC)    • Preeclampsia      Present on Admission:  • Primary hypertension      Admitting Diagnosis: LLQ pain [R10.32]  Age/Sex: 52 y.o. female  Admission Orders:  Scheduled Medications:  acetaminophen, 975 mg, Oral, Q6H  amLODIPine, 2.5 mg, Oral, Daily  atorvastatin, 80 mg, Oral, Daily With Dinner  cefOXitin, 2,000 mg, Intravenous, Q6H  docusate sodium, 100 mg, Oral, BID  doxycycline, 100 mg, Intravenous, Q12H  enoxaparin, 40 mg, Subcutaneous, Daily  ibuprofen, 600 mg, Oral, Q6H  metoprolol succinate, 50 mg, Oral, Daily  nicotine, 7 mg, Transdermal, Daily  pantoprazole, 40 mg, Oral, Daily  senna, 1 tablet, Oral, Daily  sertraline, 100 mg, Oral, Daily    ketorolac (TORADOL) injection 15 mg  Dose: 15 mg  Freq: Every 6 hours scheduled Route: IV  Start: 07/18/23 0200 End: 07/18/23 2007    multi-electrolyte (PLASMALYTE-A/ISOLYTE-S PH 7.4) IV solution 1,000 mL  Dose: 1,000 mL  Freq: Once Route: IV  Last Dose: Stopped (07/18/23 1205)  Start: 07/18/23 1100 End: 07/18/23 1205   Admin Instructions:   Bag #1 of 3      Followed by  multi-electrolyte (PLASMALYTE-A/ISOLYTE-S PH 7.4) IV solution 1,000 mL  Dose: 1,000 mL  Freq:  Once Route: IV  Last Dose: Stopped (07/18/23 1242)  Start: 07/18/23 1120 End: 07/18/23 1242   Admin Instructions:   Bag #2 of 3      Followed by  multi-electrolyte (PLASMALYTE-A/ISOLYTE-S PH 7.4) IV solution 1,000 mL  Dose: 1,000 mL  Freq: Once Route: IV  Last Dose: Stopped (07/18/23 1312)  Start: 07/18/23 1150 End: 07/18/23 1312        Continuous IV Infusions:  multi-electrolyte, 125 mL/hr, Intravenous, Continuous      PRN Meds:  calcium carbonate, 1,000 mg, Oral, Daily PRN  methocarbamol, 500 mg, Oral, Q6H PRN  ondansetron, 4 mg, Intravenous, Q6H PRN  oxyCODONE, 10 mg, Oral, Q4H PRN x1 7/19  oxyCODONE, 5 mg, Oral, Q4H PRN x2 7/18          Network Utilization Review Department  ATTENTION: Please call with any questions or concerns to [phone] and carefully listen to the prompts so that you are directed to the right person. All voicemails are confidential.  Remy Edmondson all requests for admission clinical reviews, approved or denied determinations and any other requests to dedicated fax number below belonging to the campus where the patient is receiving treatment.  List of dedicated fax numbers for the Facilities:  [place] DENIALS (Administrative/Medical Necessity) [phone]   [address] [facility] (Maternity/NICU/Pediatrics) [phone]   [address] [place] [address] Drive [phone]   [facility] [phone]   [facility] [id number] [place] [address] [phone]   [address] [facility] [address] [facility] [address] [place] [address] [phone]

## 2023-07-20 ENCOUNTER — NURSE TRIAGE (OUTPATIENT)
Dept: OTHER | Facility: OTHER | Age: 48
End: 2023-07-20

## 2023-07-20 ENCOUNTER — TRANSITIONAL CARE MANAGEMENT (OUTPATIENT)
Dept: FAMILY MEDICINE CLINIC | Facility: CLINIC | Age: 48
End: 2023-07-20

## 2023-07-20 VITALS
WEIGHT: 198.41 LBS | RESPIRATION RATE: 17 BRPM | HEIGHT: 63 IN | BODY MASS INDEX: 35.16 KG/M2 | DIASTOLIC BLOOD PRESSURE: 102 MMHG | OXYGEN SATURATION: 96 % | SYSTOLIC BLOOD PRESSURE: 172 MMHG | HEART RATE: 64 BPM | TEMPERATURE: 97.9 F

## 2023-07-20 DIAGNOSIS — N73.9 PELVIC INFLAMMATORY DISEASE (PID): ICD-10-CM

## 2023-07-20 LAB
BACTERIA UR CULT: ABNORMAL
BACTERIA UR CULT: ABNORMAL
BASOPHILS # BLD MANUAL: 0 THOUSAND/UL (ref 0–0.1)
BASOPHILS NFR MAR MANUAL: 0 % (ref 0–1)
EOSINOPHIL # BLD MANUAL: 0 THOUSAND/UL (ref 0–0.4)
EOSINOPHIL NFR BLD MANUAL: 0 % (ref 0–6)
ERYTHROCYTE [DISTWIDTH] IN BLOOD BY AUTOMATED COUNT: 13.3 % (ref 11.6–15.1)
HCT VFR BLD AUTO: 31.3 % (ref 34.8–46.1)
HGB BLD-MCNC: 10.4 G/DL (ref 11.5–15.4)
LYMPHOCYTES # BLD AUTO: 0.77 THOUSAND/UL (ref 0.6–4.47)
LYMPHOCYTES # BLD AUTO: 10 % (ref 14–44)
MCH RBC QN AUTO: 31 PG (ref 26.8–34.3)
MCHC RBC AUTO-ENTMCNC: 33.2 G/DL (ref 31.4–37.4)
MCV RBC AUTO: 93 FL (ref 82–98)
METAMYELOCYTES NFR BLD MANUAL: 3 % (ref 0–1)
MONOCYTES # BLD AUTO: 0.28 THOUSAND/UL (ref 0–1.22)
MONOCYTES NFR BLD: 4 % (ref 4–12)
MYELOCYTES NFR BLD MANUAL: 3 % (ref 0–1)
NEUTROPHILS # BLD MANUAL: 5.55 THOUSAND/UL (ref 1.85–7.62)
NEUTS BAND NFR BLD MANUAL: 1 % (ref 0–8)
NEUTS SEG NFR BLD AUTO: 78 % (ref 43–75)
NRBC BLD AUTO-RTO: 1 /100 WBC (ref 0–2)
PLATELET # BLD AUTO: 168 THOUSANDS/UL (ref 149–390)
PLATELET BLD QL SMEAR: ADEQUATE
PMV BLD AUTO: 10.5 FL (ref 8.9–12.7)
POIKILOCYTOSIS BLD QL SMEAR: PRESENT
RBC # BLD AUTO: 3.35 MILLION/UL (ref 3.81–5.12)
VARIANT LYMPHS # BLD AUTO: 1 %
WBC # BLD AUTO: 7.03 THOUSAND/UL (ref 4.31–10.16)

## 2023-07-20 PROCEDURE — NC001 PR NO CHARGE: Performed by: STUDENT IN AN ORGANIZED HEALTH CARE EDUCATION/TRAINING PROGRAM

## 2023-07-20 PROCEDURE — 99238 HOSP IP/OBS DSCHRG MGMT 30/<: CPT | Performed by: STUDENT IN AN ORGANIZED HEALTH CARE EDUCATION/TRAINING PROGRAM

## 2023-07-20 PROCEDURE — 85027 COMPLETE CBC AUTOMATED: CPT | Performed by: OBSTETRICS & GYNECOLOGY

## 2023-07-20 PROCEDURE — 85007 BL SMEAR W/DIFF WBC COUNT: CPT | Performed by: OBSTETRICS & GYNECOLOGY

## 2023-07-20 RX ORDER — ACETAMINOPHEN 325 MG/1
975 TABLET ORAL EVERY 6 HOURS
Start: 2023-07-20 | End: 2023-07-21 | Stop reason: SDUPTHER

## 2023-07-20 RX ORDER — DOXYCYCLINE HYCLATE 100 MG/1
100 CAPSULE ORAL EVERY 12 HOURS SCHEDULED
Status: DISCONTINUED | OUTPATIENT
Start: 2023-07-20 | End: 2023-07-20 | Stop reason: HOSPADM

## 2023-07-20 RX ORDER — METRONIDAZOLE 500 MG/1
500 TABLET ORAL EVERY 12 HOURS SCHEDULED
Qty: 23 TABLET | Refills: 0 | OUTPATIENT
Start: 2023-07-20 | End: 2023-08-01

## 2023-07-20 RX ORDER — DOXYCYCLINE HYCLATE 100 MG/1
100 CAPSULE ORAL EVERY 12 HOURS SCHEDULED
Qty: 23 CAPSULE | Refills: 0 | OUTPATIENT
Start: 2023-07-20 | End: 2023-08-01

## 2023-07-20 RX ORDER — LABETALOL HYDROCHLORIDE 5 MG/ML
10 INJECTION, SOLUTION INTRAVENOUS EVERY 6 HOURS PRN
Status: DISCONTINUED | OUTPATIENT
Start: 2023-07-20 | End: 2023-07-20 | Stop reason: HOSPADM

## 2023-07-20 RX ORDER — METRONIDAZOLE 500 MG/1
500 TABLET ORAL EVERY 12 HOURS SCHEDULED
Qty: 23 TABLET | Refills: 0 | Status: SHIPPED | OUTPATIENT
Start: 2023-07-20 | End: 2023-07-21 | Stop reason: SDUPTHER

## 2023-07-20 RX ORDER — IBUPROFEN 600 MG/1
600 TABLET ORAL EVERY 6 HOURS
Qty: 30 TABLET | Refills: 0
Start: 2023-07-20 | End: 2023-07-21 | Stop reason: SDUPTHER

## 2023-07-20 RX ORDER — METRONIDAZOLE 500 MG/1
500 TABLET ORAL EVERY 12 HOURS SCHEDULED
Status: DISCONTINUED | OUTPATIENT
Start: 2023-07-20 | End: 2023-07-20 | Stop reason: HOSPADM

## 2023-07-20 RX ORDER — DOXYCYCLINE HYCLATE 100 MG/1
100 CAPSULE ORAL EVERY 12 HOURS SCHEDULED
Qty: 23 CAPSULE | Refills: 0 | Status: SHIPPED | OUTPATIENT
Start: 2023-07-20 | End: 2023-07-21 | Stop reason: SDUPTHER

## 2023-07-20 RX ADMIN — PANTOPRAZOLE SODIUM 40 MG: 40 TABLET, DELAYED RELEASE ORAL at 08:14

## 2023-07-20 RX ADMIN — METOPROLOL SUCCINATE 50 MG: 50 TABLET, EXTENDED RELEASE ORAL at 08:14

## 2023-07-20 RX ADMIN — CEFOXITIN SODIUM 2000 MG: 2 INJECTION, SOLUTION INTRAVENOUS at 03:02

## 2023-07-20 RX ADMIN — METRONIDAZOLE 500 MG: 500 TABLET ORAL at 08:14

## 2023-07-20 RX ADMIN — ACETAMINOPHEN 975 MG: 325 TABLET, FILM COATED ORAL at 12:22

## 2023-07-20 RX ADMIN — NICOTINE 7 MG: 7 PATCH, EXTENDED RELEASE TRANSDERMAL at 08:21

## 2023-07-20 RX ADMIN — ACETAMINOPHEN 975 MG: 325 TABLET, FILM COATED ORAL at 05:23

## 2023-07-20 RX ADMIN — IBUPROFEN 600 MG: 600 TABLET, FILM COATED ORAL at 08:14

## 2023-07-20 RX ADMIN — IBUPROFEN 600 MG: 600 TABLET, FILM COATED ORAL at 03:02

## 2023-07-20 RX ADMIN — ENOXAPARIN SODIUM 40 MG: 40 INJECTION SUBCUTANEOUS at 08:13

## 2023-07-20 RX ADMIN — DOXYCYCLINE 100 MG: 100 CAPSULE ORAL at 08:14

## 2023-07-20 RX ADMIN — AMLODIPINE BESYLATE 2.5 MG: 2.5 TABLET ORAL at 08:15

## 2023-07-20 RX ADMIN — SERTRALINE 100 MG: 100 TABLET, FILM COATED ORAL at 08:14

## 2023-07-20 NOTE — PROGRESS NOTES
Gyn Progress Note   Grant Taylor 52 y.o. female MRN: [id number]  Unit/Bed#: W -01 Encounter: [id number]      A/P:Patient is a 53 y/o with fever, abdominal pain and ultrasound findings concerning for PID requiring inpatient antibiotics   Tobacco abuse counseling  Assessment & Plan  Nicotine patch ordered     Primary hypertension  Assessment & Plan  Home amlodipine and metoprolol ordered     * Pelvic inflammatory disease (PID)  Assessment & Plan  Afebrile overnight  WBC 11K -> 13K -> 7K -> AM CBC pending   Blood pending   Urine culture negative   Now s/p IV Cefoxitin and doxycycline for 48 hours   Plan to transition to flagyl/doxy PO today for remainder of 14 day course  Scheduled tylenol/motrin for pain, only required PRN chino 1x yesterday  SCDs for DVT ppx and lovenox   Anticipate discharge home today         Subjective:   Patient has no complaints. No new events over night. Her pain is improved today. She endorses trouble sleeping secondary to the amount of fluid she has been receiving and the need to use the bathroom. She denies any fevers or chills. /94   Pulse 71   Temp 97.7 °F (36.5 °C)   Resp 18   Ht 5' 3" (1.6 m)   Wt 90 kg (198 lb 6.6 oz)   SpO2 96%   BMI 35.15 kg/m²     Lab Results   Component Value Date    WBC 7.03 07/20/2023    HGB 10.4 (L) 07/20/2023    HCT 31.3 (L) 07/20/2023    MCV 93 07/20/2023     07/20/2023       Lab Results   Component Value Date    GLUCOSE 110 07/02/2021    CALCIUM 9.2 07/17/2023     10/27/2015    K 3.0 (L) 07/17/2023    CO2 25 07/17/2023     07/17/2023    BUN 12 07/17/2023    CREATININE 0.74 07/17/2023       Lab Results   Component Value Date/Time    POCGLU 129 11/24/2019 06:14 AM       I/O last 3 completed shifts: In: 7100.2 [P.O.:1200; I.V.:5435.5; IV Piggyback:464.7]  Out: -   No intake/output data recorded. Physical Exam  Constitutional:       General: She is not in acute distress. Appearance: Normal appearance.    HENT: Head: Normocephalic and atraumatic. Cardiovascular:      Rate and Rhythm: Normal rate. Pulmonary:      Effort: Pulmonary effort is normal.   Abdominal:      Palpations: Abdomen is soft. Tenderness: There is no abdominal tenderness. Neurological:      General: No focal deficit present. Mental Status: She is alert. Skin:     General: Skin is warm and dry.    Psychiatric:         Mood and Affect: Mood normal.           Lisa Guerrero MD  Obstetrics & Gynecology PGY-3  7/20/2023  6:54 AM

## 2023-07-20 NOTE — TELEPHONE ENCOUNTER
Regarding: medication issue  ----- Message from Alok Poster sent at 7/20/2023  7:14 PM EDT -----  "[facility] in [place] and I need my medication"

## 2023-07-20 NOTE — TELEPHONE ENCOUNTER
These antibiotics were sent mailorder and patient is requesting they be sent to [facility] in [facility] because she is not to stop them and needs them today.  Just discharged from hospital, please advise

## 2023-07-20 NOTE — DISCHARGE SUMMARY
Discharge Summary - Gynecology  Rommel Queen 52 y.o. female MRN: [id number]  Unit/Bed#: W -01 Encounter: [id number]    Admission Date: 7/18/2023   Discharge Date: 07/20/23    Attending Physician:   Allison Deleon MD     Admitting Diagnosis:   Abdominal pain   Primary Hypertension   Pelvic inflammatory disease     Discharge Diagnosis:   Same     HPI:  Eugene Edgar is a 53 yo F who presented to the emergency room with abdominal pain and fevers after not feelling right" for approximately 5 days. Her abdominal pain was worse on the left greater than right and she was on her period at the time of arrival.  He was admitted for pain management and IV antibiotics. A CT AP and TVUS were completed on arrival which showed:  "4.6 x 3.9 x 4.9 cm left ovarian simple cyst, likely physiologic. Thickened tubular structure in both adnexa, most likely representing thickened fallopian tubes which can be seen in the setting of chronic salpingitis. Heterogeneous appearance of the myometrium in the region of the uterine fundus. Differential diagnosis includes atypical appearance of intramural fibroids, adenomyosis, or endometriotic implants if the patient has a history of deep endometriosis"      While inpatient her urine culture resulted as negative and her blood cultures were still pending at the time of discharge. White blood cell count was noted to improve from 13k  to 7k. She received 48 hours of IV antibiotics and was transitioned to PO antibiotics on hospital day 3.   Her pain was well controlled with p.o. analgesics and she was discharged home with return precautions    Lab Results:   Lab Results   Component Value Date    WBC 7.03 07/20/2023    HGB 10.4 (L) 07/20/2023    HCT 31.3 (L) 07/20/2023    MCV 93 07/20/2023     07/20/2023     Lab Results   Component Value Date    GLUCOSE 110 07/02/2021    CALCIUM 9.2 07/17/2023     10/27/2015    K 3.0 (L) 07/17/2023    CO2 25 07/17/2023     07/17/2023    BUN 12 07/17/2023    CREATININE 0.74 07/17/2023     Lab Results   Component Value Date/Time    POCGLU 129 11/24/2019 06:14 AM     Lab Results   Component Value Date    PTT 27 12/28/2019     Lab Results   Component Value Date    INR 0.88 07/02/2021    INR 0.94 12/28/2019    INR 0.99 12/12/2019    PROTIME 11.9 07/02/2021    PROTIME 12.2 12/28/2019    PROTIME 12.7 12/12/2019     Condition at Discharge:   good     Discharge Medications:   See after visit summary for reconciled discharge medications provided to patient and family. Discharge instructions: See after visit summary for complete information. Do not place anything (no partner, tampons or douche) in your vagina for 6 weeks. You may walk for exercise for the first 6 weeks then gradually return to your usual activities.    You may take stairs one at a time, touching each step with both feet for the first few days, then as tolerated. Please do not drive until tolerating pain and off of narcotics.    Please look at your incision in the mirror daily and call for redness or tenderness or increased warmth.  Call us for increasing redness or steady drainage from the incision.    Please call us for temperature > 100.4*F or 38* C, worsening pain or a foul discharge. No heavy lifting more than one full gallon of milk (about 8 lbs). No tub baths or swimming. Provisions for Follow-Up Care:   See after visit summary for information related to follow-up care and any pertinent home health orders.       Disposition: Home  Planned Readmission: No    Code Status: Full Code     Eliana Perez MD  7/20/2023  6:54 AM

## 2023-07-20 NOTE — PLAN OF CARE
Problem: SAFETY ADULT  Goal: Patient will remain free of falls  Description: INTERVENTIONS:  - Educate patient/family on patient safety including physical limitations  - Instruct patient to call for assistance with activity   - Consult OT/PT to assist with strengthening/mobility   - Keep Call bell within reach  - Keep bed low and locked with side rails adjusted as appropriate  - Keep care items and personal belongings within reach  - Initiate and maintain comfort rounds  - Make Fall Risk Sign visible to staff  - Consider moving patient to room near nurses station  Outcome: Progressing     Problem: PAIN - ADULT  Goal: Verbalizes/displays adequate comfort level or baseline comfort level  Description: Interventions:  - Encourage patient to monitor pain and request assistance  - Assess pain using appropriate pain scale  - Administer analgesics based on type and severity of pain and evaluate response  - Implement non-pharmacological measures as appropriate and evaluate response  - Consider cultural and social influences on pain and pain management  - Notify physician/advanced practitioner if interventions unsuccessful or patient reports new pain  Outcome: Progressing     Problem: INFECTION - ADULT  Goal: Absence or prevention of progression during hospitalization  Description: INTERVENTIONS:  - Assess and monitor for signs and symptoms of infection  - Monitor lab/diagnostic results  - Monitor all insertion sites, i.e. indwelling lines, tubes, and drains  - Monitor endotracheal if appropriate and nasal secretions for changes in amount and color  - Arnold appropriate cooling/warming therapies per order  - Administer medications as ordered  - Instruct and encourage patient and family to use good hand hygiene technique  - Identify and instruct in appropriate isolation precautions for identified infection/condition  Outcome: Progressing

## 2023-07-20 NOTE — NURSING NOTE
Pt manual BP at this time is 172/102, MD notified and ordered PRN BP med. Pt is refusing bp med at this time. MD made aware.

## 2023-07-21 RX ORDER — METRONIDAZOLE 500 MG/1
500 TABLET ORAL EVERY 12 HOURS SCHEDULED
Qty: 23 TABLET | Refills: 0 | Status: SHIPPED | OUTPATIENT
Start: 2023-07-21 | End: 2023-08-02

## 2023-07-21 RX ORDER — IBUPROFEN 600 MG/1
600 TABLET ORAL EVERY 6 HOURS
Qty: 30 TABLET | Refills: 0 | Status: SHIPPED | OUTPATIENT
Start: 2023-07-21

## 2023-07-21 RX ORDER — DOXYCYCLINE HYCLATE 100 MG/1
100 CAPSULE ORAL EVERY 12 HOURS SCHEDULED
Qty: 23 CAPSULE | Refills: 0 | Status: SHIPPED | OUTPATIENT
Start: 2023-07-21 | End: 2023-08-02

## 2023-07-21 RX ORDER — ACETAMINOPHEN 325 MG/1
975 TABLET ORAL EVERY 6 HOURS
Qty: 168 TABLET | Refills: 0 | Status: SHIPPED | OUTPATIENT
Start: 2023-07-21 | End: 2023-08-04

## 2023-07-21 NOTE — TELEPHONE ENCOUNTER
Hi Dr. Rosalba Gray the refills, called and spoke with patient and patient states the health call nurse will be taking care of the refolls. Medications were refilled today.  Thanks in advance

## 2023-07-21 NOTE — UTILIZATION REVIEW
NOTIFICATION OF ADMISSION DISCHARGE   This is a Notification of Discharge from [address] [place] [address]. Please be advised that this patient has been discharge from our facility. Below you will find the admission and discharge date and time including the patient’s disposition. UTILIZATION REVIEW CONTACT:  Francoise Pisano MA  Utilization   Network Utilization Review Department  Phone: [phone] x carefully listen to the prompts. All voicemails are confidential.  Email: [email or web address]     ADMISSION INFORMATION  PRESENTATION DATE: 7/18/2023  1:23 AM  OBERVATION ADMISSION DATE:   INPATIENT ADMISSION DATE: 7/18/23  1:23 AM   DISCHARGE DATE: 7/20/2023  3:00 PM   DISPOSITION:Home/Self Care    IMPORTANT INFORMATION:  Send all requests for admission clinical reviews, approved or denied determinations and any other requests to dedicated fax number below belonging to the campus where the patient is receiving treatment.  List of dedicated fax numbers:  [place] [facility] (Administrative/Medical Necessity) [phone] [address] [facility] (Maternity/NICU/Pediatrics) [phone]   [facility] [phone]   [facility] [phone] [address] [facility] [address] [phone]   [address] [facility] [phone]   [facility] [phone]   [address] [place] [address] [address] [facility] [phone] [address] [facility] [phone] [address] [facility] [phone] [address] [facility] [address] [facility] [phone]

## 2023-07-21 NOTE — TELEPHONE ENCOUNTER
Reason for Disposition  • [1] Caller has NON-URGENT medicine question about med that PCP prescribed AND [2] triager unable to answer question    Answer Assessment - Initial Assessment Questions  1. NAME of MEDICATION: "What medicine are you calling about?"      All discharge medications from hospital stay, - tylenol, motrin, flagyl, doxycycline. 2. QUESTION: "What is your question?" (e.g., medication refill, side effect)      Patient would like medications sent to local pharmacy     3. PRESCRIBING HCP: "Who prescribed it?" Reason: if prescribed by specialist, call should be referred to that group. Dr Zoe Park, discharging doctor     4. SYMPTOMS: "Do you have any symptoms?"      Mild to moderate pelvic pain     5.  SEVERITY: If symptoms are present, ask "Are they mild, moderate or severe?"      Mild to moderate    Protocols used: MEDICATION QUESTION CALL-ADULT-

## 2023-07-23 LAB
BACTERIA BLD CULT: NORMAL
BACTERIA BLD CULT: NORMAL

## 2023-07-25 ENCOUNTER — OFFICE VISIT (OUTPATIENT)
Dept: FAMILY MEDICINE CLINIC | Facility: CLINIC | Age: 48
End: 2023-07-25
Payer: COMMERCIAL

## 2023-07-25 ENCOUNTER — HOSPITAL ENCOUNTER (OUTPATIENT)
Dept: RADIOLOGY | Facility: HOSPITAL | Age: 48
Discharge: HOME | End: 2023-07-25
Payer: COMMERCIAL

## 2023-07-25 VITALS
SYSTOLIC BLOOD PRESSURE: 126 MMHG | RESPIRATION RATE: 16 BRPM | HEART RATE: 73 BPM | HEIGHT: 63 IN | OXYGEN SATURATION: 98 % | BODY MASS INDEX: 33.31 KG/M2 | TEMPERATURE: 98.2 F | WEIGHT: 188 LBS | DIASTOLIC BLOOD PRESSURE: 80 MMHG

## 2023-07-25 DIAGNOSIS — D25.9 UTERINE LEIOMYOMA, UNSPECIFIED LOCATION: ICD-10-CM

## 2023-07-25 DIAGNOSIS — R10.32 LLQ PAIN: Primary | ICD-10-CM

## 2023-07-25 DIAGNOSIS — N73.9 PELVIC INFLAMMATORY DISEASE (PID): ICD-10-CM

## 2023-07-25 DIAGNOSIS — I10 PRIMARY HYPERTENSION: ICD-10-CM

## 2023-07-25 DIAGNOSIS — R10.32 LLQ PAIN: ICD-10-CM

## 2023-07-25 DIAGNOSIS — R65.10 SIRS (SYSTEMIC INFLAMMATORY RESPONSE SYNDROME) (HCC): ICD-10-CM

## 2023-07-25 PROCEDURE — 99496 TRANSJ CARE MGMT HIGH F2F 7D: CPT | Performed by: FAMILY MEDICINE

## 2023-07-25 PROCEDURE — 74177 CT ABD & PELVIS W/CONTRAST: CPT

## 2023-07-25 PROCEDURE — G1004 CDSM NDSC: HCPCS

## 2023-07-25 RX ADMIN — IOHEXOL 90 ML: 350 INJECTION, SOLUTION INTRAVENOUS at 13:39

## 2023-07-25 NOTE — PROGRESS NOTES
Assessment & Plan     1. LLQ pain  Assessment & Plan:  Persistent left lower quadrant pain. CT revealed 4.4 cm adnexal cyst as well as fibroid. Patient is being treated for pelvic inflammatory disease. I wonder if in the long run she could be a good candidate for LURDES/BSO. She will discuss at forthcoming appointment with GYN. Exam today reveals left lower quadrant tenderness with some rebound. Proceed with stat CT abdomen and pelvis with contrast to rule out diverticulitis    Orders:  -     CT abdomen pelvis w contrast; Future; Expected date: 07/25/2023    2. Pelvic inflammatory disease (PID)  Assessment & Plan:  Treated with IV cefoxitin and doxycycline while inpatient. She rwas discharged on regimen of Flagyl and doxycycline as outpatient. Patient reports severe nausea and vomiting due to Rx, she discontinued antibiotics a few days ago. She reports very mild pelvic pressure, left lower quadrant more than right lower quadrant. She is afebrile. I will defer a decision regarding antibiotic therapy to GYN. Pending GYN appointment tomorrow. 3. SIRS (systemic inflammatory response syndrome) (HCC)  Assessment & Plan:  Negative blood culture. Urine culture reveals more than 100 K of strep pyogenous A  Currently afebrile, no chills      4. Uterine leiomyoma, unspecified location    5. Primary hypertension  Assessment & Plan:  Blood pressure is elevated upon arrival, normal on my recheck. Continue amlodipine, metoprolol and irbesartan         Subjective     Transitional Care Management Review:   [place] is a 52 y.o. female here for TCM follow up.      During the TCM phone call patient stated:  TCM Call     Date and time call was made  7/20/2023  5:17 PM    Hospital care reviewed  Records reviewed    Patient was hospitialized at  [address] [place] [address]    Date of Admission  07/18/23    Date of discharge  07/20/23    Diagnosis  Pelvic Inflammatory Disease    Disposition  Home    Were the patients medications reviewed and updated  No    Current Symptoms  None  pain in pelvic area      TCM Call     Post hospital issues  Poor ADL (Activities of Daily Living) performance; Reduced activity    Should patient be enrolled in anticoag monitoring? No    Scheduled for follow up? Yes    Patients specialists  Other (comment)    Other specialists names  OB-GYN    Did you obtain your prescribed medications  Yes    Do you need help managing your prescriptions or medications  No    Is transportation to your appointment needed  No    I have advised the patient to call PCP with any new or worsening symptoms  [facility] members    Support System  Family    The type of support provided  Physical; Emotional    Do you have social support  Yes, as much as I need    Are you recieving any outpatient services  No    Are you recieving home care services  No    Are you using any community resources  No    Current waiver services  No    Have you fallen in the last 12 months  No    Interperter language line needed  No    Counseling  Patient    Counseling topics  Importance of RX compliance    Comments  Patient schedule TCM on 07/28/2023 w/ Dr. Tsering Bartholomew at 10:00 am        Patient presents for follow-up after recent hospitalization. She presented to emergency room at [facility] with severe left lower quadrant pain on July 17. She was previously evaluated in our office for similar symptoms back on June 30. At that time her left lower quadrant pain has subsided but patient was scheduled for further evaluation with CT abdomen and pelvis. Patient stated that she felt essentially fine from our last office visit till July 17 when her pain has recurred. She was hospitalized for treatment of pelvic inflammatory disease. Patient was seen by gynecology and has pending follow-up with her regular GYN, Dr. Tara Marin tomorrow.     CT AP and TVUS were completed on arrival which showed:  "4.6 x 3.9 x 4.9 cm left ovarian simple cyst, likely physiologic. Thickened tubular structure in both adnexa, most likely representing thickened fallopian tubes which can be seen in the setting of chronic salpingitis. Heterogeneous appearance of the myometrium in the region of the uterine fundus. Differential diagnosis includes atypical appearance of intramural fibroids, adenomyosis, or endometriotic implants if the patient has a history of deep endometriosis"    Patient was discharged home on outpatient regimen of doxycycline and Flagyl. Patient reports that she developed severe nausea and vomiting due to antibiotics and stopped taking them a few days ago. She is currently afebrile. She reports some pelvic pressure left lower quadrant more than right lower quadrant but no severe abdominal pain, no fever, chills, flank pain or dysuria. Blood cultures performed during recent hospitalization were negative. Urine culture was positive for strep group A, more than 100,000, sensitive to penicillin. Patient denies symptoms of UTI. Review of Systems   Constitutional: Positive for fatigue. Negative for fever. HENT: Negative. Respiratory: Negative. Cardiovascular: Negative. Gastrointestinal: Positive for abdominal pain and nausea. Nausea due to antibiotics, improved as patient discontinued Rx   Endocrine: Negative. Genitourinary: Positive for pelvic pain. Musculoskeletal: Negative. Allergic/Immunologic: Negative. Neurological: Negative. Hematological: Negative. Psychiatric/Behavioral: Negative. Objective     /80 (BP Location: Left arm, Patient Position: Sitting, Cuff Size: Large)   Pulse 73   Temp 98.2 °F (36.8 °C) (Temporal)   Resp 16   Ht 5' 3" (1.6 m)   Wt 85.3 kg (188 lb)   LMP 07/19/2023 (Exact Date)   SpO2 98%   BMI 33.30 kg/m²      Physical Exam  Vitals and nursing note reviewed. Constitutional:       General: She is not in acute distress.      Appearance: Normal appearance. She is well-developed. She is not ill-appearing. HENT:      Head: Normocephalic and atraumatic. Eyes:      General: No scleral icterus. Conjunctiva/sclera: Conjunctivae normal.   Neck:      Vascular: No carotid bruit. Cardiovascular:      Rate and Rhythm: Normal rate and regular rhythm. Heart sounds: Normal heart sounds. No murmur heard. Pulmonary:      Effort: Pulmonary effort is normal. No respiratory distress. Breath sounds: Normal breath sounds. Abdominal:      General: Abdomen is flat. Bowel sounds are normal. There is no abdominal bruit. Palpations: Abdomen is soft. Tenderness: There is abdominal tenderness in the left lower quadrant. There is rebound. There is no right CVA tenderness or left CVA tenderness. Musculoskeletal:      Cervical back: Neck supple. No rigidity. Right lower leg: No edema. Left lower leg: No edema. Skin:     General: Skin is warm. Neurological:      General: No focal deficit present. Mental Status: She is alert and oriented to person, place, and time.    Psychiatric:         Behavior: Behavior normal.       Medications have been reviewed by provider in current encounter    Radha Anne MD

## 2023-07-26 ENCOUNTER — OFFICE VISIT (OUTPATIENT)
Dept: OBGYN CLINIC | Facility: CLINIC | Age: 48
End: 2023-07-26
Payer: COMMERCIAL

## 2023-07-26 VITALS
DIASTOLIC BLOOD PRESSURE: 88 MMHG | HEIGHT: 63 IN | BODY MASS INDEX: 33.13 KG/M2 | SYSTOLIC BLOOD PRESSURE: 144 MMHG | WEIGHT: 187 LBS

## 2023-07-26 DIAGNOSIS — N73.9 PELVIC INFLAMMATORY DISEASE: Primary | ICD-10-CM

## 2023-07-26 PROCEDURE — 99213 OFFICE O/P EST LOW 20 MIN: CPT | Performed by: OBSTETRICS & GYNECOLOGY

## 2023-07-26 NOTE — PATIENT INSTRUCTIONS
The patient was made aware that it is important that she complete her antibiotic regimen. If she is unable to get the antibiotics and today we may suggest going back to the emergency department for IV antibiotics. We will check with her later today. Reviewed her recent CAT scan from yesterday. We will check with the patient later today. We will excuse her from work today. She will try it again to get her p.o. antibiotics and. This is not successful I strongly urged to go to the emergency department for IV antibiotics.

## 2023-07-26 NOTE — PROGRESS NOTES
This is a 55-year-old white female, she is a  3 para 3 with 1 vaginal birth and 2  sections. Currently she is not sexually active. She was seen in the emergency department last week and admitted for pelvic inflammatory disease on . She was discharged on doxycycline and Flagyl. CAT scan was suspicious for inflamed fallopian tubes bilateral.  There is also evidence of a posterior uterine fibroid. The patient is seen today for follow-up. She had a CAT scan again yesterday with some improvement. We discussed the results of her recent urinalysis which was positive for grade 100,000. Sensitive antibiotics. She denies any bladder symptoms. She was advised to keep her self hydrated. If she is unable to get her antibiotics and she may need to go to the emergency department for IV hydration and antibiotics. The patient says she is unable to take her antibiotics since the  of this month. She was told to take doxycycline and Flagyl. She says she is unable to swallow the pills. She denies fever or chill. She is having bowel movements. But overall she is just not feeling well because she cannot swallow the pills. Positive bowel sounds of abdominal exam.  Slightly tender to left lower quadrant with deep palpation. Vaginal examination shows the vagina is clean. There is no cervical motion tenderness. There is a fibroid in the posterior wall and slightly tender. The adnexa is otherwise unremarkable. Impression status post hospitalization for pelvic inflammatory disease. She has not completed her antibiotics regimen of doxycycline and Flagyl. I talked her about the importance of completing the regimen. She is going to try to take the pills apart and swallow. If she is unable to get the antibiotics then she may need IV antibiotics. If the pain gets worse she will go to the emergency department for follow-up. We will call her tomorrow for follow-up.   She was also urged to increase her p.o. intake. I called the patient later back this morning I spoke to her around 1130. She was able to get the antibiotics and. She was going to try to go to work. She reminded her if she got more pain or fever chills to go to the emergency department soon as possible. She will continue to try to get her pills down.

## 2023-07-26 NOTE — ASSESSMENT & PLAN NOTE
Negative blood culture.   Urine culture reveals more than 100 K of strep pyogenous A  Currently afebrile, no chills

## 2023-07-26 NOTE — ASSESSMENT & PLAN NOTE
Blood pressure is elevated upon arrival, normal on my recheck.   Continue amlodipine, metoprolol and irbesartan

## 2023-07-26 NOTE — ASSESSMENT & PLAN NOTE
Persistent left lower quadrant pain. CT revealed 4.4 cm adnexal cyst as well as fibroid. Patient is being treated for pelvic inflammatory disease. I wonder if in the long run she could be a good candidate for LURDES/BSO. She will discuss at forthcoming appointment with GYN. Exam today reveals left lower quadrant tenderness with some rebound.   Proceed with stat CT abdomen and pelvis with contrast to rule out diverticulitis

## 2023-07-26 NOTE — ASSESSMENT & PLAN NOTE
Treated with IV cefoxitin and doxycycline while inpatient. She rwas discharged on regimen of Flagyl and doxycycline as outpatient. Patient reports severe nausea and vomiting due to Rx, she discontinued antibiotics a few days ago. She reports very mild pelvic pressure, left lower quadrant more than right lower quadrant. She is afebrile. I will defer a decision regarding antibiotic therapy to GYN. Pending GYN appointment tomorrow.

## 2023-08-02 PROBLEM — Z00.00 ENCOUNTER FOR WELLNESS EXAMINATION IN ADULT: Status: RESOLVED | Noted: 2019-07-28 | Resolved: 2023-08-02

## 2023-08-14 ENCOUNTER — TELEPHONE (OUTPATIENT)
Dept: OBGYN CLINIC | Facility: CLINIC | Age: 48
End: 2023-08-14

## 2023-08-14 NOTE — TELEPHONE ENCOUNTER
The patient called today for progress report. She is try to get the antibiotics in. No more fever chills her pain is greatly subsided she is feeling better. Her menstrual cycle did come earlier otherwise she is doing well. She will keep us informed of her progress return my office on a as needed basis.

## 2023-08-27 DIAGNOSIS — I10 BENIGN ESSENTIAL HYPERTENSION: ICD-10-CM

## 2023-08-28 DIAGNOSIS — I10 BENIGN ESSENTIAL HYPERTENSION: ICD-10-CM

## 2023-08-28 DIAGNOSIS — Z01.419 ENCOUNTER FOR ANNUAL ROUTINE GYNECOLOGICAL EXAMINATION: ICD-10-CM

## 2023-08-28 RX ORDER — SERTRALINE HYDROCHLORIDE 100 MG/1
100 TABLET, FILM COATED ORAL 2 TIMES DAILY
Qty: 180 TABLET | Refills: 3 | Status: SHIPPED | OUTPATIENT
Start: 2023-08-28

## 2023-08-28 RX ORDER — AMLODIPINE BESYLATE 2.5 MG/1
TABLET ORAL
Qty: 90 TABLET | Refills: 3 | OUTPATIENT
Start: 2023-08-28

## 2023-08-29 ENCOUNTER — TELEPHONE (OUTPATIENT)
Dept: CARDIOLOGY CLINIC | Facility: CLINIC | Age: 48
End: 2023-08-29

## 2023-09-01 ENCOUNTER — TELEPHONE (OUTPATIENT)
Dept: CARDIOLOGY CLINIC | Facility: CLINIC | Age: 48
End: 2023-09-01

## 2023-09-05 DIAGNOSIS — I21.21 STEMI INVOLVING LEFT CIRCUMFLEX CORONARY ARTERY (HCC): ICD-10-CM

## 2023-09-05 DIAGNOSIS — I10 BENIGN ESSENTIAL HYPERTENSION: ICD-10-CM

## 2023-09-05 RX ORDER — METOPROLOL SUCCINATE 50 MG/1
50 TABLET, EXTENDED RELEASE ORAL DAILY
Qty: 90 TABLET | Refills: 0 | Status: SHIPPED | OUTPATIENT
Start: 2023-09-05

## 2023-09-05 RX ORDER — AMLODIPINE BESYLATE 2.5 MG/1
2.5 TABLET ORAL DAILY
Qty: 90 TABLET | Refills: 0 | Status: SHIPPED | OUTPATIENT
Start: 2023-09-05

## 2023-09-12 DIAGNOSIS — I21.21 STEMI INVOLVING LEFT CIRCUMFLEX CORONARY ARTERY (HCC): ICD-10-CM

## 2023-09-12 RX ORDER — ATORVASTATIN CALCIUM 80 MG/1
80 TABLET, FILM COATED ORAL
Qty: 90 TABLET | Refills: 0 | Status: SHIPPED | OUTPATIENT
Start: 2023-09-12

## 2023-10-23 ENCOUNTER — TELEPHONE (OUTPATIENT)
Dept: FAMILY MEDICINE CLINIC | Facility: CLINIC | Age: 48
End: 2023-10-23

## 2023-10-23 ENCOUNTER — OFFICE VISIT (OUTPATIENT)
Dept: SURGERY | Facility: CLINIC | Age: 48
End: 2023-10-23

## 2023-10-23 VITALS
SYSTOLIC BLOOD PRESSURE: 126 MMHG | HEIGHT: 63 IN | OXYGEN SATURATION: 99 % | TEMPERATURE: 98.2 F | BODY MASS INDEX: 32.6 KG/M2 | DIASTOLIC BLOOD PRESSURE: 82 MMHG | HEART RATE: 78 BPM | WEIGHT: 184 LBS

## 2023-10-23 DIAGNOSIS — K43.9 VENTRAL HERNIA WITHOUT OBSTRUCTION OR GANGRENE: Primary | ICD-10-CM

## 2023-10-23 PROBLEM — R65.10 SIRS (SYSTEMIC INFLAMMATORY RESPONSE SYNDROME) (HCC): Status: RESOLVED | Noted: 2023-07-17 | Resolved: 2023-10-23

## 2023-10-23 PROBLEM — R55 SYNCOPE AND COLLAPSE: Status: RESOLVED | Noted: 2019-07-28 | Resolved: 2023-10-23

## 2023-10-23 PROBLEM — R10.32 LLQ PAIN: Status: RESOLVED | Noted: 2023-07-17 | Resolved: 2023-10-23

## 2023-10-23 NOTE — PROGRESS NOTES
Assessment/Plan:       Diagnoses and all orders for this visit:    Ventral hernia without obstruction or gangrene     Epigastric and umbilical hernia    Patient is being scheduled for laparoscopic repair of epigastric and umbilical hernia with mesh. This will be done at [facility]. Procedure discussed at length with patient and informed consent obtained. Risks include bleeding, infection, conversion to open surgery, recurrence. Patient will obtain cardiology clearance prior to surgery. Preadmission testing: CBC, BMP    Subjective:      Patient ID: Neyda Mosquera is a 50 y.o. female. 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 This occurred while at work when patient was lifting a patient. Patient immediately noticed pain in the supraumbilical region which radiated to the left side. Pain was made worse by straining. Patient then went on light duty and has been resting and states that the pain has decreased in intensity. Patient denies any urinary or bowel complaints. Patient recently had a work-up for GYN problems and was found on the CT scan to have a left adnexal cyst.  On that CT scan it had been noticed that patient has a epigastric hernia and an umbilical hernia. Patient was asymptomatic at that time. Patient has history of  sections x2 done via Pfannenstiel incision. The following portions of the patient's history were reviewed and updated as appropriate: allergies, current medications, past family history, past medical history, past social history, past surgical history, and problem list.    Review of Systems    Patient has coronary artery disease. Patient states that 3 years ago she underwent cardiac catheterization after an abnormal EKG. She states that she had a plaque rupture at the time of the catheterization and myocardial infarction.   She had 3 coronary artery stents placed. She has been well since but has not been under regular cardiology surveillance. She denies any angina or shortness of breath on exertion. She is on aspirin for the stents. Essential hypertension    Objective:    /82   Pulse 78   Temp 98.2 °F (36.8 °C)   Ht 5' 3" (1.6 m)   Wt 83.5 kg (184 lb)   SpO2 99%   BMI 32.59 kg/m²      Physical Exam    No pallor or icterus  No cervical lymphadenopathy    Heart sounds are normal  Chest is clear to auscultation    Abdominal exam reveals a epigastric hernia which is palpable on straining and leg raising. This is tender. No other abdominal masses are palpable.     Extremity exam is normal.

## 2023-10-23 NOTE — LETTER
October 23, 2023     Patient: Denise Riley  YOB: 1975  Date of Visit: 10/23/2023      To Whom it May Concern:    Danis Neri is under my professional care. Preeti Officer was seen in my office on 10/23/2023. Nekarloon Officer may return to work with limitations 10/25/2023, Patient my not lift anything more then 10lbs until surgery, this will also include no pushing or pulling more then 15lbs, No transferring patients . Possible surgery being on 11/10. Patient will receive a confirmation call in the next day or so with a confirmation on surgery date. If you have any questions or concerns, please don't hesitate to call.          Sincerely,           Michelle Elias MD        CC: No Recipients

## 2023-10-27 ENCOUNTER — OFFICE VISIT (OUTPATIENT)
Dept: CARDIOLOGY CLINIC | Facility: CLINIC | Age: 48
End: 2023-10-27
Payer: COMMERCIAL

## 2023-10-27 VITALS
OXYGEN SATURATION: 98 % | WEIGHT: 185 LBS | HEART RATE: 73 BPM | HEIGHT: 63 IN | BODY MASS INDEX: 32.78 KG/M2 | DIASTOLIC BLOOD PRESSURE: 100 MMHG | SYSTOLIC BLOOD PRESSURE: 130 MMHG

## 2023-10-27 DIAGNOSIS — I25.10 CORONARY ARTERY DISEASE INVOLVING NATIVE CORONARY ARTERY OF NATIVE HEART WITHOUT ANGINA PECTORIS: Primary | ICD-10-CM

## 2023-10-27 DIAGNOSIS — I10 PRIMARY HYPERTENSION: ICD-10-CM

## 2023-10-27 DIAGNOSIS — Z01.810 PREOPERATIVE CARDIOVASCULAR EXAMINATION: ICD-10-CM

## 2023-10-27 DIAGNOSIS — I25.2 HISTORY OF ST ELEVATION MYOCARDIAL INFARCTION (STEMI): ICD-10-CM

## 2023-10-27 PROCEDURE — 93000 ELECTROCARDIOGRAM COMPLETE: CPT | Performed by: INTERNAL MEDICINE

## 2023-10-27 PROCEDURE — 99214 OFFICE O/P EST MOD 30 MIN: CPT | Performed by: INTERNAL MEDICINE

## 2023-10-27 NOTE — PROGRESS NOTES
Cardiology Follow Up    So Mancera  1975  [id number]  [address] [facility] [place]  [address] [id number] [phone]    1. Coronary artery disease involving native coronary artery of native heart without angina pectoris  POCT ECG      2. History of ST elevation myocardial infarction (STEMI)  POCT ECG      3. Primary hypertension  POCT ECG      4. Preoperative cardiovascular examination  POCT ECG          Discussion/Summary:    CAD with an MI in 2019 with multivessel PCI at the time. LVEF is preserved. She had symptoms of angina in 2021 but a repeat catheterization showed disease which was most amenable to medical management. She has currently been without angina. She is on aspirin. Her blood pressure is a little bit labile but looking at recent office visits with physicians at [facility], it seems to be better controlled. She is under a lot of stress and very anxious today which likely explains the high diastolic numbers. She needs a hernia surgery. There is no contraindication to proceeding with the surgery. She is overall moderate risk. She is currently asymptomatic and optimized from a cardiac standpoint. She is able to hold her aspirin for 5 to 7 days prior to the surgery as needed by the surgeon and should resume as soon as possible postoperatively. No cardiac testing is necessary prior to the surgery. Otherwise, recommend continuing her current antihypertensive regimen with careful monitoring of her blood pressure. She has had hypotension previously which has led to syncope so I want to be a little bit careful about this but certainly we do not want to have her hypertensive overall. Continue with 80 mg of atorvastatin which is controlling her lipid panel well. She had symptoms of angina but this seems to be under control with her antihypertensive regimen as well as 30 mg of Imdur.     She can follow-up with me in 1 year.    Previous History:  Luna Rosales is 50years old. She previously had symptoms of angina, a borderline/abnormal stress test and was planned for a cardiac catheterization, but developed acceleration of symptoms in the meantime she developed an MI. She had several stents placed: MYA to the LAD, LCx ballooned, OM x 2. Her EF was low normal post MI, subsequently improved. Smoker, currently 1/2 PPD    Anemic. Was having very heavy menses with being on DAPT. Also describes palpitations    In mid 2021, she had seen me, complained of symptoms of typical angina and she was referred for cardiac catheterization. That showed DTO and some progression of coronary disease but no targets for intervention. She was managed medically. Interval History:    Lost to follow-up after the last visit with me in mid 2021 at which time she was complaining of symptoms of typical angina and had been referred for cardiac catheterization. Medications were adjusted. She is treated for chronic stable angina and she has actually done well. She is not needing any sublingual nitroglycerin at this time. She had been on dual antiplatelet therapy which I started in anticipation of potentially needing a stent, but when she did not need intervention the Plavix was discontinued and she has remained on aspirin alone. Her blood pressure is a little bit variable. She is under a great deal of stress currently. She had a work-related injury recently and had abdominal pain. She saw the general surgeon and was planned for a hernia repair surgery, but is now told by her insurance that she needs to see a different surgeon so she is quite upset. Regardless, she is coming for preoperative evaluation prior to a hernia operation but also needs to reestablish with me for her ongoing cardiac care.     She is continue to see her primary care physician who has done blood work including lipid panel which was done earlier this year and continues to show good control of her lipids with 80 mg of atorvastatin. She had been hypertensive previously. I had her on a higher dose of amlodipine but then she developed hypotension and episodes of syncope so she has been on 2.5 mg of Norvasc most recently. Problem List       Benign essential hypertension    Depression with anxiety    Leiomyoma of uterus    Tremor of face and hands    Numbness and tingling of left leg    Syncope    Impaired fasting glucose    Mitral valve regurgitation    STEMI involving left circumflex coronary artery (HCC)    Smoker    Coronary artery disease involving native coronary artery of native heart without angina pectoris    Overview Signed 2020  3:19 PM by Kevin Rondon MD     Emergent heart catheterization for ischemic EKG changes with he rise in troponins concerning for ACS 2019  She had 3 stents placed in total, DESx2 to OM1 and DESx1 to the mid LAD. Patient is on regimen of aspirin, Brilinta, metoprolol and Lipitor 80 mg daily    TTE showed an EF of 50% with some lateral wall abnormalities consistent where her ischemia was on heart catheterization.   Patient is starting cardiac rehabilitation  Patient is following up with [facility]           Iron deficiency anemia          Past Medical History:   Diagnosis Date   • HTN (hypertension)    • Mitral valve regurgitation    • Myocardial infarction ([address])    • Preeclampsia      Social History     Tobacco Use   • Smoking status: Some Days     Types: Cigarettes   • Smokeless tobacco: Never   Vaping Use   • Vaping Use: Former   Substance Use Topics   • Alcohol use: Not Currently     Comment: rarely   • Drug use: No      Family History   Problem Relation Age of Onset   • Hypertension Father    • Kidney disease Paternal Grandfather      Past Surgical History:   Procedure Laterality Date   • CARDIAC CATHETERIZATION     •  SECTION     • CORONARY STENT PLACEMENT     • EYE SURGERY     • TUBAL LIGATION         Current Outpatient Medications:   •  amLODIPine (NORVASC) 2.5 mg tablet, Take 1 tablet (2.5 mg total) by mouth daily, Disp: 90 tablet, Rfl: 0  •  aspirin (ECOTRIN LOW STRENGTH) 81 mg EC tablet, Take 1 tablet (81 mg total) by mouth daily, Disp:  , Rfl:   •  atorvastatin (LIPITOR) 80 mg tablet, Take 1 tablet (80 mg total) by mouth daily with dinner, Disp: 90 tablet, Rfl: 0  •  irbesartan (AVAPRO) 300 mg tablet, TAKE 1 TABLET DAILY, Disp: 90 tablet, Rfl: 3  •  isosorbide mononitrate (IMDUR) 30 mg 24 hr tablet, Take 1 tablet (30 mg total) by mouth daily, Disp: 90 tablet, Rfl: 3  •  metoprolol succinate (TOPROL-XL) 50 mg 24 hr tablet, Take 1 tablet (50 mg total) by mouth daily, Disp: 90 tablet, Rfl: 0  •  sertraline (ZOLOFT) 100 mg tablet, Take 1 tablet (100 mg total) by mouth 2 (two) times a day, Disp: 180 tablet, Rfl: 3  Allergies   Allergen Reactions   • Iron Polysaccharide      GI upset, constipation, hemorrhoid flare, heartburn       Vitals:    10/27/23 1445   BP: 130/100   BP Location: Left arm   Patient Position: Sitting   Cuff Size: Standard   Pulse: 73   SpO2: 98%   Weight: 83.9 kg (185 lb)   Height: 5' 3" (1.6 m)     Vitals:    10/27/23 1445   Weight: 83.9 kg (185 lb)      Height: 5' 3" (160 cm)   Body mass index is 32.77 kg/m². Physical Exam:  GEN: Jamari Zeng appears well, alert and oriented x 3, pleasant and cooperative   HEENT: pupils equal, round, and reactive to light; extraocular muscles intact  NECK: supple, no carotid bruits   HEART: regular rhythm, normal S1 and S2, no murmurs, clicks, gallops or rubs   LUNGS: clear to auscultation bilaterally; no wheezes, rales, or rhonchi   ABDOMEN: nontender  EXTREMITIES: peripheral pulses normal; no clubbing, cyanosis, or edema  NEURO: no focal findings   SKIN: normal without suspicious lesions on exposed skin      ROS:  Positive for anxiety, depression, shortness of breath, sharp pains. Heavy periods, bleeding, anemia. Fatigue.  Weight gain  Except as noted in HPI, is otherwise reviewed in detail and a 12 point review of systems is negative. ROS reviewed and is unchanged    Labs:  Lab Results   Component Value Date     10/27/2015    K 3.0 (L) 07/17/2023     07/17/2023    CREATININE 0.74 07/17/2023    BUN 12 07/17/2023    CO2 25 07/17/2023    ALT 19 06/29/2023    AST 16 06/29/2023    INR 0.88 07/02/2021    GLUF 96 06/29/2023    HGBA1C 5.4 10/16/2019    WBC 7.03 07/20/2023    HGB 10.4 (L) 07/20/2023    HCT 31.3 (L) 07/20/2023     07/20/2023       Lab Results   Component Value Date    CHOL 194 07/09/2015     Lab Results   Component Value Date    LDLCALC 70 06/29/2023    LDLCALC 58 07/03/2021    LDLCALC 63 09/04/2020     Lab Results   Component Value Date    HDL 42 (L) 06/29/2023    HDL 49 07/03/2021    HDL 45 09/04/2020     Lab Results   Component Value Date    TRIG 111 06/29/2023    TRIG 80 07/03/2021    TRIG 52 09/04/2020       Testing:  Echo 7/3/21:  LEFT VENTRICLE:  Systolic function was vigorous. Ejection fraction was estimated to be 70 %. There were no regional wall motion abnormalities. There was mild concentric hypertrophy. RIGHT VENTRICLE:  The size was normal.  Systolic function was normal.     IVC, HEPATIC VEINS:  The inferior vena cava was normal in size and course. Respirophasic changes were exaggerated. Cath 6/14/21:  CORONARY CIRCULATION:  Proximal LAD: There was a 50 % stenosis. Mid LAD: There was a 10 % stenosis at the site of a prior stent. 1st diagonal: There was a 100 % stenosis. This lesion is a chronic total occlusion. Distal circumflex: There was a diffuse 50 % stenosis. 1st obtuse marginal: There was a 25 % stenosis at the proximal margin of the stented segment. Mid RCA: There was a diffuse 40 % stenosis. Cardiac Cath 12/28/19:  CORONARY CIRCULATION:  Mid LAD: There was a tubular 75 % stenosis. This is a likely culprit for the patient's clinical presentation. An intervention was performed. Mid circumflex:  There was a 100 % stenosis at the origin of OM1. There was LOUISE grade 0 flow through the vessel (no flow). This is a likely culprit for the patient's clinical presentation. An intervention was performed. 1ST LESION INTERVENTIONS:  A successful balloon angioplasty with stent procedure was performed on the 99 % lesion in the mid circumflex. Following intervention there was an excellent angiographic appearance with a 0 % residual stenosis. 2ND LESION INTERVENTIONS:  A successful drug-eluting stent with balloon angioplasty procedure was performed on the 99 % lesion in the 1st obtuse marginal. Following intervention there was an excellent angiographic appearance with a 0 % residual stenosis. A Xience [id number] Rx 2.5 x 15mm drug-eluting stent was placed across the lesion and deployed at a maximum inflation pressure of 10 yolanda. A Xience [id number] Rx 2.5 x 08mm drug-eluting stent was placed across the lesion and deployed at a maximum inflation pressure of 9 yolanda. 3RD LESION INTERVENTIONS:  A successful drug-eluting stent with balloon angioplasty procedure was performed on the 75 % lesion in the mid LAD. Following intervention there was an excellent angiographic appearance with a 0 % residual stenosis. A Xience Lory Rx 3.0 x 28mm drug-eluting stent was placed across the lesion and deployed at a maximum inflation pressure of 16 yolanda. ï¾·Proximal LAD: Lesion 1: tubular, 40 % stenosis. ï¾·Mid LAD: Lesion 1: tubular, 75 % stenosis. ï¾·Mid circumflex: Lesion 1: 100 % stenosis. Intervention results  Native coronary lesions:  ï¾·Successful balloon angioplasty and stent of the 99 % stenosis in mid circumflex. Appearance excellent with 0 % residual stenosis. ï¾·Successful drug-eluting stent and balloon angioplasty of the 99 % stenosis in OM1. Appearance excellent with 0 % residual stenosis. Stent: Minor Daily Rx 2.5 x 15mm drug-eluting. Stent: Minor Daily Rx 2.5 x 08mm drug-eluting.   ï¾·Successful drug-eluting stent and balloon angioplasty of the 75 % stenosis in mid LAD. Appearance excellent with 0 % residual stenosis. Stent: Roberto Keenan Rx 3.0 x 28mm drug-eluting. Echo:  12/29/19:  LEFT VENTRICLE:  Systolic function was at the lower limits of normal. Ejection fraction was estimated to be 50 %. There was hypokinesis of the basal-mid inferior and the entire lateral walls. Doppler parameters were consistent with abnormal left ventricular relaxation (grade 1 diastolic dysfunction). MITRAL VALVE:  There was mild regurgitation. COMPARISONS:  Comparison was made with the previous study of 06-Dec-2019. Regional wall motion abnormalities were now present, and ejection fraction has very mildly decreased. EKG:  Sinus rhythm, 73 BPM. Atrial enlargement.

## 2023-11-07 ENCOUNTER — APPOINTMENT (OUTPATIENT)
Dept: LAB | Facility: CLINIC | Age: 48
End: 2023-11-07
Payer: OTHER MISCELLANEOUS

## 2023-11-07 ENCOUNTER — HOSPITAL ENCOUNTER (OUTPATIENT)
Dept: RADIOLOGY | Facility: HOSPITAL | Age: 48
Discharge: HOME | End: 2023-11-07
Attending: SURGERY
Payer: OTHER MISCELLANEOUS

## 2023-11-07 DIAGNOSIS — K43.9 VENTRAL HERNIA WITHOUT OBSTRUCTION OR GANGRENE: ICD-10-CM

## 2023-11-07 DIAGNOSIS — K43.9 EPIGASTRIC HERNIA: ICD-10-CM

## 2023-11-07 LAB
ANION GAP SERPL CALCULATED.3IONS-SCNC: 9 MMOL/L
BUN SERPL-MCNC: 11 MG/DL (ref 5–25)
CALCIUM SERPL-MCNC: 9.2 MG/DL (ref 8.4–10.2)
CHLORIDE SERPL-SCNC: 108 MMOL/L (ref 96–108)
CO2 SERPL-SCNC: 24 MMOL/L (ref 21–32)
CREAT SERPL-MCNC: 0.54 MG/DL (ref 0.6–1.3)
ERYTHROCYTE [DISTWIDTH] IN BLOOD BY AUTOMATED COUNT: 15 % (ref 11.6–15.1)
GFR SERPL CREATININE-BSD FRML MDRD: 111 ML/MIN/1.73SQ M
GLUCOSE SERPL-MCNC: 101 MG/DL (ref 65–140)
HCT VFR BLD AUTO: 44.2 % (ref 34.8–46.1)
HGB BLD-MCNC: 14.9 G/DL (ref 11.5–15.4)
MCH RBC QN AUTO: 29.9 PG (ref 26.8–34.3)
MCHC RBC AUTO-ENTMCNC: 33.7 G/DL (ref 31.4–37.4)
MCV RBC AUTO: 89 FL (ref 82–98)
PLATELET # BLD AUTO: 240 THOUSANDS/UL (ref 149–390)
PMV BLD AUTO: 10.5 FL (ref 8.9–12.7)
POTASSIUM SERPL-SCNC: 3.7 MMOL/L (ref 3.5–5.3)
RBC # BLD AUTO: 4.98 MILLION/UL (ref 3.81–5.12)
SODIUM SERPL-SCNC: 141 MMOL/L (ref 135–147)
WBC # BLD AUTO: 7.26 THOUSAND/UL (ref 4.31–10.16)

## 2023-11-07 PROCEDURE — 71046 X-RAY EXAM CHEST 2 VIEWS: CPT

## 2023-11-07 PROCEDURE — 80048 BASIC METABOLIC PNL TOTAL CA: CPT

## 2023-11-07 PROCEDURE — 85027 COMPLETE CBC AUTOMATED: CPT

## 2023-11-07 PROCEDURE — 36415 COLL VENOUS BLD VENIPUNCTURE: CPT

## 2024-01-03 DIAGNOSIS — I10 BENIGN ESSENTIAL HYPERTENSION: ICD-10-CM

## 2024-01-03 DIAGNOSIS — I25.118 CORONARY ARTERY DISEASE INVOLVING NATIVE CORONARY ARTERY OF NATIVE HEART WITH OTHER FORM OF ANGINA PECTORIS (HCC): ICD-10-CM

## 2024-01-03 RX ORDER — ISOSORBIDE MONONITRATE 30 MG/1
30 TABLET, EXTENDED RELEASE ORAL DAILY
Qty: 90 TABLET | Refills: 0 | Status: SHIPPED | OUTPATIENT
Start: 2024-01-03

## 2024-01-03 RX ORDER — AMLODIPINE BESYLATE 2.5 MG/1
2.5 TABLET ORAL DAILY
Qty: 90 TABLET | Refills: 0 | Status: SHIPPED | OUTPATIENT
Start: 2024-01-03

## 2024-01-10 ENCOUNTER — OFFICE VISIT (OUTPATIENT)
Dept: FAMILY MEDICINE CLINIC | Facility: CLINIC | Age: 49
End: 2024-01-10
Payer: COMMERCIAL

## 2024-01-10 VITALS
OXYGEN SATURATION: 98 % | BODY MASS INDEX: 32.89 KG/M2 | RESPIRATION RATE: 18 BRPM | TEMPERATURE: 98.6 F | SYSTOLIC BLOOD PRESSURE: 166 MMHG | WEIGHT: 185.6 LBS | HEART RATE: 65 BPM | DIASTOLIC BLOOD PRESSURE: 108 MMHG | HEIGHT: 63 IN

## 2024-01-10 DIAGNOSIS — I10 PRIMARY HYPERTENSION: ICD-10-CM

## 2024-01-10 PROCEDURE — 99213 OFFICE O/P EST LOW 20 MIN: CPT | Performed by: FAMILY MEDICINE

## 2024-01-10 RX ORDER — HYDROCHLOROTHIAZIDE 12.5 MG/1
12.5 TABLET ORAL DAILY
Qty: 30 TABLET | Refills: 3 | Status: SHIPPED | OUTPATIENT
Start: 2024-01-10

## 2024-01-10 RX ORDER — HYDROCODONE BITARTRATE AND ACETAMINOPHEN 5; 325 MG/1; MG/1
1-2 TABLET ORAL EVERY 6 HOURS PRN
COMMUNITY
Start: 2023-11-10

## 2024-01-10 RX ORDER — AMLODIPINE BESYLATE 5 MG/1
5 TABLET ORAL DAILY
Qty: 30 TABLET | Refills: 3 | Status: SHIPPED | OUTPATIENT
Start: 2024-01-10

## 2024-01-10 NOTE — PROGRESS NOTES
Name: Elsye Lagunas      : 1975      MRN: [id number]  Encounter Provider: Janeth Olvera MD  Encounter Date: 1/10/2024   Encounter department: [facility]    Assessment & Plan     1. Primary hypertension  Assessment & Plan:  Blood pressure is elevated.  Increase dose of amlodipine from 2.5 to 5 mg daily  Add HCTZ 12.5 mg daily  Continue metoprolol 50 mg daily, irbesartan 300 mg daily and isosorbide 30 mg daily.    May consider trial of propranolol in place of metoprolol for symptoms of ET        Orders:  -     amLODIPine (NORVASC) 5 mg tablet; Take 1 tablet (5 mg total) by mouth daily  -     hydrochlorothiazide (HYDRODIURIL) 12.5 mg tablet; Take 1 tablet (12.5 mg total) by mouth daily       Patient Instructions   Dose of amlodipine is being increased from 2.5 to 5 mg daily  We are adding HCTZ, mild water pill, to improve diastolic pressures, 1 tablet daily  Rest of medications at the same  Please contact me within next 1 to 2 weeks of blood pressures are not improving  In the long run we may consider switching metoprolol for alternative medication to improve shakiness  If headaches will persist in the long run-please discuss isosorbide with Dr. Gregory Rico in about 1 month (around 2/10/2024) for follow up.    Subjective     Patient presents for evaluation of hypertension.  She has been monitoring her blood pressures for the last few weeks and presents with a readings in 160s over 100s.  No complaints of chest pain, palpitations, shortness of breath or dizziness.  Patient reports intermittent headaches and feeling somewhat flushed.    Patient reports intermittent head shaking that has been present for quite a while.  She reports few family members with similar symptoms suggesting essential tremor.        Review of Systems   Constitutional: Negative.    HENT: Negative.     Respiratory: Negative.     Cardiovascular: Negative.        Past Medical History:   Diagnosis Date   • HTN  (hypertension)    • Mitral valve regurgitation    • Myocardial infarction (HCC)    • Preeclampsia      Past Surgical History:   Procedure Laterality Date   • CARDIAC CATHETERIZATION     •  SECTION     • CORONARY STENT PLACEMENT     • EYE SURGERY     • TUBAL LIGATION       Family History   Problem Relation Age of Onset   • Hypertension Father    • Kidney disease Paternal Grandfather      Social History     Socioeconomic History   • Marital status: /Civil Union     Spouse name: None   • Number of children: None   • Years of education: None   • Highest education level: None   Occupational History   • None   Tobacco Use   • Smoking status: Some Days     Types: Cigarettes   • Smokeless tobacco: Never   Vaping Use   • Vaping status: Former   Substance and Sexual Activity   • Alcohol use: Not Currently     Comment: rarely   • Drug use: No   • Sexual activity: Not Currently   Other Topics Concern   • None   Social History Narrative   • None     Social Determinants of Health     Financial Resource Strain: Not on file   Food Insecurity: No Food Insecurity (2021)    Hunger Vital Sign    • Worried About Running Out of Food in the Last Year: Never true    • Ran Out of Food in the Last Year: Never true   Transportation Needs: No Transportation Needs (2021)    PRAPARE - Transportation    • Lack of Transportation (Medical): No    • Lack of Transportation (Non-Medical): No   Physical Activity: Not on file   Stress: Not on file   Social Connections: Not on file   Intimate Partner Violence: Not on file   Housing Stability: Not on file     Current Outpatient Medications on File Prior to Visit   Medication Sig   • aspirin (ECOTRIN LOW STRENGTH) 81 mg EC tablet Take 1 tablet (81 mg total) by mouth daily   • atorvastatin (LIPITOR) 80 mg tablet Take 1 tablet (80 mg total) by mouth daily with dinner   • HYDROcodone-acetaminophen (NORCO) 5-325 mg per tablet Take 1-2 tablets by mouth every 6 (six) hours as needed   •  "irbesartan (AVAPRO) 300 mg tablet TAKE 1 TABLET DAILY   • isosorbide mononitrate (IMDUR) 30 mg 24 hr tablet TAKE ONE TABLET BY MOUTH ONCE DAILY   • metoprolol succinate (TOPROL-XL) 50 mg 24 hr tablet Take 1 tablet (50 mg total) by mouth daily   • sertraline (ZOLOFT) 100 mg tablet Take 1 tablet (100 mg total) by mouth 2 (two) times a day     Allergies   Allergen Reactions   • Iron Polysaccharide      GI upset, constipation, hemorrhoid flare, heartburn     Immunization History   Administered Date(s) Administered   • COVID-19 J&J ([email or web address]) vaccine 0.5 mL 12/05/2021   • Influenza, seasonal, injectable 10/16/2015   • Influenza, seasonal, injectable, preservative free 10/29/2018   • Pneumococcal Polysaccharide PPV23 09/30/2020   • Tdap 07/12/2014   • Tuberculin Skin Test-PPD Intradermal 09/10/2012       Objective     BP (!) 166/108 (BP Location: Left arm, Patient Position: Sitting, Cuff Size: Large)   Pulse 65   Temp 98.6 °F (37 °C) (Temporal)   Resp 18   Ht 5' 3\" (1.6 m)   Wt 84.2 kg (185 lb 9.6 oz)   SpO2 98%   BMI 32.88 kg/m²     Physical Exam  Vitals and nursing note reviewed.   Constitutional:       General: She is not in acute distress.     Appearance: Normal appearance. She is well-developed. She is not ill-appearing.   HENT:      Head: Normocephalic and atraumatic.   Eyes:      Conjunctiva/sclera: Conjunctivae normal.   Neck:      Thyroid: No thyromegaly.      Vascular: No carotid bruit.   Cardiovascular:      Rate and Rhythm: Normal rate and regular rhythm.      Heart sounds: Normal heart sounds. No murmur heard.     Comments: 168/100 ,HR 66  Pulmonary:      Effort: Pulmonary effort is normal. No respiratory distress.      Breath sounds: Normal breath sounds. No wheezing.   Abdominal:      General: There is no abdominal bruit.   Musculoskeletal:         General: Normal range of motion.      Cervical back: Neck supple.   Neurological:      General: No focal deficit present.      Mental Status: She is alert " and oriented to person, place, and time.      Cranial Nerves: No cranial nerve deficit.      Coordination: Coordination normal.   Psychiatric:         Mood and Affect: Mood normal.         Behavior: Behavior normal.       Janeth Olvera MD

## 2024-01-10 NOTE — PATIENT INSTRUCTIONS
Dose of amlodipine is being increased from 2.5 to 5 mg daily  We are adding HCTZ, mild water pill, to improve diastolic pressures, 1 tablet daily  Rest of medications at the same  Please contact me within next 1 to 2 weeks of blood pressures are not improving  In the long run we may consider switching metoprolol for alternative medication to improve shakiness  If headaches will persist in the long run-please discuss isosorbide with Dr. Jenkins

## 2024-01-13 NOTE — ASSESSMENT & PLAN NOTE
Blood pressure is elevated.  Increase dose of amlodipine from 2.5 to 5 mg daily  Add HCTZ 12.5 mg daily  Continue metoprolol 50 mg daily, irbesartan 300 mg daily and isosorbide 30 mg daily.    May consider trial of propranolol in place of metoprolol for symptoms of ET

## 2024-02-01 ENCOUNTER — RA CDI HCC (OUTPATIENT)
Dept: OTHER | Facility: HOSPITAL | Age: 49
End: 2024-02-01

## 2024-02-01 NOTE — PROGRESS NOTES
HCC coding opportunities          Chart Reviewed number of suggestions sent to Provider: 1   [facility]    [facility] Insurance        Commercial Insurance: [employer]

## 2024-02-08 ENCOUNTER — OFFICE VISIT (OUTPATIENT)
Dept: FAMILY MEDICINE CLINIC | Facility: CLINIC | Age: 49
End: 2024-02-08
Payer: COMMERCIAL

## 2024-02-08 VITALS
OXYGEN SATURATION: 97 % | WEIGHT: 188.4 LBS | HEIGHT: 63 IN | RESPIRATION RATE: 16 BRPM | DIASTOLIC BLOOD PRESSURE: 88 MMHG | SYSTOLIC BLOOD PRESSURE: 122 MMHG | BODY MASS INDEX: 33.38 KG/M2 | TEMPERATURE: 97.8 F | HEART RATE: 73 BPM

## 2024-02-08 DIAGNOSIS — I25.10 CORONARY ARTERY DISEASE INVOLVING NATIVE CORONARY ARTERY OF NATIVE HEART WITHOUT ANGINA PECTORIS: Primary | ICD-10-CM

## 2024-02-08 DIAGNOSIS — I10 PRIMARY HYPERTENSION: ICD-10-CM

## 2024-02-08 DIAGNOSIS — R09.89 SYMPTOMS OF UPPER RESPIRATORY INFECTION (URI): ICD-10-CM

## 2024-02-08 PROCEDURE — 99214 OFFICE O/P EST MOD 30 MIN: CPT | Performed by: FAMILY MEDICINE

## 2024-02-08 RX ORDER — BENZONATATE 200 MG/1
200 CAPSULE ORAL 3 TIMES DAILY PRN
Qty: 20 CAPSULE | Refills: 0 | Status: SHIPPED | OUTPATIENT
Start: 2024-02-08

## 2024-02-08 RX ORDER — METHYLPREDNISOLONE 4 MG/1
TABLET ORAL
Qty: 21 EACH | Refills: 0 | Status: SHIPPED | OUTPATIENT
Start: 2024-02-08

## 2024-02-08 RX ORDER — AMLODIPINE BESYLATE 5 MG/1
5 TABLET ORAL DAILY
Qty: 90 TABLET | Refills: 1 | Status: SHIPPED | OUTPATIENT
Start: 2024-02-08

## 2024-02-08 RX ORDER — AZITHROMYCIN 250 MG/1
TABLET, FILM COATED ORAL
Qty: 6 TABLET | Refills: 0 | Status: SHIPPED | OUTPATIENT
Start: 2024-02-08 | End: 2024-02-12

## 2024-02-08 RX ORDER — HYDROCHLOROTHIAZIDE 12.5 MG/1
25 TABLET ORAL DAILY
Qty: 180 TABLET | Refills: 3 | Status: SHIPPED | OUTPATIENT
Start: 2024-02-08

## 2024-02-08 NOTE — ASSESSMENT & PLAN NOTE
BP has improved: 122/88 upon arrival, up to 138/78 on my recheck.  Tolerates current Rx regimen well.  I recommend to increase dose of HCTZ to 25 mg daily   Keep rest of Rx same: Amlodipine 5 mg daily, Avapro 300 mg daily, Toprol ER 50 mg daily  She will continue close monitoring at home and will contact me with updates regarding BP and any possible side effects

## 2024-02-08 NOTE — PROGRESS NOTES
Name: Elyse Lagunas      : 1975      MRN: [id number]  Encounter Provider: Janeth Olvera MD  Encounter Date: 2024   Encounter department: [facility]    Assessment & Plan     1. Coronary artery disease involving native coronary artery of native heart without angina pectoris  -     CBC; Future  -     Comprehensive metabolic panel; Future  -     Lipid Panel with Direct LDL reflex; Future  -     TSH, 3rd generation; Future    2. Primary hypertension  Assessment & Plan:  BP has improved: 122/88 upon arrival, up to 138/78 on my recheck.  Tolerates current Rx regimen well.  I recommend to increase dose of HCTZ to 25 mg daily   Keep rest of Rx same: Amlodipine 5 mg daily, Avapro 300 mg daily, Toprol ER 50 mg daily  She will continue close monitoring at home and will contact me with updates regarding BP and any possible side effects    Orders:  -     hydroCHLOROthiazide 12.5 mg tablet; Take 2 tablets (25 mg total) by mouth daily  -     amLODIPine (NORVASC) 5 mg tablet; Take 1 tablet (5 mg total) by mouth daily    3. Symptoms of upper respiratory infection (URI)  -     azithromycin (ZITHROMAX) 250 mg tablet; Take 2 tablets today then 1 tablet daily x 4 days  -     methylPREDNISolone 4 MG tablet therapy pack; Use as directed on package  -     benzonatate (TESSALON) 200 MG capsule; Take 1 capsule (200 mg total) by mouth 3 (three) times a day as needed for cough    Return in about 4 months (around 2024) for Annual physical/well exam.         Subjective     Patient presents for follow-up of hypertension.  She reports that her blood pressures have improved after recent Rx changes.  Dose of amlodipine was increased from 2.5 to 5 mg daily patient started HCTZ.  No symptoms of lightheadedness or orthostasis.  Home blood pressure log reviewed.    She developed symptoms of cough and cold within past few days.  She is complaining of significant sinus pressure, dry poorly productive cough and  bodyaches due to coughing attacks.  Patient has been using DayQuil.  She is unfortunately smoking.    URI   Associated symptoms include congestion and coughing. Pertinent negatives include no wheezing.     Review of Systems   Constitutional:  Positive for fatigue. Negative for fever.   HENT:  Positive for congestion, postnasal drip and sinus pressure.    Eyes: Negative.    Respiratory:  Positive for cough. Negative for shortness of breath and wheezing.    Cardiovascular: Negative.    Gastrointestinal: Negative.    Genitourinary: Negative.    Neurological: Negative.        Past Medical History:   Diagnosis Date   • HTN (hypertension)    • Mitral valve regurgitation    • Myocardial infarction (HCC)    • Preeclampsia      Past Surgical History:   Procedure Laterality Date   • CARDIAC CATHETERIZATION     •  SECTION     • CORONARY STENT PLACEMENT     • EYE SURGERY     • TUBAL LIGATION       Family History   Problem Relation Age of Onset   • Hypertension Father    • Kidney disease Paternal Grandfather      Social History     Socioeconomic History   • Marital status: /Civil Union     Spouse name: None   • Number of children: None   • Years of education: None   • Highest education level: None   Occupational History   • None   Tobacco Use   • Smoking status: Some Days     Types: Cigarettes   • Smokeless tobacco: Never   Vaping Use   • Vaping status: Former   Substance and Sexual Activity   • Alcohol use: Not Currently     Comment: rarely   • Drug use: No   • Sexual activity: Not Currently   Other Topics Concern   • None   Social History Narrative   • None     Social Determinants of Health     Financial Resource Strain: Not on file   Food Insecurity: No Food Insecurity (2021)    Hunger Vital Sign    • Worried About Running Out of Food in the Last Year: Never true    • Ran Out of Food in the Last Year: Never true   Transportation Needs: No Transportation Needs (2021)    PRAPARE - Transportation    •  "Lack of Transportation (Medical): No    • Lack of Transportation (Non-Medical): No   Physical Activity: Not on file   Stress: Not on file   Social Connections: Not on file   Intimate Partner Violence: Not on file   Housing Stability: Not on file     Current Outpatient Medications on File Prior to Visit   Medication Sig   • aspirin (ECOTRIN LOW STRENGTH) 81 mg EC tablet Take 1 tablet (81 mg total) by mouth daily   • atorvastatin (LIPITOR) 80 mg tablet Take 1 tablet (80 mg total) by mouth daily with dinner   • HYDROcodone-acetaminophen (NORCO) 5-325 mg per tablet Take 1-2 tablets by mouth every 6 (six) hours as needed   • irbesartan (AVAPRO) 300 mg tablet TAKE 1 TABLET DAILY   • isosorbide mononitrate (IMDUR) 30 mg 24 hr tablet TAKE ONE TABLET BY MOUTH ONCE DAILY   • metoprolol succinate (TOPROL-XL) 50 mg 24 hr tablet Take 1 tablet (50 mg total) by mouth daily   • sertraline (ZOLOFT) 100 mg tablet Take 1 tablet (100 mg total) by mouth 2 (two) times a day     Allergies   Allergen Reactions   • Iron Polysaccharide      GI upset, constipation, hemorrhoid flare, heartburn     Immunization History   Administered Date(s) Administered   • COVID-19 J&J ([employer]) vaccine 0.5 mL 12/05/2021   • Influenza, seasonal, injectable 10/16/2015   • Influenza, seasonal, injectable, preservative free 10/29/2018   • Pneumococcal Polysaccharide PPV23 09/30/2020   • Tdap 07/12/2014   • Tuberculin Skin Test-PPD Intradermal 09/10/2012       Objective     /88 (BP Location: Left arm, Patient Position: Sitting, Cuff Size: Large)   Pulse 73   Temp 97.8 °F (36.6 °C) (Temporal)   Resp 16   Ht 5' 3\" (1.6 m)   Wt 85.5 kg (188 lb 6.4 oz)   SpO2 97%   BMI 33.37 kg/m²     Physical Exam  Vitals and nursing note reviewed.   Constitutional:       General: She is not in acute distress.     Appearance: Normal appearance. She is well-developed. She is not ill-appearing.   HENT:      Head: Normocephalic and atraumatic.      Right Ear: Tympanic " membrane normal.      Left Ear: Tympanic membrane normal.      Nose: Mucosal edema and congestion present.      Comments: Severe nasal congestion     Mouth/Throat:      Mouth: Mucous membranes are moist.      Pharynx: Posterior oropharyngeal erythema present.      Comments: Uvula erythema, no exudates  Eyes:      Conjunctiva/sclera: Conjunctivae normal.   Cardiovascular:      Rate and Rhythm: Normal rate and regular rhythm.      Heart sounds: Normal heart sounds. No murmur heard.     Comments: 138/78  Pulmonary:      Effort: Pulmonary effort is normal. No respiratory distress.      Breath sounds: Normal breath sounds. No wheezing or rhonchi.   Musculoskeletal:      Cervical back: Neck supple.   Neurological:      Mental Status: She is alert and oriented to person, place, and time.      Cranial Nerves: No cranial nerve deficit.      Deep Tendon Reflexes: Reflexes are normal and symmetric.   Psychiatric:         Mood and Affect: Mood normal.         Behavior: Behavior normal.         Thought Content: Thought content normal.       Janeth Olvera MD

## 2024-03-15 ENCOUNTER — TELEPHONE (OUTPATIENT)
Dept: CARDIOLOGY CLINIC | Facility: CLINIC | Age: 49
End: 2024-03-15

## 2024-03-15 DIAGNOSIS — I25.118 CORONARY ARTERY DISEASE INVOLVING NATIVE CORONARY ARTERY OF NATIVE HEART WITH OTHER FORM OF ANGINA PECTORIS (HCC): ICD-10-CM

## 2024-03-15 DIAGNOSIS — I21.21 STEMI INVOLVING LEFT CIRCUMFLEX CORONARY ARTERY (HCC): ICD-10-CM

## 2024-03-15 DIAGNOSIS — I10 BENIGN ESSENTIAL HYPERTENSION: ICD-10-CM

## 2024-03-15 DIAGNOSIS — I25.10 CORONARY ARTERY DISEASE INVOLVING NATIVE CORONARY ARTERY OF NATIVE HEART WITHOUT ANGINA PECTORIS: ICD-10-CM

## 2024-03-15 RX ORDER — ISOSORBIDE MONONITRATE 30 MG/1
30 TABLET, EXTENDED RELEASE ORAL DAILY
Qty: 90 TABLET | Refills: 0 | Status: SHIPPED | OUTPATIENT
Start: 2024-03-15

## 2024-03-15 RX ORDER — IRBESARTAN 300 MG/1
300 TABLET ORAL DAILY
Qty: 90 TABLET | Refills: 3 | Status: SHIPPED | OUTPATIENT
Start: 2024-03-15

## 2024-03-15 RX ORDER — METOPROLOL SUCCINATE 50 MG/1
50 TABLET, EXTENDED RELEASE ORAL DAILY
Qty: 90 TABLET | Refills: 0 | Status: SHIPPED | OUTPATIENT
Start: 2024-03-15

## 2024-05-07 DIAGNOSIS — I21.21 STEMI INVOLVING LEFT CIRCUMFLEX CORONARY ARTERY (HCC): ICD-10-CM

## 2024-05-07 RX ORDER — ATORVASTATIN CALCIUM 80 MG/1
80 TABLET, FILM COATED ORAL
Qty: 90 TABLET | Refills: 1 | Status: SHIPPED | OUTPATIENT
Start: 2024-05-07

## 2024-06-10 DIAGNOSIS — I25.118 CORONARY ARTERY DISEASE INVOLVING NATIVE CORONARY ARTERY OF NATIVE HEART WITH OTHER FORM OF ANGINA PECTORIS (HCC): ICD-10-CM

## 2024-06-10 DIAGNOSIS — I21.21 STEMI INVOLVING LEFT CIRCUMFLEX CORONARY ARTERY (HCC): ICD-10-CM

## 2024-06-10 RX ORDER — ISOSORBIDE MONONITRATE 30 MG/1
30 TABLET, EXTENDED RELEASE ORAL DAILY
Qty: 90 TABLET | Refills: 1 | Status: SHIPPED | OUTPATIENT
Start: 2024-06-10

## 2024-06-10 RX ORDER — METOPROLOL SUCCINATE 50 MG/1
50 TABLET, EXTENDED RELEASE ORAL DAILY
Qty: 90 TABLET | Refills: 1 | Status: SHIPPED | OUTPATIENT
Start: 2024-06-10

## 2024-06-27 ENCOUNTER — OFFICE VISIT (OUTPATIENT)
Dept: FAMILY MEDICINE CLINIC | Facility: CLINIC | Age: 49
End: 2024-06-27
Payer: COMMERCIAL

## 2024-06-27 VITALS
TEMPERATURE: 98 F | OXYGEN SATURATION: 99 % | RESPIRATION RATE: 18 BRPM | SYSTOLIC BLOOD PRESSURE: 130 MMHG | DIASTOLIC BLOOD PRESSURE: 80 MMHG | HEART RATE: 77 BPM

## 2024-06-27 DIAGNOSIS — J06.9 UPPER RESPIRATORY TRACT INFECTION, UNSPECIFIED TYPE: Primary | ICD-10-CM

## 2024-06-27 PROCEDURE — 99213 OFFICE O/P EST LOW 20 MIN: CPT | Performed by: FAMILY MEDICINE

## 2024-06-27 RX ORDER — AZITHROMYCIN 200 MG/5ML
POWDER, FOR SUSPENSION ORAL
Qty: 37.5 ML | Refills: 0 | Status: SHIPPED | OUTPATIENT
Start: 2024-06-27

## 2024-06-27 RX ORDER — METHYLPREDNISOLONE 4 MG/1
TABLET ORAL
Qty: 21 EACH | Refills: 0 | Status: SHIPPED | OUTPATIENT
Start: 2024-06-27

## 2024-06-27 NOTE — PROGRESS NOTES
FAMILY PRACTICE OFFICE VISIT       NAME: Elyse Lagunas  AGE: 48 y.o. SEX: female       : 1975        MRN: [id number]    DATE: 2024  TIME: 5:17 PM    Assessment and Plan     Problem List Items Addressed This Visit       Upper respiratory tract infection - Primary     URI.  Patient was suspicious URI versus sinus symptoms.  She was given prescription for Medrol Dosepak to take as directed as well as Zithromax to take for 5 days as directed.  Patient will call if symptoms persist after medication completed         Relevant Medications    azithromycin (ZITHROMAX) 200 mg/5 mL suspension    methylPREDNISolone 4 MG tablet therapy pack           Chief Complaint   No chief complaint on file.      History of Present Illness     Patient with a couple weeks history of sinus pressure and left ear discomfort.  She has mild cough but no documented fevers.  She continues to smoke 1/2 pack of cigarettes per day.        Review of Systems   Review of Systems   Constitutional: Negative.    HENT:  Positive for congestion and hearing loss.    Respiratory:  Positive for cough.    Cardiovascular: Negative.    Gastrointestinal: Negative.        Active Problem List     Patient Active Problem List   Diagnosis    Primary hypertension    Depression with anxiety    Leiomyoma of uterus    Impaired fasting glucose    History of ST elevation myocardial infarction (STEMI)    Tobacco abuse counseling    Coronary artery disease involving native coronary artery of native heart without angina pectoris    Iron deficiency anemia    Pelvic inflammatory disease (PID)    Upper respiratory tract infection       Past Medical History:  Past Medical History:   Diagnosis Date    HTN (hypertension)     Mitral valve regurgitation     Myocardial infarction (HCC)     Preeclampsia        Past Surgical History:  Past Surgical History:   Procedure Laterality Date    CARDIAC CATHETERIZATION       SECTION      CORONARY STENT PLACEMENT      EYE  SURGERY      TUBAL LIGATION         Family History:  Family History   Problem Relation Age of Onset    Hypertension Father     Kidney disease Paternal Grandfather        Social History:  Social History     Socioeconomic History    Marital status: /Civil Union     Spouse name: Not on file    Number of children: Not on file    Years of education: Not on file    Highest education level: Not on file   Occupational History    Not on file   Tobacco Use    Smoking status: Some Days     Types: Cigarettes    Smokeless tobacco: Never   Vaping Use    Vaping status: Former   Substance and Sexual Activity    Alcohol use: Not Currently     Comment: rarely    Drug use: No    Sexual activity: Not Currently   Other Topics Concern    Not on file   Social History Narrative    Not on file     Social Determinants of Health     Financial Resource Strain: Not on file   Food Insecurity: No Food Insecurity (5/24/2021)    Hunger Vital Sign     Worried About Running Out of Food in the Last Year: Never true     Ran Out of Food in the Last Year: Never true   Transportation Needs: No Transportation Needs (5/24/2021)    PRAPARE - Transportation     Lack of Transportation (Medical): No     Lack of Transportation (Non-Medical): No   Physical Activity: Not on file   Stress: Not on file   Social Connections: Not on file   Intimate Partner Violence: Not on file   Housing Stability: Not on file       Objective     Vitals:    06/27/24 1449   BP: 130/80   Pulse: 77   Resp: 18   Temp: 98 °F (36.7 °C)   SpO2: 99%     Wt Readings from Last 3 Encounters:   02/08/24 85.5 kg (188 lb 6.4 oz)   01/10/24 84.2 kg (185 lb 9.6 oz)   10/27/23 83.9 kg (185 lb)       Physical Exam  Constitutional:       General: She is not in acute distress.     Appearance: Normal appearance. She is not ill-appearing.   HENT:      Head: Normocephalic and atraumatic.      Right Ear: Tympanic membrane, ear canal and external ear normal. There is no impacted cerumen.      Left Ear:  "Tympanic membrane, ear canal and external ear normal. There is no impacted cerumen.   Eyes:      General:         Right eye: No discharge.         Left eye: No discharge.      Extraocular Movements: Extraocular movements intact.      Conjunctiva/sclera: Conjunctivae normal.      Pupils: Pupils are equal, round, and reactive to light.   Neck:      Vascular: No carotid bruit.   Cardiovascular:      Rate and Rhythm: Normal rate and regular rhythm.      Heart sounds: Normal heart sounds. No murmur heard.  Pulmonary:      Effort: Pulmonary effort is normal.      Breath sounds: Normal breath sounds. No wheezing, rhonchi or rales.   Musculoskeletal:      Right lower leg: No edema.      Left lower leg: No edema.   Lymphadenopathy:      Cervical: No cervical adenopathy.   Skin:     Findings: No rash.   Neurological:      General: No focal deficit present.      Mental Status: She is alert and oriented to person, place, and time.      Cranial Nerves: No cranial nerve deficit.   Psychiatric:         Mood and Affect: Mood normal.         Behavior: Behavior normal.         Thought Content: Thought content normal.         Judgment: Judgment normal.         Pertinent Laboratory/Diagnostic Studies:  Lab Results   Component Value Date    GLUCOSE 110 07/02/2021    BUN 11 11/07/2023    CREATININE 0.54 (L) 11/07/2023    CALCIUM 9.2 11/07/2023     10/27/2015    K 3.7 11/07/2023    CO2 24 11/07/2023     11/07/2023     Lab Results   Component Value Date    ALT 19 06/29/2023    AST 16 06/29/2023    ALKPHOS 91 06/29/2023    BILITOT 0.24 07/09/2015       Lab Results   Component Value Date    WBC 7.26 11/07/2023    HGB 14.9 11/07/2023    HCT 44.2 11/07/2023    MCV 89 11/07/2023     11/07/2023       No results found for: \"TSH\"    Lab Results   Component Value Date    CHOL 194 07/09/2015     Lab Results   Component Value Date    TRIG 111 06/29/2023     Lab Results   Component Value Date    HDL 42 (L) 06/29/2023     Lab Results "   Component Value Date    LDLCALC 70 06/29/2023     Lab Results   Component Value Date    HGBA1C 5.4 10/16/2019       Results for orders placed or performed in visit on 11/07/23   CBC and Platelet   Result Value Ref Range    WBC 7.26 4.31 - 10.16 Thousand/uL    RBC 4.98 3.81 - 5.12 Million/uL    Hemoglobin 14.9 11.5 - 15.4 g/dL    Hematocrit 44.2 34.8 - 46.1 %    MCV 89 82 - 98 fL    MCH 29.9 26.8 - 34.3 pg    MCHC 33.7 31.4 - 37.4 g/dL    RDW 15.0 11.6 - 15.1 %    Platelets 240 149 - 390 Thousands/uL    MPV 10.5 8.9 - 12.7 fL   Basic metabolic panel   Result Value Ref Range    Sodium 141 135 - 147 mmol/L    Potassium 3.7 3.5 - 5.3 mmol/L    Chloride 108 96 - 108 mmol/L    CO2 24 21 - 32 mmol/L    ANION GAP 9 mmol/L    BUN 11 5 - 25 mg/dL    Creatinine 0.54 (L) 0.60 - 1.30 mg/dL    Glucose 101 65 - 140 mg/dL    Calcium 9.2 8.4 - 10.2 mg/dL    eGFR 111 ml/min/1.73sq m       No orders of the defined types were placed in this encounter.      ALLERGIES:  Allergies   Allergen Reactions    Iron Polysaccharide      GI upset, constipation, hemorrhoid flare, heartburn       Current Medications     Current Outpatient Medications   Medication Sig Dispense Refill    amLODIPine (NORVASC) 5 mg tablet Take 1 tablet (5 mg total) by mouth daily 90 tablet 1    aspirin (ECOTRIN LOW STRENGTH) 81 mg EC tablet Take 1 tablet (81 mg total) by mouth daily 30 tablet 6    atorvastatin (LIPITOR) 80 mg tablet Take 1 tablet (80 mg total) by mouth daily with dinner 90 tablet 1    azithromycin (ZITHROMAX) 200 mg/5 mL suspension 2.5 tsp po day # 1, 1.25 tsp po days #2-5 37.5 mL 0    benzonatate (TESSALON) 200 MG capsule Take 1 capsule (200 mg total) by mouth 3 (three) times a day as needed for cough 20 capsule 0    hydroCHLOROthiazide 12.5 mg tablet Take 2 tablets (25 mg total) by mouth daily 180 tablet 3    HYDROcodone-acetaminophen (NORCO) 5-325 mg per tablet Take 1-2 tablets by mouth every 6 (six) hours as needed      irbesartan (AVAPRO) 300 mg  tablet Take 1 tablet (300 mg total) by mouth daily 90 tablet 3    isosorbide mononitrate (IMDUR) 30 mg 24 hr tablet take 1 tablet by mouth once daily 90 tablet 1    methylPREDNISolone 4 MG tablet therapy pack Use as directed on package 21 each 0    methylPREDNISolone 4 MG tablet therapy pack Use as directed on package 21 each 0    metoprolol succinate (TOPROL-XL) 50 mg 24 hr tablet take 1 tablet by mouth once daily 90 tablet 1    sertraline (ZOLOFT) 100 mg tablet Take 1 tablet (100 mg total) by mouth 2 (two) times a day 180 tablet 3     No current facility-administered medications for this visit.         Health Maintenance     Health Maintenance   Topic Date Due    Hepatitis C Screening  Never done    Breast Cancer Screening: Mammogram  Never done    Colorectal Cancer Screening  Never done    Cervical Cancer Screening  01/24/2021    Pneumococcal Vaccine: Pediatrics (0 to 5 Years) and At-Risk Patients (6 to 64 Years) (2 of 2 - PCV) 09/30/2021    COVID-19 Vaccine (2 - 2023-24 season) 09/01/2023    Annual Physical  05/31/2024    Influenza Vaccine (Season Ended) 09/01/2024    DTaP,Tdap,and Td Vaccines (2 - Td or Tdap) 07/12/2024    Depression Screening  01/10/2025    Zoster Vaccine (1 of 2) 09/05/2025    RSV Vaccine Age 60+ Years (1 - 1-dose 60+ series) 09/05/2035    HIV Screening  Completed    RSV Vaccine age 0-20 Months  Aged Out    HIB Vaccine  Aged Out    IPV Vaccine  Aged Out    Hepatitis A Vaccine  Aged Out    Meningococcal ACWY Vaccine  Aged Out    HPV Vaccine  Aged Out     Immunization History   Administered Date(s) Administered    COVID-19 J&J ([employer]) vaccine 0.5 mL 12/05/2021    Influenza, seasonal, injectable 10/16/2015    Influenza, seasonal, injectable, preservative free 10/29/2018    Pneumococcal Polysaccharide PPV23 09/30/2020    Tdap 07/12/2014    Tuberculin Skin Test-PPD Intradermal 09/10/2012       Gabriele Cruz MD

## 2024-06-27 NOTE — ASSESSMENT & PLAN NOTE
URI.  Patient was suspicious URI versus sinus symptoms.  She was given prescription for Medrol Dosepak to take as directed as well as Zithromax to take for 5 days as directed.  Patient will call if symptoms persist after medication completed

## 2024-07-29 DIAGNOSIS — I10 PRIMARY HYPERTENSION: ICD-10-CM

## 2024-07-29 RX ORDER — AMLODIPINE BESYLATE 5 MG/1
5 TABLET ORAL DAILY
Qty: 90 TABLET | Refills: 1 | Status: SHIPPED | OUTPATIENT
Start: 2024-07-29

## 2024-09-26 ENCOUNTER — TELEPHONE (OUTPATIENT)
Age: 49
End: 2024-09-26

## 2024-09-26 DIAGNOSIS — Z01.419 ENCOUNTER FOR ANNUAL ROUTINE GYNECOLOGICAL EXAMINATION: ICD-10-CM

## 2024-09-26 NOTE — TELEPHONE ENCOUNTER
Pt requested sertraline (ZOLOFT) 100 mg tablet and it was sent to [facility]. Pt stated she no longer uses [facility], only [id number] - [place] - [address] [place] [address] [phone]     Please change pharmacy for this rx as pt only has one dose for 9/26. Pt would appreciate a return call when sent to pharmacy,   pt # [phone].

## 2024-09-27 RX ORDER — SERTRALINE HYDROCHLORIDE 100 MG/1
100 TABLET, FILM COATED ORAL 2 TIMES DAILY
Qty: 200 TABLET | Refills: 3 | Status: SHIPPED | OUTPATIENT
Start: 2024-09-27

## 2024-09-27 NOTE — TELEPHONE ENCOUNTER
Prescription sent.  Patient should be scheduling her annual physical/checkup.  She is past due routine blood work which was ordered back in February 2024.  Thank you

## 2024-10-31 DIAGNOSIS — I21.21 STEMI INVOLVING LEFT CIRCUMFLEX CORONARY ARTERY (HCC): ICD-10-CM

## 2024-10-31 RX ORDER — ATORVASTATIN CALCIUM 80 MG/1
80 TABLET, FILM COATED ORAL
Qty: 30 TABLET | Refills: 0 | Status: SHIPPED | OUTPATIENT
Start: 2024-10-31

## 2024-11-16 ENCOUNTER — APPOINTMENT (OUTPATIENT)
Dept: LAB | Facility: CLINIC | Age: 49
End: 2024-11-16
Payer: COMMERCIAL

## 2024-11-16 DIAGNOSIS — I25.10 CORONARY ARTERY DISEASE INVOLVING NATIVE CORONARY ARTERY OF NATIVE HEART WITHOUT ANGINA PECTORIS: ICD-10-CM

## 2024-11-16 LAB
ALBUMIN SERPL BCG-MCNC: 4.3 G/DL (ref 3.5–5)
ALP SERPL-CCNC: 62 U/L (ref 34–104)
ALT SERPL W P-5'-P-CCNC: 17 U/L (ref 7–52)
ANION GAP SERPL CALCULATED.3IONS-SCNC: 9 MMOL/L (ref 4–13)
AST SERPL W P-5'-P-CCNC: 25 U/L (ref 13–39)
BILIRUB SERPL-MCNC: 0.45 MG/DL (ref 0.2–1)
BUN SERPL-MCNC: 19 MG/DL (ref 5–25)
CALCIUM SERPL-MCNC: 8.9 MG/DL (ref 8.4–10.2)
CHLORIDE SERPL-SCNC: 101 MMOL/L (ref 96–108)
CHOLEST SERPL-MCNC: 110 MG/DL (ref ?–200)
CO2 SERPL-SCNC: 30 MMOL/L (ref 21–32)
CREAT SERPL-MCNC: 0.54 MG/DL (ref 0.6–1.3)
ERYTHROCYTE [DISTWIDTH] IN BLOOD BY AUTOMATED COUNT: 16.9 % (ref 11.6–15.1)
GFR SERPL CREATININE-BSD FRML MDRD: 111 ML/MIN/1.73SQ M
GLUCOSE P FAST SERPL-MCNC: 85 MG/DL (ref 65–99)
HCT VFR BLD AUTO: 42.4 % (ref 34.8–46.1)
HDLC SERPL-MCNC: 35 MG/DL
HGB BLD-MCNC: 14.1 G/DL (ref 11.5–15.4)
LDLC SERPL CALC-MCNC: 58 MG/DL (ref 0–100)
MCH RBC QN AUTO: 29.3 PG (ref 26.8–34.3)
MCHC RBC AUTO-ENTMCNC: 33.3 G/DL (ref 31.4–37.4)
MCV RBC AUTO: 88 FL (ref 82–98)
PLATELET # BLD AUTO: 203 THOUSANDS/UL (ref 149–390)
PMV BLD AUTO: 10.4 FL (ref 8.9–12.7)
POTASSIUM SERPL-SCNC: 3.1 MMOL/L (ref 3.5–5.3)
PROT SERPL-MCNC: 7.2 G/DL (ref 6.4–8.4)
RBC # BLD AUTO: 4.81 MILLION/UL (ref 3.81–5.12)
SODIUM SERPL-SCNC: 140 MMOL/L (ref 135–147)
TRIGL SERPL-MCNC: 87 MG/DL (ref ?–150)
TSH SERPL DL<=0.05 MIU/L-ACNC: 1.51 UIU/ML (ref 0.45–4.5)
WBC # BLD AUTO: 6.01 THOUSAND/UL (ref 4.31–10.16)

## 2024-11-16 PROCEDURE — 85027 COMPLETE CBC AUTOMATED: CPT

## 2024-11-16 PROCEDURE — 36415 COLL VENOUS BLD VENIPUNCTURE: CPT

## 2024-11-16 PROCEDURE — 80053 COMPREHEN METABOLIC PANEL: CPT

## 2024-11-16 PROCEDURE — 84443 ASSAY THYROID STIM HORMONE: CPT

## 2024-11-16 PROCEDURE — 80061 LIPID PANEL: CPT

## 2024-11-18 ENCOUNTER — TELEPHONE (OUTPATIENT)
Dept: FAMILY MEDICINE CLINIC | Facility: CLINIC | Age: 49
End: 2024-11-18

## 2024-11-18 DIAGNOSIS — E87.6 HYPOKALEMIA: Primary | Chronic | ICD-10-CM

## 2024-11-18 RX ORDER — POTASSIUM CHLORIDE 750 MG/1
20 TABLET, EXTENDED RELEASE ORAL DAILY
Qty: 60 TABLET | Refills: 3 | Status: SHIPPED | OUTPATIENT
Start: 2024-11-18

## 2024-11-18 NOTE — TELEPHONE ENCOUNTER
Please contact the patient.  I received results of blood work.  All blood work is normal aside from low level of potassium.  I recommend to start taking potassium tablet every day.  She should repeat BMP in 10 to 14 days, nonfasting.  Rx sent.  Blood work order placed.  Thank you

## 2024-11-19 NOTE — TELEPHONE ENCOUNTER
"Pt called back to get her results. I relayed Dr. James message: \"Please contact the patient.  I received results of blood work.  All blood work is normal aside from low level of potassium.  I recommend to start taking potassium tablet every day.  She should repeat BMP in 10 to 14 days, nonfasting.  Rx sent.  Blood work order placed.  Thank you\"    Patient was agreeable and had no questions.    Thanks    "

## 2024-12-13 DIAGNOSIS — I21.21 STEMI INVOLVING LEFT CIRCUMFLEX CORONARY ARTERY (HCC): ICD-10-CM

## 2024-12-13 RX ORDER — ATORVASTATIN CALCIUM 80 MG/1
80 TABLET, FILM COATED ORAL
Qty: 30 TABLET | Refills: 5 | Status: SHIPPED | OUTPATIENT
Start: 2024-12-13

## 2024-12-16 DIAGNOSIS — I21.21 STEMI INVOLVING LEFT CIRCUMFLEX CORONARY ARTERY (HCC): ICD-10-CM

## 2024-12-16 DIAGNOSIS — I25.118 CORONARY ARTERY DISEASE INVOLVING NATIVE CORONARY ARTERY OF NATIVE HEART WITH OTHER FORM OF ANGINA PECTORIS (HCC): ICD-10-CM

## 2024-12-18 ENCOUNTER — ANESTHESIA EVENT (OUTPATIENT)
Dept: ANESTHESIOLOGY | Facility: HOSPITAL | Age: 49
End: 2024-12-18

## 2024-12-18 ENCOUNTER — ANESTHESIA (OUTPATIENT)
Dept: ANESTHESIOLOGY | Facility: HOSPITAL | Age: 49
End: 2024-12-18

## 2024-12-18 RX ORDER — ISOSORBIDE MONONITRATE 30 MG/1
30 TABLET, EXTENDED RELEASE ORAL DAILY
Qty: 90 TABLET | Refills: 1 | Status: SHIPPED | OUTPATIENT
Start: 2024-12-18

## 2024-12-18 RX ORDER — METOPROLOL SUCCINATE 50 MG/1
50 TABLET, EXTENDED RELEASE ORAL DAILY
Qty: 90 TABLET | Refills: 1 | Status: SHIPPED | OUTPATIENT
Start: 2024-12-18

## 2024-12-20 ENCOUNTER — HOSPITAL ENCOUNTER (OUTPATIENT)
Dept: GASTROENTEROLOGY | Facility: AMBULARY SURGERY CENTER | Age: 49
Setting detail: OUTPATIENT SURGERY
End: 2024-12-20
Attending: SURGERY
Payer: COMMERCIAL

## 2024-12-20 ENCOUNTER — ANESTHESIA (OUTPATIENT)
Dept: GASTROENTEROLOGY | Facility: AMBULARY SURGERY CENTER | Age: 49
End: 2024-12-20
Payer: COMMERCIAL

## 2024-12-20 VITALS
RESPIRATION RATE: 15 BRPM | OXYGEN SATURATION: 99 % | HEART RATE: 68 BPM | SYSTOLIC BLOOD PRESSURE: 141 MMHG | DIASTOLIC BLOOD PRESSURE: 86 MMHG | TEMPERATURE: 97.5 F

## 2024-12-20 DIAGNOSIS — Z12.11 ENCOUNTER FOR SCREENING FOR MALIGNANT NEOPLASM OF COLON: ICD-10-CM

## 2024-12-20 PROCEDURE — 88305 TISSUE EXAM BY PATHOLOGIST: CPT | Performed by: PATHOLOGY

## 2024-12-20 RX ORDER — LIDOCAINE HYDROCHLORIDE 10 MG/ML
INJECTION, SOLUTION EPIDURAL; INFILTRATION; INTRACAUDAL; PERINEURAL AS NEEDED
Status: DISCONTINUED | OUTPATIENT
Start: 2024-12-20 | End: 2024-12-20

## 2024-12-20 RX ORDER — PROPOFOL 10 MG/ML
INJECTION, EMULSION INTRAVENOUS AS NEEDED
Status: DISCONTINUED | OUTPATIENT
Start: 2024-12-20 | End: 2024-12-20

## 2024-12-20 RX ORDER — SODIUM CHLORIDE, SODIUM LACTATE, POTASSIUM CHLORIDE, CALCIUM CHLORIDE 600; 310; 30; 20 MG/100ML; MG/100ML; MG/100ML; MG/100ML
125 INJECTION, SOLUTION INTRAVENOUS CONTINUOUS
Status: DISCONTINUED | OUTPATIENT
Start: 2024-12-20 | End: 2024-12-24 | Stop reason: HOSPADM

## 2024-12-20 RX ORDER — LABETALOL HYDROCHLORIDE 5 MG/ML
INJECTION, SOLUTION INTRAVENOUS AS NEEDED
Status: DISCONTINUED | OUTPATIENT
Start: 2024-12-20 | End: 2024-12-20

## 2024-12-20 RX ORDER — PROPOFOL 10 MG/ML
INJECTION, EMULSION INTRAVENOUS CONTINUOUS PRN
Status: DISCONTINUED | OUTPATIENT
Start: 2024-12-20 | End: 2024-12-20

## 2024-12-20 RX ORDER — ESMOLOL HYDROCHLORIDE 10 MG/ML
INJECTION INTRAVENOUS AS NEEDED
Status: DISCONTINUED | OUTPATIENT
Start: 2024-12-20 | End: 2024-12-20

## 2024-12-20 RX ADMIN — ESMOLOL HYDROCHLORIDE 50 MG: 10 INJECTION, SOLUTION INTRAVENOUS at 14:38

## 2024-12-20 RX ADMIN — SODIUM CHLORIDE, SODIUM LACTATE, POTASSIUM CHLORIDE, AND CALCIUM CHLORIDE: .6; .31; .03; .02 INJECTION, SOLUTION INTRAVENOUS at 14:28

## 2024-12-20 RX ADMIN — PROPOFOL 120 MCG/KG/MIN: 10 INJECTION, EMULSION INTRAVENOUS at 14:31

## 2024-12-20 RX ADMIN — LIDOCAINE HYDROCHLORIDE 50 MG: 10 INJECTION, SOLUTION EPIDURAL; INFILTRATION; INTRACAUDAL at 14:31

## 2024-12-20 RX ADMIN — PROPOFOL 50 MG: 10 INJECTION, EMULSION INTRAVENOUS at 14:39

## 2024-12-20 RX ADMIN — PROPOFOL 100 MG: 10 INJECTION, EMULSION INTRAVENOUS at 14:31

## 2024-12-20 RX ADMIN — LABETALOL HYDROCHLORIDE 10 MG: 5 INJECTION, SOLUTION INTRAVENOUS at 14:44

## 2024-12-20 NOTE — ANESTHESIA PREPROCEDURE EVALUATION
Procedure:  COLONOSCOPY    ECHO (07/03/21):  LEFT VENTRICLE:  Systolic function was vigorous. Ejection fraction was estimated to be 70 %.  There were no regional wall motion abnormalities.  There was mild concentric hypertrophy.     RIGHT VENTRICLE:  The size was normal.  Systolic function was normal.     IVC, HEPATIC VEINS:  The inferior vena cava was normal in size and course.  Respirophasic changes were exaggerated.    Relevant Problems   ANESTHESIA (within normal limits)      CARDIO   (+) Coronary artery disease involving native coronary artery of native heart without angina pectoris   (+) Primary hypertension      ENDO (within normal limits)      GI/HEPATIC (within normal limits)      /RENAL (within normal limits)      GYN (within normal limits)      HEMATOLOGY   (+) Iron deficiency anemia      MUSCULOSKELETAL (within normal limits)      NEURO/PSYCH   (+) Depression with anxiety      PULMONARY   (+) Upper respiratory tract infection      Other   (+) Tobacco abuse counseling      Lab Results   Component Value Date    WBC 6.01 11/16/2024    HGB 14.1 11/16/2024    HCT 42.4 11/16/2024    MCV 88 11/16/2024     11/16/2024     Lab Results   Component Value Date     10/27/2015    SODIUM 140 11/16/2024    K 3.1 (L) 11/16/2024     11/16/2024    CO2 30 11/16/2024    ANIONGAP 8 10/27/2015    AGAP 9 11/16/2024    BUN 19 11/16/2024    CREATININE 0.54 (L) 11/16/2024    GLUC 101 11/07/2023    GLUF 85 11/16/2024    CALCIUM 8.9 11/16/2024    AST 25 11/16/2024    ALT 17 11/16/2024    ALKPHOS 62 11/16/2024    PROT 7.7 07/09/2015    TP 7.2 11/16/2024    BILITOT 0.24 07/09/2015    TBILI 0.45 11/16/2024    EGFR 111 11/16/2024     Lab Results   Component Value Date    PTT 27 12/28/2019     Lab Results   Component Value Date    INR 0.88 07/02/2021    INR 0.94 12/28/2019    INR 0.99 12/12/2019    PROTIME 11.9 07/02/2021    PROTIME 12.2 12/28/2019    PROTIME 12.7 12/12/2019       Physical Exam    Airway    Mallampati  score: II  TM Distance: >3 FB  Neck ROM: full     Dental       Cardiovascular  Rhythm: regular, Rate: normal, Cardiovascular exam normal    Pulmonary  Pulmonary exam normal Breath sounds clear to auscultation    Other Findings  post-pubertal.      Anesthesia Plan  ASA Score- 2     Anesthesia Type- IV sedation with anesthesia with ASA Monitors.         Additional Monitors:     Airway Plan:            Plan Factors-Exercise tolerance (METS): >4 METS.    Chart reviewed. EKG reviewed.  Existing labs reviewed. Patient summary reviewed.          Obstructive sleep apnea risk education given perioperatively.        Induction- intravenous.    Postoperative Plan-         Informed Consent- Anesthetic plan and risks discussed with patient.  I personally reviewed this patient with the CRNA. Discussed and agreed on the Anesthesia Plan with the CRNA..

## 2024-12-20 NOTE — ANESTHESIA POSTPROCEDURE EVALUATION
Post-Op Assessment Note    CV Status:  Stable  Pain Score: 0    Pain management: adequate       Mental Status:  Awake   Hydration Status:  Stable   PONV Controlled:  Controlled   Airway Patency:  Patent     Post Op Vitals Reviewed: Yes    No anethesia notable event occurred.    Staff: CRNA           Last Filed PACU Vitals:  Vitals Value Taken Time   Temp     Pulse 79    /84    Resp 19    SpO2 100        Modified Therese:  Activity: 2 (12/20/2024  1:23 PM)  Respiration: 2 (12/20/2024  1:23 PM)  Circulation: 2 (12/20/2024  1:23 PM)  Consciousness: 2 (12/20/2024  1:23 PM)  Oxygen Saturation: 2 (12/20/2024  1:23 PM)  Modified Therese Score: 10 (12/20/2024  1:23 PM)

## 2024-12-20 NOTE — H&P
History and Physical - [facility]  Elyse Lagunas 49 y.o. female MRN: [id number]                  HPI: Elyse Lagunas is a 49 y.o. year old female who presents  for colonoscopy      REVIEW OF SYSTEMS: Per the HPI, and otherwise unremarkable.    Historical Information   Past Medical History:   Diagnosis Date    HTN (hypertension)     Mitral valve regurgitation     Myocardial infarction (HCC)     Preeclampsia      Past Surgical History:   Procedure Laterality Date    CARDIAC CATHETERIZATION       SECTION      CORONARY STENT PLACEMENT      EYE SURGERY      TUBAL LIGATION       Social History   Social History     Substance and Sexual Activity   Alcohol Use Not Currently    Comment: rarely     Social History     Substance and Sexual Activity   Drug Use No     Social History     Tobacco Use   Smoking Status Some Days    Types: Cigarettes   Smokeless Tobacco Never     Family History   Problem Relation Age of Onset    Hypertension Father     Kidney disease Paternal Grandfather        Meds/Allergies       Current Outpatient Medications:     amLODIPine (NORVASC) 5 mg tablet    atorvastatin (LIPITOR) 80 mg tablet    hydroCHLOROthiazide 12.5 mg tablet    irbesartan (AVAPRO) 300 mg tablet    isosorbide mononitrate (IMDUR) 30 mg 24 hr tablet    metoprolol succinate (TOPROL-XL) 50 mg 24 hr tablet    potassium chloride (Klor-Con M10) 10 mEq tablet    sertraline (ZOLOFT) 100 mg tablet    aspirin (ECOTRIN LOW STRENGTH) 81 mg EC tablet    HYDROcodone-acetaminophen (NORCO) 5-325 mg per tablet    Current Facility-Administered Medications:     lactated ringers infusion, 125 mL/hr, Intravenous, Continuous    Allergies   Allergen Reactions    Iron Polysaccharide      GI upset, constipation, hemorrhoid flare, heartburn       Objective     /90   Pulse 77   Temp (!) 97.1 °F (36.2 °C) (Temporal)   Resp 18   LMP 06/15/2024   SpO2 99%       PHYSICAL EXAM    Gen: NAD  Head: NCAT  CV: RRR  CHEST:  Clear  ABD: soft, NT/ND  EXT: no edema      ASSESSMENT/PLAN:  This is a 49 y.o. year old female here for colonoscopy, and she is stable and optimized for her procedure.

## 2024-12-20 NOTE — INTERVAL H&P NOTE
H&P reviewed. After examining the patient I find no changes in the patients condition since the H&P had been written.    Vitals:    12/20/24 1320   BP: 165/90   Pulse: 77   Resp: 18   Temp: (!) 97.1 °F (36.2 °C)   SpO2: 99%

## 2024-12-27 PROCEDURE — 88305 TISSUE EXAM BY PATHOLOGIST: CPT | Performed by: PATHOLOGY

## 2025-01-28 ENCOUNTER — APPOINTMENT (OUTPATIENT)
Dept: LAB | Facility: CLINIC | Age: 50
End: 2025-01-28
Payer: COMMERCIAL

## 2025-01-28 DIAGNOSIS — E87.6 HYPOKALEMIA: Chronic | ICD-10-CM

## 2025-01-28 LAB
ANION GAP SERPL CALCULATED.3IONS-SCNC: 11 MMOL/L (ref 4–13)
BUN SERPL-MCNC: 14 MG/DL (ref 5–25)
CALCIUM SERPL-MCNC: 9.3 MG/DL (ref 8.4–10.2)
CHLORIDE SERPL-SCNC: 105 MMOL/L (ref 96–108)
CO2 SERPL-SCNC: 24 MMOL/L (ref 21–32)
CREAT SERPL-MCNC: 0.56 MG/DL (ref 0.6–1.3)
GFR SERPL CREATININE-BSD FRML MDRD: 109 ML/MIN/1.73SQ M
GLUCOSE SERPL-MCNC: 101 MG/DL (ref 65–140)
POTASSIUM SERPL-SCNC: 3.4 MMOL/L (ref 3.5–5.3)
SODIUM SERPL-SCNC: 140 MMOL/L (ref 135–147)

## 2025-01-28 PROCEDURE — 36415 COLL VENOUS BLD VENIPUNCTURE: CPT

## 2025-01-28 PROCEDURE — 80048 BASIC METABOLIC PNL TOTAL CA: CPT

## 2025-01-29 ENCOUNTER — OFFICE VISIT (OUTPATIENT)
Dept: FAMILY MEDICINE CLINIC | Facility: CLINIC | Age: 50
End: 2025-01-29
Payer: COMMERCIAL

## 2025-01-29 VITALS
BODY MASS INDEX: 33.17 KG/M2 | RESPIRATION RATE: 16 BRPM | DIASTOLIC BLOOD PRESSURE: 84 MMHG | OXYGEN SATURATION: 100 % | TEMPERATURE: 97.5 F | HEART RATE: 58 BPM | SYSTOLIC BLOOD PRESSURE: 120 MMHG | HEIGHT: 63 IN | WEIGHT: 187.2 LBS

## 2025-01-29 DIAGNOSIS — E87.6 HYPOKALEMIA: Chronic | ICD-10-CM

## 2025-01-29 DIAGNOSIS — I10 PRIMARY HYPERTENSION: ICD-10-CM

## 2025-01-29 DIAGNOSIS — Z12.31 ENCOUNTER FOR SCREENING MAMMOGRAM FOR BREAST CANCER: ICD-10-CM

## 2025-01-29 DIAGNOSIS — F17.210 CIGARETTE NICOTINE DEPENDENCE WITHOUT COMPLICATION: ICD-10-CM

## 2025-01-29 DIAGNOSIS — F17.210 TOBACCO DEPENDENCE DUE TO CIGARETTES: ICD-10-CM

## 2025-01-29 DIAGNOSIS — Z00.00 ENCOUNTER FOR WELLNESS EXAMINATION IN ADULT: Primary | ICD-10-CM

## 2025-01-29 DIAGNOSIS — F41.8 DEPRESSION WITH ANXIETY: Chronic | ICD-10-CM

## 2025-01-29 DIAGNOSIS — I25.10 CORONARY ARTERY DISEASE INVOLVING NATIVE CORONARY ARTERY OF NATIVE HEART WITHOUT ANGINA PECTORIS: ICD-10-CM

## 2025-01-29 PROBLEM — J06.9 UPPER RESPIRATORY TRACT INFECTION: Status: RESOLVED | Noted: 2024-06-27 | Resolved: 2025-01-29

## 2025-01-29 PROCEDURE — 99396 PREV VISIT EST AGE 40-64: CPT | Performed by: FAMILY MEDICINE

## 2025-01-29 PROCEDURE — 93000 ELECTROCARDIOGRAM COMPLETE: CPT | Performed by: FAMILY MEDICINE

## 2025-01-29 PROCEDURE — 99214 OFFICE O/P EST MOD 30 MIN: CPT | Performed by: FAMILY MEDICINE

## 2025-01-29 RX ORDER — POTASSIUM CHLORIDE 750 MG/1
30 TABLET, EXTENDED RELEASE ORAL DAILY
Qty: 90 TABLET | Refills: 5 | Status: SHIPPED | OUTPATIENT
Start: 2025-01-29

## 2025-01-29 RX ORDER — VARENICLINE TARTRATE 1 MG/1
1 TABLET, FILM COATED ORAL 2 TIMES DAILY
Qty: 60 TABLET | Refills: 4 | Status: SHIPPED | OUTPATIENT
Start: 2025-01-29

## 2025-01-29 RX ORDER — METOPROLOL SUCCINATE 50 MG/1
50 TABLET, EXTENDED RELEASE ORAL DAILY
Qty: 100 TABLET | Refills: 1 | Status: SHIPPED | OUTPATIENT
Start: 2025-01-29

## 2025-01-29 RX ORDER — VARENICLINE TARTRATE 0.5 (11)-1
KIT ORAL
Qty: 53 EACH | Refills: 0 | Status: SHIPPED | OUTPATIENT
Start: 2025-01-29

## 2025-01-29 NOTE — PATIENT INSTRUCTIONS
Mammogram -[phone]  New dose of potassium 3 tablets once a day, please eat banana daily, potassium rich diet  Follow-up with cardiology  Please repeat nonfasting blood work to follow on potassium level in another 6 to 8 weeks  Gynecology  Quit tobacco!! Chantix Rx sent

## 2025-01-29 NOTE — ASSESSMENT & PLAN NOTE
ASA, Atorvastatin,metoprolol  EKG performed in the office today stable.  No angina or dyspnea  Patient presents with symptoms of costochondritis, reproducible tenderness with palpation of left anterior chest wall.  Patient is due for follow-up with [facility]ology  Orders:  •  Comprehensive metabolic panel; Future  •  POCT ECG

## 2025-01-29 NOTE — ASSESSMENT & PLAN NOTE
Pressure is well-controlled.  Continue current regimen including amlodipine, irbesartan, metoprolol and HCTZ.  Orders:  •  metoprolol succinate (TOPROL-XL) 50 mg 24 hr tablet; Take 1 tablet (50 mg total) by mouth daily

## 2025-01-29 NOTE — PROGRESS NOTES
Adult Annual Physical  Name: Elyse Lagunas      : 1975      MRN: [id number]  Encounter Provider: Janeth Olvera MD  Encounter Date: 2025   Encounter department: [facility]    Assessment & Plan  Encounter for wellness examination in adult         Hypokalemia  Potassium level 3.4.  Currently on potassium chloride 20 mill equivalents daily, increase dose to 30 mill equivalents daily.  Repeat labs  Orders:  •  potassium chloride (Klor-Con M10) 10 mEq tablet; Take 3 tablets (30 mEq total) by mouth daily  •  Comprehensive metabolic panel; Future    Primary hypertension  Pressure is well-controlled.  Continue current regimen including amlodipine, irbesartan, metoprolol and HCTZ.  Orders:  •  metoprolol succinate (TOPROL-XL) 50 mg 24 hr tablet; Take 1 tablet (50 mg total) by mouth daily    Coronary artery disease involving native coronary artery of native heart without angina pectoris  ASA, Atorvastatin,metoprolol  EKG performed in the office today stable.  No angina or dyspnea  Patient presents with symptoms of costochondritis, reproducible tenderness with palpation of left anterior chest wall.  Patient is due for follow-up with [facility] cardiology  Orders:  •  Comprehensive metabolic panel; Future  •  POCT ECG    Depression with anxiety  Continue Zoloft, symptoms are well-controlled       Encounter for screening mammogram for breast cancer    Orders:  •  Mammo screening bilateral w 3d and cad; Future    Tobacco dependence due to cigarettes  Strongly advised patient to quit.  Smokes half a pack daily, successfully quit in the past. She is highly motivated.  Recommend Chantix       Cigarette nicotine dependence without complication    Orders:  •  Varenicline Tartrate, Starter, (Chantix Starting Month Oliver) 0.5 MG X 11 & 1 MG X 42 TBPK; 0.5 mg once daily for 3 days then 0.5mg twice daily for days 4-7 then 1 mg twice daily  •  varenicline (Chantix Continuing Month Oliver) 1 mg tablet;  Take 1 tablet (1 mg total) by mouth 2 (two) times a day    Immunizations and preventive care screenings were discussed with patient today. Appropriate education was printed on patient's after visit summary.    Counseling:  Exercise: the importance of regular exercise/physical activity was discussed. Recommend exercise 3-5 times per week for at least 30 minutes.       Depression Screening and Follow-up Plan: Patient was screened for depression during today's encounter. They screened negative with a PHQ-9 score of 2.      Patient Instructions   Mammogram -[phone]  New dose of potassium 3 tablets once a day, please eat banana daily, potassium rich diet  Follow-up with cardiology  Please repeat nonfasting blood work to follow on potassium level in another 6 to 8 weeks  Gynecology  Quit tobacco!! Chantix Rx sent     Call card pt needs  tosee   high  risk  History of Present Illness     Adult Annual Physical:  Patient presents for annual physical. Well exam and follow-up chronic medical conditions.  Blood pressure has been well-controlled, patient monitors on a regular basis, /72  at work today  Notes of recent blood work discussed.  Potassium level has improved but not from 3.1-3.4, she is on potassium chloride 20 mill equivalents daily.  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  Reports left mid chest wall and left lateral chest wall pain, pinpoints with 1 finger at each site.  No angina, pressure, tightness or dyspnea.  Patient is up-to-date with colonoscopy, December 2024.  She is past due GYN exam and mammogram.  Menses are very irregular and light.  She is in the midst of her period now, previous menstrual cycle was in June.    Smoker, about half a pack daily, patient was able to quit tobacco in the past, used Chantix with good results.     .     Diet and Physical Activity:  - Diet/Nutrition: well balanced diet.  - Exercise: walking.    Depression  "Screening:    - PHQ-9 Score: 2    General Health:  - Sleep: sleeps well.  - Hearing: decreased hearing bilateral ears.  - Vision: vision problems and wears glasses. [employer]    /GYN Health:  - Follows with GYN: yes.   - Menopause: perimenopausal.   - Last menstrual cycle: 1/29/2025.     Review of Systems   Constitutional: Negative.    HENT: Negative.     Eyes: Negative.    Respiratory: Negative.     Cardiovascular: Negative.    Gastrointestinal: Negative.    Endocrine: Negative.    Genitourinary: Negative.    Musculoskeletal: Negative.    Skin: Negative.    Allergic/Immunologic: Negative.    Neurological: Negative.    Hematological: Negative.    Psychiatric/Behavioral: Negative.       Medical History Reviewed by provider this encounter:  Tobacco  Allergies  Meds  Problems  Med Hx  Surg Hx  Fam Hx     .    Objective   /84 (BP Location: Left arm, Patient Position: Sitting, Cuff Size: Standard)   Pulse 58   Temp 97.5 °F (36.4 °C) (Temporal)   Resp 16   Ht 5' 3\" (1.6 m)   Wt 84.9 kg (187 lb 3.2 oz)   SpO2 100%   BMI 33.16 kg/m²     Physical Exam  Vitals and nursing note reviewed.   Constitutional:       General: She is not in acute distress.     Appearance: Normal appearance. She is well-developed. She is not ill-appearing.   HENT:      Head: Normocephalic and atraumatic.   Eyes:      General: No scleral icterus.     Conjunctiva/sclera: Conjunctivae normal.   Neck:      Vascular: No carotid bruit.   Cardiovascular:      Rate and Rhythm: Normal rate and regular rhythm.      Heart sounds: Normal heart sounds. No murmur heard.  Pulmonary:      Effort: Pulmonary effort is normal. No respiratory distress.      Breath sounds: Normal breath sounds.   Chest:       Abdominal:      General: Bowel sounds are normal. There is no abdominal bruit.      Palpations: Abdomen is soft.      Tenderness: There is no abdominal tenderness.   Musculoskeletal:      Cervical back: Neck supple. No rigidity. "      Right lower leg: No edema.      Left lower leg: No edema.   Skin:     General: Skin is warm.   Neurological:      General: No focal deficit present.      Mental Status: She is alert and oriented to person, place, and time.   Psychiatric:         Mood and Affect: Mood normal.         Behavior: Behavior normal.         Thought Content: Thought content normal.       Results for orders placed or performed in visit on 01/29/25   POCT ECG    Impression    Sinus bradycardia, 55 bpm, no acute ischemic changes.  Nonspecific ST changes.  No changes from previous studies in July 2023 in October 2023

## 2025-02-02 ENCOUNTER — TELEPHONE (OUTPATIENT)
Dept: FAMILY MEDICINE CLINIC | Facility: CLINIC | Age: 50
End: 2025-02-02

## 2025-02-02 PROBLEM — F17.210 TOBACCO DEPENDENCE DUE TO CIGARETTES: Status: ACTIVE | Noted: 2025-02-02

## 2025-02-02 PROBLEM — Z71.6 TOBACCO ABUSE COUNSELING: Chronic | Status: RESOLVED | Noted: 2019-12-30 | Resolved: 2025-02-02

## 2025-02-02 NOTE — ASSESSMENT & PLAN NOTE
Potassium level 3.4.  Currently on potassium chloride 20 mill equivalents daily, increase dose to 30 mill equivalents daily.  Repeat labs  Orders:  •  potassium chloride (Klor-Con M10) 10 mEq tablet; Take 3 tablets (30 mEq total) by mouth daily  •  Comprehensive metabolic panel; Future

## 2025-02-02 NOTE — ASSESSMENT & PLAN NOTE
Strongly advised patient to quit.  Smokes half a pack daily, successfully quit in the past. She is highly motivated.  Recommend Chantix

## 2025-02-02 NOTE — TELEPHONE ENCOUNTER
Please contact [facility] cardiology.  Patient is established with Dr. Jenkins, to routine checkup.  I would greatly appreciate if they could reach out to the patient and schedule follow-up with him at physician's earliest availability.  Thank you

## 2025-03-13 DIAGNOSIS — I10 PRIMARY HYPERTENSION: ICD-10-CM

## 2025-03-14 RX ORDER — HYDROCHLOROTHIAZIDE 12.5 MG/1
25 TABLET ORAL DAILY
Qty: 180 TABLET | Refills: 1 | Status: SHIPPED | OUTPATIENT
Start: 2025-03-14

## 2025-03-14 RX ORDER — AMLODIPINE BESYLATE 5 MG/1
5 TABLET ORAL DAILY
Qty: 90 TABLET | Refills: 1 | Status: SHIPPED | OUTPATIENT
Start: 2025-03-14

## 2025-03-24 ENCOUNTER — OFFICE VISIT (OUTPATIENT)
Dept: CARDIOLOGY CLINIC | Facility: CLINIC | Age: 50
End: 2025-03-24
Payer: COMMERCIAL

## 2025-03-24 VITALS
TEMPERATURE: 97.7 F | DIASTOLIC BLOOD PRESSURE: 86 MMHG | BODY MASS INDEX: 33.66 KG/M2 | WEIGHT: 190 LBS | OXYGEN SATURATION: 98 % | HEART RATE: 61 BPM | SYSTOLIC BLOOD PRESSURE: 138 MMHG | HEIGHT: 63 IN

## 2025-03-24 DIAGNOSIS — E78.5 DYSLIPIDEMIA: ICD-10-CM

## 2025-03-24 DIAGNOSIS — I25.118 CORONARY ARTERY DISEASE INVOLVING NATIVE CORONARY ARTERY OF NATIVE HEART WITH OTHER FORM OF ANGINA PECTORIS (HCC): ICD-10-CM

## 2025-03-24 DIAGNOSIS — F17.210 TOBACCO DEPENDENCE DUE TO CIGARETTES: ICD-10-CM

## 2025-03-24 DIAGNOSIS — E87.6 HYPOKALEMIA: ICD-10-CM

## 2025-03-24 DIAGNOSIS — I10 PRIMARY HYPERTENSION: ICD-10-CM

## 2025-03-24 DIAGNOSIS — I25.10 CORONARY ARTERY DISEASE INVOLVING NATIVE CORONARY ARTERY OF NATIVE HEART WITHOUT ANGINA PECTORIS: ICD-10-CM

## 2025-03-24 PROCEDURE — 99214 OFFICE O/P EST MOD 30 MIN: CPT

## 2025-03-24 RX ORDER — NITROGLYCERIN 0.4 MG/1
0.4 TABLET SUBLINGUAL
Qty: 30 TABLET | Refills: 1 | Status: SHIPPED | OUTPATIENT
Start: 2025-03-24

## 2025-03-24 NOTE — Clinical Note
Hello,   I saw this patient in the office. Discussed possibility switching her hydrochlorothiazide to spirolactone to help with her hypokalemia but wanted to check with you prior.   Thanks, LUKE Bryant

## 2025-03-24 NOTE — PROGRESS NOTES
Elyse MITCHELL Lagunas  1975  [id number]  [facility] [address] [facility] [id number] [phone]    1. Coronary artery disease involving native coronary artery of native heart with other form of angina pectoris (HCC)  nitroglycerin (NITROSTAT) 0.4 mg SL tablet      2. Primary hypertension        3. Coronary artery disease involving native coronary artery of native heart without angina pectoris        4. Tobacco dependence due to cigarettes        5. Hypokalemia        6. Dyslipidemia            Summary/Discussion:  Coronary artery disease:  - with prior MI in 2019 with multivessel PCI   - repeat cardiac catheterization (6/2021): pLAD 50% stenosis, mLAD 10% stenosis at the site of prior stent,  of 1st diagonal, distal circumflex 50% stenosis, 1st obtuse marginal 25% stenosis at the proximal margin of the stented segment, mild RCA 40% stenosis-- being medically managed   - limited echo (7/2021): LVEF 70%, no WMA, mild concentric hypertrophy, no significant valvular disease  - currently without symptoms of angina   - noted to have symptoms of costochondritis, reproducible tenderness with palpitations of left chest wall during her office visit with PCP on 1/29/2025. Continued left chest wall tenderness with palpitation on exam today. 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  PRN SL nitroglycerin prescribed today     Hypertension:  - 138/86  - continue present medication regimen  - lifestyle modification   - close blood pressure monitoring     Dyslipidemia:  - Lipid Profile:    Latest Reference Range & Units 11/16/24 07:16   Cholesterol See Comment mg/dL 110   Triglycerides See Comment mg/dL 87   HDL >=50 mg/dL 35 (L)   LDL Calculated 0 - 100 mg/dL 58   - continue atorvastatin 80 mg daily    - encouraged low cholesterol, mediterranean diet, and annual lipid follow up    Hypokalemia:  - potassium 3.4  -  currently on oral potassium per PCP who is following  - consider switching her hydrochlorothiazide which is prescribed by her PCP to spironolactone 25 mg daily. Will discuss further with her PCP    Tobacco use:  - complete smoking cessation encouraged    Interval History: Elyse Lagunas is a 49 y.o. year old female with history mentioned in problem list who presents to the office today for routine follow up.     Since her last office visit she has been overall feeling well from a cardiac standpoint. She denies any anginal symptoms. She denies lower extremity edema, orthopnea, and PND. She denies lightheadedness, dizziness, and syncope. She denies palpitations.  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       No further cardiac testing indicated this time.    She will RTO in 1 year with Dr. Jenkins or sooner if necessary. She will call with any concerns.       Medical Problems       Problem List       Primary hypertension    Depression with anxiety (Chronic)    Leiomyoma of uterus    Impaired fasting glucose    Hypokalemia    History of ST elevation myocardial infarction (STEMI)    Coronary artery disease involving native coronary artery of native heart without angina pectoris    Overview Signed 1/12/2020  3:19 PM by Janeth Olvera MD   Emergent heart catheterization for ischemic EKG changes with he rise in troponins concerning for ACS 12/2019  She had 3 stents placed in total, DESx2 to OM1 and DESx1 to the mid LAD.  Patient is on regimen of aspirin, Brilinta, metoprolol and Lipitor 80 mg daily    TTE showed an EF of 50% with some lateral wall abnormalities consistent where her ischemia was on heart catheterization.  Patient is starting cardiac rehabilitation  Patient is following up with [facility]           Iron deficiency anemia    Pelvic inflammatory disease (PID)    Tobacco dependence due to cigarettes        Past Medical History:   Diagnosis Date    HTN (hypertension)      Mitral valve regurgitation     Myocardial infarction (HCC)     Preeclampsia      Social History     Socioeconomic History    Marital status: /Civil Union     Spouse name: Not on file    Number of children: Not on file    Years of education: Not on file    Highest education level: Not on file   Occupational History    Not on file   Tobacco Use    Smoking status: Some Days     Types: Cigarettes    Smokeless tobacco: Never   Vaping Use    Vaping status: Former   Substance and Sexual Activity    Alcohol use: Not Currently     Comment: rarely    Drug use: No    Sexual activity: Not Currently   Other Topics Concern    Not on file   Social History Narrative    Not on file     Social Drivers of Health     Financial Resource Strain: Not on file   Food Insecurity: No Food Insecurity (2021)    Hunger Vital Sign     Worried About Running Out of Food in the Last Year: Never true     Ran Out of Food in the Last Year: Never true   Transportation Needs: No Transportation Needs (2021)    PRAPARE - Transportation     Lack of Transportation (Medical): No     Lack of Transportation (Non-Medical): No   Physical Activity: Not on file   Stress: Not on file   Social Connections: Not on file   Intimate Partner Violence: Not on file   Housing Stability: Not on file      Family History   Problem Relation Age of Onset    Hypertension Father     Kidney disease Paternal Grandfather      Past Surgical History:   Procedure Laterality Date    CARDIAC CATHETERIZATION       SECTION      CORONARY STENT PLACEMENT      EYE SURGERY      TUBAL LIGATION         Current Outpatient Medications:     amLODIPine (NORVASC) 5 mg tablet, take 1 tablet by mouth once daily, Disp: 90 tablet, Rfl: 1    aspirin (ECOTRIN LOW STRENGTH) 81 mg EC tablet, Take 1 tablet (81 mg total) by mouth daily, Disp: 30 tablet, Rfl: 6    atorvastatin (LIPITOR) 80 mg tablet, take 1 tablet by mouth once daily with dinner, Disp: 30 tablet, Rfl: 5     hydroCHLOROthiazide 12.5 mg tablet, take 2 tablets by mouth once daily, Disp: 180 tablet, Rfl: 1    irbesartan (AVAPRO) 300 mg tablet, Take 1 tablet (300 mg total) by mouth daily, Disp: 90 tablet, Rfl: 3    isosorbide mononitrate (IMDUR) 30 mg 24 hr tablet, take 1 tablet by mouth once daily, Disp: 90 tablet, Rfl: 1    metoprolol succinate (TOPROL-XL) 50 mg 24 hr tablet, Take 1 tablet (50 mg total) by mouth daily, Disp: 100 tablet, Rfl: 1    nitroglycerin (NITROSTAT) 0.4 mg SL tablet, Place 1 tablet (0.4 mg total) under the tongue every 5 (five) minutes as needed for chest pain, Disp: 30 tablet, Rfl: 1    potassium chloride (Klor-Con M10) 10 mEq tablet, Take 3 tablets (30 mEq total) by mouth daily, Disp: 90 tablet, Rfl: 5    sertraline (ZOLOFT) 100 mg tablet, Take 1 tablet (100 mg total) by mouth 2 (two) times a day, Disp: 200 tablet, Rfl: 3    HYDROcodone-acetaminophen (NORCO) 5-325 mg per tablet, Take 1-2 tablets by mouth every 6 (six) hours as needed (Patient not taking: Reported on 3/24/2025), Disp: , Rfl:     varenicline (Chantix Continuing Month Oliver) 1 mg tablet, Take 1 tablet (1 mg total) by mouth 2 (two) times a day (Patient not taking: Reported on 3/24/2025), Disp: 60 tablet, Rfl: 4    Varenicline Tartrate, Starter, (Chantix Starting Month Oliver) 0.5 MG X 11 & 1 MG X 42 TBPK, 0.5 mg once daily for 3 days then 0.5mg twice daily for days 4-7 then 1 mg twice daily (Patient not taking: Reported on 3/24/2025), Disp: 53 each, Rfl: 0  Allergies   Allergen Reactions    Iron Polysaccharide      GI upset, constipation, hemorrhoid flare, heartburn       Labs:     Chemistry        Component Value Date/Time     10/27/2015 1700    K 3.4 (L) 01/28/2025 1240    K 3.6 10/27/2015 1700     01/28/2025 1240     10/27/2015 1700    CO2 24 01/28/2025 1240    CO2 26 07/02/2021 2218    BUN 14 01/28/2025 1240    BUN 9 10/27/2015 1700    CREATININE 0.56 (L) 01/28/2025 1240    CREATININE 0.50 (L) 10/27/2015 1700       "  Component Value Date/Time    CALCIUM 9.3 01/28/2025 1240    CALCIUM 8.8 10/27/2015 1700    ALKPHOS 62 11/16/2024 0716    ALKPHOS 94 07/09/2015 0959    AST 25 11/16/2024 0716    AST 16 07/09/2015 0959    ALT 17 11/16/2024 0716    ALT 20 07/09/2015 0959    BILITOT 0.24 07/09/2015 0959            Lab Results   Component Value Date    CHOL 194 07/09/2015     Lab Results   Component Value Date    HDL 35 (L) 11/16/2024    HDL 42 (L) 06/29/2023    HDL 49 07/03/2021     Lab Results   Component Value Date    LDLCALC 58 11/16/2024    LDLCALC 70 06/29/2023    LDLCALC 58 07/03/2021     Lab Results   Component Value Date    TRIG 87 11/16/2024    TRIG 111 06/29/2023    TRIG 80 07/03/2021     No results found for: \"CHOLHDL\"    Imaging: No results found.    ECG:  n/a    Review of Systems   All other systems reviewed and are negative.      Vitals:    03/24/25 0938   BP: 138/86   Pulse: 61   Temp: 97.7 °F (36.5 °C)   SpO2: 98%     Vitals:    03/24/25 0938   Weight: 86.2 kg (190 lb)     Height: 5' 3\" (160 cm)   Body mass index is 33.66 kg/m².    Physical Exam  Vitals and nursing note reviewed.   Constitutional:       General: She is not in acute distress.     Appearance: Normal appearance. She is not ill-appearing.   HENT:      Head: Normocephalic.      Nose: Nose normal.      Mouth/Throat:      Mouth: Mucous membranes are moist.      Pharynx: Oropharynx is clear.   Cardiovascular:      Rate and Rhythm: Normal rate and regular rhythm.      Heart sounds: No murmur heard.  Pulmonary:      Breath sounds: Decreased breath sounds present.   Musculoskeletal:         General: Normal range of motion.      Cervical back: Normal range of motion.      Right lower leg: No edema.      Left lower leg: No edema.   Skin:     General: Skin is warm and dry.   Neurological:      Mental Status: She is alert and oriented to person, place, and time.   Psychiatric:         Mood and Affect: Mood normal.         Behavior: Behavior normal.        "

## 2025-04-04 ENCOUNTER — APPOINTMENT (OUTPATIENT)
Dept: LAB | Facility: CLINIC | Age: 50
End: 2025-04-04
Payer: COMMERCIAL

## 2025-04-04 ENCOUNTER — TELEPHONE (OUTPATIENT)
Dept: FAMILY MEDICINE CLINIC | Facility: CLINIC | Age: 50
End: 2025-04-04

## 2025-04-04 DIAGNOSIS — E87.6 HYPOKALEMIA: Chronic | ICD-10-CM

## 2025-04-04 DIAGNOSIS — I25.10 CORONARY ARTERY DISEASE INVOLVING NATIVE CORONARY ARTERY OF NATIVE HEART WITHOUT ANGINA PECTORIS: ICD-10-CM

## 2025-04-04 DIAGNOSIS — E87.6 HYPOKALEMIA: Primary | ICD-10-CM

## 2025-04-04 DIAGNOSIS — I10 PRIMARY HYPERTENSION: ICD-10-CM

## 2025-04-04 LAB
ALBUMIN SERPL BCG-MCNC: 4.3 G/DL (ref 3.5–5)
ALP SERPL-CCNC: 68 U/L (ref 34–104)
ALT SERPL W P-5'-P-CCNC: 14 U/L (ref 7–52)
ANION GAP SERPL CALCULATED.3IONS-SCNC: 10 MMOL/L (ref 4–13)
AST SERPL W P-5'-P-CCNC: 17 U/L (ref 13–39)
BILIRUB SERPL-MCNC: 0.42 MG/DL (ref 0.2–1)
BUN SERPL-MCNC: 20 MG/DL (ref 5–25)
CALCIUM SERPL-MCNC: 9.1 MG/DL (ref 8.4–10.2)
CHLORIDE SERPL-SCNC: 101 MMOL/L (ref 96–108)
CO2 SERPL-SCNC: 25 MMOL/L (ref 21–32)
CREAT SERPL-MCNC: 0.63 MG/DL (ref 0.6–1.3)
GFR SERPL CREATININE-BSD FRML MDRD: 105 ML/MIN/1.73SQ M
GLUCOSE SERPL-MCNC: 88 MG/DL (ref 65–140)
POTASSIUM SERPL-SCNC: 3.3 MMOL/L (ref 3.5–5.3)
PROT SERPL-MCNC: 7.4 G/DL (ref 6.4–8.4)
SODIUM SERPL-SCNC: 136 MMOL/L (ref 135–147)

## 2025-04-04 PROCEDURE — 80053 COMPREHEN METABOLIC PANEL: CPT

## 2025-04-04 PROCEDURE — 36415 COLL VENOUS BLD VENIPUNCTURE: CPT

## 2025-04-04 RX ORDER — SPIRONOLACTONE 25 MG/1
25 TABLET ORAL DAILY
Qty: 90 TABLET | Refills: 1 | Status: SHIPPED | OUTPATIENT
Start: 2025-04-04

## 2025-04-04 NOTE — TELEPHONE ENCOUNTER
Reviewed cardiology consult.  Spoke with patient tonight regarding today's blood work results.  Persistent hypokalemia of 3.3.  Current regimen includes HCTZ 25 mg daily and potassium chloride 30 mill equivalents daily.    Plan  Stop HCT  Stop potassium chloride  Start spironolactone 25 mg daily  Repeat BMP in 10 days  Patient understands instructions and agrees

## 2025-05-02 ENCOUNTER — APPOINTMENT (OUTPATIENT)
Dept: LAB | Facility: CLINIC | Age: 50
End: 2025-05-02
Attending: FAMILY MEDICINE
Payer: COMMERCIAL

## 2025-05-02 ENCOUNTER — TELEPHONE (OUTPATIENT)
Dept: FAMILY MEDICINE CLINIC | Facility: CLINIC | Age: 50
End: 2025-05-02

## 2025-05-02 DIAGNOSIS — R53.83 OTHER FATIGUE: ICD-10-CM

## 2025-05-02 DIAGNOSIS — E87.6 HYPOKALEMIA: Primary | ICD-10-CM

## 2025-05-02 DIAGNOSIS — E87.6 HYPOKALEMIA: ICD-10-CM

## 2025-05-02 DIAGNOSIS — I25.10 CORONARY ARTERY DISEASE INVOLVING NATIVE CORONARY ARTERY OF NATIVE HEART WITHOUT ANGINA PECTORIS: ICD-10-CM

## 2025-05-02 LAB
ANION GAP SERPL CALCULATED.3IONS-SCNC: 11 MMOL/L (ref 4–13)
BUN SERPL-MCNC: 20 MG/DL (ref 5–25)
CALCIUM SERPL-MCNC: 9.4 MG/DL (ref 8.4–10.2)
CHLORIDE SERPL-SCNC: 103 MMOL/L (ref 96–108)
CO2 SERPL-SCNC: 25 MMOL/L (ref 21–32)
CREAT SERPL-MCNC: 0.6 MG/DL (ref 0.6–1.3)
GFR SERPL CREATININE-BSD FRML MDRD: 107 ML/MIN/1.73SQ M
GLUCOSE SERPL-MCNC: 119 MG/DL (ref 65–140)
POTASSIUM SERPL-SCNC: 3.7 MMOL/L (ref 3.5–5.3)
SODIUM SERPL-SCNC: 139 MMOL/L (ref 135–147)

## 2025-05-02 PROCEDURE — 36415 COLL VENOUS BLD VENIPUNCTURE: CPT

## 2025-05-02 PROCEDURE — 80048 BASIC METABOLIC PNL TOTAL CA: CPT

## 2025-05-02 NOTE — TELEPHONE ENCOUNTER
Please contact the patient.  I am happy to report her BMP/potassium level was normal.  Please continue same daily medications.  I recommend to repeat complete blood work panel in 3 to 4 months.  Orders placed.  Thank you

## 2025-05-08 DIAGNOSIS — I10 BENIGN ESSENTIAL HYPERTENSION: ICD-10-CM

## 2025-05-09 RX ORDER — IRBESARTAN 300 MG/1
300 TABLET ORAL DAILY
Qty: 90 TABLET | Refills: 1 | Status: SHIPPED | OUTPATIENT
Start: 2025-05-09 | End: 2025-05-15 | Stop reason: SDUPTHER

## 2025-05-15 DIAGNOSIS — I10 BENIGN ESSENTIAL HYPERTENSION: ICD-10-CM

## 2025-05-15 RX ORDER — IRBESARTAN 300 MG/1
300 TABLET ORAL DAILY
Qty: 90 TABLET | Refills: 1 | Status: SHIPPED | OUTPATIENT
Start: 2025-05-15

## 2025-05-15 NOTE — TELEPHONE ENCOUNTER
Requesting meds ROSLYN gallegos does not have order from 5/9/25  Resending irbesartan (AVAPRO) 300 mg

## 2025-08-07 DIAGNOSIS — I10 BENIGN ESSENTIAL HYPERTENSION: ICD-10-CM

## 2025-08-08 RX ORDER — IRBESARTAN 300 MG/1
300 TABLET ORAL DAILY
Qty: 90 TABLET | Refills: 0 | Status: SHIPPED | OUTPATIENT
Start: 2025-08-08